# Patient Record
Sex: FEMALE | Race: WHITE | Employment: OTHER | URBAN - NONMETROPOLITAN AREA
[De-identification: names, ages, dates, MRNs, and addresses within clinical notes are randomized per-mention and may not be internally consistent; named-entity substitution may affect disease eponyms.]

---

## 2017-05-19 ENCOUNTER — HOSPITAL ENCOUNTER (OUTPATIENT)
Dept: GENERAL RADIOLOGY | Age: 62
Discharge: HOME | End: 2017-05-19
Payer: MEDICARE

## 2017-05-19 DIAGNOSIS — M25.561 ARTHRALGIA OF BOTH KNEES: ICD-10-CM

## 2017-05-19 DIAGNOSIS — M25.562 ARTHRALGIA OF BOTH KNEES: ICD-10-CM

## 2017-05-19 PROCEDURE — 73562 X-RAY EXAM OF KNEE 3: CPT

## 2020-11-04 ENCOUNTER — HOSPITAL ENCOUNTER (OUTPATIENT)
Dept: NON INVASIVE DIAGNOSTICS | Age: 65
Discharge: HOME | End: 2020-11-04
Payer: MEDICARE

## 2020-11-04 PROCEDURE — 93229 REMOTE 30 DAY ECG TECH SUPP: CPT

## 2022-06-14 ENCOUNTER — HOSPITAL ENCOUNTER (INPATIENT)
Age: 67
LOS: 16 days | Discharge: NURSING, REHAB OR LONG-TERM CARE | DRG: 853 | End: 2022-07-01
Attending: EMERGENCY MEDICINE | Admitting: INTERNAL MEDICINE
Payer: MEDICARE

## 2022-06-14 ENCOUNTER — APPOINTMENT (OUTPATIENT)
Dept: GENERAL RADIOLOGY | Age: 67
DRG: 853 | End: 2022-06-14
Payer: MEDICARE

## 2022-06-14 DIAGNOSIS — G89.4 CHRONIC PAIN SYNDROME: ICD-10-CM

## 2022-06-14 DIAGNOSIS — A41.9 SEPTICEMIA (HCC): ICD-10-CM

## 2022-06-14 DIAGNOSIS — J44.9 OSA AND COPD OVERLAP SYNDROME (HCC): ICD-10-CM

## 2022-06-14 DIAGNOSIS — R65.20 SEPSIS WITH ACUTE RENAL FAILURE AND TUBULAR NECROSIS WITHOUT SEPTIC SHOCK, DUE TO UNSPECIFIED ORGANISM (HCC): ICD-10-CM

## 2022-06-14 DIAGNOSIS — R10.30 LOWER ABDOMINAL PAIN: ICD-10-CM

## 2022-06-14 DIAGNOSIS — A41.9 SEPSIS WITH ACUTE RENAL FAILURE AND TUBULAR NECROSIS WITHOUT SEPTIC SHOCK, DUE TO UNSPECIFIED ORGANISM (HCC): ICD-10-CM

## 2022-06-14 DIAGNOSIS — N17.0 SEPSIS WITH ACUTE RENAL FAILURE AND TUBULAR NECROSIS WITHOUT SEPTIC SHOCK, DUE TO UNSPECIFIED ORGANISM (HCC): ICD-10-CM

## 2022-06-14 DIAGNOSIS — N17.9 ACUTE KIDNEY INJURY (HCC): Primary | ICD-10-CM

## 2022-06-14 DIAGNOSIS — N20.0 KIDNEY STONE: ICD-10-CM

## 2022-06-14 DIAGNOSIS — G47.33 OSA AND COPD OVERLAP SYNDROME (HCC): ICD-10-CM

## 2022-06-14 DIAGNOSIS — M15.9 PRIMARY OSTEOARTHRITIS INVOLVING MULTIPLE JOINTS: ICD-10-CM

## 2022-06-14 DIAGNOSIS — N30.01 ACUTE CYSTITIS WITH HEMATURIA: ICD-10-CM

## 2022-06-14 DIAGNOSIS — N20.1 URETERAL CALCULUS: ICD-10-CM

## 2022-06-14 DIAGNOSIS — N39.0 URINARY TRACT INFECTION WITHOUT HEMATURIA, SITE UNSPECIFIED: ICD-10-CM

## 2022-06-14 DIAGNOSIS — N20.1 CALCULUS OF DISTAL LEFT URETER: ICD-10-CM

## 2022-06-14 DIAGNOSIS — N13.9 OBSTRUCTIVE UROPATHY: ICD-10-CM

## 2022-06-14 LAB
ALBUMIN SERPL-MCNC: 3.1 G/DL (ref 3.5–5.2)
ALP BLD-CCNC: 68 U/L (ref 35–104)
ALT SERPL-CCNC: 42 U/L (ref 5–33)
ANION GAP SERPL CALCULATED.3IONS-SCNC: 17 MMOL/L (ref 7–19)
AST SERPL-CCNC: 50 U/L (ref 5–32)
BANDED NEUTROPHILS RELATIVE PERCENT: 9 % (ref 0–5)
BASOPHILS ABSOLUTE: 0 K/UL (ref 0–0.2)
BASOPHILS RELATIVE PERCENT: 0 % (ref 0–1)
BILIRUB SERPL-MCNC: 1.1 MG/DL (ref 0.2–1.2)
BUN BLDV-MCNC: 47 MG/DL (ref 8–23)
CALCIUM SERPL-MCNC: 8.5 MG/DL (ref 8.8–10.2)
CHLORIDE BLD-SCNC: 94 MMOL/L (ref 98–111)
CO2: 25 MMOL/L (ref 22–29)
CREAT SERPL-MCNC: 3 MG/DL (ref 0.5–0.9)
EOSINOPHILS ABSOLUTE: 0.17 K/UL (ref 0–0.6)
EOSINOPHILS RELATIVE PERCENT: 1 % (ref 0–5)
GFR AFRICAN AMERICAN: 19
GFR NON-AFRICAN AMERICAN: 16
GLUCOSE BLD-MCNC: 228 MG/DL (ref 74–109)
HCT VFR BLD CALC: 49.3 % (ref 37–47)
HEMOGLOBIN: 15.9 G/DL (ref 12–16)
IMMATURE GRANULOCYTES #: 1.6 K/UL
LACTIC ACID: 4.1 MMOL/L (ref 0.5–1.9)
LIPASE: 9 U/L (ref 13–60)
LYMPHOCYTES ABSOLUTE: 0.9 K/UL (ref 1.1–4.5)
LYMPHOCYTES RELATIVE PERCENT: 5 % (ref 20–40)
MCH RBC QN AUTO: 30.2 PG (ref 27–31)
MCHC RBC AUTO-ENTMCNC: 32.3 G/DL (ref 33–37)
MCV RBC AUTO: 93.7 FL (ref 81–99)
METAMYELOCYTES RELATIVE PERCENT: 2 %
MONOCYTES ABSOLUTE: 1 K/UL (ref 0–0.9)
MONOCYTES RELATIVE PERCENT: 6 % (ref 0–10)
NEUTROPHILS ABSOLUTE: 15 K/UL (ref 1.5–7.5)
NEUTROPHILS RELATIVE PERCENT: 77 % (ref 50–65)
PDW BLD-RTO: 14.2 % (ref 11.5–14.5)
PLATELET # BLD: 84 K/UL (ref 130–400)
PLATELET SLIDE REVIEW: ABNORMAL
PMV BLD AUTO: 13.1 FL (ref 9.4–12.3)
POTASSIUM REFLEX MAGNESIUM: 4.2 MMOL/L (ref 3.5–5)
PRO-BNP: 6541 PG/ML (ref 0–900)
RBC # BLD: 5.26 M/UL (ref 4.2–5.4)
RBC # BLD: NORMAL 10*6/UL
REASON FOR REJECTION: NORMAL
REJECTED TEST: NORMAL
SARS-COV-2, NAAT: NOT DETECTED
SODIUM BLD-SCNC: 136 MMOL/L (ref 136–145)
TOTAL PROTEIN: 6 G/DL (ref 6.6–8.7)
TROPONIN: 0.01 NG/ML (ref 0–0.03)
WBC # BLD: 17 K/UL (ref 4.8–10.8)

## 2022-06-14 PROCEDURE — 71045 X-RAY EXAM CHEST 1 VIEW: CPT

## 2022-06-14 PROCEDURE — 96361 HYDRATE IV INFUSION ADD-ON: CPT

## 2022-06-14 PROCEDURE — 2580000003 HC RX 258: Performed by: PHYSICIAN ASSISTANT

## 2022-06-14 PROCEDURE — 36415 COLL VENOUS BLD VENIPUNCTURE: CPT

## 2022-06-14 PROCEDURE — 87150 DNA/RNA AMPLIFIED PROBE: CPT

## 2022-06-14 PROCEDURE — 85025 COMPLETE CBC W/AUTO DIFF WBC: CPT

## 2022-06-14 PROCEDURE — 87186 SC STD MICRODIL/AGAR DIL: CPT

## 2022-06-14 PROCEDURE — 84484 ASSAY OF TROPONIN QUANT: CPT

## 2022-06-14 PROCEDURE — 83880 ASSAY OF NATRIURETIC PEPTIDE: CPT

## 2022-06-14 PROCEDURE — 83605 ASSAY OF LACTIC ACID: CPT

## 2022-06-14 PROCEDURE — 71045 X-RAY EXAM CHEST 1 VIEW: CPT | Performed by: RADIOLOGY

## 2022-06-14 PROCEDURE — 87040 BLOOD CULTURE FOR BACTERIA: CPT

## 2022-06-14 RX ORDER — 0.9 % SODIUM CHLORIDE 0.9 %
1000 INTRAVENOUS SOLUTION INTRAVENOUS ONCE
Status: COMPLETED | OUTPATIENT
Start: 2022-06-14 | End: 2022-06-14

## 2022-06-14 RX ADMIN — SODIUM CHLORIDE 1000 ML: 9 INJECTION, SOLUTION INTRAVENOUS at 22:42

## 2022-06-14 ASSESSMENT — ENCOUNTER SYMPTOMS
COLOR CHANGE: 0
SHORTNESS OF BREATH: 0
PHOTOPHOBIA: 0
BACK PAIN: 0
ABDOMINAL DISTENTION: 0
APNEA: 0
NAUSEA: 0
RHINORRHEA: 0
COUGH: 0
ABDOMINAL PAIN: 1
SORE THROAT: 0
EYE PAIN: 0
EYE DISCHARGE: 0

## 2022-06-15 ENCOUNTER — APPOINTMENT (OUTPATIENT)
Dept: GENERAL RADIOLOGY | Age: 67
DRG: 853 | End: 2022-06-15
Payer: MEDICARE

## 2022-06-15 ENCOUNTER — ANESTHESIA EVENT (OUTPATIENT)
Dept: OPERATING ROOM | Age: 67
DRG: 853 | End: 2022-06-15
Payer: MEDICARE

## 2022-06-15 ENCOUNTER — APPOINTMENT (OUTPATIENT)
Dept: CT IMAGING | Age: 67
DRG: 853 | End: 2022-06-15
Payer: MEDICARE

## 2022-06-15 ENCOUNTER — ANESTHESIA (OUTPATIENT)
Dept: OPERATING ROOM | Age: 67
DRG: 853 | End: 2022-06-15
Payer: MEDICARE

## 2022-06-15 PROBLEM — N17.9 AKI (ACUTE KIDNEY INJURY) (HCC): Status: ACTIVE | Noted: 2022-06-15

## 2022-06-15 PROBLEM — R65.20 SEPSIS WITH ACUTE RENAL FAILURE (HCC): Status: ACTIVE | Noted: 2022-06-15

## 2022-06-15 PROBLEM — N20.1 LEFT URETERAL CALCULUS: Status: ACTIVE | Noted: 2022-06-15

## 2022-06-15 PROBLEM — A41.9 SEPSIS WITH ACUTE RENAL FAILURE (HCC): Status: ACTIVE | Noted: 2022-06-15

## 2022-06-15 PROBLEM — G47.33 OSA AND COPD OVERLAP SYNDROME (HCC): Status: ACTIVE | Noted: 2022-06-15

## 2022-06-15 PROBLEM — J44.9 OSA AND COPD OVERLAP SYNDROME (HCC): Status: ACTIVE | Noted: 2022-06-15

## 2022-06-15 PROBLEM — N13.9 OBSTRUCTIVE UROPATHY: Status: ACTIVE | Noted: 2022-06-15

## 2022-06-15 PROBLEM — N20.0 RENAL CALCULUS, BILATERAL: Status: ACTIVE | Noted: 2022-06-15

## 2022-06-15 PROBLEM — N17.9 SEPSIS WITH ACUTE RENAL FAILURE (HCC): Status: ACTIVE | Noted: 2022-06-15

## 2022-06-15 PROBLEM — E86.0 DEHYDRATION: Status: ACTIVE | Noted: 2022-06-15

## 2022-06-15 PROBLEM — E11.65 HYPERGLYCEMIA DUE TO DIABETES MELLITUS (HCC): Status: ACTIVE | Noted: 2022-06-15

## 2022-06-15 PROBLEM — N11.1 OBSTRUCTIVE PYELONEPHRITIS: Status: ACTIVE | Noted: 2022-06-15

## 2022-06-15 PROBLEM — M19.90 DEGENERATIVE JOINT DISEASE: Status: ACTIVE | Noted: 2022-06-15

## 2022-06-15 PROBLEM — I10 HYPERTENSION: Status: ACTIVE | Noted: 2022-06-15

## 2022-06-15 LAB
ACINETOBACTER CALCOAC BAUMANNII COMPLEX BY PCR: NOT DETECTED
ALBUMIN SERPL-MCNC: 2.8 G/DL (ref 3.5–5.2)
ALP BLD-CCNC: 73 U/L (ref 35–104)
ALT SERPL-CCNC: 39 U/L (ref 5–33)
AMORPHOUS: ABNORMAL /HPF
ANION GAP SERPL CALCULATED.3IONS-SCNC: 18 MMOL/L (ref 7–19)
AST SERPL-CCNC: 48 U/L (ref 5–32)
BACTERIA: ABNORMAL /HPF
BACTEROIDES FRAGILIS BY PCR: NOT DETECTED
BANDED NEUTROPHILS RELATIVE PERCENT: 15 % (ref 0–5)
BASOPHILS ABSOLUTE: 0 K/UL (ref 0–0.2)
BASOPHILS RELATIVE PERCENT: 0 % (ref 0–1)
BILIRUB SERPL-MCNC: 1 MG/DL (ref 0.2–1.2)
BILIRUBIN URINE: ABNORMAL
BLOOD, URINE: ABNORMAL
BUN BLDV-MCNC: 52 MG/DL (ref 8–23)
CALCIUM SERPL-MCNC: 8 MG/DL (ref 8.8–10.2)
CANDIDA ALBICANS BY PCR: NOT DETECTED
CANDIDA AURIS BY PCR: NOT DETECTED
CANDIDA GLABRATA BY PCR: NOT DETECTED
CANDIDA KRUSEI BY PCR: NOT DETECTED
CANDIDA PARAPSILOSIS BY PCR: NOT DETECTED
CANDIDA TROPICALIS BY PCR: NOT DETECTED
CARBAPENEM RESISTANCE IMP GENE BY PCR: NOT DETECTED
CARBAPENEM RESISTANCE KPC BY PCR: NOT DETECTED
CARBAPENEM RESISTANCE NDM GENE BY PCR: NOT DETECTED
CARBAPENEM RESISTANCE OXA-48 GENE BY PCR: NOT DETECTED
CARBAPENEM RESISTANCE VIM GENE BY PCR: NOT DETECTED
CEPHALOSPORIN RESISTANCE CTX-M GENE BY PCR: NOT DETECTED
CHLORIDE BLD-SCNC: 97 MMOL/L (ref 98–111)
CLARITY: ABNORMAL
CO2: 21 MMOL/L (ref 22–29)
COLISTIN RESISTANCE MCR-1 GENE BY PCR: NOT DETECTED
COLOR: ABNORMAL
CREAT SERPL-MCNC: 2.8 MG/DL (ref 0.5–0.9)
CRYPTOCOCCUS NEOFORMANS/GATTII BY PCR: NOT DETECTED
ENTEROBACTER CLOACAE COMPLEX BY PCR: NOT DETECTED
ENTEROBACTERALES BY PCR: DETECTED
ENTEROCOCCUS FAECALIS BY PCR: NOT DETECTED
ENTEROCOCCUS FAECIUM BY PCR: NOT DETECTED
EOSINOPHILS ABSOLUTE: 0 K/UL (ref 0–0.6)
EOSINOPHILS RELATIVE PERCENT: 0 % (ref 0–5)
ESCHERICHIA COLI BY PCR: DETECTED
GFR AFRICAN AMERICAN: 20
GFR NON-AFRICAN AMERICAN: 17
GLUCOSE BLD-MCNC: 171 MG/DL (ref 70–99)
GLUCOSE BLD-MCNC: 178 MG/DL (ref 70–99)
GLUCOSE BLD-MCNC: 193 MG/DL (ref 70–99)
GLUCOSE BLD-MCNC: 197 MG/DL (ref 70–99)
GLUCOSE BLD-MCNC: 200 MG/DL (ref 70–99)
GLUCOSE BLD-MCNC: 216 MG/DL (ref 74–109)
GLUCOSE URINE: 100 MG/DL
HAEMOPHILUS INFLUENZAE BY PCR: NOT DETECTED
HBA1C MFR BLD: 9.1 % (ref 4–6)
HCT VFR BLD CALC: 42.9 % (ref 37–47)
HEMOGLOBIN: 13.6 G/DL (ref 12–16)
IMMATURE GRANULOCYTES #: 2.4 K/UL
INR BLD: 1.39 (ref 0.88–1.18)
KETONES, URINE: 15 MG/DL
KLEBSIELLA AEROGENES BY PCR: NOT DETECTED
KLEBSIELLA OXYTOCA BY PCR: NOT DETECTED
KLEBSIELLA PNEUMONIAE GROUP BY PCR: NOT DETECTED
LACTIC ACID: 2.6 MMOL/L (ref 0.5–1.9)
LACTIC ACID: 4 MMOL/L (ref 0.5–1.9)
LACTIC ACID: 4.3 MMOL/L (ref 0.5–1.9)
LEUKOCYTE ESTERASE, URINE: ABNORMAL
LISTERIA MONOCYTOGENES BY PCR: NOT DETECTED
LYMPHOCYTES ABSOLUTE: 0.9 K/UL (ref 1.1–4.5)
LYMPHOCYTES RELATIVE PERCENT: 7 % (ref 20–40)
MCH RBC QN AUTO: 30.1 PG (ref 27–31)
MCHC RBC AUTO-ENTMCNC: 31.7 G/DL (ref 33–37)
MCV RBC AUTO: 94.9 FL (ref 81–99)
MONOCYTES ABSOLUTE: 0.5 K/UL (ref 0–0.9)
MONOCYTES RELATIVE PERCENT: 4 % (ref 0–10)
NEISSERIA MENINGITIDIS BY PCR: NOT DETECTED
NEUTROPHILS ABSOLUTE: 11.1 K/UL (ref 1.5–7.5)
NEUTROPHILS RELATIVE PERCENT: 74 % (ref 50–65)
NITRITE, URINE: NEGATIVE
PDW BLD-RTO: 14.3 % (ref 11.5–14.5)
PERFORMED ON: ABNORMAL
PH UA: 5 (ref 5–8)
PLATELET # BLD: 64 K/UL (ref 130–400)
PMV BLD AUTO: 12.8 FL (ref 9.4–12.3)
POTASSIUM REFLEX MAGNESIUM: 4.5 MMOL/L (ref 3.5–5)
PROTEIN UA: 100 MG/DL
PROTEUS SPECIES BY PCR: NOT DETECTED
PROTHROMBIN TIME: 17.2 SEC (ref 12–14.6)
PSEUDOMONAS AERUGINOSA BY PCR: NOT DETECTED
RBC # BLD: 4.52 M/UL (ref 4.2–5.4)
RBC # BLD: NORMAL 10*6/UL
SALMONELLA SPECIES BY PCR: NOT DETECTED
SERRATIA MARCESCENS BY PCR: NOT DETECTED
SODIUM BLD-SCNC: 136 MMOL/L (ref 136–145)
SPECIFIC GRAVITY UA: 1.03 (ref 1–1.03)
STAPHYLOCOCCUS AUREUS BY PCR: NOT DETECTED
STAPHYLOCOCCUS EPIDERMIDIS BY PCR: NOT DETECTED
STAPHYLOCOCCUS LUGDUNENSIS BY PCR: NOT DETECTED
STAPHYLOCOCCUS SPECIES BY PCR: NOT DETECTED
STENOTROPHOMONAS MALTOPHILIA BY PCR: NOT DETECTED
STREPTOCOCCUS AGALACTIAE BY PCR: NOT DETECTED
STREPTOCOCCUS PNEUMONIAE BY PCR: NOT DETECTED
STREPTOCOCCUS PYOGENES  BY PCR: NOT DETECTED
STREPTOCOCCUS SPECIES BY PCR: NOT DETECTED
TOTAL PROTEIN: 5.4 G/DL (ref 6.6–8.7)
TROPONIN: 0.02 NG/ML (ref 0–0.03)
TROPONIN: 0.02 NG/ML (ref 0–0.03)
TROPONIN: <0.01 NG/ML (ref 0–0.03)
UROBILINOGEN, URINE: 1 E.U./DL
VACUOLATED NEUTROPHILS: ABNORMAL
WBC # BLD: 12.5 K/UL (ref 4.8–10.8)
WBC UA: ABNORMAL /HPF (ref 0–5)
YEAST: PRESENT /HPF

## 2022-06-15 PROCEDURE — 6360000002 HC RX W HCPCS: Performed by: UROLOGY

## 2022-06-15 PROCEDURE — 80053 COMPREHEN METABOLIC PANEL: CPT

## 2022-06-15 PROCEDURE — 94640 AIRWAY INHALATION TREATMENT: CPT

## 2022-06-15 PROCEDURE — 87077 CULTURE AEROBIC IDENTIFY: CPT

## 2022-06-15 PROCEDURE — 6360000002 HC RX W HCPCS: Performed by: INTERNAL MEDICINE

## 2022-06-15 PROCEDURE — 6370000000 HC RX 637 (ALT 250 FOR IP): Performed by: UROLOGY

## 2022-06-15 PROCEDURE — 2500000003 HC RX 250 WO HCPCS: Performed by: NURSE ANESTHETIST, CERTIFIED REGISTERED

## 2022-06-15 PROCEDURE — 3600000004 HC SURGERY LEVEL 4 BASE: Performed by: UROLOGY

## 2022-06-15 PROCEDURE — C1751 CATH, INF, PER/CENT/MIDLINE: HCPCS | Performed by: UROLOGY

## 2022-06-15 PROCEDURE — 87186 SC STD MICRODIL/AGAR DIL: CPT

## 2022-06-15 PROCEDURE — 84484 ASSAY OF TROPONIN QUANT: CPT

## 2022-06-15 PROCEDURE — 7100000000 HC PACU RECOVERY - FIRST 15 MIN: Performed by: UROLOGY

## 2022-06-15 PROCEDURE — 83605 ASSAY OF LACTIC ACID: CPT

## 2022-06-15 PROCEDURE — C1769 GUIDE WIRE: HCPCS | Performed by: UROLOGY

## 2022-06-15 PROCEDURE — 52332 CYSTOSCOPY AND TREATMENT: CPT | Performed by: UROLOGY

## 2022-06-15 PROCEDURE — 3600000014 HC SURGERY LEVEL 4 ADDTL 15MIN: Performed by: UROLOGY

## 2022-06-15 PROCEDURE — 71045 X-RAY EXAM CHEST 1 VIEW: CPT | Performed by: RADIOLOGY

## 2022-06-15 PROCEDURE — 2580000003 HC RX 258: Performed by: NURSE ANESTHETIST, CERTIFIED REGISTERED

## 2022-06-15 PROCEDURE — 2000000000 HC ICU R&B

## 2022-06-15 PROCEDURE — 87635 SARS-COV-2 COVID-19 AMP PRB: CPT

## 2022-06-15 PROCEDURE — 96374 THER/PROPH/DIAG INJ IV PUSH: CPT

## 2022-06-15 PROCEDURE — 85025 COMPLETE CBC W/AUTO DIFF WBC: CPT

## 2022-06-15 PROCEDURE — 6360000002 HC RX W HCPCS: Performed by: NURSE ANESTHETIST, CERTIFIED REGISTERED

## 2022-06-15 PROCEDURE — 74176 CT ABD & PELVIS W/O CONTRAST: CPT

## 2022-06-15 PROCEDURE — 85610 PROTHROMBIN TIME: CPT

## 2022-06-15 PROCEDURE — 83690 ASSAY OF LIPASE: CPT

## 2022-06-15 PROCEDURE — 93005 ELECTROCARDIOGRAM TRACING: CPT | Performed by: INTERNAL MEDICINE

## 2022-06-15 PROCEDURE — 3700000000 HC ANESTHESIA ATTENDED CARE: Performed by: UROLOGY

## 2022-06-15 PROCEDURE — 71045 X-RAY EXAM CHEST 1 VIEW: CPT

## 2022-06-15 PROCEDURE — 83036 HEMOGLOBIN GLYCOSYLATED A1C: CPT

## 2022-06-15 PROCEDURE — 74176 CT ABD & PELVIS W/O CONTRAST: CPT | Performed by: RADIOLOGY

## 2022-06-15 PROCEDURE — 99285 EMERGENCY DEPT VISIT HI MDM: CPT

## 2022-06-15 PROCEDURE — 2580000003 HC RX 258: Performed by: UROLOGY

## 2022-06-15 PROCEDURE — 2500000003 HC RX 250 WO HCPCS: Performed by: INTERNAL MEDICINE

## 2022-06-15 PROCEDURE — 81001 URINALYSIS AUTO W/SCOPE: CPT

## 2022-06-15 PROCEDURE — 87086 URINE CULTURE/COLONY COUNT: CPT

## 2022-06-15 PROCEDURE — 2580000003 HC RX 258: Performed by: INTERNAL MEDICINE

## 2022-06-15 PROCEDURE — 2580000003 HC RX 258: Performed by: EMERGENCY MEDICINE

## 2022-06-15 PROCEDURE — 7100000001 HC PACU RECOVERY - ADDTL 15 MIN: Performed by: UROLOGY

## 2022-06-15 PROCEDURE — 99223 1ST HOSP IP/OBS HIGH 75: CPT | Performed by: UROLOGY

## 2022-06-15 PROCEDURE — 6370000000 HC RX 637 (ALT 250 FOR IP): Performed by: INTERNAL MEDICINE

## 2022-06-15 PROCEDURE — C2617 STENT, NON-COR, TEM W/O DEL: HCPCS | Performed by: UROLOGY

## 2022-06-15 PROCEDURE — 36415 COLL VENOUS BLD VENIPUNCTURE: CPT

## 2022-06-15 PROCEDURE — 3700000001 HC ADD 15 MINUTES (ANESTHESIA): Performed by: UROLOGY

## 2022-06-15 PROCEDURE — 2709999900 HC NON-CHARGEABLE SUPPLY: Performed by: UROLOGY

## 2022-06-15 PROCEDURE — 6360000002 HC RX W HCPCS: Performed by: EMERGENCY MEDICINE

## 2022-06-15 PROCEDURE — 2700000000 HC OXYGEN THERAPY PER DAY

## 2022-06-15 PROCEDURE — 87040 BLOOD CULTURE FOR BACTERIA: CPT

## 2022-06-15 PROCEDURE — 82947 ASSAY GLUCOSE BLOOD QUANT: CPT

## 2022-06-15 PROCEDURE — C1758 CATHETER, URETERAL: HCPCS | Performed by: UROLOGY

## 2022-06-15 PROCEDURE — 0T778DZ DILATION OF LEFT URETER WITH INTRALUMINAL DEVICE, VIA NATURAL OR ARTIFICIAL OPENING ENDOSCOPIC: ICD-10-PCS | Performed by: UROLOGY

## 2022-06-15 DEVICE — URETERAL STENT
Type: IMPLANTABLE DEVICE | Site: URETER | Status: FUNCTIONAL
Brand: POLARIS™ ULTRA

## 2022-06-15 RX ORDER — INSULIN GLARGINE 100 [IU]/ML
10 INJECTION, SOLUTION SUBCUTANEOUS NIGHTLY
Status: DISCONTINUED | OUTPATIENT
Start: 2022-06-15 | End: 2022-07-02 | Stop reason: HOSPADM

## 2022-06-15 RX ORDER — MEPERIDINE HYDROCHLORIDE 25 MG/ML
12.5 INJECTION INTRAMUSCULAR; INTRAVENOUS; SUBCUTANEOUS EVERY 5 MIN PRN
Status: DISCONTINUED | OUTPATIENT
Start: 2022-06-15 | End: 2022-06-15 | Stop reason: HOSPADM

## 2022-06-15 RX ORDER — ACETAMINOPHEN 650 MG/1
650 SUPPOSITORY RECTAL EVERY 6 HOURS PRN
Status: DISCONTINUED | OUTPATIENT
Start: 2022-06-15 | End: 2022-07-02 | Stop reason: HOSPADM

## 2022-06-15 RX ORDER — LISINOPRIL AND HYDROCHLOROTHIAZIDE 20; 12.5 MG/1; MG/1
1 TABLET ORAL 2 TIMES DAILY
COMMUNITY

## 2022-06-15 RX ORDER — GABAPENTIN 600 MG/1
600 TABLET ORAL 4 TIMES DAILY
Status: ON HOLD | COMMUNITY
End: 2022-07-01 | Stop reason: SDUPTHER

## 2022-06-15 RX ORDER — SODIUM CHLORIDE 9 MG/ML
INJECTION, SOLUTION INTRAVENOUS CONTINUOUS
Status: DISCONTINUED | OUTPATIENT
Start: 2022-06-15 | End: 2022-06-15

## 2022-06-15 RX ORDER — SODIUM CHLORIDE 9 MG/ML
INJECTION, SOLUTION INTRAVENOUS PRN
Status: DISCONTINUED | OUTPATIENT
Start: 2022-06-15 | End: 2022-06-15 | Stop reason: HOSPADM

## 2022-06-15 RX ORDER — PRAVASTATIN SODIUM 20 MG
40 TABLET ORAL DAILY
Status: DISCONTINUED | OUTPATIENT
Start: 2022-06-15 | End: 2022-07-02 | Stop reason: HOSPADM

## 2022-06-15 RX ORDER — CEFEPIME HYDROCHLORIDE 1 G/50ML
1000 INJECTION, SOLUTION INTRAVENOUS EVERY 12 HOURS
Status: DISCONTINUED | OUTPATIENT
Start: 2022-06-15 | End: 2022-06-17

## 2022-06-15 RX ORDER — OMEPRAZOLE 20 MG/1
20 CAPSULE, DELAYED RELEASE ORAL DAILY
COMMUNITY
End: 2022-08-17

## 2022-06-15 RX ORDER — NOREPINEPHRINE BIT/0.9 % NACL 16MG/250ML
1-100 INFUSION BOTTLE (ML) INTRAVENOUS CONTINUOUS
Status: DISCONTINUED | OUTPATIENT
Start: 2022-06-15 | End: 2022-06-16

## 2022-06-15 RX ORDER — SUCCINYLCHOLINE CHLORIDE 20 MG/ML
INJECTION INTRAMUSCULAR; INTRAVENOUS PRN
Status: DISCONTINUED | OUTPATIENT
Start: 2022-06-15 | End: 2022-06-15 | Stop reason: SDUPTHER

## 2022-06-15 RX ORDER — SODIUM CHLORIDE 9 MG/ML
INJECTION, SOLUTION INTRAVENOUS PRN
Status: DISCONTINUED | OUTPATIENT
Start: 2022-06-15 | End: 2022-07-02 | Stop reason: HOSPADM

## 2022-06-15 RX ORDER — HYDROMORPHONE HYDROCHLORIDE 1 MG/ML
0.25 INJECTION, SOLUTION INTRAMUSCULAR; INTRAVENOUS; SUBCUTANEOUS EVERY 5 MIN PRN
Status: DISCONTINUED | OUTPATIENT
Start: 2022-06-15 | End: 2022-06-15 | Stop reason: HOSPADM

## 2022-06-15 RX ORDER — IPRATROPIUM BROMIDE AND ALBUTEROL SULFATE 2.5; .5 MG/3ML; MG/3ML
1 SOLUTION RESPIRATORY (INHALATION)
Status: DISCONTINUED | OUTPATIENT
Start: 2022-06-15 | End: 2022-07-02 | Stop reason: HOSPADM

## 2022-06-15 RX ORDER — HYDROMORPHONE HYDROCHLORIDE 1 MG/ML
0.5 INJECTION, SOLUTION INTRAMUSCULAR; INTRAVENOUS; SUBCUTANEOUS
Status: DISCONTINUED | OUTPATIENT
Start: 2022-06-15 | End: 2022-06-16

## 2022-06-15 RX ORDER — LORAZEPAM 0.5 MG/1
0.5 TABLET ORAL EVERY 6 HOURS PRN
Status: ON HOLD | COMMUNITY
End: 2022-07-01 | Stop reason: SDUPTHER

## 2022-06-15 RX ORDER — HYDROMORPHONE HYDROCHLORIDE 1 MG/ML
0.5 INJECTION, SOLUTION INTRAMUSCULAR; INTRAVENOUS; SUBCUTANEOUS EVERY 5 MIN PRN
Status: DISCONTINUED | OUTPATIENT
Start: 2022-06-15 | End: 2022-06-15 | Stop reason: HOSPADM

## 2022-06-15 RX ORDER — ONDANSETRON 4 MG/1
4 TABLET, ORALLY DISINTEGRATING ORAL EVERY 8 HOURS PRN
Status: DISCONTINUED | OUTPATIENT
Start: 2022-06-15 | End: 2022-07-02 | Stop reason: HOSPADM

## 2022-06-15 RX ORDER — DIPHENHYDRAMINE HYDROCHLORIDE 50 MG/ML
12.5 INJECTION INTRAMUSCULAR; INTRAVENOUS
Status: DISCONTINUED | OUTPATIENT
Start: 2022-06-15 | End: 2022-06-15 | Stop reason: HOSPADM

## 2022-06-15 RX ORDER — METOCLOPRAMIDE HYDROCHLORIDE 5 MG/ML
10 INJECTION INTRAMUSCULAR; INTRAVENOUS
Status: DISCONTINUED | OUTPATIENT
Start: 2022-06-15 | End: 2022-06-15 | Stop reason: HOSPADM

## 2022-06-15 RX ORDER — ENOXAPARIN SODIUM 100 MG/ML
30 INJECTION SUBCUTANEOUS DAILY
Status: DISCONTINUED | OUTPATIENT
Start: 2022-06-15 | End: 2022-06-15

## 2022-06-15 RX ORDER — ONDANSETRON 2 MG/ML
4 INJECTION INTRAMUSCULAR; INTRAVENOUS EVERY 6 HOURS PRN
Status: DISCONTINUED | OUTPATIENT
Start: 2022-06-15 | End: 2022-07-02 | Stop reason: HOSPADM

## 2022-06-15 RX ORDER — ONDANSETRON 2 MG/ML
INJECTION INTRAMUSCULAR; INTRAVENOUS PRN
Status: DISCONTINUED | OUTPATIENT
Start: 2022-06-15 | End: 2022-06-15 | Stop reason: SDUPTHER

## 2022-06-15 RX ORDER — POTASSIUM CHLORIDE 7.45 MG/ML
10 INJECTION INTRAVENOUS PRN
Status: DISCONTINUED | OUTPATIENT
Start: 2022-06-15 | End: 2022-07-02 | Stop reason: HOSPADM

## 2022-06-15 RX ORDER — HYDROMORPHONE HYDROCHLORIDE 1 MG/ML
0.25 INJECTION, SOLUTION INTRAMUSCULAR; INTRAVENOUS; SUBCUTANEOUS
Status: DISCONTINUED | OUTPATIENT
Start: 2022-06-15 | End: 2022-06-16

## 2022-06-15 RX ORDER — SODIUM CHLORIDE 0.9 % (FLUSH) 0.9 %
5-40 SYRINGE (ML) INJECTION EVERY 12 HOURS SCHEDULED
Status: DISCONTINUED | OUTPATIENT
Start: 2022-06-15 | End: 2022-06-15 | Stop reason: HOSPADM

## 2022-06-15 RX ORDER — SODIUM CHLORIDE 0.9 % (FLUSH) 0.9 %
5-40 SYRINGE (ML) INJECTION EVERY 12 HOURS SCHEDULED
Status: DISCONTINUED | OUTPATIENT
Start: 2022-06-15 | End: 2022-07-02 | Stop reason: HOSPADM

## 2022-06-15 RX ORDER — LEVOFLOXACIN 5 MG/ML
250 INJECTION, SOLUTION INTRAVENOUS EVERY 24 HOURS
Status: DISCONTINUED | OUTPATIENT
Start: 2022-06-16 | End: 2022-06-18

## 2022-06-15 RX ORDER — FENTANYL CITRATE 50 UG/ML
INJECTION, SOLUTION INTRAMUSCULAR; INTRAVENOUS PRN
Status: DISCONTINUED | OUTPATIENT
Start: 2022-06-15 | End: 2022-06-15 | Stop reason: SDUPTHER

## 2022-06-15 RX ORDER — INSULIN LISPRO 100 [IU]/ML
0-9 INJECTION, SOLUTION INTRAVENOUS; SUBCUTANEOUS NIGHTLY
Status: DISCONTINUED | OUTPATIENT
Start: 2022-06-15 | End: 2022-07-02 | Stop reason: HOSPADM

## 2022-06-15 RX ORDER — HYDROMORPHONE HYDROCHLORIDE 1 MG/ML
1 INJECTION, SOLUTION INTRAMUSCULAR; INTRAVENOUS; SUBCUTANEOUS
Status: DISCONTINUED | OUTPATIENT
Start: 2022-06-15 | End: 2022-06-15

## 2022-06-15 RX ORDER — SODIUM CHLORIDE 0.9 % (FLUSH) 0.9 %
5-40 SYRINGE (ML) INJECTION PRN
Status: DISCONTINUED | OUTPATIENT
Start: 2022-06-15 | End: 2022-06-15 | Stop reason: HOSPADM

## 2022-06-15 RX ORDER — DEXTROSE MONOHYDRATE 50 MG/ML
100 INJECTION, SOLUTION INTRAVENOUS PRN
Status: DISCONTINUED | OUTPATIENT
Start: 2022-06-15 | End: 2022-07-02 | Stop reason: HOSPADM

## 2022-06-15 RX ORDER — METOPROLOL TARTRATE 50 MG/1
100 TABLET, FILM COATED ORAL 2 TIMES DAILY
Status: DISCONTINUED | OUTPATIENT
Start: 2022-06-15 | End: 2022-06-18

## 2022-06-15 RX ORDER — INSULIN LISPRO 100 [IU]/ML
0-18 INJECTION, SOLUTION INTRAVENOUS; SUBCUTANEOUS
Status: DISCONTINUED | OUTPATIENT
Start: 2022-06-15 | End: 2022-07-02 | Stop reason: HOSPADM

## 2022-06-15 RX ORDER — SODIUM CHLORIDE, SODIUM LACTATE, POTASSIUM CHLORIDE, CALCIUM CHLORIDE 600; 310; 30; 20 MG/100ML; MG/100ML; MG/100ML; MG/100ML
INJECTION, SOLUTION INTRAVENOUS CONTINUOUS
Status: DISCONTINUED | OUTPATIENT
Start: 2022-06-15 | End: 2022-06-29

## 2022-06-15 RX ORDER — LEVOFLOXACIN 5 MG/ML
500 INJECTION, SOLUTION INTRAVENOUS EVERY 24 HOURS
Status: DISCONTINUED | OUTPATIENT
Start: 2022-06-16 | End: 2022-06-15

## 2022-06-15 RX ORDER — SODIUM CHLORIDE, SODIUM LACTATE, POTASSIUM CHLORIDE, CALCIUM CHLORIDE 600; 310; 30; 20 MG/100ML; MG/100ML; MG/100ML; MG/100ML
INJECTION, SOLUTION INTRAVENOUS CONTINUOUS PRN
Status: DISCONTINUED | OUTPATIENT
Start: 2022-06-15 | End: 2022-06-15 | Stop reason: SDUPTHER

## 2022-06-15 RX ORDER — POLYETHYLENE GLYCOL 3350 17 G/17G
17 POWDER, FOR SOLUTION ORAL DAILY PRN
Status: DISCONTINUED | OUTPATIENT
Start: 2022-06-15 | End: 2022-07-02 | Stop reason: HOSPADM

## 2022-06-15 RX ORDER — SODIUM CHLORIDE 0.9 % (FLUSH) 0.9 %
5-40 SYRINGE (ML) INJECTION PRN
Status: DISCONTINUED | OUTPATIENT
Start: 2022-06-15 | End: 2022-07-02 | Stop reason: HOSPADM

## 2022-06-15 RX ORDER — CLOTRIMAZOLE AND BETAMETHASONE DIPROPIONATE 10; .64 MG/G; MG/G
CREAM TOPICAL 2 TIMES DAILY
COMMUNITY

## 2022-06-15 RX ORDER — LISINOPRIL AND HYDROCHLOROTHIAZIDE 20; 12.5 MG/1; MG/1
1 TABLET ORAL 2 TIMES DAILY
Status: DISCONTINUED | OUTPATIENT
Start: 2022-06-15 | End: 2022-06-19

## 2022-06-15 RX ORDER — ACETAMINOPHEN 325 MG/1
650 TABLET ORAL EVERY 6 HOURS PRN
Status: DISCONTINUED | OUTPATIENT
Start: 2022-06-15 | End: 2022-07-02 | Stop reason: HOSPADM

## 2022-06-15 RX ORDER — PANTOPRAZOLE SODIUM 40 MG/1
40 TABLET, DELAYED RELEASE ORAL
Status: DISCONTINUED | OUTPATIENT
Start: 2022-06-15 | End: 2022-06-15

## 2022-06-15 RX ORDER — LEVOFLOXACIN 5 MG/ML
500 INJECTION, SOLUTION INTRAVENOUS
Status: COMPLETED | OUTPATIENT
Start: 2022-06-15 | End: 2022-06-15

## 2022-06-15 RX ORDER — PROPOFOL 10 MG/ML
INJECTION, EMULSION INTRAVENOUS PRN
Status: DISCONTINUED | OUTPATIENT
Start: 2022-06-15 | End: 2022-06-15 | Stop reason: SDUPTHER

## 2022-06-15 RX ORDER — GABAPENTIN 600 MG/1
600 TABLET ORAL 4 TIMES DAILY
Status: DISCONTINUED | OUTPATIENT
Start: 2022-06-15 | End: 2022-07-02 | Stop reason: HOSPADM

## 2022-06-15 RX ORDER — LIDOCAINE HYDROCHLORIDE 10 MG/ML
INJECTION, SOLUTION INFILTRATION; PERINEURAL PRN
Status: DISCONTINUED | OUTPATIENT
Start: 2022-06-15 | End: 2022-06-15 | Stop reason: SDUPTHER

## 2022-06-15 RX ORDER — HYDROCODONE BITARTRATE AND ACETAMINOPHEN 10; 325 MG/1; MG/1
2 TABLET ORAL 4 TIMES DAILY PRN
Status: ON HOLD | COMMUNITY
End: 2022-07-01 | Stop reason: SDUPTHER

## 2022-06-15 RX ORDER — PRAVASTATIN SODIUM 40 MG
40 TABLET ORAL DAILY
COMMUNITY

## 2022-06-15 RX ORDER — METOPROLOL TARTRATE 100 MG/1
100 TABLET ORAL 2 TIMES DAILY
COMMUNITY
End: 2022-09-11

## 2022-06-15 RX ORDER — MAGNESIUM SULFATE IN WATER 40 MG/ML
2000 INJECTION, SOLUTION INTRAVENOUS PRN
Status: DISCONTINUED | OUTPATIENT
Start: 2022-06-15 | End: 2022-07-02 | Stop reason: HOSPADM

## 2022-06-15 RX ADMIN — LEVOFLOXACIN 500 MG: 5 INJECTION, SOLUTION INTRAVENOUS at 03:29

## 2022-06-15 RX ADMIN — INSULIN LISPRO 2 UNITS: 100 INJECTION, SOLUTION INTRAVENOUS; SUBCUTANEOUS at 20:16

## 2022-06-15 RX ADMIN — PHENYLEPHRINE HYDROCHLORIDE 100 MCG: 10 INJECTION INTRAVENOUS at 03:39

## 2022-06-15 RX ADMIN — PHENYLEPHRINE HYDROCHLORIDE 100 MCG: 10 INJECTION INTRAVENOUS at 03:37

## 2022-06-15 RX ADMIN — SUCCINYLCHOLINE CHLORIDE 160 MG: 20 INJECTION, SOLUTION INTRAMUSCULAR; INTRAVENOUS at 03:21

## 2022-06-15 RX ADMIN — METOPROLOL TARTRATE 100 MG: 50 TABLET, FILM COATED ORAL at 09:50

## 2022-06-15 RX ADMIN — FENTANYL CITRATE 50 MCG: 50 INJECTION, SOLUTION INTRAMUSCULAR; INTRAVENOUS at 03:19

## 2022-06-15 RX ADMIN — SODIUM CHLORIDE: 9 INJECTION, SOLUTION INTRAVENOUS at 08:39

## 2022-06-15 RX ADMIN — SODIUM CHLORIDE: 9 INJECTION, SOLUTION INTRAVENOUS at 02:08

## 2022-06-15 RX ADMIN — IPRATROPIUM BROMIDE AND ALBUTEROL SULFATE 1 AMPULE: 2.5; .5 SOLUTION RESPIRATORY (INHALATION) at 06:10

## 2022-06-15 RX ADMIN — SODIUM CHLORIDE, POTASSIUM CHLORIDE, SODIUM LACTATE AND CALCIUM CHLORIDE: 600; 310; 30; 20 INJECTION, SOLUTION INTRAVENOUS at 13:01

## 2022-06-15 RX ADMIN — IPRATROPIUM BROMIDE AND ALBUTEROL SULFATE 1 AMPULE: 2.5; .5 SOLUTION RESPIRATORY (INHALATION) at 14:27

## 2022-06-15 RX ADMIN — PROPOFOL 200 MG: 10 INJECTION, EMULSION INTRAVENOUS at 03:20

## 2022-06-15 RX ADMIN — Medication 28 MCG/MIN: at 19:04

## 2022-06-15 RX ADMIN — SODIUM CHLORIDE, POTASSIUM CHLORIDE, SODIUM LACTATE AND CALCIUM CHLORIDE: 600; 310; 30; 20 INJECTION, SOLUTION INTRAVENOUS at 20:24

## 2022-06-15 RX ADMIN — INSULIN LISPRO 6 UNITS: 100 INJECTION, SOLUTION INTRAVENOUS; SUBCUTANEOUS at 07:42

## 2022-06-15 RX ADMIN — SODIUM CHLORIDE, PRESERVATIVE FREE 10 ML: 5 INJECTION INTRAVENOUS at 20:16

## 2022-06-15 RX ADMIN — CEFEPIME HYDROCHLORIDE 2000 MG: 2 INJECTION, POWDER, FOR SOLUTION INTRAVENOUS at 02:10

## 2022-06-15 RX ADMIN — PRAVASTATIN SODIUM 40 MG: 20 TABLET ORAL at 07:40

## 2022-06-15 RX ADMIN — INSULIN LISPRO 3 UNITS: 100 INJECTION, SOLUTION INTRAVENOUS; SUBCUTANEOUS at 16:13

## 2022-06-15 RX ADMIN — GABAPENTIN 600 MG: 600 TABLET, FILM COATED ORAL at 07:40

## 2022-06-15 RX ADMIN — SODIUM CHLORIDE, PRESERVATIVE FREE 10 ML: 5 INJECTION INTRAVENOUS at 07:42

## 2022-06-15 RX ADMIN — SODIUM CHLORIDE: 9 INJECTION, SOLUTION INTRAVENOUS at 05:00

## 2022-06-15 RX ADMIN — Medication 5 MCG/MIN: at 08:38

## 2022-06-15 RX ADMIN — CEFEPIME HYDROCHLORIDE 1000 MG: 1 INJECTION, SOLUTION INTRAVENOUS at 13:10

## 2022-06-15 RX ADMIN — PHENYLEPHRINE HYDROCHLORIDE 100 MCG/MIN: 10 INJECTION INTRAVENOUS at 03:45

## 2022-06-15 RX ADMIN — SODIUM CHLORIDE, PRESERVATIVE FREE 20 MG: 5 INJECTION INTRAVENOUS at 07:39

## 2022-06-15 RX ADMIN — INSULIN GLARGINE 10 UNITS: 100 INJECTION, SOLUTION SUBCUTANEOUS at 05:52

## 2022-06-15 RX ADMIN — SODIUM CHLORIDE, POTASSIUM CHLORIDE, SODIUM LACTATE AND CALCIUM CHLORIDE: 600; 310; 30; 20 INJECTION, SOLUTION INTRAVENOUS at 03:13

## 2022-06-15 RX ADMIN — PHENYLEPHRINE HYDROCHLORIDE 100 MCG: 10 INJECTION INTRAVENOUS at 03:44

## 2022-06-15 RX ADMIN — INSULIN LISPRO 3 UNITS: 100 INJECTION, SOLUTION INTRAVENOUS; SUBCUTANEOUS at 11:10

## 2022-06-15 RX ADMIN — PHENYLEPHRINE HYDROCHLORIDE 100 MCG: 10 INJECTION INTRAVENOUS at 03:42

## 2022-06-15 RX ADMIN — HYDROMORPHONE HYDROCHLORIDE 1 MG: 1 INJECTION, SOLUTION INTRAMUSCULAR; INTRAVENOUS; SUBCUTANEOUS at 09:49

## 2022-06-15 RX ADMIN — ONDANSETRON 4 MG: 2 INJECTION INTRAMUSCULAR; INTRAVENOUS at 03:27

## 2022-06-15 RX ADMIN — LIDOCAINE HYDROCHLORIDE 50 MG: 10 INJECTION, SOLUTION INFILTRATION; PERINEURAL at 03:19

## 2022-06-15 RX ADMIN — HYDROMORPHONE HYDROCHLORIDE 1 MG: 1 INJECTION, SOLUTION INTRAMUSCULAR; INTRAVENOUS; SUBCUTANEOUS at 05:52

## 2022-06-15 RX ADMIN — PHENYLEPHRINE HYDROCHLORIDE 100 MCG: 10 INJECTION INTRAVENOUS at 03:25

## 2022-06-15 RX ADMIN — HYDROMORPHONE HYDROCHLORIDE 0.5 MG: 1 INJECTION, SOLUTION INTRAMUSCULAR; INTRAVENOUS; SUBCUTANEOUS at 22:03

## 2022-06-15 RX ADMIN — ENOXAPARIN SODIUM 30 MG: 100 INJECTION SUBCUTANEOUS at 07:40

## 2022-06-15 RX ADMIN — IPRATROPIUM BROMIDE AND ALBUTEROL SULFATE 1 AMPULE: 2.5; .5 SOLUTION RESPIRATORY (INHALATION) at 18:56

## 2022-06-15 ASSESSMENT — PAIN SCALES - WONG BAKER
WONGBAKER_NUMERICALRESPONSE: 0
WONGBAKER_NUMERICALRESPONSE: 0

## 2022-06-15 ASSESSMENT — PAIN SCALES - GENERAL
PAINLEVEL_OUTOF10: 0
PAINLEVEL_OUTOF10: 0
PAINLEVEL_OUTOF10: 7
PAINLEVEL_OUTOF10: 7
PAINLEVEL_OUTOF10: 6
PAINLEVEL_OUTOF10: 0
PAINLEVEL_OUTOF10: 2
PAINLEVEL_OUTOF10: 0

## 2022-06-15 ASSESSMENT — ENCOUNTER SYMPTOMS
VOMITING: 1
RESPIRATORY NEGATIVE: 1
CONSTIPATION: 1
BACK PAIN: 1
EYES NEGATIVE: 1
NAUSEA: 1
ALLERGIC/IMMUNOLOGIC NEGATIVE: 1

## 2022-06-15 ASSESSMENT — LIFESTYLE VARIABLES: SMOKING_STATUS: 0

## 2022-06-15 ASSESSMENT — PAIN DESCRIPTION - ORIENTATION: ORIENTATION: MID;LOWER

## 2022-06-15 ASSESSMENT — PAIN - FUNCTIONAL ASSESSMENT
PAIN_FUNCTIONAL_ASSESSMENT: ACTIVITIES ARE NOT PREVENTED
PAIN_FUNCTIONAL_ASSESSMENT: ACTIVITIES ARE NOT PREVENTED

## 2022-06-15 ASSESSMENT — PAIN DESCRIPTION - ONSET: ONSET: GRADUAL

## 2022-06-15 ASSESSMENT — PAIN DESCRIPTION - FREQUENCY: FREQUENCY: INTERMITTENT

## 2022-06-15 ASSESSMENT — PAIN DESCRIPTION - LOCATION
LOCATION: BACK
LOCATION: OTHER (COMMENT)

## 2022-06-15 ASSESSMENT — PAIN DESCRIPTION - DESCRIPTORS: DESCRIPTORS: ACHING

## 2022-06-15 ASSESSMENT — PAIN DESCRIPTION - PAIN TYPE: TYPE: CHRONIC PAIN

## 2022-06-15 NOTE — ANESTHESIA PRE PROCEDURE
Department of Anesthesiology  Preprocedure Note       Name:  Jorge Alatorre   Age:  78 y.o.  :  1955                                          MRN:  [id number]         Date:  6/15/2022      Surgeon: Butch Lakhani):  Malika Meredith MD    Procedure: Procedure(s):  CYSTOSCOPY RETROGRADE PYELOGRAM STENT INSERTION    Medications prior to admission:   Prior to Admission medications    Medication Sig Start Date End Date Taking? Authorizing Provider   omeprazole (PRILOSEC) 20 MG delayed release capsule Take 20 mg by mouth Daily   Yes Historical Provider, MD   HYDROcodone-acetaminophen (NORCO)  MG per tablet Take 1 tablet by mouth 4 times daily as needed for Pain. Yes Historical Provider, MD   pravastatin (PRAVACHOL) 40 MG tablet Take 40 mg by mouth daily   Yes Historical Provider, MD   gabapentin (NEURONTIN) 600 MG tablet Take 600 mg by mouth 4 times daily. Yes Historical Provider, MD   lisinopril-hydroCHLOROthiazide (PRINZIDE;ZESTORETIC) 20-12.5 MG per tablet Take 1 tablet by mouth in the morning and at bedtime   Yes Historical Provider, MD   metoprolol (LOPRESSOR) 100 MG tablet Take 100 mg by mouth 2 times daily   Yes Historical Provider, MD   LORazepam (ATIVAN) 0.5 MG tablet Take 0.5 mg by mouth every 6 hours as needed for Anxiety. Yes Historical Provider, MD       Current medications:    Current Facility-Administered Medications   Medication Dose Route Frequency Provider Last Rate Last Admin    0.9 % sodium chloride infusion   IntraVENous Continuous Ruth Aguilar  mL/hr at 06/15/22 0208 New Bag at 06/15/22 0208    levoFLOXacin (LEVAQUIN) 500 MG/100ML infusion 500 mg  500 mg IntraVENous On Call to Selvin Nevarez MD         Current Outpatient Medications   Medication Sig Dispense Refill    omeprazole (PRILOSEC) 20 MG delayed release capsule Take 20 mg by mouth Daily      HYDROcodone-acetaminophen (NORCO)  MG per tablet Take 1 tablet by mouth 4 times daily as needed for Pain.  pravastatin (PRAVACHOL) 40 MG tablet Take 40 mg by mouth daily      gabapentin (NEURONTIN) 600 MG tablet Take 600 mg by mouth 4 times daily.  lisinopril-hydroCHLOROthiazide (PRINZIDE;ZESTORETIC) 20-12.5 MG per tablet Take 1 tablet by mouth in the morning and at bedtime      metoprolol (LOPRESSOR) 100 MG tablet Take 100 mg by mouth 2 times daily      LORazepam (ATIVAN) 0.5 MG tablet Take 0.5 mg by mouth every 6 hours as needed for Anxiety. Allergies: Allergies   Allergen Reactions    Codeine        Problem List:    Patient Active Problem List   Diagnosis Code    Obstructive pyelonephritis N11.1    Sepsis with acute renal failure (HCC) A41.9, R65.20, N17.9    Left ureteral calculus N20.1    Renal calculus, bilateral N20.0    BINH (acute kidney injury) ([facility] [address] N17.9       Past Medical History:  No past medical history on file. Past Surgical History:  No past surgical history on file. Social History:    Social History     Tobacco Use    Smoking status: Not on file    Smokeless tobacco: Not on file   Substance Use Topics    Alcohol use: Not on file                                Counseling given: Not Answered      Vital Signs (Current):   Vitals:    06/15/22 0100 06/15/22 0130 06/15/22 0200 06/15/22 0230   BP: (!) 91/49 (!) 87/44 (!) 88/48 (!) 87/51   Pulse: 97 97 100 (!) 102   Resp:       Temp:   98.1 °F (36.7 °C)    TempSrc:   Oral    SpO2: (!) 87% 91% 91% 95%   Weight:       Height:                                                  BP Readings from Last 3 Encounters:   06/15/22 (!) 87/51       NPO Status:                                                                                 BMI:   Wt Readings from Last 3 Encounters:   06/14/22 (!) 315 lb (142.9 kg)     Body mass index is 52.42 kg/m².     CBC:   Lab Results   Component Value Date    WBC 17.0 06/14/2022    RBC 5.26 06/14/2022    HGB 15.9 06/14/2022    HCT 49.3 06/14/2022    MCV 93.7 06/14/2022    RDW 14.2 06/14/2022    PLT 84 06/14/2022       CMP:   Lab Results   Component Value Date     06/14/2022    K 4.2 06/14/2022    CL 94 06/14/2022    CO2 25 06/14/2022    BUN 47 06/14/2022    CREATININE 3.0 06/14/2022    GFRAA 19 06/14/2022    LABGLOM 16 06/14/2022    GLUCOSE 228 06/14/2022    PROT 6.0 06/14/2022    CALCIUM 8.5 06/14/2022    BILITOT 1.1 06/14/2022    ALKPHOS 68 06/14/2022    AST 50 06/14/2022    ALT 42 06/14/2022       POC Tests: No results for input(s): POCGLU, POCNA, POCK, POCCL, POCBUN, POCHEMO, POCHCT in the last 72 hours. Coags: No results found for: PROTIME, INR, APTT    HCG (If Applicable): No results found for: PREGTESTUR, PREGSERUM, HCG, HCGQUANT     ABGs: No results found for: PHART, PO2ART, OGI9WQS, EBW0LHB, BEART, V6UJUCBH     Type & Screen (If Applicable):  No results found for: LABABO, LABRH    Drug/Infectious Status (If Applicable):  No results found for: HIV, HEPCAB    COVID-19 Screening (If Applicable):   Lab Results   Component Value Date    COVID19 Not Detected 06/14/2022           Anesthesia Evaluation  Patient summary reviewed and Nursing notes reviewed history of anesthetic complications (slow to wake up): Airway: Mallampati: III       Mouth opening: < 3 FB   Dental:          Pulmonary:       (-) not a current smoker                          ROS comment: Probable FREDDY   Cardiovascular:    (+) hypertension:, hyperlipidemia        Rhythm: regular  Rate: abnormal           Beta Blocker:  Dose within 24 Hrs         Neuro/Psych:   (+) depression/anxiety             GI/Hepatic/Renal:   (+) GERD: well controlled, renal disease: kidney stones,           Endo/Other:    (+) Diabetes, . Abdominal:             Vascular: negative vascular ROS. Other Findings:           Anesthesia Plan      general     ASA 3 - emergent       Induction: intravenous. MIPS: Postoperative opioids intended and Prophylactic antiemetics administered.   Anesthetic plan and risks discussed with patient.                         Coleman Villalpando MD   6/15/2022

## 2022-06-15 NOTE — ANESTHESIA POSTPROCEDURE EVALUATION
Department of Anesthesiology  Postprocedure Note    Patient: Jordan Torres  MRN: [id number]  YOB: 1955  Date of evaluation: 6/15/2022  Time:  4:16 AM     Procedure Summary     Date: 06/15/22 Room / Location: [facility] OR [facility]    Anesthesia Start: 6713 Anesthesia Stop: 6322    Procedure: CYSTOSCOPY, LEFT URETEROSCOPY, RETROGRADE PYELOGRAM, STENT INSERTION (Left ) Diagnosis:       Ureteral calculus      (Ureteral calculus [N20.1])    Surgeons: Ebony Pablo MD Responsible Provider: MATTHEW Beltrán CRNA    Anesthesia Type: general ASA Status: 3 - Emergent          Anesthesia Type: No value filed. Niels Phase I:      Niels Phase II:      Last vitals: Reviewed and per EMR flowsheets.        Anesthesia Post Evaluation    Patient location during evaluation: PACU  Patient participation: complete - patient participated  Level of consciousness: sleepy but conscious and awake and alert  Pain score: 0  Airway patency: patent  Nausea & Vomiting: no nausea and no vomiting  Complications: no  Cardiovascular status: hemodynamically stable, hypotensive and blood pressure returned to baseline  Respiratory status: acceptable, spontaneous ventilation, nonlabored ventilation and face mask  Hydration status: hypovolemic

## 2022-06-15 NOTE — CONSULTS
Consult Note            Date:6/15/2022        Patient Max Nunn     YOB: 1955     Age:66 y.o. Consults    Chief Complaint     Chief Complaint   Patient presents with    Extremity Weakness     x1 week, unable to get up and move around    Abdominal Pain     started yesterday      Nausea and vomiting back pain    History Obtained From   patient, electronic medical record    History of Present Illness   Patient 70-year-old or morbidly obese female who states that she was in her normal state of health until approximately 2 or 3 days ago. Planed of some constipation and then some difficulty with urination she states she had a bowel movement 2 days ago but then became severely fatigue with extreme weakness left lower abdominal pain radiating to her left flank with associated nausea and vomiting. Brought to the emergency room. She did not have documented fever however she had some hypotension with blood pressure in the 80R to 484 systolic some mild tachycardia with heart rate up to 100. She was found to have acute renal failure with creatinine up to 3.0. Lactic acid was elevated at 4.1 white blood cell count was elevated to 17 K. Urinalysis showed signs of infection blood positive leukocyte Estrace 2-4 WBCs 1+ bacteria and yeast.  Blood urine cultures were obtained patient was started on broad-spectrum antibiotic and IV fluid resuscitation. CT scan of the abdomen pelvis was performed which shows a proximal left ureteral calculus she does not have significant hydronephrosis she also has bilateral nonobstructing renal calculi. Based on these findings it was felt that she had most likely sepsis obstructive pyelonephritis secondary to right proximal ureteral calculus with associated BINH. As a consultation requested for urgent urologic intervention with stent placement. Past Medical History   No past medical history on file. Past Surgical History   No past surgical history on file. Medications     Prior to Admission medications    Medication Sig Start Date End Date Taking? Authorizing Provider   omeprazole (PRILOSEC) 20 MG delayed release capsule Take 20 mg by mouth Daily   Yes Historical Provider, MD   HYDROcodone-acetaminophen (NORCO)  MG per tablet Take 1 tablet by mouth 4 times daily as needed for Pain. Yes Historical Provider, MD   pravastatin (PRAVACHOL) 40 MG tablet Take 40 mg by mouth daily   Yes Historical Provider, MD   gabapentin (NEURONTIN) 600 MG tablet Take 600 mg by mouth 4 times daily. Yes Historical Provider, MD   lisinopril-hydroCHLOROthiazide (PRINZIDE;ZESTORETIC) 20-12.5 MG per tablet Take 1 tablet by mouth in the morning and at bedtime   Yes Historical Provider, MD   metoprolol (LOPRESSOR) 100 MG tablet Take 100 mg by mouth 2 times daily   Yes Historical Provider, MD   LORazepam (ATIVAN) 0.5 MG tablet Take 0.5 mg by mouth every 6 hours as needed for Anxiety. Yes Historical Provider, MD        0.9 % sodium chloride infusion, Continuous  levoFLOXacin (LEVAQUIN) 500 MG/100ML infusion 500 mg, On Call to OR        Allergies   Codeine    Social History     Social History    None         Family History   No family history on file. Review of Systems   Review of Systems   Constitutional: Positive for activity change, appetite change and fatigue. Negative for chills and unexpected weight change. HENT: Negative. Eyes: Negative. Respiratory: Negative. Cardiovascular: Negative. Gastrointestinal: Positive for constipation, nausea and vomiting. Endocrine: Negative. Genitourinary: Positive for decreased urine volume and flank pain. Negative for dysuria, frequency, hematuria and urgency. Musculoskeletal: Positive for back pain. Skin: Negative. Allergic/Immunologic: Negative. Neurological: Positive for weakness. Hematological: Negative. Psychiatric/Behavioral: Negative.          Physical Exam   BP (!) 87/51   Pulse (!) 102   Temp 98.1 °F (36.7 °C) (Oral)   Resp 14   Ht 5' 5\" (1.651 m)   Wt (!) 315 lb (142.9 kg)   SpO2 95%   BMI 52.42 kg/m²      Physical Exam  Vitals reviewed. Constitutional:       General: She is not in acute distress. Appearance: She is obese. She is ill-appearing and toxic-appearing. HENT:      Head: Normocephalic and atraumatic. Nose: Nose normal.      Mouth/Throat:      Mouth: Mucous membranes are dry. Eyes:      General: Scleral icterus present. Conjunctiva/sclera: Conjunctivae normal.      Pupils: Pupils are equal, round, and reactive to light. Cardiovascular:      Rate and Rhythm: Regular rhythm. Tachycardia present. Pulses: Normal pulses. Pulmonary:      Effort: Pulmonary effort is normal. No respiratory distress. Abdominal:      General: There is no distension. Palpations: There is no mass. Tenderness: There is abdominal tenderness. There is no left CVA tenderness. Comments: Obese he has some intertiginitis of the lower abdominal pannus fold   Musculoskeletal:         General: No swelling or deformity. Cervical back: Normal range of motion. Lymphadenopathy:      Cervical: No cervical adenopathy. Skin:     General: Skin is warm. Neurological:      General: No focal deficit present. Mental Status: She is alert and oriented to person, place, and time. Psychiatric:         Mood and Affect: Mood normal.         Labs    CBC:  Recent Labs     06/14/22  2152   WBC 17.0*   RBC 5.26   HGB 15.9   HCT 49.3*   MCV 93.7   RDW 14.2   PLT 84*     CHEMISTRIES:  Recent Labs     06/14/22  2306      K 4.2   CL 94*   CO2 25   BUN 47*   CREATININE 3.0*   GLUCOSE 228*     PT/INR:No results for input(s): PROTIME, INR in the last 72 hours. APTT:No results for input(s): APTT in the last 72 hours.   LIVER PROFILE:  Recent Labs     06/14/22  2306   AST 50*   ALT 42*   BILITOT 1.1   ALKPHOS 68       Imaging/Diagnostics   CT ABDOMEN PELVIS WO CONTRAST Additional Contrast? None    Result Date: 6/15/2022  1. There is 6 mm obstructive calculus in the left upper ureter with mild proximal hydroureteronephrosis and perinephric inflammatory changes. 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 Bilateral non-obstructing renal calculi noted. 2. Mild hepatomegaly with mild hepatic steatosis. 3. Colonic diverticulosis without evidence of acute diverticulitis. 4. Small fat containing umbilical and supra umbilical hernia noted. Recommendation: Follow up as clinically indicated. All CT scans at this facility utilize dose modulation, iterative reconstruction, and/or weight based dosing when appropriate to reduce radiation dose to as low as reasonably achievable. Amended by Micki Finley MD at 15-[place]aquille-2022 02:44:19 AM Electronically Signed by Micki Finley MD at 15-[place]aquille-2022 02:29:46 AM             XR CHEST PORTABLE    Result Date: 6/14/2022  1. Unremarkable radiograph of chest. Recommendation: Follow up as clinically indicated. Electronically Signed by Kelsy Norton MD at 15-[place]-2022 12:21:20 AM               Assessment      Hospital Problems           Last Modified POA    Obstructive pyelonephritis 6/15/2022 Yes    Sepsis with acute renal failure ([address] 6/15/2022 Yes    Left ureteral calculus 6/15/2022 Yes    Renal calculus, bilateral 6/15/2022 Yes    BINH (acute kidney injury) ([address] 6/15/2022 Yes          Plan   1. Obstructive left pyelonephritis with sepsis with associated BINH secondary to proximal left ureteral calculus. We will plan to take the operating room for urgent cystoscopy left ureteral stent placement. She will be admitted to the hospital service to  ICU post procedure for continued  resuscitation, broad-spectrum antibiotic she is already been started on cefepime. And supportive care. Follow-up on blood and urine cultures which have already been obtained    2.   Left proximal ureteral calculus. As above with associated obstruction and sepsis plan for cystoscopy left ureteral stent placement. Definitive treatment with stone will be deferred until patient has been treated for her infection and sepsis. 3.  BINH secondary to #1 & #2 ATN with associated sepsis. Continue IV fluids hydration resuscitation monitor for spontaneous recovery. 4.  Nonobstructing bilateral renal calculi.     Electronically signed by Erling Phoenix, MD on 6/15/22 at 2:39 AM CDT

## 2022-06-15 NOTE — PLAN OF CARE
Problem: Discharge Planning  Goal: Discharge to home or other facility with appropriate resources  6/15/2022 1025 by Mey Lewis RN  Outcome: Progressing  6/15/2022 0529 by Diego Mc RN  Outcome: Progressing     Problem: Pain  Goal: Verbalizes/displays adequate comfort level or baseline comfort level  6/15/2022 1025 by Mey Lewis RN  Outcome: Progressing  6/15/2022 0529 by Diego Mc RN  Outcome: Progressing     Problem: Safety - Adult  Goal: Free from fall injury  6/15/2022 1025 by Mey Lewis RN  Outcome: Progressing  6/15/2022 0529 by Diego Mc RN  Outcome: Progressing     Problem: ABCDS Injury Assessment  Goal: Absence of physical injury  6/15/2022 1025 by Mey Lewis RN  Outcome: Progressing  6/15/2022 0529 by Diego Mc RN  Outcome: Progressing     Problem: Skin/Tissue Integrity  Goal: Absence of new skin breakdown  Description: 1. Monitor for areas of redness and/or skin breakdown  2. Assess vascular access sites hourly  3. Every 4-6 hours minimum:  Change oxygen saturation probe site  4. Every 4-6 hours:  If on nasal continuous positive airway pressure, respiratory therapy assess nares and determine need for appliance change or resting period.   6/15/2022 1025 by Mey Lewis RN  Outcome: Progressing  6/15/2022 0529 by Diego Mc RN  Outcome: Progressing

## 2022-06-15 NOTE — ED NOTES
Dr. Delia Calhoun at bedside discussing anesthesia with pt.       Mallorie Rizo, ABDI  06/15/22 0077

## 2022-06-15 NOTE — ED PROVIDER NOTES
[facility] ICU  eMERGENCYdEPARTMENT eNCOUnter      Pt Name: Carla Owens  MRN: [id number]  [place] 1955  Date of evaluation: 6/14/2022  Provider:BRAULIO Garcia    CHIEF COMPLAINT       Chief Complaint   Patient presents with    Extremity Weakness     x1 week, unable to get up and move around    Abdominal Pain     started yesterday         HISTORY OF PRESENT ILLNESS  (Location/Symptom, Timing/Onset, Context/Setting, Quality, Duration, Modifying Factors, Severity.)   Carla Owens is a 78 y.o. female who presents to the emergency department with complaints of fatigue extremity weakness unable to get up and move around has chronic low back pain from operation bilateral knees that are bone-on-bone morbid obesity. She denies heart failure history feels like her legs are no more swollen than baseline. Lives alone but has family check on her multiple times a day. She baseline uses a rolling walker. No fever or chills. Feels like the output has decreased in regards to stool making less flatus but left lower quadrant pain starting yesterday. Patient denies any nausea vomiting. She denies any new injury. HPI    Nursing Notes were reviewed and I agree. REVIEW OF SYSTEMS    (2-9 systems for level 4, 10 or more for level 5)     Review of Systems   Constitutional: Positive for fatigue. Negative for activity change, appetite change, chills and fever. HENT: Negative for congestion, postnasal drip, rhinorrhea and sore throat. Eyes: Negative for photophobia, pain, discharge and visual disturbance. Respiratory: Negative for apnea, cough and shortness of breath. Cardiovascular: Negative for chest pain and leg swelling. Gastrointestinal: Positive for abdominal pain. Negative for abdominal distention and nausea. Genitourinary: Negative for vaginal bleeding. Musculoskeletal: Negative for arthralgias, back pain, joint swelling, neck pain and neck stiffness. Skin: Negative for color change and rash. Neurological: Positive for weakness. Negative for dizziness, syncope, facial asymmetry and headaches. Hematological: Negative for adenopathy. Does not bruise/bleed easily. Psychiatric/Behavioral: Negative for agitation, behavioral problems and confusion. Except as noted above the remainder of the review of systems was reviewed and negative. PAST MEDICAL HISTORY     Past Medical History:   Diagnosis Date    Arthritis     HTN (hypertension)          SURGICAL HISTORY       Past Surgical History:   Procedure Laterality Date    ANKLE FRACTURE SURGERY Right     BLADDER SURGERY Left 6/15/2022    CYSTOSCOPY, LEFT URETEROSCOPY, RETROGRADE PYELOGRAM, STENT INSERTION performed by Gurinder Webber MD at [facility] (CERVIX STATUS UNKNOWN)      INCONTINENCE SURGERY           CURRENT MEDICATIONS       Current Discharge Medication List      CONTINUE these medications which have NOT CHANGED    Details   omeprazole (PRILOSEC) 20 MG delayed release capsule Take 20 mg by mouth Daily      HYDROcodone-acetaminophen (NORCO)  MG per tablet Take 2 tablets by mouth 4 times daily as needed for Pain.       pravastatin (PRAVACHOL) 40 MG tablet Take 40 mg by mouth daily      gabapentin (NEURONTIN) 600 MG tablet Take 600 mg by mouth 4 times daily. lisinopril-hydroCHLOROthiazide (PRINZIDE;ZESTORETIC) 20-12.5 MG per tablet Take 1 tablet by mouth in the morning and at bedtime      metoprolol (LOPRESSOR) 100 MG tablet Take 100 mg by mouth 2 times daily      LORazepam (ATIVAN) 0.5 MG tablet Take 0.5 mg by mouth every 6 hours as needed for Anxiety. DICLOFENAC SODIUM ER PO Take 75 tablets by mouth in the morning and at bedtime      clotrimazole-betamethasone (LOTRISONE) 1-0.05 % cream Apply topically 2 times daily Apply topically 2 times daily.              ALLERGIES     Codeine    FAMILY HISTORY       Family History   Problem Relation Age of Onset    Cancer Mother     Cancer Father           SOCIAL HISTORY       Social History     Socioeconomic History    Marital status: Single     Spouse name: None    Number of children: None    Years of education: None    Highest education level: None   Occupational History    None   Tobacco Use    Smoking status: Former Smoker    Smokeless tobacco: Never Used    Tobacco comment: quit 10 years ago   Vaping Use    Vaping Use: Never used   Substance and Sexual Activity    Alcohol use: Never    Drug use: Never    Sexual activity: None   Other Topics Concern    None   Social History Narrative    None     Social Determinants of Health     Financial Resource Strain:     Difficulty of Paying Living Expenses: Not on file   Food Insecurity:     Worried About Running Out of Food in the Last Year: Not on file    Kavon of Food in the Last Year: Not on file   Transportation Needs:     Lack of Transportation (Medical): Not on file    Lack of Transportation (Non-Medical): Not on file   Physical Activity:     Days of Exercise per Week: Not on file    Minutes of Exercise per Session: Not on file   Stress:     Feeling of Stress : Not on file   Social Connections:     Frequency of Communication with Friends and Family: Not on file    Frequency of Social Gatherings with Friends and Family: Not on file    Attends [employer]ology Services: Not on file    Active Member of [address] [place] [address] or Organizations: Not on file    Attends Club or Organization Meetings: Not on file    Marital Status: Not on file   Intimate Partner Violence:     Fear of Current or Ex-Partner: Not on file    Emotionally Abused: Not on file    Physically Abused: Not on file    Sexually Abused: Not on file   Housing Stability:     Unable to Pay for Housing in the Last Year: Not on file    Number of [place] in the Last Year: Not on file    Unstable Housing in the Last Year: Not on file       SCREENINGS    Mona Coma Scale  Eye Opening:  To speech  Best Verbal Response: Confused  Best Motor Response: Obeys commands  Mona Coma Scale Score: 13      PHYSICAL EXAM    (up to 7 forlevel 4, 8 or more for level 5)     ED Triage Vitals [06/14/22 1746]   BP Temp Temp src Heart Rate Resp SpO2 Height Weight   106/76 98.3 °F (36.8 °C) -- 84 18 95 % 5' 5\" (1.651 m) (!) 315 lb (142.9 kg)       Physical Exam  Vitals and nursing note reviewed. Constitutional:       General: She is not in acute distress. Appearance: She is well-developed. She is obese. She is not diaphoretic. HENT:      Head: Normocephalic and atraumatic. Right Ear: External ear normal.      Left Ear: External ear normal.      Mouth/Throat:      Pharynx: No oropharyngeal exudate. Eyes:      General:         Right eye: No discharge. Left eye: No discharge. Pupils: Pupils are equal, round, and reactive to light. Neck:      Thyroid: No thyromegaly. Cardiovascular:      Rate and Rhythm: Normal rate and regular rhythm. Heart sounds: Normal heart sounds. No murmur heard. No friction rub. Pulmonary:      Effort: Pulmonary effort is normal. No respiratory distress. Breath sounds: Normal breath sounds. No stridor. No wheezing. Abdominal:      General: Bowel sounds are normal. There is no distension. Palpations: Abdomen is soft. Tenderness: There is abdominal tenderness in the left lower quadrant. Comments: periumblical hernia noted on exam per patient is not acute   Musculoskeletal:         General: Normal range of motion. Cervical back: Normal range of motion and neck supple. Skin:     General: Skin is warm and dry. Capillary Refill: Capillary refill takes less than 2 seconds. Findings: No rash. Neurological:      Mental Status: She is alert and oriented to person, place, and time. Cranial Nerves: No cranial nerve deficit. Sensory: No sensory deficit.       Coordination: Coordination normal.   Psychiatric:         Behavior: Behavior normal. Thought Content: Thought content normal.           DIAGNOSTIC RESULTS     RADIOLOGY:   Non-plain film images such as CT, Ultrasound and MRI are read by the radiologist. Plain radiographic images are visualized and preliminarilyinterpreted by Leny Sellers MD with the below findings:      Interpretation per the Radiologist below, if available at the time of this note:    XR CHEST PORTABLE   Final Result   1. Questionable trace left pleural effusion with basilar atelectasis. 2. Right sided central line is in place with its tip in SVC. Recommendation: Follow up as clinically indicated. Electronically Signed by Harry Escobar MD at 15-Shaquille-2022 06:55:39 AM               CT ABDOMEN PELVIS WO CONTRAST Additional Contrast? None   Final Result   1. There is 6 mm obstructive calculus in the left upper ureter with mild proximal hydroureteronephrosis and perinephric inflammatory changes. 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 Bilateral non-obstructing renal calculi noted. 2. Mild hepatomegaly with mild hepatic steatosis. 3. Colonic diverticulosis without evidence of acute diverticulitis. 4. Small fat containing umbilical and supra umbilical hernia noted. Recommendation: Follow up as clinically indicated. All CT scans at this facility utilize dose modulation, iterative reconstruction, and/or weight based dosing when appropriate to reduce radiation dose to as low as reasonably achievable. Amended by Mary Reed MD at 15-Shaquille-2022 02:44:19 AM   Electronically Signed by Mary Reed MD at 15-Shaquille-2022 02:29:46 AM               XR CHEST PORTABLE   Final Result   1. Unremarkable radiograph of chest.   Recommendation: Follow up as clinically indicated.     Electronically Signed by Jojo Alvarez MD at 15-Shaquille-2022 12:21:20 AM                   LABS:  Labs Reviewed   CULTURE, BLOOD 2 - Abnormal; Notable for the following components:       Result Value    Culture, Blood 2   (*)     Value:  Bottle volume = 6 ml-Aerobic   Bottle volume = <5 ml-Anaerobic  Quality of results might be  affected with low volume.   Gram stain Aerobic and Anaerobic bottles:  Gram negative rods  Culture in progress  Please notify Physician      All other components within normal limits    Narrative:     ORDER#: [id number]                          ORDERED BY: MADELYN SEAY  SOURCE: Blood R AC                         COLLECTED:  06/14/22 23:00  ANTIBIOTICS AT [facility].:                      RECEIVED :  06/14/22 75:80  CALL  [facility] tel. ,  Microbiology results called to and read back by Rui Wallace RN, ICU,  06/15/2022 11:01, by [facility]   CULTURE, BLOOD 1 - Abnormal; Notable for the following components:    Blood Culture, Routine   (*)     Value:  Bottle volume = 10 ml  Gram stain Aerobic bottle:  Gram negative rods  Culture in progress  Please notify Physician   Bottle volume = 6 ml  Gram stain Anaerobic bottle:  Gram negative rods  Culture in progress  Please notify Physician      All other components within normal limits    Narrative:     ORDER#: [id number]                          ORDERED BY: MADELYN SEAY  SOURCE: Blood R Arm                        COLLECTED:  06/14/22 22:55  ANTIBIOTICS AT [place]UC[facility].:                      RECEIVED :  06/14/22 45:85  CALL  [facility] tel. ,  Microbiology results called to and read back by Rui Wallace RN, ICU,  06/15/2022 10:57, by [address] [place] [address] TO CULTURE - Abnormal; Notable for the following components:    Color, UA DARK YELLOW (*)     Clarity, UA TURBID (*)     Glucose, Ur 100 (*)     Bilirubin Urine MODERATE (*)     Ketones, Urine 15 (*)     Blood, Urine MODERATE (*)     Protein,  (*)     Leukocyte Esterase, Urine SMALL (*)     All other components within normal limits   CBC WITH AUTO DIFFERENTIAL - Abnormal; Notable for the following components:    WBC 17.0 (*)     Hematocrit 49.3 (*) MCHC 32.3 (*)     Platelets 84 (*)     MPV 13.1 (*)     Neutrophils % 77.0 (*)     Lymphocytes % 5.0 (*)     Neutrophils Absolute 15.0 (*)     Lymphocytes Absolute 0.9 (*)     Monocytes Absolute 1.00 (*)     Bands Relative 9 (*)     Metamyelocytes Relative 2 (*)     All other components within normal limits   LACTIC ACID - Abnormal; Notable for the following components:    Lactic Acid 4.1 (*)     All other components within normal limits    Narrative:     CALL  [facility] tel. ,  Chemistry results called to and read back by Gallito Hollingsworth RN in ED, 06/14/2022  22:55, by [address] - Abnormal; Notable for the following components:    Pro-BNP 6,541 (*)     All other components within normal limits   COMPREHENSIVE METABOLIC PANEL W/ REFLEX TO MG FOR LOW K - Abnormal; Notable for the following components:    Chloride 94 (*)     Glucose 228 (*)     BUN 47 (*)     CREATININE 3.0 (*)     GFR Non- 16 (*)     GFR  19 (*)     Calcium 8.5 (*)     Total Protein 6.0 (*)     Albumin 3.1 (*)     ALT 42 (*)     AST 50 (*)     All other components within normal limits   LIPASE - Abnormal; Notable for the following components:    Lipase 9 (*)     All other components within normal limits   MICROSCOPIC URINALYSIS - Abnormal; Notable for the following components:    Bacteria, UA 1+ (*)     Amorphous, UA 2+ (*)     Yeast, UA Present (*)     All other components within normal limits   LACTIC ACID - Abnormal; Notable for the following components:    Lactic Acid 4.3 (*)     All other components within normal limits    Narrative:     CALL  [facility] tel. ,  Chemistry results called to and read back by General Johnson in ED, 06/15/2022  02:32, by RUTHIE   HEMOGLOBIN A1C - Abnormal; Notable for the following components:    Hemoglobin A1C 9.1 (*)     All other components within normal limits   COMPREHENSIVE METABOLIC PANEL W/ REFLEX TO MG FOR LOW K - Abnormal; Notable for the following components: Chloride 97 (*)     CO2 21 (*)     Glucose 216 (*)     BUN 52 (*)     CREATININE 2.8 (*)     GFR Non- 17 (*)     GFR African American 20 (*)     Calcium 8.0 (*)     Total Protein 5.4 (*)     Albumin 2.8 (*)     ALT 39 (*)     AST 48 (*)     All other components within normal limits   LACTIC ACID - Abnormal; Notable for the following components:    Lactic Acid 4.0 (*)     All other components within normal limits    Narrative:     CALL  [facility] tel. ,  Chemistry results called to and read back by Jack Villatoro RN in ICU, 06/15/2022  06:39, by RUTHIE   CBC WITH AUTO DIFFERENTIAL - Abnormal; Notable for the following components:    WBC 12.5 (*)     MCHC 31.7 (*)     Platelets 64 (*)     MPV 12.8 (*)     Neutrophils % 74.0 (*)     Lymphocytes % 7.0 (*)     Neutrophils Absolute 11.1 (*)     Lymphocytes Absolute 0.9 (*)     Bands Relative 15 (*)     Vacuolated Neutrophils Occasional (*)     All other components within normal limits   PROTIME-INR - Abnormal; Notable for the following components:    Protime 17.2 (*)     INR 1.39 (*)     All other components within normal limits   BLOOD ID PANEL, MOLECULAR - Abnormal; Notable for the following components:    Enterobacteriaceae by PCR DETECTED (*)     Escherichia coli by PCR DETECTED (*)     All other components within normal limits    Narrative:     [facility] tel. ,  Microbiology results called to and read back by Murphy Giordano RN, ICU,  06/15/2022 10:57, by [address] tel. ,  Microbiology results called to and read back by Carleen Mann RN, ICU,  06/15/2022 12:57, by [facility]   LACTIC ACID - Abnormal; Notable for the following components:    Lactic Acid 2.6 (*)     All other components within normal limits    Narrative:     [facility] tel. ,  Chemistry results called to and read back by [address] [place] [address], 43/09/7442 74:99,  by LEONCIO   CBC WITH AUTO DIFFERENTIAL - Abnormal; Notable for the following components:    WBC 12.8 (*)     MCHC 31.4 (*)     RDW 14.7 (*)     Platelets 42 (*)     MPV 13.3 (*)     Neutrophils % 86.0 (*)     Lymphocytes % 5.0 (*)     Neutrophils Absolute 11.1 (*)     Lymphocytes Absolute 0.6 (*)     Monocytes Absolute 1.00 (*)     Vacuolated Neutrophils Occasional (*)     All other components within normal limits   COMPREHENSIVE METABOLIC PANEL - Abnormal; Notable for the following components:    CO2 20 (*)     Glucose 150 (*)     BUN 56 (*)     CREATININE 1.8 (*)     GFR Non- 28 (*)     GFR  34 (*)     Calcium 8.1 (*)     Total Protein 5.9 (*)     Albumin 2.3 (*)     Total Bilirubin 2.6 (*)     Alkaline Phosphatase 157 (*)     ALT 53 (*)     AST 75 (*)     All other components within normal limits   MAGNESIUM - Abnormal; Notable for the following components:    Magnesium 1.2 (*)     All other components within normal limits   POCT GLUCOSE - Abnormal; Notable for the following components:    POC Glucose 197 (*)     All other components within normal limits   POCT GLUCOSE - Abnormal; Notable for the following components:    POC Glucose 200 (*)     All other components within normal limits   POCT GLUCOSE - Abnormal; Notable for the following components:    POC Glucose 193 (*)     All other components within normal limits   POCT GLUCOSE - Abnormal; Notable for the following components:    POC Glucose 178 (*)     All other components within normal limits   POCT GLUCOSE - Abnormal; Notable for the following components:    POC Glucose 171 (*)     All other components within normal limits   POCT GLUCOSE - Abnormal; Notable for the following components:    POC Glucose 173 (*)     All other components within normal limits   COVID-19, RAPID   CULTURE, URINE   CULTURE, URINE   CULTURE, URINE   TROPONIN   SPECIMEN REJECTION   TROPONIN   TROPONIN   TROPONIN   POCT GLUCOSE   POCT GLUCOSE   POCT GLUCOSE   POCT GLUCOSE   POCT GLUCOSE   POCT GLUCOSE   POCT GLUCOSE   POCT GLUCOSE       All other labs were within normal range or notreturned as of this dictation. RE-ASSESSMENT        EMERGENCY DEPARTMENT COURSE and DIFFERENTIAL DIAGNOSIS/MDM:   Vitals:    Vitals:    06/16/22 0600 06/16/22 0653 06/16/22 0700 06/16/22 0902   BP: 136/64  117/69    Pulse: (!) 132 (!) 121 (!) 123    Resp: 19 16 21 19   Temp:       TempSrc:       SpO2: 95% 94% 94%    Weight:       Height:           MDM  Plan for passing care over to Dr. Ruba Degroot on evening shift. Have already made patient aware that she is going to be treated and admitted for what appears to be sepsis. She has fluids going broad-spectrum antibiotics have already been ordered awaiting on CT abdomen read. PROCEDURES:    Procedures      FINAL IMPRESSION      1. Acute kidney injury ([address]    2. Lower abdominal pain    3. Acute cystitis with hematuria    4. Septicemia ([address]    5. Ureteral calculus    6. Kidney stone    7. Urinary tract infection without hematuria, site unspecified          DISPOSITION/PLAN   DISPOSITION Decision To Admit 06/15/2022 01:13:32 AM      PATIENT REFERRED TO:  No follow-up provider specified.     DISCHARGE MEDICATIONS:  Current Discharge Medication List          (Please note that portions of this note were completed with a voice recognition program.  Efforts were made to edit the dictations but occasionallywords are mis-transcribed.)    Jaya Fofana, [address] [place], [place]  06/16/22 3544

## 2022-06-15 NOTE — ED PROVIDER NOTES
Attending Supervisory Note/Shared Visit   I have personally performed a face to face diagnostic evaluation on this patient. I have reviewed the mid-levels findings and agree. Patient is a 26-year-old female presents complaining of generalized fatigue for the past week or more. Denies any fevers. Has some generalized abdominal pain more in the left side. No nausea or vomiting. Felt like she was constipated but said that she is had diarrhea for the past couple of days. On exam patient is nontoxic in no distress. She is morbidly obese. Lungs are clear. Abdomen soft. CT of the abdomen pelvis shows 6 mm obstructive calculus in the left upper ureter with concerns for emphysematous pyelonephritis. Labs indicate elevated lactate and BINH. Urine looks infected. Has been given a liter of fluids. Denies any dyspnea but BNP is a little elevated so were trying to hold off on more aggressive fluids for now. Blood pressure has been a little low but stable. Patient's case discussed with on-call urologist, Dr. Mariana Teague. He came to the ER and is taking patient emergently to the OR. Spoke with hospitalist, Dr. Oscar Dumas, who admit patient to ICU following surgery. Patient resting comfortably and updated about plan. CRITICAL CARE TIME   Total Critical Care time was 35 minutes, excluding separately reportable procedures. There was a high probability of clinically significant/life threatening deterioration in the patient's condition which required my urgent intervention. FINAL IMPRESSION      1. Acute kidney injury ([address]    2. Lower abdominal pain    3. Acute cystitis with hematuria    4. Septicemia ([address]    5. Ureteral calculus    6. Kidney stone    7.  Urinary tract infection without hematuria, site unspecified          Walker Nuñez MD  Attending Emergency Physician       Walker Nuñez MD  06/15/22 0372

## 2022-06-15 NOTE — FLOWSHEET NOTE
06/15/22 1315   Encounter Summary   Encounter Overview/Reason  Initial Encounter; Advance Care Planning   Service Provided For: Patient  (in ICU)   Referral/Consult From: Nurse   Support System Children   Begin Time 1300   End Time  1315   Total Time Calculated 15 min   Encounter    Type Initial Screen/Assessment   Rituals, Rites and Sacraments   Type Blessings   Advance Care Planning   Type ACP conversation     Attempted to do a LW Per nurse request. Pt is in need of rest post emergent surgery. Talked with bedside. Will return to follow up.   Electronically signed by Jayde Ambriz on 6/15/2022 at 1:19 PM

## 2022-06-15 NOTE — ANESTHESIA PROCEDURE NOTES
Central Venous Line:    A central venous line was placed using ultrasound guidance and surface landmarks, in the OR for the following indication(s): central venous access. Sterility preparation included the following: hand hygiene performed prior to procedure, maximum sterile barriers used and sterile technique used to drape from head to toe. The patient was placed in Trendelenburg position. The    The site was prepped with Chloraprep. A 7.5 Fr (size), triple lumen was placed. During the procedure, the following specific steps were taken: target vein identified, needle advanced into vein and blood aspirated and guidewire advanced into vein. Intravenous verification was obtained by ultrasound, venous blood return and x-ray. Post insertion care included: all ports aspirated, all ports flushed easily, guidewire removed intact, Biopatch applied, line sutured in place and dressing applied. During the procedure the patient experienced: patient tolerated procedure well with no complications.       Outcomes: uncomplicated  Real-time US image taken/store: yes  Anesthesia type: general..No  Staffing  Performed: Anesthesiologist   Anesthesiologist: Chidi Luna MD  Preanesthetic Checklist  Completed: patient identified, timeout performed and monitors applied/VS acknowledged

## 2022-06-15 NOTE — ED NOTES
Verbal order over telephone from Dr. Savita Ocasio to get surgical consent ready for pt for cystoscopy with left ureteral stent placement and to have 500 mg Levaquin IV on call to OR.       Eyal Kaufman RN  06/15/22 9987

## 2022-06-15 NOTE — H&P
HISTORY AND PHYSICAL             Date: 6/15/2022        Patient Name: Rafat Lopez     YOB: 1955      Age:  78 y.o. Chief Complaint     Chief Complaint   Patient presents with    Extremity Weakness     x1 week, unable to get up and move around    Abdominal Pain     started yesterday    increased abdominal pain and flank pain that started all of a sudden   About 72 hours ago. Got worse with then fever chills and rigors. She then decided to come to ER for evaluation . History Obtained From   Patient     History of Present Illness   78year old morbidly obese female with history of hypertension , osteoarthritis and diffuse low back pain and tobacco abuse, she quit smoking several years ago. She comes in for evaluation due to flank pain and fever and chills. On evaluation noted to have kidney stones with findings of obstructive uropathy and superimposed infection with findings of sepsis. Patient had significant leukocytosis note and had developed acute renal failure and elevated lactate levels. Urine appeared greatly infected. Upon discussion with urology, Dr Elisa Gilmore, took patient emergently to OR to remove obstructing stones, place stent and wash out genitourinary system. She was then transferred to icu for care. She is seen in icu and she is slowly waking up and she can answer all of our questions and is mentally intact. Alert and oriented and coherent. She is being admitted to icu for   Sepsis and septic shock   Elevated lactate levels. Acute renal failure   Tachycardia   Tachypnea,   Dehydration   Morbid obesity   FREDDY / sleep apnea, no cpap   Hyperglycemia and most likely has dx of type ii dm   Flank pain   Chronic osteoarthritis and pain syndrome.      Past Medical History     Past Medical History:   Diagnosis Date    Arthritis     HTN (hypertension)         Past Surgical History     Past Surgical History:   Procedure Laterality Date    ANKLE FRACTURE SURGERY Right     CHOLECYSTECTOMY      HYSTERECTOMY (CERVIX STATUS UNKNOWN)      INCONTINENCE SURGERY          Medications Prior to Admission     Prior to Admission medications    Medication Sig Start Date End Date Taking? Authorizing Provider   omeprazole (PRILOSEC) 20 MG delayed release capsule Take 20 mg by mouth Daily   Yes Historical Provider, MD   HYDROcodone-acetaminophen (NORCO)  MG per tablet Take 2 tablets by mouth 4 times daily as needed for Pain. Yes Historical Provider, MD   pravastatin (PRAVACHOL) 40 MG tablet Take 40 mg by mouth daily   Yes Historical Provider, MD   gabapentin (NEURONTIN) 600 MG tablet Take 600 mg by mouth 4 times daily. Yes Historical Provider, MD   lisinopril-hydroCHLOROthiazide (PRINZIDE;ZESTORETIC) 20-12.5 MG per tablet Take 1 tablet by mouth in the morning and at bedtime   Yes Historical Provider, MD   metoprolol (LOPRESSOR) 100 MG tablet Take 100 mg by mouth 2 times daily   Yes Historical Provider, MD   LORazepam (ATIVAN) 0.5 MG tablet Take 0.5 mg by mouth every 6 hours as needed for Anxiety. Yes Historical Provider, MD   DICLOFENAC SODIUM ER PO Take 75 tablets by mouth in the morning and at bedtime   Yes Historical Provider, MD   clotrimazole-betamethasone (LOTRISONE) 1-0.05 % cream Apply topically 2 times daily Apply topically 2 times daily. Yes Historical Provider, MD        Allergies   Codeine    Social History     Social History     Tobacco History     Smoking Status  Former Smoker    Smokeless Tobacco Use  Never Used    Tobacco Comment  quit 10 years ago          Alcohol History     Alcohol Use Status  Never          Drug Use     Drug Use Status  Never          Sexual Activity     Sexually Active  Not Asked                Family History     Family History   Problem Relation Age of Onset    Cancer Mother     Cancer Father        Review of Systems   Review of Systems   Constitutional: Positive for activity change, chills, fatigue and fever. HENT: Negative.     Eyes: Negative. Respiratory: Negative. Cardiovascular: Positive for palpitations. Gastrointestinal: Positive for nausea and vomiting. Endocrine: Negative. Genitourinary: Positive for decreased urine volume, difficulty urinating, dysuria, flank pain and urgency. History of kidney stones   Musculoskeletal: Positive for arthralgias, back pain, gait problem and myalgias. Allergic/Immunologic: Negative. Neurological: Positive for dizziness, weakness and light-headedness. Hematological: Negative. Psychiatric/Behavioral: Negative. Physical Exam   BP (!) 91/54   Pulse (!) 122   Temp 97.2 °F (36.2 °C) (Temporal)   Resp 23   Ht 5' 5\" (1.651 m)   Wt (!) 308 lb 4.8 oz (139.8 kg)   SpO2 93%   BMI 51.30 kg/m²     Physical Exam  Vitals and nursing note reviewed. Constitutional:       General: She is in acute distress. Appearance: She is ill-appearing, toxic-appearing and diaphoretic. HENT:      Head: Normocephalic and atraumatic. Nose: Nose normal.      Mouth/Throat:      Mouth: Mucous membranes are moist.   Eyes:      Conjunctiva/sclera: Conjunctivae normal.      Pupils: Pupils are equal, round, and reactive to light. Cardiovascular:      Rate and Rhythm: Regular rhythm. Tachycardia present. Pulmonary:      Effort: Pulmonary effort is normal.      Breath sounds: Normal breath sounds. Abdominal:      General: Abdomen is flat. Bowel sounds are normal.      Comments: Flank pain    Musculoskeletal:      Cervical back: Normal range of motion. Right lower leg: Edema present. Left lower leg: Edema present. Skin:     General: Skin is warm. Neurological:      General: No focal deficit present.    Psychiatric:         Mood and Affect: Mood normal.         Labs      Recent Results (from the past 24 hour(s))   Lactic Acid    Collection Time: 06/14/22  9:25 PM   Result Value Ref Range    Lactic Acid 4.1 (HH) 0.5 - 1.9 mmol/L   CBC with Auto Differential    Collection Time: 06/14/22  9:52 PM   Result Value Ref Range    WBC 17.0 (H) 4.8 - 10.8 K/uL    RBC 5.26 4.20 - 5.40 M/uL    Hemoglobin 15.9 12.0 - 16.0 g/dL    Hematocrit 49.3 (H) 37.0 - 47.0 %    MCV 93.7 81.0 - 99.0 fL    MCH 30.2 27.0 - 31.0 pg    MCHC 32.3 (L) 33.0 - 37.0 g/dL    RDW 14.2 11.5 - 14.5 %    Platelets 84 (L) 449 - 400 K/uL    MPV 13.1 (H) 9.4 - 12.3 fL    PLATELET SLIDE REVIEW Decreased     Neutrophils % 77.0 (H) 50.0 - 65.0 %    Lymphocytes % 5.0 (L) 20.0 - 40.0 %    Monocytes % 6.0 0.0 - 10.0 %    Eosinophils % 1.0 0.0 - 5.0 %    Basophils % 0.0 0.0 - 1.0 %    Neutrophils Absolute 15.0 (H) 1.5 - 7.5 K/uL    Immature Granulocytes # 1.6 K/uL    Lymphocytes Absolute 0.9 (L) 1.1 - 4.5 K/uL    Monocytes Absolute 1.00 (H) 0.00 - 0.90 K/uL    Eosinophils Absolute 0.17 0.00 - 0.60 K/uL    Basophils Absolute 0.00 0.00 - 0.20 K/uL    Bands Relative 9 (H) 0 - 5 %    Metamyelocytes Relative 2 (A) %    RBC Morphology Normal    Troponin    Collection Time: 06/14/22  9:52 PM   Result Value Ref Range    Troponin 0.01 0.00 - 0.03 ng/mL   Brain Natriuretic Peptide    Collection Time: 06/14/22  9:52 PM   Result Value Ref Range    Pro-BNP 6,541 (H) 0 - 900 pg/mL   SPECIMEN REJECTION    Collection Time: 06/14/22 10:54 PM   Result Value Ref Range    Rejected Test CMP LIPAS     Reason for Rejection see below    Comprehensive Metabolic Panel w/ Reflex to MG    Collection Time: 06/14/22 11:06 PM   Result Value Ref Range    Sodium 136 136 - 145 mmol/L    Potassium reflex Magnesium 4.2 3.5 - 5.0 mmol/L    Chloride 94 (L) 98 - 111 mmol/L    CO2 25 22 - 29 mmol/L    Anion Gap 17 7 - 19 mmol/L    Glucose 228 (H) 74 - 109 mg/dL    BUN 47 (H) 8 - 23 mg/dL    CREATININE 3.0 (H) 0.5 - 0.9 mg/dL    GFR Non- 16 (A) >60    GFR  19 (L) >59    Calcium 8.5 (L) 8.8 - 10.2 mg/dL    Total Protein 6.0 (L) 6.6 - 8.7 g/dL    Albumin 3.1 (L) 3.5 - 5.2 g/dL    Total Bilirubin 1.1 0.2 - 1.2 mg/dL    Alkaline Phosphatase 68 35 - 104 U/L    ALT 42 (H) 5 - 33 U/L    AST 50 (H) 5 - 32 U/L   Lipase    Collection Time: 06/14/22 11:06 PM   Result Value Ref Range    Lipase 9 (L) 13 - 60 U/L   COVID-19, Rapid    Collection Time: 06/14/22 11:12 PM    Specimen: Nasopharyngeal Swab   Result Value Ref Range    SARS-CoV-2, NAAT Not Detected Not Detected   Urinalysis with Reflex to Culture    Collection Time: 06/15/22 12:26 AM    Specimen: Urine   Result Value Ref Range    Color, UA DARK YELLOW (A) Straw/Yellow    Clarity, UA TURBID (A) Clear    Glucose, Ur 100 (A) Negative mg/dL    Bilirubin Urine MODERATE (A) Negative    Ketones, Urine 15 (A) Negative mg/dL    Specific Gravity, UA 1.026 1.005 - 1.030    Blood, Urine MODERATE (A) Negative    pH, UA 5.0 5.0 - 8.0    Protein,  (A) Negative mg/dL    Urobilinogen, Urine 1.0 <2.0 E.U./dL    Nitrite, Urine Negative Negative    Leukocyte Esterase, Urine SMALL (A) Negative   Microscopic Urinalysis    Collection Time: 06/15/22 12:26 AM   Result Value Ref Range    WBC, UA 2-4 0 - 5 /HPF    Bacteria, UA 1+ (A) None Seen /HPF    Amorphous, UA 2+ (A) /HPF    Yeast, UA Present (A) None Seen /HPF   Lactic Acid    Collection Time: 06/15/22  1:50 AM   Result Value Ref Range    Lactic Acid 4.3 (HH) 0.5 - 1.9 mmol/L   POCT Glucose    Collection Time: 06/15/22  5:37 AM   Result Value Ref Range    POC Glucose 197 (H) 70 - 99 mg/dl    Performed on [employer]    Hemoglobin A1c    Collection Time: 06/15/22  5:39 AM   Result Value Ref Range    Hemoglobin A1C 9.1 (H) 4.0 - 6.0 %   Comprehensive Metabolic Panel w/ Reflex to MG    Collection Time: 06/15/22  5:39 AM   Result Value Ref Range    Sodium 136 136 - 145 mmol/L    Potassium reflex Magnesium 4.5 3.5 - 5.0 mmol/L    Chloride 97 (L) 98 - 111 mmol/L    CO2 21 (L) 22 - 29 mmol/L    Anion Gap 18 7 - 19 mmol/L    Glucose 216 (H) 74 - 109 mg/dL    BUN 52 (H) 8 - 23 mg/dL    CREATININE 2.8 (H) 0.5 - 0.9 mg/dL    GFR Non-African American 17 (A) >60    GFR  20 (L) >59    Calcium 8.0 (L) 8.8 - 10.2 mg/dL    Total Protein 5.4 (L) 6.6 - 8.7 g/dL    Albumin 2.8 (L) 3.5 - 5.2 g/dL    Total Bilirubin 1.0 0.2 - 1.2 mg/dL    Alkaline Phosphatase 73 35 - 104 U/L    ALT 39 (H) 5 - 33 U/L    AST 48 (H) 5 - 32 U/L   Lactic Acid    Collection Time: 06/15/22  5:39 AM   Result Value Ref Range    Lactic Acid 4.0 (HH) 0.5 - 1.9 mmol/L   CBC with Auto Differential    Collection Time: 06/15/22  5:39 AM   Result Value Ref Range    WBC 12.5 (H) 4.8 - 10.8 K/uL    RBC 4.52 4.20 - 5.40 M/uL    Hemoglobin 13.6 12.0 - 16.0 g/dL    Hematocrit 42.9 37.0 - 47.0 %    MCV 94.9 81.0 - 99.0 fL    MCH 30.1 27.0 - 31.0 pg    MCHC 31.7 (L) 33.0 - 37.0 g/dL    RDW 14.3 11.5 - 14.5 %    Platelets 64 (L) 305 - 400 K/uL    MPV 12.8 (H) 9.4 - 12.3 fL    Immature Granulocytes # 2.4 K/uL   Protime-INR    Collection Time: 06/15/22  5:39 AM   Result Value Ref Range    Protime 17.2 (H) 12.0 - 14.6 sec    INR 1.39 (H) 0.88 - 1.18        Imaging/Diagnostics Last 24 Hours   CT ABDOMEN PELVIS WO CONTRAST Additional Contrast? None    Result Date: 6/15/2022  NO PRIOR REPORT AVAILABLE <Addendum Signed by Rose Butt MD at [facility]-[place]-2022 02:44:19 AM Findings were communicated to Hortensia Mejias RN on 6/15/22 at 2:42a. m.,who confirms having received the report. Myrtle Leonard takes responsibility for providing the report to the referring clinician Dr. Ana Osorio No further action required by the reading radiologist. If the referring clinician has any further questions please call customer service [phone], option 1. Critical Documentation Completed Addendum End> Exam: CT OF THE ABDOMEN/PELVIS WITHOUT CONTRAST Clinical data: Left lower quadrant pain with constipation, less flatus, acute onset. No prior diverticular disease. Technique: Axial CT images were acquired through the abdomen and pelvis without contrast using soft tissue and bone algorithms. Reformatted/MPR images were performed. Radiation dose: CTDIvol =29.80 mGy, DLP =1470 mGy x cm. Limitations: Lack of intravenous contrast limits evaluation of solid viscera. Lack of oral contrast limits evaluation of the bowel loops. Prior Studies: No prior studies submitted. Findings: Lung bases: Mild subsegmental atelectasis is noted in the included bilateral lungs. Liver:Mild hepatomegaly with mild hepatic steatosis. No evidence of mass. No evidence of dilated ducts. Gallbladder Fossa: Status post cholecystectomy. Spleen: Grossly unremarkable. Pancreas/adrenal glands: Grossly unremarkable size, contour and density. Kidneys: In anatomic position. Grossly unremarkablerenal size, contour and density. No evidence of a renal mass or cyst.There is 6 mm obstructive calculus in the left upper ureter with mild proximal hydroureteronephrosis and perinephric inflammatory changes. 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 Bilateral non-obstructing renal calculi noted, largest is measuring about 17 mm in the lower pole of right kidney and 15 mm in the lower pole of  left kidney. Retroperitoneum: No enlarged retroperitoneal lymphadenopathy. The aorta and IVC appear unremarkable. Peritoneal cavity: No evidence of free air or ascites. Gastrointestinal tract: No obstruction. Colonic diverticulosis without evidence of acute diverticulitis. Appendix: Unremarkable Pelvis: The urinary bladder is suboptimally distended. Status post hysterectomy and bilateral oopherectomy. Osseous structures: No acute or destructive bony process identified. Mild degenerative changes noted in the spine and pelvis. Small fat containing umbilical and supra umbilical hernia noted. 1. There is 6 mm obstructive calculus in the left upper ureter with mild proximal hydroureteronephrosis and perinephric inflammatory changes. 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  Bilateral non-obstructing renal calculi noted. 2. Mild hepatomegaly with mild hepatic steatosis. 3. Colonic diverticulosis without evidence of acute diverticulitis. 4. Small fat containing umbilical and supra umbilical hernia noted. Recommendation: Follow up as clinically indicated. All CT scans at this facility utilize dose modulation, iterative reconstruction, and/or weight based dosing when appropriate to reduce radiation dose to as low as reasonably achievable. Amended by Aylin Leroy MD at 15-[place]-2022 02:44:19 AM Electronically Signed by Aylin Leroy MD at 15[facility]-2022 02:29:46 AM             XR CHEST PORTABLE    Result Date: 6/14/2022  NO PRIOR REPORT AVAILABLE Exam: X-RAY OF [facility] Clinical data:Fatigue. Technique:Single view of the chest. Prior studies: No prior studies submitted. Findings: The lungs are grossly clear; noevidence of acute infiltrate or pleural effusion. Cardiac silhouette is within normal limits. No acute osseous abnormality is detected. 1. Unremarkable radiograph of chest. Recommendation: Follow up as clinically indicated. Electronically Signed by Deborah Wilkerson MD at 15[facility]-2022 12:21:20 AM               Assessment      Hospital Problems           Last Modified POA    * (Principal) Obstructive uropathy 6/15/2022 Yes    Obstructive pyelonephritis 6/15/2022 Yes    Sepsis with acute renal failure ([address] 6/15/2022 Yes    Left ureteral calculus 6/15/2022 Yes    Renal calculus, bilateral 6/15/2022 Yes    BINH (acute kidney injury) ([address] 6/15/2022 Yes    Hyperglycemia due to diabetes mellitus ([address] 6/15/2022 Yes    Hypertension 6/15/2022 Yes    Dehydration 6/15/2022 Yes    FREDDY and COPD overlap syndrome ([address] 6/15/2022 Yes    Degenerative joint disease 6/15/2022 Yes          Plan   1. Patient is admitted due to left flank pain  2. Noted to have 6 mm obstructing stone in left upper ureter and with hydroureteronephrosis noted with acute renal failure   3. Emergently taken to or to remove   4.  Pain

## 2022-06-15 NOTE — OP NOTE
Brief operative Note      Patient: Noel Sharma  YOB: 1955  MRN: [id number]    Date of Procedure: 6/15/2022    Pre-Op Diagnosis: Left ureteral calculus [N20.1]    Post-Op Diagnosis: Same       Procedure(s):  CYSTOSCOPY, LEFT ureteral stent placement    Surgeon(s):  Low Romero MD    Assistant:   * No surgical staff found *    Anesthesia: General    Estimated Blood Loss (mL): 0    Complications: None    Specimens:   ID Type Source Tests Collected by Time Destination   1 :  Urine Bladder CULTURE, URINE Low Romero MD 6/15/2022 0342    2 :  Urine Kidney CULTURE, URINE Low Romero MD 6/15/2022 9880        Implants:  Implant Name Type Inv. Item Serial No.  Lot No. LRB No. Used Action   STENT URET 6FR L26CM PERCFLX HYDR+ DBL PGTL THRD 2 - [id number]  STENT URET 6FR L26CM PERCFLX HYDR+ DBL PGTL THRD 2  [facility]- [id number] Left 1 Implanted         Drains:   Urinary Catheter 2 Way (Active)       Findings: Proximal left ureteral calculus. Bladder mucosa showed changes consistent with cystitis. Grossly cloudy purulent bloody from the left kidney. Mild hydronephrosis. 6 Beatrice Olp by 26 cm left double-J ureteral stent placed.  18fr whiting     Detailed Description of Procedure:   See dictated report: [id number]    Disposition to PACU admit to ICU to hospitalist service critical condition for continued resuscitation, fluid hydration, supportive care, broad-spectrum antibiotics follow-up on cultures    Electronically signed by Benji Trejo MD on 6/15/2022 at 3:52 AM

## 2022-06-15 NOTE — PROGRESS NOTES
Hospitalist Progress Note  [facility]     Patient: Rafat Lopez  : 1955  MRN: [id number]  Code Status: Full Code    Hospital Day: 0   Date of Service: 6/15/2022    Subjective:   Patient seen and examined. No current complaints. Past Medical History:   Diagnosis Date    Arthritis     HTN (hypertension)        Past Surgical History:   Procedure Laterality Date    ANKLE FRACTURE SURGERY Right     CHOLECYSTECTOMY      HYSTERECTOMY (CERVIX STATUS UNKNOWN)      INCONTINENCE SURGERY         Family History   Problem Relation Age of Onset    Cancer Mother     Cancer Father        Social History     Socioeconomic History    Marital status: Single     Spouse name: Not on file    Number of children: Not on file    Years of education: Not on file    Highest education level: Not on file   Occupational History    Not on file   Tobacco Use    Smoking status: Former Smoker    Smokeless tobacco: Never Used    Tobacco comment: quit 10 years ago   Vaping Use    Vaping Use: Never used   Substance and Sexual Activity    Alcohol use: Never    Drug use: Never    Sexual activity: Not on file   Other Topics Concern    Not on file   Social History Narrative    Not on file     Social Determinants of Health     Financial Resource Strain:     Difficulty of Paying Living Expenses: Not on file   Food Insecurity:     Worried About Running Out of Food in the Last Year: Not on file    Kavon of Food in the Last Year: Not on file   Transportation Needs:     Lack of Transportation (Medical): Not on file    Lack of Transportation (Non-Medical):  Not on file   Physical Activity:     Days of Exercise per Week: Not on file    Minutes of Exercise per Session: Not on file   Stress:     Feeling of Stress : Not on file   Social Connections:     Frequency of Communication with Friends and Family: Not on file    Frequency of Social Gatherings with Friends and Family: Not on file    Attends [employer] Services: Not on file    Active Member of Clubs or Organizations: Not on file    Attends Club or Organization Meetings: Not on file    Marital Status: Not on file   Intimate Partner Violence:     Fear of Current or Ex-Partner: Not on file    Emotionally Abused: Not on file    Physically Abused: Not on file    Sexually Abused: Not on file   Housing Stability:     Unable to Pay for Housing in the Last Year: Not on file    Number of [place] in the Last Year: Not on file    Unstable Housing in the Last Year: Not on file       Current Facility-Administered Medications   Medication Dose Route Frequency Provider Last Rate Last Admin    0.9 % sodium chloride infusion   IntraVENous Continuous Corliss Runner,  mL/hr at 06/15/22 1000 Rate Verify at 06/15/22 1000    gabapentin (NEURONTIN) tablet 600 mg  600 mg Oral 4x Daily Corliss Runner, MD   600 mg at 06/15/22 0740    metoprolol tartrate (LOPRESSOR) tablet 100 mg  100 mg Oral BID Corliss Runner, MD   100 mg at 06/15/22 0950    pravastatin (PRAVACHOL) tablet 40 mg  40 mg Oral Daily Corliss Runner, MD   40 mg at 06/15/22 0740    [Held by provider] lisinopril-hydroCHLOROthiazide (PRINZIDE;ZESTORETIC) 20-12.5 MG per tablet 1 tablet  1 tablet Oral BID Corliss Runner, MD        cefepime (MAXIPIME) 1000 mg IVPB in 50 mL dextrose 5% (premix)  1,000 mg IntraVENous Q12H Corliss Runner, MD        glucose chewable tablet 16 g  4 tablet Oral PRN Ngoc Chase MD        dextrose bolus 10% 125 mL  125 mL IntraVENous PRN Ngoc Chase MD        Or    dextrose bolus 10% 250 mL  250 mL IntraVENous PRN Ngoc Chase MD        glucagon (rDNA) injection 1 mg  1 mg IntraMUSCular PRN Ngoc Chase MD        dextrose 5 % solution  100 mL/hr IntraVENous PRN Ngoc Chase MD        insulin lispro (HUMALOG) injection vial 0-18 Units  0-18 Units SubCUTAneous TID  Ngoc Chase MD   3 Units at 06/15/22 1110    insulin lispro (HUMALOG) injection vial 0-9 Units  0-9 Units SubCUTAneous Nightly Ngoc Chase MD        insulin glargine (LANTUS) injection vial 10 Units  10 Units SubCUTAneous Nightly Ngoc Chase MD   10 Units at 06/15/22 0552    sodium chloride flush 0.9 % injection 5-40 mL  5-40 mL IntraVENous 2 times per day Ngoc Chase MD   10 mL at 06/15/22 0742    sodium chloride flush 0.9 % injection 5-40 mL  5-40 mL IntraVENous PRN Ngoc Chase MD        0.9 % sodium chloride infusion   IntraVENous PRN Ngoc Chase MD        enoxaparin Sodium (LOVENOX) injection 30 mg  30 mg SubCUTAneous Daily Ngoc Chase MD   30 mg at 06/15/22 0740    ondansetron (ZOFRAN-ODT) disintegrating tablet 4 mg  4 mg Oral Q8H PRN Ngoc Chase MD        Or    ondansetron (ZOFRAN) injection 4 mg  4 mg IntraVENous Q6H PRN Ngoc Chase MD        polyethylene glycol (GLYCOLAX) packet 17 g  17 g Oral Daily PRN Ngoc Chase MD        acetaminophen (TYLENOL) tablet 650 mg  650 mg Oral Q6H PRN Ngoc Chase MD        Or    acetaminophen (TYLENOL) suppository 650 mg  650 mg Rectal Q6H PRN Ngoc Chase MD        potassium chloride 10 mEq/100 mL IVPB (Peripheral Line)  10 mEq IntraVENous PRN Ngoc Chase MD        magnesium sulfate 2000 mg in 50 mL IVPB premix  2,000 mg IntraVENous PRN Ngoc Chase MD        famotidine (PEPCID) 20 mg in sodium chloride (PF) 10 mL injection  20 mg IntraVENous BID Ngoc Chase MD   20 mg at 06/15/22 0739    ipratropium-albuterol (DUONEB) nebulizer solution 1 ampule  1 ampule Inhalation Q4H WA Ngoc Chase MD   1 ampule at 06/15/22 0610    [START ON 6/16/2022] levoFLOXacin (LEVAQUIN) 500 MG/100ML infusion 500 mg  500 mg IntraVENous Q24H Ngoc Chase MD        HYDROmorphone HCl PF (DILAUDID) injection 1 mg  1 mg IntraVENous Q3H PRN Jason Alcocer MD   1 mg at 06/15/22 0949    norepinephrine (LEVOPHED) 16 mg in sodium chloride 0.9 % 250 mL infusion  1-100 mcg/min IntraVENous Continuous Corey Beltran MD 20.6 mL/hr at 06/15/22 1104 22 mcg/min at 06/15/22 1104         sodium chloride 150 mL/hr at 06/15/22 1000    dextrose      sodium chloride      norepinephrine 22 mcg/min (06/15/22 1104)        Objective:   BP (!) 84/56   Pulse (!) 133   Temp 96.8 °F (36 °C) (Temporal)   Resp 20   Ht 5' 5\" (1.651 m)   Wt (!) 308 lb 4.8 oz (139.8 kg)   SpO2 94%   BMI 51.30 kg/m²     General: no acute distress  HEENT: normocephalic, atraumatic  Neck: supple, symmetrical, trachea midline   Lungs: clear to auscultation bilaterally   Cardiovascular: s1 and s2 normal  Abdomen: soft, positive bowel sounds  Extremities: no edema or cyanosis   Neuro: aaox3, no focal deficits   Skin: normal color and texture     Recent Labs     06/14/22  2152 06/15/22  0539   WBC 17.0* 12.5*   RBC 5.26 4.52   HGB 15.9 13.6   HCT 49.3* 42.9   MCV 93.7 94.9   MCH 30.2 30.1   MCHC 32.3* 31.7*   PLT 84* 64*     Recent Labs     06/14/22  2306 06/15/22  0539    136   K 4.2 4.5   ANIONGAP 17 18   CL 94* 97*   CO2 25 21*   BUN 47* 52*   CREATININE 3.0* 2.8*   GLUCOSE 228* 216*   CALCIUM 8.5* 8.0*     No results for input(s): MG, PHOS in the last 72 hours. Recent Labs     06/14/22  2306 06/15/22  0539   AST 50* 48*   ALT 42* 39*   BILITOT 1.1 1.0   ALKPHOS 68 73     No results for input(s): PH, PO2, PCO2, HCO3, BE, O2SAT in the last 72 hours.   Recent Labs     06/14/22  2152 06/15/22  0842   TROPONINI 0.01 0.02     Recent Labs     06/15/22  0539   INR 1.39*     Recent Labs     06/15/22  0539   LACTA 4.0*         Intake/Output Summary (Last 24 hours) at 6/15/2022 1128  Last data filed at 6/15/2022 1000  Gross per 24 hour   Intake 3038.19 ml   Output 175 ml   Net 2863.19 ml       CT ABDOMEN PELVIS WO CONTRAST Additional Contrast? None    Result Date: 6/15/2022  NO PRIOR REPORT AVAILABLE <Addendum Signed by Nuno Zambrano MD at 15-[place]aqu[place]-2022 02:44:19 AM Findings were communicated to Gracie Gilmore RN on 6/15/22 at 2:42a. m.,who confirms having received the report. Kirill Jasonshayan takes responsibility for providing the report to the referring clinician Dr. Greenfield Points No further action required by the reading radiologist. If the referring clinician has any further questions please call customer service [phone], option 1. Critical Documentation Completed Addendum End> Exam: CT OF THE ABDOMEN/PELVIS WITHOUT CONTRAST Clinical data: Left lower quadrant pain with constipation, less flatus, acute onset. No prior diverticular disease. Technique: Axial CT images were acquired through the abdomen and pelvis without contrast using soft tissue and bone algorithms. Reformatted/MPR images were performed. Radiation dose: CTDIvol =29.80 mGy, DLP =1470 mGy x cm. Limitations: Lack of intravenous contrast limits evaluation of solid viscera. Lack of oral contrast limits evaluation of the bowel loops. Prior Studies: No prior studies submitted. Findings: Lung bases: Mild subsegmental atelectasis is noted in the included bilateral lungs. Liver:Mild hepatomegaly with mild hepatic steatosis. No evidence of mass. No evidence of dilated ducts. Gallbladder Fossa: Status post cholecystectomy. Spleen: Grossly unremarkable. Pancreas/adrenal glands: Grossly unremarkable size, contour and density. Kidneys: In anatomic position. Grossly unremarkablerenal size, contour and density. No evidence of a renal mass or cyst.There is 6 mm obstructive calculus in the left upper ureter with mild proximal hydroureteronephrosis and perinephric inflammatory changes. 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  Bilateral non-obstructing renal calculi noted, largest is measuring about 17 mm in the lower pole of right kidney and 15 mm in the lower pole of  left kidney. Retroperitoneum: No enlarged retroperitoneal lymphadenopathy. The aorta and IVC appear unremarkable. Peritoneal cavity: No evidence of free air or ascites. Gastrointestinal tract: No obstruction. Colonic diverticulosis without evidence of acute diverticulitis. Appendix: Unremarkable Pelvis: The urinary bladder is suboptimally distended. Status post hysterectomy and bilateral oopherectomy. Osseous structures: No acute or destructive bony process identified. Mild degenerative changes noted in the spine and pelvis. Small fat containing umbilical and supra umbilical hernia noted. 1. There is 6 mm obstructive calculus in the left upper ureter with mild proximal hydroureteronephrosis and perinephric inflammatory changes. 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 Bilateral non-obstructing renal calculi noted. 2. Mild hepatomegaly with mild hepatic steatosis. 3. Colonic diverticulosis without evidence of acute diverticulitis. 4. Small fat containing umbilical and supra umbilical hernia noted. Recommendation: Follow up as clinically indicated. All CT scans at this facility utilize dose modulation, iterative reconstruction, and/or weight based dosing when appropriate to reduce radiation dose to as low as reasonably achievable. Amended by Halie Garcia MD at 15-[place]aquille-2022 02:44:19 AM Electronically Signed by Halie Garcia MD at 15-[place]aquille-2022 02:29:46 AM             XR CHEST PORTABLE    Result Date: 6/15/2022  NO PRIOR REPORT AVAILABLE Exam: X-RAY OF [facility] Clinical data:Central line placement. Technique:Single view of the chest. Prior studies: Radiograph of the chest dated 6/14/2022. Findings: The lungs are grossly clear; noevidence of acute infiltrate. Questionable trace left pleural effusion with basilar atelectasis. Cardiac silhouette is within normal limits. No acute osseous abnormality is detected.  Right sided central line is in  place with its tip in SVC. 1. Questionable trace left pleural effusion with basilar atelectasis. 2. Right sided central line is in place with its tip in SVC. Recommendation: Follow up as clinically indicated. Electronically Signed by Coral Fernandez MD at 15-[place]aquille-2022 06:55:39 AM             XR CHEST PORTABLE    Result Date: 6/14/2022  NO PRIOR REPORT AVAILABLE Exam: X-RAY OF [facility] Clinical data:Fatigue. Technique:Single view of the chest. Prior studies: No prior studies submitted. Findings: The lungs are grossly clear; noevidence of acute infiltrate or pleural effusion. Cardiac silhouette is within normal limits. No acute osseous abnormality is detected. 1. Unremarkable radiograph of chest. Recommendation: Follow up as clinically indicated.  Electronically Signed by Yousif Luis MD at 15-[place]aquille-2022 12:21:20 AM              Assessment and Plan:   Septic shock  Secondary to below  Initial blood cultures growing GNR  Broad-spectrum antibiotics  IVF  Requiring norepinephrine gtt  Trend lactate to clearance    Obstructive left pyelonephritis  Urology following  Secondary to proximal left ureteral calculus  S/p left ureteral stent placement 6/15/2022 per Dr. Bravo Tran  Agents as above  Follow cultures    BINH  Unknown baseline creatinine  IVF  Avoid offending agents  Follow renal function/urine output/electrolytes    DM2  HbA1c 9.1  Poor control  Counseled  Meds on board    Morbid obesity  Counseled    DVT prophylaxis  SCDs given thrombocytopenia    Total critical care time: 53 minutes    Dianna Horton MD   6/15/2022 11:28 AM

## 2022-06-15 NOTE — OP NOTE
[facility] [address] 77, [facility]                                OPERATIVE REPORT    PATIENT NAME: Marie Peabody                       :        1955  MED REC NO:   [id number]                              ROOM:       [facility]  ACCOUNT NO:   [de-identified]                           ADMIT DATE: 2022  PROVIDER:     Rosemary Narayanan MD    DATE OF PROCEDURE:  06/15/2022    TITLE OF OPERATION:  Cystoscopy, left ureteral stent placement. PREOPERATIVE DIAGNOSES:  1. Left obstructive pyelonephritis. 2.  Sepsis secondary to left obstructive pyelonephritis. 3.  Left proximal ureteral calculus. 4.  BINH. POSTOPERATIVE DIAGNOSES:  1. Left obstructive pyelonephritis. 2.  Sepsis secondary to left obstructive pyelonephritis. 3.  Left proximal ureteral calculus. 4.  BINH. ANESTHETIC:  General anesthetic. ATTENDING SURGEON:  Rosemary Narayanan MD    HISTORY:  The patient is a 67-year-old obese woman who came to the ER  with weakness, nausea, vomiting, and flank pain. She was found to have  an elevated lactic acid. She was slightly hypotensive with blood  pressures in the upper 80s to 100. She was not febrile. She had an  elevated white count of _____ and a creatinine 3.0. Lactic acid was  elevated 4.3. I felt this was secondary to sepsis. CT scan was  obtained which showed bilateral nonobstructing renal calculi, but she  had a stone in the proximal left ureter. She did not have a lot of  hydronephrosis, but she really was not making much urine. Therefore, it  was felt she has obstructive pyelonephritis with sepsis and needs to be  taken to the operating room for cystoscopy, left ureteral stent  placement. She does understand that we are not definitely treating the  stone and just providing drainage with stent placement. The risks and  complications were discussed. She agrees to proceed.   She has already  been cultured and already

## 2022-06-15 NOTE — PROGRESS NOTES
Pharmacy Renal Adjustment    Lo Meeks is a 78 y.o. female. Pharmacy has renally adjusted medications per protocol. Recent Labs     06/14/22  2306 06/15/22  0539   BUN 47* 52*       Recent Labs     06/14/22  2306 06/15/22  0539   CREATININE 3.0* 2.8*       Estimated Creatinine Clearance: 28 mL/min (A) (based on SCr of 2.8 mg/dL (H)). Height:   Ht Readings from Last 1 Encounters:   06/15/22 5' 5\" (1.651 m)     Weight:  Wt Readings from Last 1 Encounters:   06/15/22 (!) 308 lb 4.8 oz (139.8 kg)       Plan: Adjust the following medications based on renal function:  Change Levaquin to 250mg IV q 24 hours.     SIMONA WEISS, PHARM D, 6/15/2022, 1:42 PM

## 2022-06-16 LAB
ALBUMIN SERPL-MCNC: 2.3 G/DL (ref 3.5–5.2)
ALP BLD-CCNC: 157 U/L (ref 35–104)
ALT SERPL-CCNC: 53 U/L (ref 5–33)
ANION GAP SERPL CALCULATED.3IONS-SCNC: 17 MMOL/L (ref 7–19)
AST SERPL-CCNC: 75 U/L (ref 5–32)
BANDED NEUTROPHILS RELATIVE PERCENT: 1 % (ref 0–5)
BASOPHILS ABSOLUTE: 0 K/UL (ref 0–0.2)
BASOPHILS RELATIVE PERCENT: 0 % (ref 0–1)
BILIRUB SERPL-MCNC: 2.6 MG/DL (ref 0.2–1.2)
BUN BLDV-MCNC: 56 MG/DL (ref 8–23)
CALCIUM SERPL-MCNC: 8.1 MG/DL (ref 8.8–10.2)
CHLORIDE BLD-SCNC: 99 MMOL/L (ref 98–111)
CO2: 20 MMOL/L (ref 22–29)
CREAT SERPL-MCNC: 1.8 MG/DL (ref 0.5–0.9)
CULTURE, BLOOD 2: ABNORMAL
CULTURE, BLOOD 2: ABNORMAL
EKG P AXIS: 55 DEGREES
EKG P-R INTERVAL: 120 MS
EKG Q-T INTERVAL: 334 MS
EKG QRS DURATION: 98 MS
EKG QTC CALCULATION (BAZETT): 447 MS
EKG T AXIS: 7 DEGREES
EOSINOPHILS ABSOLUTE: 0 K/UL (ref 0–0.6)
EOSINOPHILS RELATIVE PERCENT: 0 % (ref 0–5)
GFR AFRICAN AMERICAN: 34
GFR NON-AFRICAN AMERICAN: 28
GLUCOSE BLD-MCNC: 150 MG/DL (ref 74–109)
GLUCOSE BLD-MCNC: 155 MG/DL (ref 70–99)
GLUCOSE BLD-MCNC: 173 MG/DL (ref 70–99)
GLUCOSE BLD-MCNC: 184 MG/DL (ref 70–99)
GLUCOSE BLD-MCNC: 192 MG/DL (ref 70–99)
HCT VFR BLD CALC: 44.3 % (ref 37–47)
HEMOGLOBIN: 13.9 G/DL (ref 12–16)
IMMATURE GRANULOCYTES #: 0.3 K/UL
LYMPHOCYTES ABSOLUTE: 0.6 K/UL (ref 1.1–4.5)
LYMPHOCYTES RELATIVE PERCENT: 5 % (ref 20–40)
MAGNESIUM: 1.2 MG/DL (ref 1.6–2.4)
MAGNESIUM: 2.4 MG/DL (ref 1.6–2.4)
MCH RBC QN AUTO: 29.8 PG (ref 27–31)
MCHC RBC AUTO-ENTMCNC: 31.4 G/DL (ref 33–37)
MCV RBC AUTO: 95.1 FL (ref 81–99)
MONOCYTES ABSOLUTE: 1 K/UL (ref 0–0.9)
MONOCYTES RELATIVE PERCENT: 8 % (ref 0–10)
NEUTROPHILS ABSOLUTE: 11.1 K/UL (ref 1.5–7.5)
NEUTROPHILS RELATIVE PERCENT: 86 % (ref 50–65)
ORGANISM: ABNORMAL
PDW BLD-RTO: 14.7 % (ref 11.5–14.5)
PERFORMED ON: ABNORMAL
PLATELET # BLD: 42 K/UL (ref 130–400)
PLATELET SLIDE REVIEW: ABNORMAL
PMV BLD AUTO: 13.3 FL (ref 9.4–12.3)
POTASSIUM SERPL-SCNC: 4 MMOL/L (ref 3.5–5)
RBC # BLD: 4.66 M/UL (ref 4.2–5.4)
SODIUM BLD-SCNC: 136 MMOL/L (ref 136–145)
TOTAL PROTEIN: 5.9 G/DL (ref 6.6–8.7)
VACUOLATED NEUTROPHILS: ABNORMAL
WBC # BLD: 12.8 K/UL (ref 4.8–10.8)

## 2022-06-16 PROCEDURE — 36592 COLLECT BLOOD FROM PICC: CPT

## 2022-06-16 PROCEDURE — 6370000000 HC RX 637 (ALT 250 FOR IP): Performed by: UROLOGY

## 2022-06-16 PROCEDURE — 6370000000 HC RX 637 (ALT 250 FOR IP): Performed by: INTERNAL MEDICINE

## 2022-06-16 PROCEDURE — 6360000002 HC RX W HCPCS: Performed by: UROLOGY

## 2022-06-16 PROCEDURE — 82947 ASSAY GLUCOSE BLOOD QUANT: CPT

## 2022-06-16 PROCEDURE — 2580000003 HC RX 258: Performed by: INTERNAL MEDICINE

## 2022-06-16 PROCEDURE — 6360000002 HC RX W HCPCS: Performed by: INTERNAL MEDICINE

## 2022-06-16 PROCEDURE — 99232 SBSQ HOSP IP/OBS MODERATE 35: CPT | Performed by: UROLOGY

## 2022-06-16 PROCEDURE — 83735 ASSAY OF MAGNESIUM: CPT

## 2022-06-16 PROCEDURE — 2000000000 HC ICU R&B

## 2022-06-16 PROCEDURE — 85025 COMPLETE CBC W/AUTO DIFF WBC: CPT

## 2022-06-16 PROCEDURE — 80053 COMPREHEN METABOLIC PANEL: CPT

## 2022-06-16 PROCEDURE — 2500000003 HC RX 250 WO HCPCS: Performed by: INTERNAL MEDICINE

## 2022-06-16 PROCEDURE — 2700000000 HC OXYGEN THERAPY PER DAY

## 2022-06-16 PROCEDURE — 94640 AIRWAY INHALATION TREATMENT: CPT

## 2022-06-16 RX ORDER — BUSPIRONE HYDROCHLORIDE 10 MG/1
10 TABLET ORAL 3 TIMES DAILY
Status: DISCONTINUED | OUTPATIENT
Start: 2022-06-16 | End: 2022-07-02 | Stop reason: HOSPADM

## 2022-06-16 RX ORDER — TRAMADOL HYDROCHLORIDE 50 MG/1
50 TABLET ORAL EVERY 6 HOURS PRN
Status: DISCONTINUED | OUTPATIENT
Start: 2022-06-16 | End: 2022-07-02 | Stop reason: HOSPADM

## 2022-06-16 RX ORDER — METOPROLOL TARTRATE 5 MG/5ML
5 INJECTION INTRAVENOUS ONCE
Status: COMPLETED | OUTPATIENT
Start: 2022-06-16 | End: 2022-06-16

## 2022-06-16 RX ORDER — METOPROLOL TARTRATE 5 MG/5ML
INJECTION INTRAVENOUS
Status: DISCONTINUED
Start: 2022-06-16 | End: 2022-06-16

## 2022-06-16 RX ORDER — FENTANYL CITRATE 50 UG/ML
25 INJECTION, SOLUTION INTRAMUSCULAR; INTRAVENOUS
Status: COMPLETED | OUTPATIENT
Start: 2022-06-16 | End: 2022-06-16

## 2022-06-16 RX ORDER — FENTANYL CITRATE 50 UG/ML
25 INJECTION, SOLUTION INTRAMUSCULAR; INTRAVENOUS ONCE
Status: DISCONTINUED | OUTPATIENT
Start: 2022-06-16 | End: 2022-06-16

## 2022-06-16 RX ORDER — PHENAZOPYRIDINE HYDROCHLORIDE 200 MG/1
200 TABLET, FILM COATED ORAL
Status: DISCONTINUED | OUTPATIENT
Start: 2022-06-16 | End: 2022-06-16

## 2022-06-16 RX ORDER — OLANZAPINE 10 MG/1
10 INJECTION, POWDER, LYOPHILIZED, FOR SOLUTION INTRAMUSCULAR
Status: COMPLETED | OUTPATIENT
Start: 2022-06-16 | End: 2022-06-16

## 2022-06-16 RX ADMIN — ONDANSETRON HYDROCHLORIDE 4 MG: 2 SOLUTION INTRAMUSCULAR; INTRAVENOUS at 10:46

## 2022-06-16 RX ADMIN — INSULIN LISPRO 2 UNITS: 100 INJECTION, SOLUTION INTRAVENOUS; SUBCUTANEOUS at 22:43

## 2022-06-16 RX ADMIN — METOPROLOL TARTRATE 5 MG: 5 INJECTION INTRAVENOUS at 05:59

## 2022-06-16 RX ADMIN — FENTANYL CITRATE 25 MCG: 50 INJECTION, SOLUTION INTRAMUSCULAR; INTRAVENOUS at 10:31

## 2022-06-16 RX ADMIN — CEFEPIME HYDROCHLORIDE 1000 MG: 1 INJECTION, SOLUTION INTRAVENOUS at 14:55

## 2022-06-16 RX ADMIN — OLANZAPINE 10 MG: 10 INJECTION, POWDER, LYOPHILIZED, FOR SOLUTION INTRAMUSCULAR at 12:14

## 2022-06-16 RX ADMIN — INSULIN LISPRO 3 UNITS: 100 INJECTION, SOLUTION INTRAVENOUS; SUBCUTANEOUS at 11:47

## 2022-06-16 RX ADMIN — AMIODARONE HYDROCHLORIDE 1 MG/MIN: 50 INJECTION, SOLUTION INTRAVENOUS at 16:32

## 2022-06-16 RX ADMIN — LEVOFLOXACIN 250 MG: 5 INJECTION, SOLUTION INTRAVENOUS at 03:07

## 2022-06-16 RX ADMIN — MAGNESIUM SULFATE HEPTAHYDRATE 2000 MG: 40 INJECTION, SOLUTION INTRAVENOUS at 03:20

## 2022-06-16 RX ADMIN — SODIUM CHLORIDE, POTASSIUM CHLORIDE, SODIUM LACTATE AND CALCIUM CHLORIDE: 600; 310; 30; 20 INJECTION, SOLUTION INTRAVENOUS at 04:39

## 2022-06-16 RX ADMIN — INSULIN LISPRO 3 UNITS: 100 INJECTION, SOLUTION INTRAVENOUS; SUBCUTANEOUS at 07:59

## 2022-06-16 RX ADMIN — PRAVASTATIN SODIUM 40 MG: 20 TABLET ORAL at 07:59

## 2022-06-16 RX ADMIN — ACETAMINOPHEN 650 MG: 325 TABLET ORAL at 07:15

## 2022-06-16 RX ADMIN — GABAPENTIN 600 MG: 600 TABLET, FILM COATED ORAL at 07:59

## 2022-06-16 RX ADMIN — SODIUM CHLORIDE, POTASSIUM CHLORIDE, SODIUM LACTATE AND CALCIUM CHLORIDE: 600; 310; 30; 20 INJECTION, SOLUTION INTRAVENOUS at 21:13

## 2022-06-16 RX ADMIN — INSULIN LISPRO 3 UNITS: 100 INJECTION, SOLUTION INTRAVENOUS; SUBCUTANEOUS at 17:10

## 2022-06-16 RX ADMIN — SODIUM CHLORIDE, POTASSIUM CHLORIDE, SODIUM LACTATE AND CALCIUM CHLORIDE: 600; 310; 30; 20 INJECTION, SOLUTION INTRAVENOUS at 12:52

## 2022-06-16 RX ADMIN — HYDROCORTISONE SODIUM SUCCINATE 50 MG: 100 INJECTION, POWDER, FOR SOLUTION INTRAMUSCULAR; INTRAVENOUS at 05:59

## 2022-06-16 RX ADMIN — INSULIN GLARGINE 10 UNITS: 100 INJECTION, SOLUTION SUBCUTANEOUS at 22:42

## 2022-06-16 RX ADMIN — HYDROMORPHONE HYDROCHLORIDE 0.25 MG: 1 INJECTION, SOLUTION INTRAMUSCULAR; INTRAVENOUS; SUBCUTANEOUS at 03:35

## 2022-06-16 RX ADMIN — TRAMADOL HYDROCHLORIDE 50 MG: 50 TABLET, COATED ORAL at 09:02

## 2022-06-16 RX ADMIN — CEFEPIME HYDROCHLORIDE 1000 MG: 1 INJECTION, SOLUTION INTRAVENOUS at 02:05

## 2022-06-16 RX ADMIN — PHENYLEPHRINE HYDROCHLORIDE 30 MCG/MIN: 10 INJECTION INTRAVENOUS at 15:26

## 2022-06-16 RX ADMIN — IPRATROPIUM BROMIDE AND ALBUTEROL SULFATE 1 AMPULE: 2.5; .5 SOLUTION RESPIRATORY (INHALATION) at 14:25

## 2022-06-16 RX ADMIN — AMIODARONE HYDROCHLORIDE 0.5 MG/MIN: 50 INJECTION, SOLUTION INTRAVENOUS at 22:48

## 2022-06-16 RX ADMIN — GABAPENTIN 600 MG: 600 TABLET, FILM COATED ORAL at 11:38

## 2022-06-16 RX ADMIN — MAGNESIUM SULFATE HEPTAHYDRATE 2000 MG: 40 INJECTION, SOLUTION INTRAVENOUS at 04:41

## 2022-06-16 RX ADMIN — DEXTROSE MONOHYDRATE 150 MG: 50 INJECTION, SOLUTION INTRAVENOUS at 16:20

## 2022-06-16 ASSESSMENT — PAIN DESCRIPTION - FREQUENCY: FREQUENCY: INTERMITTENT

## 2022-06-16 ASSESSMENT — PAIN SCALES - GENERAL
PAINLEVEL_OUTOF10: 10
PAINLEVEL_OUTOF10: 0
PAINLEVEL_OUTOF10: 4
PAINLEVEL_OUTOF10: 5
PAINLEVEL_OUTOF10: 4
PAINLEVEL_OUTOF10: 4
PAINLEVEL_OUTOF10: 10
PAINLEVEL_OUTOF10: 4
PAINLEVEL_OUTOF10: 0
PAINLEVEL_OUTOF10: 5

## 2022-06-16 ASSESSMENT — PAIN SCALES - WONG BAKER
WONGBAKER_NUMERICALRESPONSE: 2

## 2022-06-16 ASSESSMENT — PAIN DESCRIPTION - ONSET: ONSET: AWAKENED FROM SLEEP

## 2022-06-16 ASSESSMENT — ENCOUNTER SYMPTOMS
BACK PAIN: 1
RESPIRATORY NEGATIVE: 1
EYES NEGATIVE: 1
VOMITING: 0
CONSTIPATION: 1
ABDOMINAL PAIN: 1

## 2022-06-16 ASSESSMENT — PAIN DESCRIPTION - ORIENTATION
ORIENTATION: MID;LOWER
ORIENTATION: MID
ORIENTATION: MID

## 2022-06-16 ASSESSMENT — PAIN DESCRIPTION - LOCATION
LOCATION: HEAD
LOCATION: BACK;PERINEUM
LOCATION: HEAD

## 2022-06-16 ASSESSMENT — PAIN - FUNCTIONAL ASSESSMENT
PAIN_FUNCTIONAL_ASSESSMENT: PREVENTS OR INTERFERES SOME ACTIVE ACTIVITIES AND ADLS
PAIN_FUNCTIONAL_ASSESSMENT: ACTIVITIES ARE NOT PREVENTED
PAIN_FUNCTIONAL_ASSESSMENT: ACTIVITIES ARE NOT PREVENTED

## 2022-06-16 ASSESSMENT — PAIN DESCRIPTION - DESCRIPTORS
DESCRIPTORS: ACHING;BURNING
DESCRIPTORS: DISCOMFORT

## 2022-06-16 ASSESSMENT — PAIN DESCRIPTION - PAIN TYPE: TYPE: CHRONIC PAIN;ACUTE PAIN

## 2022-06-16 NOTE — PLAN OF CARE
Problem: Discharge Planning  Goal: Discharge to home or other facility with appropriate resources  6/16/2022 0722 by Mey Lewis RN  Outcome: Progressing  6/16/2022 0007 by Diego Mc RN  Outcome: Progressing     Problem: Pain  Goal: Verbalizes/displays adequate comfort level or baseline comfort level  6/16/2022 0722 by Mey Lewis RN  Outcome: Progressing  6/16/2022 0007 by Diego Mc RN  Outcome: Progressing     Problem: Safety - Adult  Goal: Free from fall injury  6/16/2022 0722 by Mey Lewis RN  Outcome: Progressing  6/16/2022 0007 by Diego Mc RN  Outcome: Progressing     Problem: ABCDS Injury Assessment  Goal: Absence of physical injury  6/16/2022 0722 by Mey Lewis RN  Outcome: Progressing  6/16/2022 0007 by Diego Mc RN  Outcome: Progressing     Problem: Skin/Tissue Integrity  Goal: Absence of new skin breakdown  Description: 1. Monitor for areas of redness and/or skin breakdown  2. Assess vascular access sites hourly  3. Every 4-6 hours minimum:  Change oxygen saturation probe site  4. Every 4-6 hours:  If on nasal continuous positive airway pressure, respiratory therapy assess nares and determine need for appliance change or resting period.   6/16/2022 0722 by Mey Lewis RN  Outcome: Progressing  6/16/2022 0007 by Diego Mc RN  Outcome: Progressing     Problem: Chronic Conditions and Co-morbidities  Goal: Patient's chronic conditions and co-morbidity symptoms are monitored and maintained or improved  6/16/2022 0722 by Mey Lewis RN  Outcome: Progressing  6/16/2022 0007 by Diego Mc RN  Outcome: Progressing

## 2022-06-16 NOTE — PROGRESS NOTES
Patient heart rate consistently staying in 150s-160s. Asymptomatic. Dr. Jenise Diaz notified. EKG obtained. EKG showed AFIB RVR at rate of 164 bpm. Orders received for Amio bolus and gtt.

## 2022-06-16 NOTE — PROGRESS NOTES
Hospitalist Progress Note  [facility]     Patient: Elizabeth Stanton  : 1955  MRN: [id number]  Code Status: Full Code    Hospital Day: 1   Date of Service: 2022    Subjective:   Patient seen and examined. Complains of chronic pain. Past Medical History:   Diagnosis Date    Arthritis     HTN (hypertension)        Past Surgical History:   Procedure Laterality Date    ANKLE FRACTURE SURGERY Right     BLADDER SURGERY Left 6/15/2022    CYSTOSCOPY, LEFT URETEROSCOPY, RETROGRADE PYELOGRAM, STENT INSERTION performed by Cal aVrgas MD at \[facility]\"" 67 (CERVIX STATUS UNKNOWN)      INCONTINENCE SURGERY         Family History   Problem Relation Age of Onset    Cancer Mother     Cancer Father        Social History     Socioeconomic History    Marital status: Single     Spouse name: Not on file    Number of children: Not on file    Years of education: Not on file    Highest education level: Not on file   Occupational History    Not on file   Tobacco Use    Smoking status: Former Smoker    Smokeless tobacco: Never Used    Tobacco comment: quit 10 years ago   Vaping Use    Vaping Use: Never used   Substance and Sexual Activity    Alcohol use: Never    Drug use: Never    Sexual activity: Not on file   Other Topics Concern    Not on file   Social History Narrative    Not on file     Social Determinants of Health     Financial Resource Strain:     Difficulty of Paying Living Expenses: Not on file   Food Insecurity:     Worried About [address] in the Last Year: Not on file    [address] in the Last Year: Not on file   Transportation Needs:     Lack of Transportation (Medical): Not on file    Lack of Transportation (Non-Medical):  Not on file   Physical Activity:     Days of Exercise per Week: Not on file    Minutes of Exercise per Session: Not on file   Stress:     Feeling of Stress : Not on file   Social Connections:     Frequency of Communication with Friends and Family: Not on file    Frequency of Social Gatherings with Friends and Family: Not on file    Attends [employer] Services: Not on file    Active Member of Clubs or Organizations: Not on file    Attends Club or Organization Meetings: Not on file    Marital Status: Not on file   Intimate Partner Violence:     Fear of Current or Ex-Partner: Not on file    Emotionally Abused: Not on file    Physically Abused: Not on file    Sexually Abused: Not on file   Housing Stability:     Unable to Pay for Housing in the Last Year: Not on file    Number of [place] in the Last Year: Not on file    Unstable Housing in the Last Year: Not on file       Current Facility-Administered Medications   Medication Dose Route Frequency Provider Last Rate Last Admin    traMADol (ULTRAM) tablet 50 mg  50 mg Oral Q6H PRN Shilo Gaspar MD   50 mg at 06/16/22 0902    busPIRone (BUSPAR) tablet 10 mg  10 mg Oral TID Shilo Gaspar MD        metoprolol (LOPRESSOR) 5 MG/5ML injection             gabapentin (NEURONTIN) tablet 600 mg  600 mg Oral 4x Daily Tomas Kinsey MD   600 mg at 06/16/22 1138    [Held by provider] metoprolol tartrate (LOPRESSOR) tablet 100 mg  100 mg Oral BID Tomas Kinsey MD   100 mg at 06/15/22 0950    pravastatin (PRAVACHOL) tablet 40 mg  40 mg Oral Daily Tomas Kinsey MD   40 mg at 06/16/22 0759    [Held by provider] lisinopril-hydroCHLOROthiazide (PRINZIDE;ZESTORETIC) 20-12.5 MG per tablet 1 tablet  1 tablet Oral BID Tomas Kinsey MD        cefepime (MAXIPIME) 1000 mg IVPB in 50 mL dextrose 5% (premix)  1,000 mg IntraVENous Q12H Tomas Kinsey MD   Stopped at 06/16/22 0235    glucose chewable tablet 16 g  4 tablet Oral PRN Ghanshyam Mendes MD        dextrose bolus 10% 125 mL  125 mL IntraVENous PRN Ghanshyam Mendes MD        Or    dextrose bolus 10% 250 mL  250 mL IntraVENous PRN Ghanshyam Mendes MD       Urvashi Coral glucagon (rDNA) injection 1 mg  1 mg IntraMUSCular PRN Kamila Schofield MD        dextrose 5 % solution  100 mL/hr IntraVENous PREDGARD Schofield MD        insulin lispro (HUMALOG) injection vial 0-18 Units  0-18 Units SubCUTAneous TID WC Kamila Schofield MD   3 Units at 06/16/22 1147    insulin lispro (HUMALOG) injection vial 0-9 Units  0-9 Units SubCUTAneous Nightly Kamila Schofield MD   2 Units at 06/15/22 2016    insulin glargine (LANTUS) injection vial 10 Units  10 Units SubCUTAneous Nightly Kamila Schofield MD   10 Units at 06/15/22 0552    sodium chloride flush 0.9 % injection 5-40 mL  5-40 mL IntraVENous 2 times per day Kamila Schofield MD   10 mL at 06/15/22 2016    sodium chloride flush 0.9 % injection 5-40 mL  5-40 mL IntraVENous PREDGARD Schofield MD        0.9 % sodium chloride infusion   IntraVENous BILLIE Schofield MD        ondansetron (ZOFRAN-ODT) disintegrating tablet 4 mg  4 mg Oral Q8H PRN Kamila Schofield MD        Or    ondansetron (ZOFRAN) injection 4 mg  4 mg IntraVENous Q6H BILLIE Schofield MD   4 mg at 06/16/22 1046    polyethylene glycol (GLYCOLAX) packet 17 g  17 g Oral Daily PRN Kamila Schofield MD        acetaminophen (TYLENOL) tablet 650 mg  650 mg Oral Q6H PRN Kamila Schofield MD   650 mg at 06/16/22 0715    Or    acetaminophen (TYLENOL) suppository 650 mg  650 mg Rectal Q6H PRN Kamila Schofield MD        potassium chloride 10 mEq/100 mL IVPB (Peripheral Line)  10 mEq IntraVENous PRN Kamila Schofield MD        magnesium sulfate 2000 mg in 50 mL IVPB premix  2,000 mg IntraVENous PREDGARD Schofield MD   Stopped at 06/16/22 0640    ipratropium-albuterol (DUONEB) nebulizer solution 1 ampule  1 ampule Inhalation Q4H WA Kamila Schofield MD   1 ampule at 06/15/22 1856    norepinephrine (LEVOPHED) 16 mg in sodium chloride 0.9 % 250 mL infusion  1-100 mcg/min IntraVENous Continuous Jose R Enamorado MD 5.6 mL/hr at 06/16/22 1000 6 mcg/min at 06/16/22 1000    lactated ringers infusion   IntraVENous Continuous Jose R Enamorado  mL/hr at 06/16/22 1000 Rate Verify at 06/16/22 1000    levoFLOXacin (LEVAQUIN) 250 MG/50ML infusion 250 mg  250 mg IntraVENous Q24H Angela Winston MD   Stopped at 06/16/22 0407         dextrose      sodium chloride      norepinephrine 6 mcg/min (06/16/22 1000)    lactated ringers 125 mL/hr at 06/16/22 1000        Objective:   BP (!) 78/60   Pulse (!) 126   Temp 98.6 °F (37 °C) (Temporal)   Resp 16   Ht 5' 5\" (1.651 m)   Wt (!) 329 lb 14.4 oz (149.6 kg)   SpO2 93%   BMI 54.90 kg/m²     General: no acute distress  HEENT: normocephalic, atraumatic  Neck: supple, symmetrical, trachea midline   Lungs: clear to auscultation bilaterally   Cardiovascular: s1 and s2 normal  Abdomen: soft, positive bowel sounds  Extremities: no edema or cyanosis   Neuro: aaox3, no focal deficits   Skin: normal color and texture     Recent Labs     06/14/22  2152 06/15/22  0539 06/16/22  0200   WBC 17.0* 12.5* 12.8*   RBC 5.26 4.52 4.66   HGB 15.9 13.6 13.9   HCT 49.3* 42.9 44.3   MCV 93.7 94.9 95.1   MCH 30.2 30.1 29.8   MCHC 32.3* 31.7* 31.4*   PLT 84* 64* 42*     Recent Labs     06/14/22  2306 06/15/22  0539 06/16/22  0200    136 136   K 4.2 4.5 4.0   ANIONGAP 17 18 17   CL 94* 97* 99   CO2 25 21* 20*   BUN 47* 52* 56*   CREATININE 3.0* 2.8* 1.8*   GLUCOSE 228* 216* 150*   CALCIUM 8.5* 8.0* 8.1*     Recent Labs     06/16/22  0200   MG 1.2*     Recent Labs     06/14/22  2306 06/15/22  0539 06/16/22  0200   AST 50* 48* 75*   ALT 42* 39* 53*   BILITOT 1.1 1.0 2.6*   ALKPHOS 68 73 157*     No results for input(s): PH, PO2, PCO2, HCO3, BE, O2SAT in the last 72 hours.   Recent Labs     06/15/22  0842 06/15/22  1448 06/15/22  2110   TROPONINI 0.02 0.02 <0.01     Recent Labs     06/15/22  0539   INR 1.39*     Recent Labs     06/15/22  1307 LACTA 2.6*         Intake/Output Summary (Last 24 hours) at 6/16/2022 1152  Last data filed at 6/16/2022 1000  Gross per 24 hour   Intake 4132.09 ml   Output 2180 ml   Net 1952.09 ml       CT ABDOMEN PELVIS WO CONTRAST Additional Contrast? None    Result Date: 6/15/2022  NO PRIOR REPORT AVAILABLE <Addendum Signed by Alexander Schwab MD at 15-[place]aquille-2022 02:44:19 AM Findings were communicated to Isaias Tovar RN on 6/15/22 at 2:42a. m.,who confirms having received the report. Luna López takes responsibility for providing the report to the referring clinician Dr. Jolynn Bello No further action required by the reading radiologist. If the referring clinician has any further questions please call [email or web address] service [phone], option 1. Critical Documentation Completed Addendum End> Exam: CT OF THE ABDOMEN/PELVIS WITHOUT CONTRAST Clinical data: Left lower quadrant pain with constipation, less flatus, acute onset. No prior diverticular disease. Technique: Axial CT images were acquired through the abdomen and pelvis without contrast using soft tissue and bone algorithms. Reformatted/MPR images were performed. Radiation dose: CTDIvol =29.80 mGy, DLP =1470 mGy x cm. Limitations: Lack of intravenous contrast limits evaluation of solid viscera. Lack of oral contrast limits evaluation of the bowel loops. Prior Studies: No prior studies submitted. Findings: Lung bases: Mild subsegmental atelectasis is noted in the included bilateral lungs. Liver:Mild hepatomegaly with mild hepatic steatosis. No evidence of mass. No evidence of dilated ducts. Gallbladder Fossa: Status post cholecystectomy. Spleen: Grossly unremarkable. Pancreas/adrenal glands: Grossly unremarkable size, contour and density. Kidneys: In anatomic position. Grossly unremarkablerenal size, contour and density.  No evidence of a renal mass or cyst.There is 6 mm obstructive calculus in the left upper ureter with mild proximal hydroureteronephrosis and perinephric inflammatory changes. 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 Bilateral non-obstructing renal calculi noted, largest is measuring about 17 mm in the lower pole of right kidney and 15 mm in the lower pole of  left kidney. Retroperitoneum: No enlarged retroperitoneal lymphadenopathy. The aorta and IVC appear unremarkable. Peritoneal cavity: No evidence of free air or ascites. Gastrointestinal tract: No obstruction. Colonic diverticulosis without evidence of acute diverticulitis. Appendix: Unremarkable Pelvis: The urinary bladder is suboptimally distended. Status post hysterectomy and bilateral oopherectomy. Osseous structures: No acute or destructive bony process identified. Mild degenerative changes noted in the spine and pelvis. Small fat containing umbilical and supra umbilical hernia noted. 1. There is 6 mm obstructive calculus in the left upper ureter with mild proximal hydroureteronephrosis and perinephric inflammatory changes. 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 Bilateral non-obstructing renal calculi noted. 2. Mild hepatomegaly with mild hepatic steatosis. 3. Colonic diverticulosis without evidence of acute diverticulitis. 4. Small fat containing umbilical and supra umbilical hernia noted. Recommendation: Follow up as clinically indicated. All CT scans at this facility utilize dose modulation, iterative reconstruction, and/or weight based dosing when appropriate to reduce radiation dose to as low as reasonably achievable. Amended by Kim Howard MD at 15-[place]aquille-2022 02:44:19 AM Electronically Signed by Kim Howard MD at 15-[place]aquille-2022 02:29:46 AM             XR CHEST PORTABLE    Result Date: 6/15/2022  NO PRIOR REPORT AVAILABLE Exam: X-RAY OF [facility] Clinical data:Central line placement.  Technique:Single view of the chest. Prior studies: Radiograph of the chest dated 6/14/2022. Findings: The lungs are grossly clear; noevidence of acute infiltrate. Questionable trace left pleural effusion with basilar atelectasis. Cardiac silhouette is within normal limits. No acute osseous abnormality is detected. Right sided central line is in  place with its tip in SVC. 1. Questionable trace left pleural effusion with basilar atelectasis. 2. Right sided central line is in place with its tip in SVC. Recommendation: Follow up as clinically indicated. Electronically Signed by Alice Butler MD at 15-[place]aquille-2022 06:55:39 AM             XR CHEST PORTABLE    Result Date: 6/14/2022  NO PRIOR REPORT AVAILABLE Exam: X-RAY OF [facility] Clinical data:Fatigue. Technique:Single view of the chest. Prior studies: No prior studies submitted. Findings: The lungs are grossly clear; noevidence of acute infiltrate or pleural effusion. Cardiac silhouette is within normal limits. No acute osseous abnormality is detected. 1. Unremarkable radiograph of chest. Recommendation: Follow up as clinically indicated.  Electronically Signed by Chrystie Bence MD at 15-[place]aquille-2022 12:21:20 AM              Assessment and Plan:   Septic shock  Secondary to below  Blood cultures growing GNR  Awaiting speciation/urine culture  Broad-spectrum antibiotics per urology  IVF  Norepinephrine gtt, decreasing requirement  Lactate improved overall     Obstructive left pyelonephritis  Urology following  Secondary to proximal left ureteral calculus  S/p left ureteral stent placement 6/15/2022 per Dr. Queen August  Agents as above  Follow cultures     BINH  Unknown baseline creatinine  Creatinine continues to improve  IVF  Avoid offending agents  Follow renal function/urine output/electrolytes     DM2  HbA1c 9.1  Poor control  Counseled  Meds on board     Morbid obesity  Counseled    Chronic pain syndrome  Patient states she has been taking opioids for many years  I extensively explained how these agents can run counter to her current treatment plan  Further discussed addicting effects of these agents  Daughter also at bedside  All questions sought and answered     DVT prophylaxis  SCDs given thrombocytopenia    Extensive discussion with patient and her daughter in regards to current clinical state. All questions sought and answered.     Total critical care time: 61 minutes    Eusebio Cervantes MD   6/16/2022 11:52 AM

## 2022-06-16 NOTE — PLAN OF CARE
Problem: Discharge Planning  Goal: Discharge to home or other facility with appropriate resources  6/16/2022 0007 by Aurora Maier RN  Outcome: Progressing     Problem: Pain  Goal: Verbalizes/displays adequate comfort level or baseline comfort level  6/16/2022 0007 by Aurora Maier RN  Outcome: Progressing     Problem: Safety - Adult  Goal: Free from fall injury  6/16/2022 0007 by Aurora Maier RN  Outcome: Progressing     Problem: ABCDS Injury Assessment  Goal: Absence of physical injury  6/16/2022 0007 by Aurora Maier RN  Outcome: Progressing     Problem: Skin/Tissue Integrity  Goal: Absence of new skin breakdown  Description: 1. Monitor for areas of redness and/or skin breakdown  2. Assess vascular access sites hourly  3. Every 4-6 hours minimum:  Change oxygen saturation probe site  4. Every 4-6 hours:  If on nasal continuous positive airway pressure, respiratory therapy assess nares and determine need for appliance change or resting period.   6/16/2022 0007 by Aurora Maier RN  Outcome: Progressing     Problem: Chronic Conditions and Co-morbidities  Goal: Patient's chronic conditions and co-morbidity symptoms are monitored and maintained or improved  Outcome: Progressing

## 2022-06-16 NOTE — PROGRESS NOTES
Urology Progress Note      SUBJECTIVE: Patient awake complaining of discomfort associated with Krishna catheter and just generalized back pain. Daughter at bedside    OBJECTIVE: Norepinephrine down to 8. Slowly weaning urine output improved creatinine down to 1.8    REVIEW OF SYSTEMS:   Review of Systems   Constitutional: Positive for fatigue. HENT: Negative. Eyes: Negative. Respiratory: Negative. Cardiovascular: Negative. Gastrointestinal: Positive for abdominal pain and constipation. Negative for vomiting. Genitourinary: Positive for dysuria and flank pain. Musculoskeletal: Positive for arthralgias and back pain. Neurological: Positive for weakness. All other systems reviewed and are negative. Physical  VITALS:  /69   Pulse (!) 123   Temp (!) 96.3 °F (35.7 °C) (Temporal)   Resp 21   Ht 5' 5\" (1.651 m)   Wt (!) 329 lb 14.4 oz (149.6 kg)   SpO2 94%   BMI 54.90 kg/m²   TEMPERATURE:  Current - Temp: (!) 96.3 °F (35.7 °C); Max - Temp  Av.7 °F (35.9 °C)  Min: 96.1 °F (35.6 °C)  Max: 97.6 °F (36.4 °C)   24 HR I&O     Intake/Output Summary (Last 24 hours) at 2022 0842  Last data filed at 2022 0600  Gross per 24 hour   Intake 3972.45 ml   Output 1530 ml   Net 2442. 45 ml     BACK: flank tenderness: left  ABDOMEN: Obese, distended and tenderness noted diffusely  HEART:  regular rhythm and abnormal rate-tachycardic 110s 120  CHEST: Normal respiratory effort no distress  GENITAL/URINARY: Krishna catheter in place otherwise normal genitalia urine slight tinged    Data       CBC:   Recent Labs     22  2152 06/15/22  0539 22  0200   WBC 17.0* 12.5* 12.8*   HGB 15.9 13.6 13.9   HCT 49.3* 42.9 44.3   PLT 84* 64* 42*     BMP:    Recent Labs     22  2306 06/15/22  0539 22  0200    136 136   K 4.2 4.5 4.0   CL 94* 97* 99   CO2 25 21* 20*   BUN 47* 52* 56*   CREATININE 3.0* 2.8* 1.8*   GLUCOSE 228* 216* 150*       No results for input(s): LABURIN in

## 2022-06-16 NOTE — PROGRESS NOTES
Physical Therapy/ Occupational Therapy    Eval attempted. Pt up in chair per nsg and robert lift. Nurse states too lethargic at this time to participate in therapy. Will cont to follow.     Electronically signed by Deana Powell PT on 6/16/2022 at 2:17 PM

## 2022-06-17 PROBLEM — Z51.5 PALLIATIVE CARE PATIENT: Status: ACTIVE | Noted: 2022-06-17

## 2022-06-17 LAB
ALBUMIN SERPL-MCNC: 2.3 G/DL (ref 3.5–5.2)
ALP BLD-CCNC: 133 U/L (ref 35–104)
ALT SERPL-CCNC: 51 U/L (ref 5–33)
ANION GAP SERPL CALCULATED.3IONS-SCNC: 10 MMOL/L (ref 7–19)
AST SERPL-CCNC: 62 U/L (ref 5–32)
BASOPHILS ABSOLUTE: 0 K/UL (ref 0–0.2)
BASOPHILS RELATIVE PERCENT: 0 % (ref 0–1)
BILIRUB SERPL-MCNC: 2.1 MG/DL (ref 0.2–1.2)
BLOOD CULTURE, ROUTINE: ABNORMAL
BLOOD CULTURE, ROUTINE: ABNORMAL
BUN BLDV-MCNC: 54 MG/DL (ref 8–23)
CALCIUM SERPL-MCNC: 7.8 MG/DL (ref 8.8–10.2)
CHLORIDE BLD-SCNC: 100 MMOL/L (ref 98–111)
CO2: 25 MMOL/L (ref 22–29)
CREAT SERPL-MCNC: 1.3 MG/DL (ref 0.5–0.9)
EOSINOPHILS ABSOLUTE: 0.19 K/UL (ref 0–0.6)
EOSINOPHILS RELATIVE PERCENT: 2 % (ref 0–5)
GFR AFRICAN AMERICAN: 49
GFR NON-AFRICAN AMERICAN: 41
GLUCOSE BLD-MCNC: 154 MG/DL (ref 70–99)
GLUCOSE BLD-MCNC: 182 MG/DL (ref 70–99)
GLUCOSE BLD-MCNC: 182 MG/DL (ref 70–99)
GLUCOSE BLD-MCNC: 190 MG/DL (ref 70–99)
GLUCOSE BLD-MCNC: 215 MG/DL (ref 74–109)
HCT VFR BLD CALC: 40.7 % (ref 37–47)
HEMOGLOBIN: 12.7 G/DL (ref 12–16)
IMMATURE GRANULOCYTES #: 0.9 K/UL
LYMPHOCYTES ABSOLUTE: 1 K/UL (ref 1.1–4.5)
LYMPHOCYTES RELATIVE PERCENT: 11 % (ref 20–40)
MAGNESIUM: 2.1 MG/DL (ref 1.6–2.4)
MCH RBC QN AUTO: 29.7 PG (ref 27–31)
MCHC RBC AUTO-ENTMCNC: 31.2 G/DL (ref 33–37)
MCV RBC AUTO: 95.3 FL (ref 81–99)
MONOCYTES ABSOLUTE: 1.1 K/UL (ref 0–0.9)
MONOCYTES RELATIVE PERCENT: 12 % (ref 0–10)
NEUTROPHILS ABSOLUTE: 7 K/UL (ref 1.5–7.5)
NEUTROPHILS RELATIVE PERCENT: 75 % (ref 50–65)
ORGANISM: ABNORMAL
PDW BLD-RTO: 15.2 % (ref 11.5–14.5)
PERFORMED ON: ABNORMAL
PLATELET # BLD: 36 K/UL (ref 130–400)
PLATELET SLIDE REVIEW: ABNORMAL
PMV BLD AUTO: 12.5 FL (ref 9.4–12.3)
POTASSIUM SERPL-SCNC: 4 MMOL/L (ref 3.5–5)
RBC # BLD: 4.27 M/UL (ref 4.2–5.4)
SODIUM BLD-SCNC: 135 MMOL/L (ref 136–145)
TOTAL PROTEIN: 4.8 G/DL (ref 6.6–8.7)
TOXIC GRANULATION: ABNORMAL
URINE CULTURE, ROUTINE: ABNORMAL
VACUOLATED NEUTROPHILS: ABNORMAL
WBC # BLD: 9.3 K/UL (ref 4.8–10.8)

## 2022-06-17 PROCEDURE — 6360000002 HC RX W HCPCS: Performed by: UROLOGY

## 2022-06-17 PROCEDURE — 2580000003 HC RX 258: Performed by: INTERNAL MEDICINE

## 2022-06-17 PROCEDURE — 6360000002 HC RX W HCPCS: Performed by: INTERNAL MEDICINE

## 2022-06-17 PROCEDURE — 99223 1ST HOSP IP/OBS HIGH 75: CPT

## 2022-06-17 PROCEDURE — 99231 SBSQ HOSP IP/OBS SF/LOW 25: CPT | Performed by: NURSE PRACTITIONER

## 2022-06-17 PROCEDURE — 2000000000 HC ICU R&B

## 2022-06-17 PROCEDURE — 36592 COLLECT BLOOD FROM PICC: CPT

## 2022-06-17 PROCEDURE — 94640 AIRWAY INHALATION TREATMENT: CPT

## 2022-06-17 PROCEDURE — 80053 COMPREHEN METABOLIC PANEL: CPT

## 2022-06-17 PROCEDURE — 83735 ASSAY OF MAGNESIUM: CPT

## 2022-06-17 PROCEDURE — 82947 ASSAY GLUCOSE BLOOD QUANT: CPT

## 2022-06-17 PROCEDURE — 6370000000 HC RX 637 (ALT 250 FOR IP): Performed by: UROLOGY

## 2022-06-17 PROCEDURE — 2700000000 HC OXYGEN THERAPY PER DAY

## 2022-06-17 PROCEDURE — 85025 COMPLETE CBC W/AUTO DIFF WBC: CPT

## 2022-06-17 PROCEDURE — 6370000000 HC RX 637 (ALT 250 FOR IP): Performed by: INTERNAL MEDICINE

## 2022-06-17 RX ADMIN — METOPROLOL TARTRATE 25 MG: 25 TABLET, FILM COATED ORAL at 14:49

## 2022-06-17 RX ADMIN — SODIUM CHLORIDE, PRESERVATIVE FREE 10 ML: 5 INJECTION INTRAVENOUS at 07:58

## 2022-06-17 RX ADMIN — BUSPIRONE HYDROCHLORIDE 10 MG: 10 TABLET ORAL at 07:57

## 2022-06-17 RX ADMIN — INSULIN GLARGINE 10 UNITS: 100 INJECTION, SOLUTION SUBCUTANEOUS at 20:48

## 2022-06-17 RX ADMIN — GABAPENTIN 600 MG: 600 TABLET, FILM COATED ORAL at 07:57

## 2022-06-17 RX ADMIN — AMIODARONE HYDROCHLORIDE 0.5 MG/MIN: 50 INJECTION, SOLUTION INTRAVENOUS at 12:12

## 2022-06-17 RX ADMIN — LEVOFLOXACIN 250 MG: 5 INJECTION, SOLUTION INTRAVENOUS at 03:02

## 2022-06-17 RX ADMIN — TRAMADOL HYDROCHLORIDE 50 MG: 50 TABLET, COATED ORAL at 13:13

## 2022-06-17 RX ADMIN — SODIUM CHLORIDE, POTASSIUM CHLORIDE, SODIUM LACTATE AND CALCIUM CHLORIDE: 600; 310; 30; 20 INJECTION, SOLUTION INTRAVENOUS at 07:03

## 2022-06-17 RX ADMIN — BUSPIRONE HYDROCHLORIDE 10 MG: 10 TABLET ORAL at 13:13

## 2022-06-17 RX ADMIN — GABAPENTIN 600 MG: 600 TABLET, FILM COATED ORAL at 13:13

## 2022-06-17 RX ADMIN — IPRATROPIUM BROMIDE AND ALBUTEROL SULFATE 1 AMPULE: 2.5; .5 SOLUTION RESPIRATORY (INHALATION) at 18:26

## 2022-06-17 RX ADMIN — INSULIN LISPRO 3 UNITS: 100 INJECTION, SOLUTION INTRAVENOUS; SUBCUTANEOUS at 07:56

## 2022-06-17 RX ADMIN — ACETAMINOPHEN 650 MG: 325 TABLET ORAL at 08:00

## 2022-06-17 RX ADMIN — GABAPENTIN 600 MG: 600 TABLET, FILM COATED ORAL at 20:49

## 2022-06-17 RX ADMIN — CEFEPIME HYDROCHLORIDE 1000 MG: 1 INJECTION, SOLUTION INTRAVENOUS at 02:02

## 2022-06-17 RX ADMIN — INSULIN LISPRO 3 UNITS: 100 INJECTION, SOLUTION INTRAVENOUS; SUBCUTANEOUS at 16:48

## 2022-06-17 RX ADMIN — BUSPIRONE HYDROCHLORIDE 10 MG: 10 TABLET ORAL at 20:49

## 2022-06-17 RX ADMIN — GABAPENTIN 600 MG: 600 TABLET, FILM COATED ORAL at 16:49

## 2022-06-17 RX ADMIN — PRAVASTATIN SODIUM 40 MG: 20 TABLET ORAL at 07:57

## 2022-06-17 RX ADMIN — CEFEPIME HYDROCHLORIDE 1000 MG: 1 INJECTION, SOLUTION INTRAVENOUS at 13:13

## 2022-06-17 RX ADMIN — SODIUM CHLORIDE, POTASSIUM CHLORIDE, SODIUM LACTATE AND CALCIUM CHLORIDE: 600; 310; 30; 20 INJECTION, SOLUTION INTRAVENOUS at 14:46

## 2022-06-17 RX ADMIN — METOPROLOL TARTRATE 25 MG: 25 TABLET, FILM COATED ORAL at 20:49

## 2022-06-17 RX ADMIN — INSULIN LISPRO 2 UNITS: 100 INJECTION, SOLUTION INTRAVENOUS; SUBCUTANEOUS at 20:49

## 2022-06-17 ASSESSMENT — PAIN SCALES - GENERAL
PAINLEVEL_OUTOF10: 2
PAINLEVEL_OUTOF10: 4
PAINLEVEL_OUTOF10: 5
PAINLEVEL_OUTOF10: 0
PAINLEVEL_OUTOF10: 5
PAINLEVEL_OUTOF10: 2
PAINLEVEL_OUTOF10: 0

## 2022-06-17 ASSESSMENT — PAIN DESCRIPTION - LOCATION
LOCATION: HEAD
LOCATION: GENERALIZED
LOCATION: GENERALIZED

## 2022-06-17 ASSESSMENT — PAIN DESCRIPTION - DESCRIPTORS
DESCRIPTORS: ACHING;DISCOMFORT
DESCRIPTORS: ACHING

## 2022-06-17 NOTE — PROGRESS NOTES
Urology Progress Note    SUBJECTIVE: Patient awake and sitting up, nurse and daughter at bedside. Patient states generalized abdominal pain. OBJECTIVE: No longer requiring pressors. Adequate urine output. Improving renal function creatinine now 1.3, GFR 41. Review of Systems     Physical  VITALS:  /68   Pulse (!) 126   Temp 97.4 °F (36.3 °C) (Temporal)   Resp 18   Ht 5' 5\" (1.651 m)   Wt (!) 337 lb (152.9 kg)   SpO2 90%   BMI 56.08 kg/m²   TEMPERATURE:  Current - Temp: 97.4 °F (36.3 °C); Max - Temp  Av.5 °F (36.4 °C)  Min: 97 °F (36.1 °C)  Max: 98.9 °F (37.2 °C)   24 HR I&O   Intake/Output Summary (Last 24 hours) at 2022 1045  Last data filed at 2022 1000  Gross per 24 hour   Intake 3726.61 ml   Output 2335 ml   Net 1391.61 ml       Physical Exam   BACK: no tenderness in spine or flanks  ABDOMEN:  Soft, obese, distended and tenderness noted diffusely  HEART:  abnormal rate-tachycardic  CHEST: Normal respiratory effort, no distress  GENITAL/URINARY: Krishna catheter in place draining josy-colored urine. Data  CBC:   Recent Labs     06/15/22  0539 22  0200 22  0451   WBC 12.5* 12.8* 9.3   HGB 13.6 13.9 12.7   HCT 42.9 44.3 40.7   PLT 64* 42* 36*     BMP:    Recent Labs     06/15/22  0539 22  0200 22  0451    136 135*   K 4.5 4.0 4.0   CL 97* 99 100   CO2 21* 20* 25   BUN 52* 56* 54*   CREATININE 2.8* 1.8* 1.3*   GLUCOSE 216* 150* 215*       Recent Labs     06/15/22  0243   LABURIN >100,000 CFU/ml*  Heavy growth     Recent Labs     22  2255   BC  Bottle volume = 10 ml   Bottle volume = 6 ml  *  Isolated from Aerobic and Anaerobic bottle     Recent Labs     22  2300   BLOODCULT2  Bottle volume = 6 ml-Aerobic   Bottle volume = <5 ml-Anaerobic  Quality of results might be  affected with low volume.   *  No further workup  Isolated from Aerobic and Anaerobic bottle  Refer to (blood culture collected [email or web address]) for sensitivity results U/A:    Lab Results   Component Value Date    COLORU DARK YELLOW 06/15/2022    PHUR 5.0 06/15/2022    WBCUA 2-4 06/15/2022    YEAST Present 06/15/2022    BACTERIA 1+ 06/15/2022    CLARITYU TURBID 06/15/2022    SPECGRAV 1.026 06/15/2022    LEUKOCYTESUR SMALL 06/15/2022    UROBILINOGEN 1.0 06/15/2022    BILIRUBINUR MODERATE 06/15/2022    BLOODU MODERATE 06/15/2022    GLUCOSEU 100 06/15/2022    AMORPHOUS 2+ 06/15/2022       Radiology:   CT ABDOMEN PELVIS WO CONTRAST Additional Contrast? None    Result Date: 6/15/2022  1. There is 6 mm obstructive calculus in the left upper ureter with mild proximal hydroureteronephrosis and perinephric inflammatory changes. 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 Bilateral non-obstructing renal calculi noted. 2. Mild hepatomegaly with mild hepatic steatosis. 3. Colonic diverticulosis without evidence of acute diverticulitis. 4. Small fat containing umbilical and supra umbilical hernia noted. Recommendation: Follow up as clinically indicated. All CT scans at this facility utilize dose modulation, iterative reconstruction, and/or weight based dosing when appropriate to reduce radiation dose to as low as reasonably achievable. Amended by Leopoldo Karvonen MD at 15-[place]aquille-2022 02:44:19 AM Electronically Signed by Leopoldo Karvonen MD at 15-[place]-2022 02:29:46 AM             XR CHEST PORTABLE    Result Date: 6/15/2022  1. Questionable trace left pleural effusion with basilar atelectasis. 2. Right sided central line is in place with its tip in SVC. Recommendation: Follow up as clinically indicated. Electronically Signed by Elie Blackman MD at 15-[place]aquille-2022 06:55:39 AM             XR CHEST PORTABLE    Result Date: 6/14/2022  1. Unremarkable radiograph of chest. Recommendation: Follow up as clinically indicated.  Electronically Signed by Trevor Angulo MD at 15-[place]aquille-2022 12:21:20 AM ASSESSMENT AND PLAN    Patient Active Problem List   Diagnosis    Obstructive pyelonephritis    Sepsis with acute renal failure ([address]    Left ureteral calculus    Renal calculus, bilateral    BINH (acute kidney injury) ([address]    Obstructive uropathy    [facility]glycemia [facility] diabetes mellitus [facility]    Hypertension    Dehydration    FREDDY and COPD overlap syndrome [address] [address]    Degenerative joint disease    Palliative care patient     1. Sepsis secondary to left obstructive pyelonephritis. Overall clinically improved. No longer requiring pressors. Leukocytosis significantly improved. Urine and blood cultures positive for E. Coli resistant to sulfa antibiotics. Per hospitalist continue antibiotics cefepime and Levaquin. No further  recommendations. 2.  Left obstructing proximal ureteral calculus status post left ureteral stent placement. Overall improvement in renal function. Definitive treatment of stone at a later date after the patient is recovered from sepsis and has had the appropriate course of antibiotics. 3.  Bilateral nonobstructing renal calculi. These can be treated electively in the future when clinically appropriate.       MATTHEW Blakely CNP  6/17/2022 10:45 AM

## 2022-06-17 NOTE — ACP (ADVANCE CARE PLANNING)
Advance Care Planning   Healthcare Decision Maker:    Primary Decision Maker: Pranav Gates - [phone]    Primary Decision Maker: Gilford Mannan - Child    Electronically signed by Jocelynn Barajas on 6/16/2022 at 8:53 PM

## 2022-06-17 NOTE — PROGRESS NOTES
Not on file   Social Connections:     Frequency of Communication with Friends and Family: Not on file    Frequency of Social Gatherings with Friends and Family: Not on file    Attends Bahai Services: Not on file    Active Member of Clubs or Organizations: Not on file    Attends Club or Organization Meetings: Not on file    Marital Status: Not on file   Intimate Partner Violence:     Fear of Current or Ex-Partner: Not on file    Emotionally Abused: Not on file    Physically Abused: Not on file    Sexually Abused: Not on file   Housing Stability:     Unable to Pay for Housing in the Last Year: Not on file    Number of [place] in the Last Year: Not on file    Unstable Housing in the Last Year: Not on file       Current Facility-Administered Medications   Medication Dose Route Frequency Provider Last Rate Last Admin    traMADol (ULTRAM) tablet 50 mg  50 mg Oral Q6H PRN Jossy Schafer MD   50 mg at 06/17/22 1313    busPIRone (BUSPAR) tablet 10 mg  10 mg Oral TID Jossy Schafer MD   10 mg at 06/17/22 1313    phenylephrine (ARI-SYNEPHRINE) 50 mg in dextrose 5 % 250 mL infusion   mcg/min IntraVENous Continuous Jossy Schafer MD   Paused at 06/17/22 0602    amiodarone (CORDARONE) 450 mg in dextrose 5 % 250 mL infusion  0.5 mg/min IntraVENous Continuous Jossy Schafer MD 16.7 mL/hr at 06/17/22 1212 0.5 mg/min at 06/17/22 1212    gabapentin (NEURONTIN) tablet 600 mg  600 mg Oral 4x Daily Patti Wilder MD   600 mg at 06/17/22 1313    [Held by provider] metoprolol tartrate (LOPRESSOR) tablet 100 mg  100 mg Oral BID Patti Wilder MD   100 mg at 06/15/22 0950    pravastatin (PRAVACHOL) tablet 40 mg  40 mg Oral Daily Patti Wilder MD   40 mg at 06/17/22 0757    [Held by provider] lisinopril-hydroCHLOROthiazide (PRINZIDE;ZESTORETIC) 20-12.5 MG per tablet 1 tablet  1 tablet Oral BID Patti Wilder MD        cefepime (MAXIPIME) 1000 mg IVPB in 50 mL dextrose 5% (premix)  1,000 mg IntraVENous Q12H Carry MD Veronica 100 mL/hr at 06/17/22 1313 1,000 mg at 06/17/22 1313    glucose chewable tablet 16 g  4 tablet Oral PRN Niki Leong MD        dextrose bolus 10% 125 mL  125 mL IntraVENous PRN Niki Leong MD        Or    dextrose bolus 10% 250 mL  250 mL IntraVENous PRN Niki Leong MD        glucagon (rDNA) injection 1 mg  1 mg IntraMUSCular PRN Niki Leong MD        dextrose 5 % solution  100 mL/hr IntraVENous PRN Niki Leong MD        insulin lispro (HUMALOG) injection vial 0-18 Units  0-18 Units SubCUTAneous TID WC Niki Leong MD   3 Units at 06/17/22 0756    insulin lispro (HUMALOG) injection vial 0-9 Units  0-9 Units SubCUTAneous Nightly Niki Leong MD   2 Units at 06/16/22 2243    insulin glargine (LANTUS) injection vial 10 Units  10 Units SubCUTAneous Nightly Niki Leong MD   10 Units at 06/16/22 2242    sodium chloride flush 0.9 % injection 5-40 mL  5-40 mL IntraVENous 2 times per day Niki Leong MD   10 mL at 06/17/22 0758    sodium chloride flush 0.9 % injection 5-40 mL  5-40 mL IntraVENous PRN Niki Leong MD        0.9 % sodium chloride infusion   IntraVENous PRN Niki Leong MD        ondansetron (ZOFRAN-ODT) disintegrating tablet 4 mg  4 mg Oral Q8H PRN Niki Leong MD        Or    ondansetron (ZOFRAN) injection 4 mg  4 mg IntraVENous Q6H PRN Niki Leong MD   4 mg at 06/16/22 1046    polyethylene glycol (GLYCOLAX) packet 17 g  17 g Oral Daily PRN Niki Leong MD        acetaminophen (TYLENOL) tablet 650 mg  650 mg Oral Q6H PRN Niki Leong MD   650 mg at 06/17/22 0800    Or    acetaminophen (TYLENOL) suppository 650 mg  650 mg Rectal Q6H PRN Niki Leong MD        potassium chloride 10 mEq/100 mL IVPB (Peripheral Line)  10 mEq IntraVENous PRN MD Dave Carrillo ALKPHOS 73 157* 133*     No results for input(s): PH, PO2, PCO2, HCO3, BE, O2SAT in the last 72 hours. Recent Labs     06/15/22  0842 06/15/22  1448 06/15/22  2110   TROPONINI 0.02 0.02 <0.01     Recent Labs     06/15/22  0539   INR 1.39*     Recent Labs     06/15/22  1307   LACTA 2.6*         Intake/Output Summary (Last 24 hours) at 6/17/2022 1357  Last data filed at 6/17/2022 1200  Gross per 24 hour   Intake 3876.41 ml   Output 2200 ml   Net 1676.41 ml       No results found. Assessment and Plan:   Septic shock  Secondary to below  Agents as below  IVF  Since weaned off phenylephrine gtt  Lactate improved overall     Obstructive left E. coli pyelonephritis  Urology following  Secondary to proximal left ureteral calculus  S/p left ureteral stent placement 6/15/2022 per Dr. Tiffanie Ndiaye per sensitivities    E. coli bacteremia  Secondary to above  Levaquin per sensitivities  Follow repeat blood cultures  Echo as warranted     BINH  Unknown baseline creatinine  Creatinine continues to improve  IVF  Avoid offending agents  Follow renal function/urine output/electrolytes     DM2  HbA1c 9.1  Poor control  Counseled  Meds on board     Morbid obesity  Counseled     Chronic pain syndrome  Patient has been bedbound for the last 5 years  Patient/daughter states she has been taking opioids for many years  I extensively explained how these agents can run counter to her current treatment plan  Patient continues to request home pain meds despite extensive counseling  Palliative consulted per patient/family request    New onset atrial fibrillation with RVR  Suspect secondary to above processes  Rate control agents  AC contraindicated given thrombocytopenia  Serial troponin 0.01 > 0.02 > 0.02 > (<0.01)  Follow electrolytes     DVT prophylaxis  SCDs given thrombocytopenia     Extensive discussions with patient and her daughter Iva [facility]) in regards to current clinical state/goals of care.   All questions sought and answered.     Total critical care time: 93 minutes    Angella Pinon MD   6/17/2022 1:57 PM

## 2022-06-17 NOTE — CARE COORDINATION
Was called by nurse to icu to talk with family. Writer along with Daryn Nicole to icu  fatou with family. Family was asking about possible options as far as nursing home vz home vz hospice. Pt lives at home alone. Mainly stays in bed or recliner but is able to walk down [address] to bathroom with walker. Pt requires assist but was able to get to dr evangelista. pts daughter Jaelyn Mendez , and son Davonte Byrd both involved in conversation, pt is more lethargic today and not able to participate. Family was just looking at possible options at this point. Different options have been explained. Pt does have a paid caregiver that comes and helps her cook and clean. Will need to see how pt does medically over the weekend. PT has been ordered but pt was not able to particiate due to mental status. Gave family my # and informed them that we will monitor medical status and assist with planning as pt progresses.     Electronically signed by Florinda Sullivan RN on 6/17/2022 at 4:24 PM

## 2022-06-17 NOTE — FLOWSHEET NOTE
06/16/22 2047   Encounter Summary   Encounter Overview/Reason  Spiritual/Emotional Needs   Service Provided For: Patient and family together  (with daughter at bedside in ICU)   Begin Time 1930   End Time  1945   Total Time Calculated 15 min   Encounter    Type Follow up   Spiritual/Emotional needs   Type Spiritual Support   Advance Care Planning   Type ACP conversation; Completed AD/ACP document(s)   Assessment/Intervention/Outcome   Assessment Concerns with suffering; Hopeful   Intervention Active listening;Discussed belief system/[employer]ist practices/waaqs; Explored/Affirmed feelings, thoughts, concerns;Prayer (assurance of)/[place]   Outcome Expressed feelings, needs, and concerns;Expressed Gratitude     Pt asleep. Discussed ACP with pt's daughter Kat Kruger at bedside. Explained LW and Raven will take time to discuss with her brother regarding LW. Will follow up. HCDMs documented in ACP Note at this time.   Electronically signed by Davina Das on 6/16/2022 at 8:52 PM

## 2022-06-17 NOTE — CONSULTS
Palliative Care Consult Note    6/17/2022 7:35 AM  Subjective:  Admit Date: 6/14/2022  PCP: Jesus Todd MD    Date of Service: 6/17/2022    Reason for Consultation:  Goals of Care, Code Status, Family Support     History Obtained From: EMR/Patient and their Family    History Of Present Illness: The patient is a 78 y.o. female with PMH arthritis and HTN who presented to [address] [place] [address] ED complaining of weakness, fatigue, and abdominal pain x2-3 days. She reported difficulty with urination and constipation at home as well. She presented with hypotension and mild tachycardia. Work up in ED revealed She was found to have acute renal failure with creatinine up to 3.0. Lactic acid was elevated at 4.1 white blood cell count was elevated to 17 K. Urinalysis showed signs of infection blood positive leukocyte Estrace 2-4 WBCs 1+ bacteria and yeast.  Blood urine cultures were obtained patient was started on broad-spectrum antibiotic and IV fluid resuscitation. CT scan of the abdomen pelvis was performed which shows a proximal left ureteral calculus she does not have significant hydronephrosis she also has bilateral nonobstructing renal calculi. She was taken to OR emergently for sepsis obstructive pyelonephritis secondary to left proximal ureteral calculus with placement of stent to left ureter. She was admitted under hospitalist services following OR and required pressor support. She has since been weaned with positive growth of E Coli in both blood and urine cultures. Palliative care is consulted for goals of care, code status discussion, and family support.      Past Medical History:        Diagnosis Date    Arthritis     HTN (hypertension)        Past Surgical History:        Procedure Laterality Date    ANKLE FRACTURE SURGERY Right     BLADDER SURGERY Left 6/15/2022    CYSTOSCOPY, LEFT URETEROSCOPY, RETROGRADE PYELOGRAM, STENT INSERTION performed by Gregory Ruiz MD at [place] [id number] (CERVIX STATUS UNKNOWN)      INCONTINENCE SURGERY         Home Medications:  Prior to Admission medications    Medication Sig Start Date End Date Taking? Authorizing Provider   omeprazole (PRILOSEC) 20 MG delayed release capsule Take 20 mg by mouth Daily   Yes Historical Provider, MD   HYDROcodone-acetaminophen (NORCO)  MG per tablet Take 2 tablets by mouth 4 times daily as needed for Pain. Yes Historical Provider, MD   pravastatin (PRAVACHOL) 40 MG tablet Take 40 mg by mouth daily   Yes Historical Provider, MD   gabapentin (NEURONTIN) 600 MG tablet Take 600 mg by mouth 4 times daily. Yes Historical Provider, MD   lisinopril-hydroCHLOROthiazide (PRINZIDE;ZESTORETIC) 20-12.5 MG per tablet Take 1 tablet by mouth in the morning and at bedtime   Yes Historical Provider, MD   metoprolol (LOPRESSOR) 100 MG tablet Take 100 mg by mouth 2 times daily   Yes Historical Provider, MD   LORazepam (ATIVAN) 0.5 MG tablet Take 0.5 mg by mouth every 6 hours as needed for Anxiety. Yes Historical Provider, MD   DICLOFENAC SODIUM ER PO Take 75 tablets by mouth in the morning and at bedtime   Yes Historical Provider, MD   clotrimazole-betamethasone (LOTRISONE) 1-0.05 % cream Apply topically 2 times daily Apply topically 2 times daily. Yes Historical Provider, MD       Allergies:    Codeine    Social History:    The patient currently lives at home alone  Tobacco:   reports that she has quit smoking. She has never used smokeless tobacco.  Alcohol:   reports no history of alcohol use.   Illicit Drugs: Unknown    Family History:      Problem Relation Age of Onset    Cancer Mother     Cancer Father        Review of Systems:   Unable to obtain 2/2 AMS    Physical Examination:  BP (!) 149/82   Pulse (!) 130   Temp 97.1 °F (36.2 °C) (Temporal)   Resp 20   Ht 5' 5\" (1.651 m)   Wt (!) 337 lb (152.9 kg)   SpO2 95%   BMI 56.08 kg/m²      General appearance: 77 yo female, ill appearing, no acute distress  Head: Normocephalic, without obvious abnormality, atraumatic  Eyes: conjunctivae/corneas clear. PERRL, EOM's intact. Ears: normal external ears and nose  Neck: no JVD, supple, symmetrical, trachea midline   Lungs: clear and diminished in bases to auscultation bilaterally, shallow, NC in place  Heart: Irregularly irregular rhythm and rate controlled, S1, S2 normal, no murmur  Abdomen: Rounded, taut, no grimacing to palpation normal bowel sounds   Extremities: BLE 1+ edema,  No erythema, no to palpation  Skin: Warm, dry/flaky, pale,   Neurologic: Alert, answers orientation questions appropriately however with illogical speech and intermittent increased confusion, generalized weakness, occasional agitation    Diagnostic Data:  CBC:  Recent Labs     06/15/22  0539 06/16/22  0200 06/17/22 0451   WBC 12.5* 12.8* 9.3   HGB 13.6 13.9 12.7   HCT 42.9 44.3 40.7   PLT 64* 42* 36*     BMP:  Recent Labs     06/15/22  0539 06/16/22  0200 06/17/22 0451    136 135*   K 4.5 4.0 4.0   CL 97* 99 100   CO2 21* 20* 25   BUN 52* 56* 54*   CREATININE 2.8* 1.8* 1.3*   CALCIUM 8.0* 8.1* 7.8*     Recent Labs     06/15/22  0539 06/16/22  0200 06/17/22 0451   AST 48* 75* 62*   ALT 39* 53* 51*   BILITOT 1.0 2.6* 2.1*   ALKPHOS 73 157* 133*     CT ABDOMEN PELVIS WO CONTRAST Additional Contrast? None  Result Date: 6/15/2022  1. There is 6 mm obstructive calculus in the left upper ureter with mild proximal hydroureteronephrosis and perinephric inflammatory changes. 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 Bilateral non-obstructing renal calculi noted. 2. Mild hepatomegaly with mild hepatic steatosis. 3. Colonic diverticulosis without evidence of acute diverticulitis. 4. Small fat containing umbilical and supra umbilical hernia noted. Recommendation: Follow up as clinically indicated.  All CT scans at this facility utilize dose modulation, iterative reconstruction, and/or weight based dosing when appropriate to reduce radiation dose to as low as reasonably achievable. Amended by Stephanie Bernal MD at [facility]-2022 02:44:19 AM Electronically Signed by Stephanie Bernal MD at [facility]-2022 02:29:46 AM             XR CHEST PORTABLE  Result Date: 6/15/2022  1. Questionable trace left pleural effusion with basilar atelectasis. 2. Right sided central line is in place with its tip in SVC. Recommendation: Follow up as clinically indicated. Electronically Signed by Pam Solis MD at [facility]-2022 06:55:39 AM             XR CHEST PORTABLE  Result Date: 6/14/2022  1. Unremarkable radiograph of chest. Recommendation: Follow up as clinically indicated. Electronically Signed by Vivi De Leon MD at [facility]-2022 12:21:20 AM             Palliative Performance Scale:  [x] 30% Bed Bound  Extensive disease  Total care  Reduced Intake  LOC full/confusion      Palliative Review of Advance Directives:     Surrogate Decision Maker:  Flako Wong (daughter) and Hilton Motta (son)    Durable Power of : No    Advanced Directives/Living Celis:  No    Out of hospital medical orders in place to reflect resuscitation status (MOLST/POLST): No    Information Sharing:  Patient's awareness of illness:  [] Terminal [] Life-Threatening [] Serious [] Non life-threatening [] Not serious   [x] Not discussed    Family awareness of illness:   [] Terminal [x] Life-Threatening [] Serious [] Nonlife-threatening [] Not serious   [] Not discussed    Assessment/Plan:  Principal Problem:    Obstructive uropathy  Active Problems:    Obstructive pyelonephritis    Sepsis with acute renal failure ([address]    Left ureteral calculus    Renal calculus, bilateral    BINH (acute kidney injury) ([address]    Hyperglycemia due to diabetes mellitus ([address]    Hypertension    Dehydration    FREDDY and COPD overlap syndrome ([address]    Degenerative joint disease    Palliative care patient  Resolved Problems:    * No resolved hospital problems. *       Visit Summary:  Chart reviewed, patient discussed with nursing staff. Reviewed health issues, work up and treatment plan as well as factors that lead to hospitalization. Ms. Jaymie Kellogg is seen at bedside this afternoon with her son and daughter present. Family conference completed with hospitalist and long discussion had regarding patient's current status and goals of care. Septic shock has improved with treatment however patient's underlying chronic illnesses and quality of life prior to hospitalization are poor. Family reports that patient is homebound and mostly immobile. She is able to ambulate to bathroom with walker and can perform simple care needs but family checks on her frequently throughout the day. She has ongoing issues with chronic pain and cannot do the things she used to enjoy such as going out to eat with family. Family is interested in additional information regarding hospice/comfort care. I provided basic information regarding hospice services, goals of care, billing, and care team we discussed outpatient hospice in the setting of home versus SNF. Their goal is to continue treatment for sepsis and improve infection but are considering evaluation for hospice care at discharge likely at [facility]. Family will be unable to care for her at home if she remains total care. SW asked to evaluate coverage for SNF with hospice as a potential option for care once patient is more stable. I explained that if patient continues to decline over the weekend hospice evaluation can be ordered and if there are symptom management issues/acute distress she potentially could admit to [facility] at that time. They report her PCP is Dr. Ward Reilly and records from his office will likely need to be requested for supporting documentation if hospice is per pursued as it does not appear that they are available in care everywhere. They deny that she has ACP/POA documents.   Education provided on full code vs DNR code status and family was going to discuss this. I reported that code status could be changed by any nursing staff if family wishes to change this and hospitalist or myself are not present at bedside. Opportunity for questions and emotional support provided. Will continue to follow. Recommendations:     1. Palliative Care- GOC continue all medical treatments and monitor for improvement. Considering transition to hospice care following improvement in acute condition or if further decline. Code Status- FULL CODE  2. Obstructive left pyelonephritis- 2/2 proximal left ureteral calculus. S/p left ureteral stent placement 6/15/2022 per Dr. Joseph Lopez. Urology following. Levaquin per sensitivities  3. Septic shock with E Coli bacteremia- 2/2 above, follow repeat blood cultures. Antibiotics per hospitalist.  Weaned from pressor support  4. BINH- improving. unknown baseline creatinine, IVF, monitor labs  5. Chronic pain syndrome- chronic back pain with Norco and gabapentin use at home. Potential overuse. Pain regimen with ultram as able at this time  6. Afib with RVR new onset- suspect 2/2 above processes. Troponin negative. Amio gtt in place    Thank you for consulting palliative care and allowing us to participate in the care of the patient.     CounselingTopics: Goals of care, Code Status, Disease process education, pt/family support    Time Spent Counseling > 50%:  YES                                   Total Time Spent with patient/family counseling, workup/treatment review, counseling and placement of orders/preparation of this note: 73 minutes plus an additional 34 minutes of ACP discussion with son/daughter    Electronically signed by MATTHEW Castillo CNP on 6/17/2022 at 7:35 AM    (Please note that portions of this note were completed with a voice recognition program.  Efforts were made to edit the dictations but occasionally words are mis-transcribed.)

## 2022-06-18 LAB
ALBUMIN SERPL-MCNC: 2 G/DL (ref 3.5–5.2)
ALP BLD-CCNC: 152 U/L (ref 35–104)
ALT SERPL-CCNC: 61 U/L (ref 5–33)
ANION GAP SERPL CALCULATED.3IONS-SCNC: 13 MMOL/L (ref 7–19)
AST SERPL-CCNC: 102 U/L (ref 5–32)
BANDED NEUTROPHILS RELATIVE PERCENT: 2 % (ref 0–5)
BASOPHILS ABSOLUTE: 0 K/UL (ref 0–0.2)
BASOPHILS RELATIVE PERCENT: 0 % (ref 0–1)
BILIRUB SERPL-MCNC: 2.6 MG/DL (ref 0.2–1.2)
BUN BLDV-MCNC: 46 MG/DL (ref 8–23)
CALCIUM SERPL-MCNC: 8.6 MG/DL (ref 8.8–10.2)
CHLORIDE BLD-SCNC: 101 MMOL/L (ref 98–111)
CO2: 25 MMOL/L (ref 22–29)
CREAT SERPL-MCNC: 1.2 MG/DL (ref 0.5–0.9)
EOSINOPHILS ABSOLUTE: 0 K/UL (ref 0–0.6)
EOSINOPHILS RELATIVE PERCENT: 0 % (ref 0–5)
GFR AFRICAN AMERICAN: 54
GFR NON-AFRICAN AMERICAN: 45
GLUCOSE BLD-MCNC: 165 MG/DL (ref 70–99)
GLUCOSE BLD-MCNC: 175 MG/DL (ref 70–99)
GLUCOSE BLD-MCNC: 179 MG/DL (ref 74–109)
GLUCOSE BLD-MCNC: 180 MG/DL (ref 70–99)
GLUCOSE BLD-MCNC: 206 MG/DL (ref 70–99)
HCT VFR BLD CALC: 41.8 % (ref 37–47)
HEMOGLOBIN: 13.2 G/DL (ref 12–16)
IMMATURE GRANULOCYTES #: 0.7 K/UL
LYMPHOCYTES ABSOLUTE: 1.8 K/UL (ref 1.1–4.5)
LYMPHOCYTES RELATIVE PERCENT: 17 % (ref 20–40)
MAGNESIUM: 2 MG/DL (ref 1.6–2.4)
MCH RBC QN AUTO: 29.9 PG (ref 27–31)
MCHC RBC AUTO-ENTMCNC: 31.6 G/DL (ref 33–37)
MCV RBC AUTO: 94.8 FL (ref 81–99)
MONOCYTES ABSOLUTE: 0.7 K/UL (ref 0–0.9)
MONOCYTES RELATIVE PERCENT: 7 % (ref 0–10)
NEUTROPHILS ABSOLUTE: 7.9 K/UL (ref 1.5–7.5)
NEUTROPHILS RELATIVE PERCENT: 74 % (ref 50–65)
PDW BLD-RTO: 15.3 % (ref 11.5–14.5)
PERFORMED ON: ABNORMAL
PLATELET # BLD: 36 K/UL (ref 130–400)
PLATELET SLIDE REVIEW: ABNORMAL
PMV BLD AUTO: 13.5 FL (ref 9.4–12.3)
POTASSIUM SERPL-SCNC: 3.9 MMOL/L (ref 3.5–5)
RBC # BLD: 4.41 M/UL (ref 4.2–5.4)
SODIUM BLD-SCNC: 139 MMOL/L (ref 136–145)
TOTAL PROTEIN: 5.8 G/DL (ref 6.6–8.7)
TOXIC GRANULATION: ABNORMAL
VACUOLATED NEUTROPHILS: ABNORMAL
WBC # BLD: 10.4 K/UL (ref 4.8–10.8)

## 2022-06-18 PROCEDURE — 99232 SBSQ HOSP IP/OBS MODERATE 35: CPT | Performed by: UROLOGY

## 2022-06-18 PROCEDURE — 2000000000 HC ICU R&B

## 2022-06-18 PROCEDURE — 2580000003 HC RX 258: Performed by: INTERNAL MEDICINE

## 2022-06-18 PROCEDURE — 6370000000 HC RX 637 (ALT 250 FOR IP): Performed by: INTERNAL MEDICINE

## 2022-06-18 PROCEDURE — 6370000000 HC RX 637 (ALT 250 FOR IP): Performed by: UROLOGY

## 2022-06-18 PROCEDURE — 87040 BLOOD CULTURE FOR BACTERIA: CPT

## 2022-06-18 PROCEDURE — 82947 ASSAY GLUCOSE BLOOD QUANT: CPT

## 2022-06-18 PROCEDURE — 83735 ASSAY OF MAGNESIUM: CPT

## 2022-06-18 PROCEDURE — 94640 AIRWAY INHALATION TREATMENT: CPT

## 2022-06-18 PROCEDURE — 36415 COLL VENOUS BLD VENIPUNCTURE: CPT

## 2022-06-18 PROCEDURE — 36592 COLLECT BLOOD FROM PICC: CPT

## 2022-06-18 PROCEDURE — 2700000000 HC OXYGEN THERAPY PER DAY

## 2022-06-18 PROCEDURE — 85025 COMPLETE CBC W/AUTO DIFF WBC: CPT

## 2022-06-18 PROCEDURE — 80053 COMPREHEN METABOLIC PANEL: CPT

## 2022-06-18 PROCEDURE — 6360000002 HC RX W HCPCS: Performed by: INTERNAL MEDICINE

## 2022-06-18 RX ORDER — METOPROLOL TARTRATE 50 MG/1
100 TABLET, FILM COATED ORAL 2 TIMES DAILY
Status: DISCONTINUED | OUTPATIENT
Start: 2022-06-18 | End: 2022-07-02 | Stop reason: HOSPADM

## 2022-06-18 RX ORDER — LEVOFLOXACIN 5 MG/ML
750 INJECTION, SOLUTION INTRAVENOUS EVERY 24 HOURS
Status: DISCONTINUED | OUTPATIENT
Start: 2022-06-19 | End: 2022-06-30

## 2022-06-18 RX ADMIN — PRAVASTATIN SODIUM 40 MG: 20 TABLET ORAL at 07:29

## 2022-06-18 RX ADMIN — LEVOFLOXACIN 250 MG: 5 INJECTION, SOLUTION INTRAVENOUS at 04:51

## 2022-06-18 RX ADMIN — AMIODARONE HYDROCHLORIDE 0.5 MG/MIN: 50 INJECTION, SOLUTION INTRAVENOUS at 17:45

## 2022-06-18 RX ADMIN — METOPROLOL TARTRATE 100 MG: 50 TABLET, FILM COATED ORAL at 21:55

## 2022-06-18 RX ADMIN — BUSPIRONE HYDROCHLORIDE 10 MG: 10 TABLET ORAL at 07:29

## 2022-06-18 RX ADMIN — IPRATROPIUM BROMIDE AND ALBUTEROL SULFATE 1 AMPULE: 2.5; .5 SOLUTION RESPIRATORY (INHALATION) at 19:06

## 2022-06-18 RX ADMIN — IPRATROPIUM BROMIDE AND ALBUTEROL SULFATE 1 AMPULE: 2.5; .5 SOLUTION RESPIRATORY (INHALATION) at 06:37

## 2022-06-18 RX ADMIN — INSULIN LISPRO 3 UNITS: 100 INJECTION, SOLUTION INTRAVENOUS; SUBCUTANEOUS at 08:42

## 2022-06-18 RX ADMIN — IPRATROPIUM BROMIDE AND ALBUTEROL SULFATE 1 AMPULE: 2.5; .5 SOLUTION RESPIRATORY (INHALATION) at 15:36

## 2022-06-18 RX ADMIN — INSULIN GLARGINE 10 UNITS: 100 INJECTION, SOLUTION SUBCUTANEOUS at 21:40

## 2022-06-18 RX ADMIN — INSULIN LISPRO 6 UNITS: 100 INJECTION, SOLUTION INTRAVENOUS; SUBCUTANEOUS at 12:28

## 2022-06-18 RX ADMIN — GABAPENTIN 600 MG: 600 TABLET, FILM COATED ORAL at 07:29

## 2022-06-18 RX ADMIN — METOPROLOL TARTRATE 25 MG: 25 TABLET, FILM COATED ORAL at 07:29

## 2022-06-18 RX ADMIN — AMIODARONE HYDROCHLORIDE 0.5 MG/MIN: 50 INJECTION, SOLUTION INTRAVENOUS at 03:37

## 2022-06-18 RX ADMIN — IPRATROPIUM BROMIDE AND ALBUTEROL SULFATE 1 AMPULE: 2.5; .5 SOLUTION RESPIRATORY (INHALATION) at 10:10

## 2022-06-18 RX ADMIN — INSULIN LISPRO 3 UNITS: 100 INJECTION, SOLUTION INTRAVENOUS; SUBCUTANEOUS at 17:34

## 2022-06-18 RX ADMIN — SODIUM CHLORIDE, PRESERVATIVE FREE 10 ML: 5 INJECTION INTRAVENOUS at 09:24

## 2022-06-18 ASSESSMENT — PAIN SCALES - WONG BAKER
WONGBAKER_NUMERICALRESPONSE: 0

## 2022-06-18 ASSESSMENT — PAIN SCALES - GENERAL
PAINLEVEL_OUTOF10: 0

## 2022-06-18 NOTE — PROGRESS NOTES
Physical Therapy   Name: oNel Sharma  MRN:  [id number]  Date of service:  6/18/2022  RN states pt is on 15L NRB and sats are 85-88%, pt is restless. Will f/u at a later time.   Electronically signed by Jair Pineda PT on 6/18/2022 at 2:18 PM

## 2022-06-18 NOTE — PROGRESS NOTES
Physical Therapy   Name: Dilma Richards  MRN:  [id number]  Date of service:  6/18/2022  Pt. had just returned to bed with robert lift after breakfast. Will f/u at a later time.   Electronically signed by Marceil Landau, PT on 6/18/2022 at 10:35 AM

## 2022-06-18 NOTE — PROGRESS NOTES
Urology Progress Note      SUBJECTIVE: Patient requiring supplemental oxygen is sleepy but is arousable but not very responsive nurses report she has now been made DNR. OBJECTIVE: Patient on 50% nonrebreather    REVIEW OF SYSTEMS:   Review of Systems   Unable to perform ROS: Acuity of condition         Physical  VITALS:  /72   Pulse (!) 103   Temp 97.6 °F (36.4 °C) (Temporal)   Resp 27   Ht 5' 5\" (1.651 m)   Wt (!) 337 lb (152.9 kg)   SpO2 (!) 89% Comment: keep oxygen saturation greater than 88% per Dr. Marrero Crew  BMI 56.08 kg/m²   TEMPERATURE:  Current - Temp: 97.6 °F (36.4 °C); Max - Temp  Av.2 °F (36.2 °C)  Min: 96.4 °F (35.8 °C)  Max: 97.7 °F (36.5 °C)   24 HR I&O   Intake/Output Summary (Last 24 hours) at 2022 1242  Last data filed at 2022 1200  Gross per 24 hour   Intake 2314.05 ml   Output 2155 ml   Net 159.05 ml     BACK: flank tenderness: left  ABDOMEN:  soft, non-distended, non-tender and obese  HEART:  normal rate, regular rhythm and tachycardia to the 100s which is improved  CHEST: Increased work and labored breathing not tachypneic or in distress.   GENITAL/URINARY: Krishna cath draining clear urine    Data       CBC:   Recent Labs     22  0200 22  0451 22  0415   WBC 12.8* 9.3 10.4   HGB 13.9 12.7 13.2   HCT 44.3 40.7 41.8   PLT 42* 36* 36*     BMP:    Recent Labs     22  0200 22  0451 22  0415    135* 139   K 4.0 4.0 3.9   CL 99 100 101   CO2 20* 25 25   BUN 56* 54* 46*   CREATININE 1.8* 1.3* 1.2*   GLUCOSE 150* 215* 179*         Component 6/15/22 0243   Urine Culture, Routine >100,000 CFU/ml Abnormal     Organism Escherichia coli Abnormal     Urine Culture, Routine Heavy growth    Resulting Agency [facility]          Susceptibility      Escherichia coli (1)    Antibiotic Interpretation Microscan  Method Status    ampicillin Sensitive <=2 mcg/mL BACTERIAL SUSCEPTIBILITY PANEL BY DARRIUS     ampicillin-sulbactam Sensitive <=2 mcg/mL BACTERIAL SUSCEPTIBILITY PANEL BY DARRIUS     aztreonam Sensitive <=1 mcg/mL BACTERIAL SUSCEPTIBILITY PANEL BY DARRIUS     ceFAZolin Sensitive <=4 mcg/mL BACTERIAL SUSCEPTIBILITY PANEL BY DARRIUS     cefepime Sensitive <=1 mcg/mL BACTERIAL SUSCEPTIBILITY PANEL BY DARRIUS     cefTRIAXone Sensitive <=1 mcg/mL BACTERIAL SUSCEPTIBILITY PANEL BY DARRIUS     ertapenem Sensitive <=0.5 mcg/mL BACTERIAL SUSCEPTIBILITY PANEL BY DARRIUS     gentamicin Sensitive <=1 mcg/mL BACTERIAL SUSCEPTIBILITY PANEL BY DARRIUS     levofloxacin Sensitive <=0.12 mcg/mL BACTERIAL SUSCEPTIBILITY PANEL BY DARRIUS     meropenem Sensitive <=0.25 mcg/mL BACTERIAL SUSCEPTIBILITY PANEL BY DARRIUS     nitrofurantoin Sensitive <=16 mcg/mL BACTERIAL SUSCEPTIBILITY PANEL BY DARRIUS     piperacillin-tazobactam Sensitive <=4 mcg/mL BACTERIAL SUSCEPTIBILITY PANEL BY DARRIUS     trimethoprim-sulfamethoxazole Resistant >=320 mcg/mL BACTERIAL SUSCEPTIBILITY PANEL BY DARRIUS          Narrative  Performed by: [facility]  ORDER#: [id number]                          ORDERED BY: Miranda Holland   SOURCE: Urine Cystoscopy #2 Kidney Urine   COLLECTED:  06/15/22 02:43   [facility] :                      RECEIVED :  06/15/22 04:33               Component 6/14/22 9345   Blood Culture, Routine  Abnormal    Bottle volume = 10 ml    Bottle volume = 6 ml     Organism Escherichia coli Abnormal     Blood Culture, Routine Isolated from Aerobic and Anaerobic bottle    Resulting Agency [address] [place] [address]          Susceptibility      Escherichia coli (1)    Antibiotic Interpretation Microscan  Method Status    ampicillin Sensitive <=2 mcg/mL BACTERIAL SUSCEPTIBILITY PANEL BY DARRIUS     ampicillin-sulbactam Sensitive <=2 mcg/mL BACTERIAL SUSCEPTIBILITY PANEL BY DARRIUS     aztreonam Sensitive <=1 mcg/mL BACTERIAL SUSCEPTIBILITY PANEL BY DARRIUS     ceFAZolin Sensitive <=4 mcg/mL BACTERIAL SUSCEPTIBILITY PANEL BY DARRIUS     cefepime Sensitive <=1 mcg/mL BACTERIAL SUSCEPTIBILITY PANEL BY DARRIUS cefTRIAXone Sensitive <=1 mcg/mL BACTERIAL SUSCEPTIBILITY PANEL BY DARRIUS     ertapenem Sensitive <=0.5 mcg/mL BACTERIAL SUSCEPTIBILITY PANEL BY DARRIUS     gentamicin Sensitive <=1 mcg/mL BACTERIAL SUSCEPTIBILITY PANEL BY DARRIUS     levofloxacin Sensitive <=0.12 mcg/mL BACTERIAL SUSCEPTIBILITY PANEL BY DARRIUS     meropenem Sensitive <=0.25 mcg/mL BACTERIAL SUSCEPTIBILITY PANEL BY DARRIUS     piperacillin-tazobactam Sensitive <=4 mcg/mL BACTERIAL SUSCEPTIBILITY PANEL BY DARRIUS     trimethoprim-sulfamethoxazole Sensitive <=20 mcg/mL BACTERIAL SUSCEPTIBILITY PANEL BY DARRIUS          Narrative  Performed by: [facility]  ORDER#: [id number]                          ORDERED BY: MADELYN Mendoza   SOURCE: Blood R Arm                        COLLECTED:  06/14/22 22:55   [facility] :                      RECEIVED :  06/14/22 72:31   CALL  [facility]  [facility] tel. ,   Microbiology results called to and read back by Johanna Vance RN, ICU,   06/15/2022 10:57, by [facility]      Specimen Collected: 06/14/22 22:55 Last Resulted: 06/17/22 06:32              Urine from the left kidney and blood culture positive for E. coli pansensitive        ASSESSMENT AND PLAN    Patient Active Problem List   Diagnosis    Obstructive pyelonephritis    Sepsis with acute renal failure ([address]    Left ureteral calculus    Renal calculus, bilateral    BINH (acute kidney injury) ([address]    Obstructive uropathy    Hyperglycemia due to diabetes mellitus ([address]    Hypertension    Dehydration    FREDDY and COPD overlap syndrome ([address]    Degenerative joint disease    Palliative care patient    Septicemia ([address]    Chronic pain syndrome       1. Sepsis secondary to left obstructive pyelonephritis. Cultures noted patient is covered on cefepime which has been discontinued and Levaquin which she continues on.   She was initially placed on 250 mg IV every 24 hours due to BINH this is improved we will ask pharmacy recommendation for adjustment of dosing based on improved

## 2022-06-18 NOTE — PROGRESS NOTES
Hospitalist Progress Note  [facility]     Patient: Reggie Richardson  : 1955  MRN: [id number]  Code Status: Full Code    Hospital Day: 3   Date of Service: 2022    Subjective:   Patient seen and examined. Sitting in chair. No acute distress. Past Medical History:   Diagnosis Date    Arthritis     HTN (hypertension)        Past Surgical History:   Procedure Laterality Date    ANKLE FRACTURE SURGERY Right     BLADDER SURGERY Left 6/15/2022    CYSTOSCOPY, LEFT URETEROSCOPY, RETROGRADE PYELOGRAM, STENT INSERTION performed by Christina Lopez MD at [facility] (CERVIX STATUS UNKNOWN)      INCONTINENCE SURGERY         Family History   Problem Relation Age of Onset    Cancer Mother     Cancer Father        Social History     Socioeconomic History    Marital status: Single     Spouse name: Not on file    Number of children: Not on file    Years of education: Not on file    Highest education level: Not on file   Occupational History    Not on file   Tobacco Use    Smoking status: Former Smoker    Smokeless tobacco: Never Used    Tobacco comment: quit 10 years ago   Vaping Use    Vaping Use: Never used   Substance and Sexual Activity    Alcohol use: Never    Drug use: Never    Sexual activity: Not on file   Other Topics Concern    Not on file   Social History Narrative    Not on file     Social Determinants of Health     Financial Resource Strain:     Difficulty of Paying Living Expenses: Not on file   Food Insecurity:     Worried About [address] in the Last Year: Not on file    [address] in the Last Year: Not on file   Transportation Needs:     Lack of Transportation (Medical): Not on file    Lack of Transportation (Non-Medical):  Not on file   Physical Activity:     Days of Exercise per Week: Not on file    Minutes of Exercise per Session: Not on file   Stress:     Feeling of Stress : Not on file   Social Connections:     Frequency of Communication with Friends and Family: Not on file    Frequency of Social Gatherings with Friends and Family: Not on file    Attends Mu-ism Services: Not on file    Active Member of Clubs or Organizations: Not on file    Attends Club or Organization Meetings: Not on file    Marital Status: Not on file   Intimate Partner Violence:     Fear of Current or Ex-Partner: Not on file    Emotionally Abused: Not on file    Physically Abused: Not on file    Sexually Abused: Not on file   Housing Stability:     Unable to Pay for Housing in the Last Year: Not on file    Number of [place] in the Last Year: Not on file    Unstable Housing in the Last Year: Not on file       Current Facility-Administered Medications   Medication Dose Route Frequency Provider Last Rate Last Admin    metoprolol tartrate (LOPRESSOR) tablet 25 mg  25 mg Oral BID Abdulkadir Tolentino MD   25 mg at 06/18/22 0729    traMADol (ULTRAM) tablet 50 mg  50 mg Oral Q6H PRN Abdulkadir Tolentino MD   50 mg at 06/17/22 1313    busPIRone (BUSPAR) tablet 10 mg  10 mg Oral TID Abdulkadir Tolentino MD   10 mg at 06/18/22 0729    amiodarone (CORDARONE) 450 mg in dextrose 5 % 250 mL infusion  0.5 mg/min IntraVENous Continuous Abdulkadir Tolentino MD 16.7 mL/hr at 06/18/22 0800 0.5 mg/min at 06/18/22 0800    gabapentin (NEURONTIN) tablet 600 mg  600 mg Oral 4x Daily Jimbo Lucas MD   600 mg at 06/18/22 0729    [Held by provider] metoprolol tartrate (LOPRESSOR) tablet 100 mg  100 mg Oral BID Jimbo Lucas MD   100 mg at 06/15/22 0950    pravastatin (PRAVACHOL) tablet 40 mg  40 mg Oral Daily Jimbo Lucas MD   40 mg at 06/18/22 0729    [Held by provider] lisinopril-hydroCHLOROthiazide (PRINZIDE;ZESTORETIC) 20-12.5 MG per tablet 1 tablet  1 tablet Oral BID Jimbo Lucas MD        glucose chewable tablet 16 g  4 tablet Oral PRN Kamila Schofield MD        dextrose bolus 10% 125 mL  125 mL IntraVENous PRN Jim GODINEZ MD   1 ampule at 06/18/22 5202    lactated ringers infusion   IntraVENous Continuous Dianna Horton MD 75 mL/hr at 06/17/22 1446 New Bag at 06/17/22 1446    levoFLOXacin (LEVAQUIN) 250 MG/50ML infusion 250 mg  250 mg IntraVENous Q24H Madelin Charles MD 50 mL/hr at 06/18/22 0451 250 mg at 06/18/22 0451         amiodarone 0.5 mg/min (06/18/22 0800)    dextrose      sodium chloride      lactated ringers 75 mL/hr at 06/17/22 1446        Objective:   BP (!) 158/70   Pulse (!) 113   Temp 97.2 °F (36.2 °C) (Temporal)   Resp 23   Ht 5' 5\" (1.651 m)   Wt (!) 337 lb (152.9 kg)   SpO2 90%   BMI 56.08 kg/m²     General: no acute distress  HEENT: normocephalic, atraumatic  Neck: supple, symmetrical, trachea midline   Lungs: clear to auscultation bilaterally   Cardiovascular: s1 and s2 normal, irregularly irregular rhythm  Abdomen: soft, positive bowel sounds  Extremities: no edema or cyanosis   Neuro: alert, no acute focal motor deficits    Recent Labs     06/16/22  0200 06/17/22  0451 06/18/22  0415   WBC 12.8* 9.3 10.4   RBC 4.66 4.27 4.41   HGB 13.9 12.7 13.2   HCT 44.3 40.7 41.8   MCV 95.1 95.3 94.8   MCH 29.8 29.7 29.9   MCHC 31.4* 31.2* 31.6*   PLT 42* 36* 36*     Recent Labs     06/16/22  0200 06/17/22  0451 06/18/22  0415    135* 139   K 4.0 4.0 3.9   ANIONGAP 17 10 13   CL 99 100 101   CO2 20* 25 25   BUN 56* 54* 46*   CREATININE 1.8* 1.3* 1.2*   GLUCOSE 150* 215* 179*   CALCIUM 8.1* 7.8* 8.6*     Recent Labs     06/16/22  1559 06/17/22  0451 06/18/22  0415   MG 2.4 2.1 2.0     Recent Labs     06/16/22  0200 06/17/22  0451 06/18/22  0415   AST 75* 62* 102*   ALT 53* 51* 61*   BILITOT 2.6* 2.1* 2.6*   ALKPHOS 157* 133* 152*     No results for input(s): PH, PO2, PCO2, HCO3, BE, O2SAT in the last 72 hours. Recent Labs     06/15/22  1448 06/15/22  2110   TROPONINI 0.02 <0.01     No results for input(s): INR in the last 72 hours.   Recent Labs     06/15/22  1307   LACTA 2.6*         Intake/Output Summary (Last 24 hours) at 6/18/2022 0934  Last data filed at 6/18/2022 0800  Gross per 24 hour   Intake 2615.12 ml   Output 2495 ml   Net 120.12 ml       No results found. Assessment and Plan:   Septic shock  Shock resolved  Secondary to below  Agents as below  IVF  Since weaned off phenylephrine gtt  Lactate improved overall     Obstructive left E. coli pyelonephritis  Urology following  Secondary to proximal left ureteral calculus  S/p left ureteral stent placement 6/15/2022 per Dr. Toya Aguirre per sensitivities     E. coli bacteremia  Secondary to above  Levaquin per sensitivities  Follow repeat blood cultures  Echo as warranted     BINH  Unknown baseline creatinine  Creatinine continues to improve  IVF  Avoid offending agents  Follow renal function/urine output/electrolytes     DM2  HbA1c 9.1  Poor control  Counseled  Meds on board     Morbid obesity  Counseled     Chronic pain syndrome  Patient has been bedbound for the last 5 years  Patient/daughter state she has been taking opioids for many years  I extensively explained how these agents can run counter to her current treatment plan  Patient continues to request home pain meds despite extensive counseling  Palliative/social work following per patient/family request     New onset atrial fibrillation with RVR  Suspect secondary to above processes  Rate control agents  AC contraindicated given thrombocytopenia  Serial troponin 0.01 > 0.02 > 0.02 > (<0.01)  Follow electrolytes    Acute hypoxic respiratory failure  Suspect multifactorial  Currently requiring HFNC 10 L  Wean as tolerated     DVT prophylaxis  SCDs given thrombocytopenia     Extensive discussions with patient, daughter Belinda Dunbar), and son Dimas Sheehan) in regards to current clinical state/goals of care.  All questions sought and answered.     Total critical care time: 48 minutes    Arlet Anne MD   6/18/2022 9:34 AM

## 2022-06-18 NOTE — PROGRESS NOTES
Patient's son and daughter in agreement patient code status be updated to DNR. At this time both wish to continue with current course of treatment. Glendy PATTON witness. Dr. Martin Pouch notified. Code status updated.     Electronically signed by Rafael Melo RN on 6/18/2022 at 11:19 AM

## 2022-06-19 LAB
ALBUMIN SERPL-MCNC: 2 G/DL (ref 3.5–5.2)
ALP BLD-CCNC: 169 U/L (ref 35–104)
ALT SERPL-CCNC: 71 U/L (ref 5–33)
ANION GAP SERPL CALCULATED.3IONS-SCNC: 11 MMOL/L (ref 7–19)
AST SERPL-CCNC: 140 U/L (ref 5–32)
ATYPICAL LYMPHOCYTE RELATIVE PERCENT: 1 % (ref 0–8)
BASOPHILS ABSOLUTE: 0 K/UL (ref 0–0.2)
BASOPHILS RELATIVE PERCENT: 0 % (ref 0–1)
BILIRUB SERPL-MCNC: 3.4 MG/DL (ref 0.2–1.2)
BUN BLDV-MCNC: 47 MG/DL (ref 8–23)
CALCIUM SERPL-MCNC: 8.7 MG/DL (ref 8.8–10.2)
CHLORIDE BLD-SCNC: 103 MMOL/L (ref 98–111)
CO2: 26 MMOL/L (ref 22–29)
CREAT SERPL-MCNC: 1.1 MG/DL (ref 0.5–0.9)
EOSINOPHILS ABSOLUTE: 0 K/UL (ref 0–0.6)
EOSINOPHILS RELATIVE PERCENT: 0 % (ref 0–5)
GFR AFRICAN AMERICAN: >59
GFR NON-AFRICAN AMERICAN: 49
GLUCOSE BLD-MCNC: 163 MG/DL (ref 70–99)
GLUCOSE BLD-MCNC: 169 MG/DL (ref 70–99)
GLUCOSE BLD-MCNC: 175 MG/DL (ref 70–99)
GLUCOSE BLD-MCNC: 181 MG/DL (ref 70–99)
GLUCOSE BLD-MCNC: 196 MG/DL (ref 74–109)
HCT VFR BLD CALC: 43.4 % (ref 37–47)
HEMOGLOBIN: 13.6 G/DL (ref 12–16)
IMMATURE GRANULOCYTES #: 1.5 K/UL
LYMPHOCYTES ABSOLUTE: 2 K/UL (ref 1.1–4.5)
LYMPHOCYTES RELATIVE PERCENT: 13 % (ref 20–40)
MAGNESIUM: 2.1 MG/DL (ref 1.6–2.4)
MCH RBC QN AUTO: 29.4 PG (ref 27–31)
MCHC RBC AUTO-ENTMCNC: 31.3 G/DL (ref 33–37)
MCV RBC AUTO: 93.9 FL (ref 81–99)
MONOCYTES ABSOLUTE: 1 K/UL (ref 0–0.9)
MONOCYTES RELATIVE PERCENT: 7 % (ref 0–10)
NEUTROPHILS ABSOLUTE: 11.3 K/UL (ref 1.5–7.5)
NEUTROPHILS RELATIVE PERCENT: 79 % (ref 50–65)
PDW BLD-RTO: 15.8 % (ref 11.5–14.5)
PERFORMED ON: ABNORMAL
PLATELET # BLD: 46 K/UL (ref 130–400)
PLATELET SLIDE REVIEW: ABNORMAL
PMV BLD AUTO: 13 FL (ref 9.4–12.3)
POTASSIUM SERPL-SCNC: 4 MMOL/L (ref 3.5–5)
RBC # BLD: 4.62 M/UL (ref 4.2–5.4)
SODIUM BLD-SCNC: 140 MMOL/L (ref 136–145)
TOTAL PROTEIN: 5.9 G/DL (ref 6.6–8.7)
TOXIC GRANULATION: ABNORMAL
VACUOLATED NEUTROPHILS: ABNORMAL
WBC # BLD: 14.3 K/UL (ref 4.8–10.8)

## 2022-06-19 PROCEDURE — 83735 ASSAY OF MAGNESIUM: CPT

## 2022-06-19 PROCEDURE — 6370000000 HC RX 637 (ALT 250 FOR IP): Performed by: INTERNAL MEDICINE

## 2022-06-19 PROCEDURE — 6370000000 HC RX 637 (ALT 250 FOR IP): Performed by: UROLOGY

## 2022-06-19 PROCEDURE — 2140000000 HC CCU INTERMEDIATE R&B

## 2022-06-19 PROCEDURE — 99231 SBSQ HOSP IP/OBS SF/LOW 25: CPT | Performed by: UROLOGY

## 2022-06-19 PROCEDURE — 2700000000 HC OXYGEN THERAPY PER DAY

## 2022-06-19 PROCEDURE — 2500000003 HC RX 250 WO HCPCS: Performed by: INTERNAL MEDICINE

## 2022-06-19 PROCEDURE — 36592 COLLECT BLOOD FROM PICC: CPT

## 2022-06-19 PROCEDURE — 6360000002 HC RX W HCPCS: Performed by: INTERNAL MEDICINE

## 2022-06-19 PROCEDURE — 2580000003 HC RX 258: Performed by: INTERNAL MEDICINE

## 2022-06-19 PROCEDURE — 80053 COMPREHEN METABOLIC PANEL: CPT

## 2022-06-19 PROCEDURE — 94640 AIRWAY INHALATION TREATMENT: CPT

## 2022-06-19 PROCEDURE — 82947 ASSAY GLUCOSE BLOOD QUANT: CPT

## 2022-06-19 PROCEDURE — 85025 COMPLETE CBC W/AUTO DIFF WBC: CPT

## 2022-06-19 PROCEDURE — 6360000002 HC RX W HCPCS: Performed by: UROLOGY

## 2022-06-19 RX ORDER — LISINOPRIL 20 MG/1
20 TABLET ORAL 2 TIMES DAILY
Status: DISCONTINUED | OUTPATIENT
Start: 2022-06-19 | End: 2022-07-02 | Stop reason: HOSPADM

## 2022-06-19 RX ORDER — HYDROCHLOROTHIAZIDE 25 MG/1
12.5 TABLET ORAL 2 TIMES DAILY
Status: DISCONTINUED | OUTPATIENT
Start: 2022-06-19 | End: 2022-07-02 | Stop reason: HOSPADM

## 2022-06-19 RX ORDER — LABETALOL HYDROCHLORIDE 5 MG/ML
10 INJECTION, SOLUTION INTRAVENOUS EVERY 4 HOURS PRN
Status: DISCONTINUED | OUTPATIENT
Start: 2022-06-19 | End: 2022-07-02 | Stop reason: HOSPADM

## 2022-06-19 RX ADMIN — LISINOPRIL 20 MG: 20 TABLET ORAL at 13:48

## 2022-06-19 RX ADMIN — INSULIN LISPRO 3 UNITS: 100 INJECTION, SOLUTION INTRAVENOUS; SUBCUTANEOUS at 08:54

## 2022-06-19 RX ADMIN — BUSPIRONE HYDROCHLORIDE 10 MG: 10 TABLET ORAL at 20:13

## 2022-06-19 RX ADMIN — INSULIN LISPRO 3 UNITS: 100 INJECTION, SOLUTION INTRAVENOUS; SUBCUTANEOUS at 16:59

## 2022-06-19 RX ADMIN — INSULIN LISPRO 3 UNITS: 100 INJECTION, SOLUTION INTRAVENOUS; SUBCUTANEOUS at 12:31

## 2022-06-19 RX ADMIN — AMIODARONE HYDROCHLORIDE 0.5 MG/MIN: 50 INJECTION, SOLUTION INTRAVENOUS at 08:50

## 2022-06-19 RX ADMIN — SODIUM CHLORIDE, POTASSIUM CHLORIDE, SODIUM LACTATE AND CALCIUM CHLORIDE: 600; 310; 30; 20 INJECTION, SOLUTION INTRAVENOUS at 12:02

## 2022-06-19 RX ADMIN — GABAPENTIN 600 MG: 600 TABLET, FILM COATED ORAL at 12:32

## 2022-06-19 RX ADMIN — PRAVASTATIN SODIUM 40 MG: 20 TABLET ORAL at 07:53

## 2022-06-19 RX ADMIN — IPRATROPIUM BROMIDE AND ALBUTEROL SULFATE 1 AMPULE: 2.5; .5 SOLUTION RESPIRATORY (INHALATION) at 06:55

## 2022-06-19 RX ADMIN — SODIUM CHLORIDE, PRESERVATIVE FREE 10 ML: 5 INJECTION INTRAVENOUS at 20:13

## 2022-06-19 RX ADMIN — SODIUM CHLORIDE, PRESERVATIVE FREE 10 ML: 5 INJECTION INTRAVENOUS at 08:03

## 2022-06-19 RX ADMIN — IPRATROPIUM BROMIDE AND ALBUTEROL SULFATE 1 AMPULE: 2.5; .5 SOLUTION RESPIRATORY (INHALATION) at 10:41

## 2022-06-19 RX ADMIN — LEVOFLOXACIN 750 MG: 5 INJECTION, SOLUTION INTRAVENOUS at 04:34

## 2022-06-19 RX ADMIN — BUSPIRONE HYDROCHLORIDE 10 MG: 10 TABLET ORAL at 09:08

## 2022-06-19 RX ADMIN — HYDROCHLOROTHIAZIDE 12.5 MG: 25 TABLET ORAL at 12:32

## 2022-06-19 RX ADMIN — IPRATROPIUM BROMIDE AND ALBUTEROL SULFATE 1 AMPULE: 2.5; .5 SOLUTION RESPIRATORY (INHALATION) at 14:51

## 2022-06-19 RX ADMIN — Medication 10 MG: at 03:34

## 2022-06-19 RX ADMIN — INSULIN GLARGINE 10 UNITS: 100 INJECTION, SOLUTION SUBCUTANEOUS at 21:22

## 2022-06-19 RX ADMIN — GABAPENTIN 600 MG: 600 TABLET, FILM COATED ORAL at 16:58

## 2022-06-19 RX ADMIN — HYDROCHLOROTHIAZIDE 12.5 MG: 25 TABLET ORAL at 20:13

## 2022-06-19 RX ADMIN — BUSPIRONE HYDROCHLORIDE 10 MG: 10 TABLET ORAL at 12:32

## 2022-06-19 RX ADMIN — METOPROLOL TARTRATE 100 MG: 50 TABLET, FILM COATED ORAL at 20:13

## 2022-06-19 RX ADMIN — INSULIN LISPRO 2 UNITS: 100 INJECTION, SOLUTION INTRAVENOUS; SUBCUTANEOUS at 21:23

## 2022-06-19 RX ADMIN — IPRATROPIUM BROMIDE AND ALBUTEROL SULFATE 1 AMPULE: 2.5; .5 SOLUTION RESPIRATORY (INHALATION) at 19:29

## 2022-06-19 RX ADMIN — LISINOPRIL 20 MG: 20 TABLET ORAL at 20:13

## 2022-06-19 RX ADMIN — GABAPENTIN 600 MG: 600 TABLET, FILM COATED ORAL at 20:13

## 2022-06-19 RX ADMIN — METOPROLOL TARTRATE 100 MG: 50 TABLET, FILM COATED ORAL at 07:53

## 2022-06-19 RX ADMIN — GABAPENTIN 600 MG: 600 TABLET, FILM COATED ORAL at 09:08

## 2022-06-19 ASSESSMENT — PAIN SCALES - GENERAL
PAINLEVEL_OUTOF10: 0

## 2022-06-19 ASSESSMENT — PAIN SCALES - WONG BAKER
WONGBAKER_NUMERICALRESPONSE: 0

## 2022-06-19 NOTE — PROGRESS NOTES
Urology Progress Note      SUBJECTIVE: Patient more alert today though is not really able to answer questions denies hurting her pain. OBJECTIVE: Hypoxic respiratory failure still requiring submental oxygen may be a little improved. Thrombocytopenia continues, A. fib but overall slow clinical improvement. Levaquin day #4 today     REVIEW OF SYSTEMS:   Review of Systems   Unable to perform ROS: Acuity of condition         Physical  VITALS:  BP (!) 158/85   Pulse 84   Temp 97.7 °F (36.5 °C) (Temporal)   Resp 21   Ht 5' 5\" (1.651 m)   Wt (!) 349 lb 8 oz (158.5 kg)   SpO2 91%   BMI 58.16 kg/m²   TEMPERATURE:  Current - Temp: 97.7 °F (36.5 °C); Max - Temp  Av.1 °F (36.2 °C)  Min: 96.3 °F (35.7 °C)  Max: 97.7 °F (36.5 °C)   24 HR I&O   Intake/Output Summary (Last 24 hours) at 2022 1248  Last data filed at 2022 1000  Gross per 24 hour   Intake 1322.99 ml   Output 1360 ml   Net -37.01 ml     BACK: no tenderness in spine or flanks  ABDOMEN:  soft, non-distended and non-tender  HEART:  abnormal rate-100s and irregularly irregular rhythm noted A. fib  CHEST: Increased work of breathing but not distressed or tachypneic  GENITAL/URINARY: Normal Krishna catheter blood-tinged urine    Data       CBC:   Recent Labs     22  0451 22  0415 22  0315   WBC 9.3 10.4 14.3*   HGB 12.7 13.2 13.6   HCT 40.7 41.8 43.4   PLT 36* 36* 46*     BMP:    Recent Labs     22  0451 22  0415 22  0315   * 139 140   K 4.0 3.9 4.0    101 103   CO2 25 25 26   BUN 54* 46* 47*   CREATININE 1.3* 1.2* 1.1*   GLUCOSE 215* 179* 196*       No results for input(s): LABURIN in the last 72 hours. Recent Labs     22  1046   BC No Growth to date. Any change in status will be called. Recent Labs     22  1046   BLOODCULT2 No Growth to date. Any change in status will be called.      Blood cultures from 2022 now no growth    ASSESSMENT AND PLAN    Patient Active Problem List Diagnosis    Obstructive pyelonephritis    [facility]is with [facility] failure [facility]    Left ureteral calculus    Renal calculus, bilateral    BINH (acute kidney injury) ([address]    Obstructive uropathy    Hyperglycemia due to diabetes mellitus (HCC)    Hypertension    Dehydration    FREDDY and COPD overlap syndrome (HCC)    Degenerative joint disease    Palliative care patient    Septicemia ([facility]    Chronic pain syndrome       1. Sepsis with septic shock secondary to left obstructive pyelonephritis from obstructing left ureter stone status post left ureteral stent placement. She continues to clinically improve slowly. Levaquin dose adjusted up to 750 mg continue current antibiotics and continued current supportive care no further  recommendations. 2.  Left obstructing proximal ureteral calculus status post left ureteral stent placement we will plan for definitive treatment of the stone at a later date when the patient is much improved clinically and she is completed at least a minimum of 14 days of antibiotic treatment.     Bob Clark MD

## 2022-06-19 NOTE — PROGRESS NOTES
Hospitalist Progress Note  [facility]     Patient: Lalito Corral  : 1955  MRN: [id number]  Code Status: DNR    Hospital Day: 4   Date of Service: 2022    Subjective:   Patient seen and examined. No acute distress. Past Medical History:   Diagnosis Date    Arthritis     HTN (hypertension)        Past Surgical History:   Procedure Laterality Date    ANKLE FRACTURE SURGERY Right     BLADDER SURGERY Left 6/15/2022    CYSTOSCOPY, LEFT URETEROSCOPY, RETROGRADE PYELOGRAM, STENT INSERTION performed by Orly Rodriguez MD at [facility] (CERVIX STATUS UNKNOWN)      INCONTINENCE SURGERY         Family History   Problem Relation Age of Onset    Cancer Mother     Cancer Father        Social History     Socioeconomic History    Marital status: Single     Spouse name: Not on file    Number of children: Not on file    Years of education: Not on file    Highest education level: Not on file   Occupational History    Not on file   Tobacco Use    Smoking status: Former Smoker    Smokeless tobacco: Never Used    Tobacco comment: quit 10 years ago   Vaping Use    Vaping Use: Never used   Substance and Sexual Activity    Alcohol use: Never    Drug use: Never    Sexual activity: Not on file   Other Topics Concern    Not on file   Social History Narrative    Not on file     Social Determinants of Health     Financial Resource Strain:     Difficulty of Paying Living Expenses: Not on file   Food Insecurity:     Worried About [address] in the Last Year: Not on file    [address] in the Last Year: Not on file   Transportation Needs:     Lack of Transportation (Medical): Not on file    Lack of Transportation (Non-Medical):  Not on file   Physical Activity:     Days of Exercise per Week: Not on file    Minutes of Exercise per Session: Not on file   Stress:     Feeling of Stress : Not on file   Social Connections:     Frequency of Communication with Friends and Family: Not on file    Frequency of Social Gatherings with Friends and Family: Not on file    Attends Catholic Services: Not on file    Active Member of Clubs or Organizations: Not on file    Attends Club or Organization Meetings: Not on file    Marital Status: Not on file   Intimate Partner Violence:     Fear of Current or Ex-Partner: Not on file    Emotionally Abused: Not on file    Physically Abused: Not on file    Sexually Abused: Not on file   Housing Stability:     Unable to Pay for Housing in the Last Year: Not on file    Number of [place] in the Last Year: Not on file    Unstable Housing in the Last Year: Not on file       Current Facility-Administered Medications   Medication Dose Route Frequency Provider Last Rate Last Admin    labetalol (NORMODYNE;TRANDATE) injection 10 mg  10 mg IntraVENous Q4H PRN Mickie De Oliveira MD   10 mg at 06/19/22 0334    metoprolol tartrate (LOPRESSOR) tablet 100 mg  100 mg Oral BID Aryan Jacobson MD   100 mg at 06/19/22 0753    levoFLOXacin (LEVAQUIN) 750 MG/150ML infusion 750 mg  750 mg IntraVENous Q24H Raquel Oh MD   Stopped at 06/19/22 7044    traMADol (ULTRAM) tablet 50 mg  50 mg Oral Q6H PRN Aryan Jacobson MD   50 mg at 06/17/22 1313    busPIRone (BUSPAR) tablet 10 mg  10 mg Oral TID Aryan Jacobson MD   10 mg at 06/19/22 0908    amiodarone (CORDARONE) 450 mg in dextrose 5 % 250 mL infusion  0.5 mg/min IntraVENous Continuous Aryan Jacobson MD 16.7 mL/hr at 06/19/22 1000 0.5 mg/min at 06/19/22 1000    gabapentin (NEURONTIN) tablet 600 mg  600 mg Oral 4x Daily Raquel Oh MD   600 mg at 06/19/22 0908    pravastatin (PRAVACHOL) tablet 40 mg  40 mg Oral Daily Raquel Oh MD   40 mg at 06/19/22 0753    [Held by provider] lisinopril-hydroCHLOROthiazide (PRINZIDE;ZESTORETIC) 20-12.5 MG per tablet 1 tablet  1 tablet Oral BID Raquel Oh MD        glucose chewable tablet 16 g  4 tablet Oral PRN Antoine Meza MD        dextrose bolus 10% 125 mL  125 mL IntraVENous PRN Antoine Meza MD        Or    dextrose bolus 10% 250 mL  250 mL IntraVENous PRN Antoine Meza MD        glucagon (rDNA) injection 1 mg  1 mg IntraMUSCular PRN Antoine Meza MD        dextrose 5 % solution  100 mL/hr IntraVENous PRN Antoine Meza MD        insulin lispro (HUMALOG) injection vial 0-18 Units  0-18 Units SubCUTAneous TID WC Antoine Meza MD   3 Units at 06/19/22 0854    insulin lispro (HUMALOG) injection vial 0-9 Units  0-9 Units SubCUTAneous Nightly Antoine Meza MD   2 Units at 06/17/22 2049    insulin glargine (LANTUS) injection vial 10 Units  10 Units SubCUTAneous Nightly Antoine Meza MD   10 Units at 06/18/22 2140    sodium chloride flush 0.9 % injection 5-40 mL  5-40 mL IntraVENous 2 times per day Antoine Meza MD   10 mL at 06/19/22 0803    sodium chloride flush 0.9 % injection 5-40 mL  5-40 mL IntraVENous PRN Antoine Meza MD        0.9 % sodium chloride infusion   IntraVENous PRN Antoine Meza MD        ondansetron (ZOFRAN-ODT) disintegrating tablet 4 mg  4 mg Oral Q8H PRN Antoine Meza MD        Or    ondansetron (ZOFRAN) injection 4 mg  4 mg IntraVENous Q6H PRN Antoine Meza MD   4 mg at 06/16/22 1046    polyethylene glycol (GLYCOLAX) packet 17 g  17 g Oral Daily PRN Antoine Meza MD        acetaminophen (TYLENOL) tablet 650 mg  650 mg Oral Q6H PRN Antoine Meza MD   650 mg at 06/17/22 0800    Or    acetaminophen (TYLENOL) suppository 650 mg  650 mg Rectal Q6H PRN Antoine Meza MD        potassium chloride 10 mEq/100 mL IVPB (Peripheral Line)  10 mEq IntraVENous PRN Antoine Meza MD        magnesium sulfate 2000 mg in 50 mL IVPB premix  2,000 mg IntraVENous PRN Antoine Meza MD   Stopped at 06/16/22 5466    ipratropium-albuterol (DUONEB) nebulizer solution 1 ampule  1 ampule Inhalation Q4H WA Bong Benavides MD   1 ampule at 06/19/22 1041    lactated ringers infusion   IntraVENous Continuous Ady Workman MD 30 mL/hr at 06/19/22 1000 Rate Verify at 06/19/22 1000         amiodarone 0.5 mg/min (06/19/22 1000)    dextrose      sodium chloride      lactated ringers 30 mL/hr at 06/19/22 1000        Objective:   BP (!) 148/83   Pulse 79   Temp 97.6 °F (36.4 °C) (Temporal)   Resp 22   Ht 5' 5\" (1.651 m)   Wt (!) 349 lb 8 oz (158.5 kg)   SpO2 93%   BMI 58.16 kg/m²     General: no acute distress  HEENT: normocephalic, atraumatic  Neck: supple, symmetrical, trachea midline   Lungs: clear to auscultation bilaterally   Cardiovascular: s1 and s2 normal  Abdomen: soft, positive bowel sounds  Extremities: no edema or cyanosis   Neuro: alert, no acute focal motor deficits    Recent Labs     06/17/22 0451 06/18/22 0415 06/19/22  0315   WBC 9.3 10.4 14.3*   RBC 4.27 4.41 4.62   HGB 12.7 13.2 13.6   HCT 40.7 41.8 43.4   MCV 95.3 94.8 93.9   MCH 29.7 29.9 29.4   MCHC 31.2* 31.6* 31.3*   PLT 36* 36* 46*     Recent Labs     06/17/22 0451 06/18/22 0415 06/19/22  0315   * 139 140   K 4.0 3.9 4.0   ANIONGAP 10 13 11    101 103   CO2 25 25 26   BUN 54* 46* 47*   CREATININE 1.3* 1.2* 1.1*   GLUCOSE 215* 179* 196*   CALCIUM 7.8* 8.6* 8.7*     Recent Labs     06/17/22 0451 06/18/22  0415 06/19/22  0315   MG 2.1 2.0 2.1     Recent Labs     06/17/22 0451 06/18/22 0415 06/19/22  0315   AST 62* 102* 140*   ALT 51* 61* 71*   BILITOT 2.1* 2.6* 3.4*   ALKPHOS 133* 152* 169*     No results for input(s): PH, PO2, PCO2, HCO3, BE, O2SAT in the last 72 hours. No results for input(s): TROPONINI in the last 72 hours. No results for input(s): INR in the last 72 hours. No results for input(s): LACTA in the last 72 hours.       Intake/Output Summary (Last 24 hours) at 6/19/2022 1149  Last data filed at 6/19/2022 1000  Gross per 24 hour   Intake 1416.39 ml   Output 1480 ml   Net -63.61 ml       No results found. Assessment and Plan:   Septic shock  Shock resolved  Secondary to below  Agents as below  IVF  Since weaned off phenylephrine gtt  Lactate improved overall     Obstructive left E. coli pyelonephritis  Urology following  Secondary to proximal left ureteral calculus  S/p left ureteral stent placement 6/15/2022 per Dr. Madeline Mcbride per sensitivities     E. coli bacteremia  Secondary to above  Levaquin per sensitivities  Repeat blood cultures NGTD     BINH  Unknown baseline creatinine  Creatinine continues to improve currently at 1.1  IVF  Avoid offending agents  Follow renal function/urine output/electrolytes     DM2  HbA1c 9.1  Poor control  Counseled  Meds on board     Morbid obesity  Counseled     Chronic pain syndrome  Patient has been bedbound for the last 5 years  Patient/daughter state she has been taking opioids for many years  I extensively explained how these agents can run counter to her current treatment plan  Patient continues to request home pain meds despite extensive counseling  Palliative/social work following per patient/family request  Possible transition to hospice but nothing official as of yet     New onset atrial fibrillation with RVR  Currently in sinus rhythm  Suspect secondary to above processes  Rate control agents  AC contraindicated given thrombocytopenia  Serial troponin 0.01 > 0.02 > 0.02 > (<0.01)  Follow electrolytes     Acute hypoxic respiratory failure  Suspect multifactorial  Currently requiring HFNC 9 L, decreasing requirement     DVT prophylaxis  SCDs given thrombocytopenia     Extensive discussions with patient, daughter Shade Pam), and son (Evens) in regards to current clinical state/goals of care.  All questions sought and answered.     Total critical care time: 45 minutes    Angella Pinon MD   6/19/2022 11:49 AM

## 2022-06-19 NOTE — FLOWSHEET NOTE
06/18/22 1859   Encounter Summary   Encounter Overview/Reason  Spiritual/Emotional Needs   Service Provided For: Patient and family together  (with the son at bedside)   Begin Time 1830   End Time  1900   Total Time Calculated 30 min   Spiritual/Emotional needs   Type Spiritual Support   Assessment/Intervention/Outcome   Assessment Concerns with suffering;Coping   Intervention Active listening;Discussed belief system/Uatsdin practices/waqas; Explored/Affirmed feelings, thoughts, concerns   Outcome Expressed feelings, needs, and concerns;Expressed Gratitude     Patient is not able to converse, her son is at bedside. The son and daughter discussed pt's care gaol, pt is a DNR now. The son stated the patient has waqas on God so does the family. Emotional and spiritual support provided. Family appreciated the visit.   Electronically signed by Jocelynn Barajas on 6/18/2022 at 7:06 PM

## 2022-06-20 LAB
ALBUMIN SERPL-MCNC: 2 G/DL (ref 3.5–5.2)
ALP BLD-CCNC: 189 U/L (ref 35–104)
ALT SERPL-CCNC: 69 U/L (ref 5–33)
ANION GAP SERPL CALCULATED.3IONS-SCNC: 10 MMOL/L (ref 7–19)
AST SERPL-CCNC: 135 U/L (ref 5–32)
BANDED NEUTROPHILS RELATIVE PERCENT: 1 % (ref 0–5)
BASOPHILS ABSOLUTE: 0 K/UL (ref 0–0.2)
BASOPHILS RELATIVE PERCENT: 0 % (ref 0–1)
BILIRUB SERPL-MCNC: 3.5 MG/DL (ref 0.2–1.2)
BUN BLDV-MCNC: 50 MG/DL (ref 8–23)
CALCIUM SERPL-MCNC: 8.8 MG/DL (ref 8.8–10.2)
CHLORIDE BLD-SCNC: 102 MMOL/L (ref 98–111)
CO2: 28 MMOL/L (ref 22–29)
CREAT SERPL-MCNC: 1 MG/DL (ref 0.5–0.9)
EOSINOPHILS ABSOLUTE: 0 K/UL (ref 0–0.6)
EOSINOPHILS RELATIVE PERCENT: 0 % (ref 0–5)
GFR AFRICAN AMERICAN: >59
GFR NON-AFRICAN AMERICAN: 55
GLUCOSE BLD-MCNC: 136 MG/DL (ref 70–99)
GLUCOSE BLD-MCNC: 161 MG/DL (ref 70–99)
GLUCOSE BLD-MCNC: 179 MG/DL (ref 70–99)
GLUCOSE BLD-MCNC: 188 MG/DL (ref 70–99)
GLUCOSE BLD-MCNC: 190 MG/DL (ref 74–109)
HCT VFR BLD CALC: 43 % (ref 37–47)
HEMOGLOBIN: 13.3 G/DL (ref 12–16)
IMMATURE GRANULOCYTES #: 2.2 K/UL
LYMPHOCYTES ABSOLUTE: 3.1 K/UL (ref 1.1–4.5)
LYMPHOCYTES RELATIVE PERCENT: 17 % (ref 20–40)
MAGNESIUM: 2 MG/DL (ref 1.6–2.4)
MCH RBC QN AUTO: 29.6 PG (ref 27–31)
MCHC RBC AUTO-ENTMCNC: 30.9 G/DL (ref 33–37)
MCV RBC AUTO: 95.6 FL (ref 81–99)
MONOCYTES ABSOLUTE: 1.8 K/UL (ref 0–0.9)
MONOCYTES RELATIVE PERCENT: 10 % (ref 0–10)
NEUTROPHILS ABSOLUTE: 13.1 K/UL (ref 1.5–7.5)
NEUTROPHILS RELATIVE PERCENT: 72 % (ref 50–65)
PDW BLD-RTO: 15.8 % (ref 11.5–14.5)
PERFORMED ON: ABNORMAL
PLATELET # BLD: 54 K/UL (ref 130–400)
PLATELET SLIDE REVIEW: ABNORMAL
PMV BLD AUTO: 12.8 FL (ref 9.4–12.3)
POTASSIUM SERPL-SCNC: 4 MMOL/L (ref 3.5–5)
RBC # BLD: 4.5 M/UL (ref 4.2–5.4)
SODIUM BLD-SCNC: 140 MMOL/L (ref 136–145)
STOMATOCYTES: ABNORMAL
TOTAL PROTEIN: 5.9 G/DL (ref 6.6–8.7)
TOXIC GRANULATION: ABNORMAL
VACUOLATED NEUTROPHILS: ABNORMAL
WBC # BLD: 18 K/UL (ref 4.8–10.8)

## 2022-06-20 PROCEDURE — 2700000000 HC OXYGEN THERAPY PER DAY

## 2022-06-20 PROCEDURE — 94640 AIRWAY INHALATION TREATMENT: CPT

## 2022-06-20 PROCEDURE — 99231 SBSQ HOSP IP/OBS SF/LOW 25: CPT | Performed by: NURSE PRACTITIONER

## 2022-06-20 PROCEDURE — 82947 ASSAY GLUCOSE BLOOD QUANT: CPT

## 2022-06-20 PROCEDURE — 6370000000 HC RX 637 (ALT 250 FOR IP): Performed by: INTERNAL MEDICINE

## 2022-06-20 PROCEDURE — 2140000000 HC CCU INTERMEDIATE R&B

## 2022-06-20 PROCEDURE — 6360000002 HC RX W HCPCS: Performed by: UROLOGY

## 2022-06-20 PROCEDURE — 80053 COMPREHEN METABOLIC PANEL: CPT

## 2022-06-20 PROCEDURE — 99231 SBSQ HOSP IP/OBS SF/LOW 25: CPT

## 2022-06-20 PROCEDURE — 05HY33Z INSERTION OF INFUSION DEVICE INTO UPPER VEIN, PERCUTANEOUS APPROACH: ICD-10-PCS | Performed by: INTERNAL MEDICINE

## 2022-06-20 PROCEDURE — 85025 COMPLETE CBC W/AUTO DIFF WBC: CPT

## 2022-06-20 PROCEDURE — 97530 THERAPEUTIC ACTIVITIES: CPT

## 2022-06-20 PROCEDURE — 97165 OT EVAL LOW COMPLEX 30 MIN: CPT

## 2022-06-20 PROCEDURE — 6370000000 HC RX 637 (ALT 250 FOR IP): Performed by: UROLOGY

## 2022-06-20 PROCEDURE — 97535 SELF CARE MNGMENT TRAINING: CPT

## 2022-06-20 PROCEDURE — 83735 ASSAY OF MAGNESIUM: CPT

## 2022-06-20 RX ADMIN — BUSPIRONE HYDROCHLORIDE 10 MG: 10 TABLET ORAL at 21:58

## 2022-06-20 RX ADMIN — BUSPIRONE HYDROCHLORIDE 10 MG: 10 TABLET ORAL at 14:02

## 2022-06-20 RX ADMIN — GABAPENTIN 600 MG: 600 TABLET, FILM COATED ORAL at 14:02

## 2022-06-20 RX ADMIN — LEVOFLOXACIN 750 MG: 5 INJECTION, SOLUTION INTRAVENOUS at 05:04

## 2022-06-20 RX ADMIN — METOPROLOL TARTRATE 100 MG: 50 TABLET, FILM COATED ORAL at 21:58

## 2022-06-20 RX ADMIN — TRAMADOL HYDROCHLORIDE 50 MG: 50 TABLET, COATED ORAL at 14:05

## 2022-06-20 RX ADMIN — INSULIN LISPRO 3 UNITS: 100 INJECTION, SOLUTION INTRAVENOUS; SUBCUTANEOUS at 16:50

## 2022-06-20 RX ADMIN — PRAVASTATIN SODIUM 40 MG: 20 TABLET ORAL at 08:35

## 2022-06-20 RX ADMIN — ACETAMINOPHEN 650 MG: 325 TABLET ORAL at 17:13

## 2022-06-20 RX ADMIN — METOPROLOL TARTRATE 100 MG: 50 TABLET, FILM COATED ORAL at 08:35

## 2022-06-20 RX ADMIN — HYDROCHLOROTHIAZIDE 12.5 MG: 25 TABLET ORAL at 08:35

## 2022-06-20 RX ADMIN — LISINOPRIL 20 MG: 20 TABLET ORAL at 08:35

## 2022-06-20 RX ADMIN — IPRATROPIUM BROMIDE AND ALBUTEROL SULFATE 1 AMPULE: 2.5; .5 SOLUTION RESPIRATORY (INHALATION) at 06:54

## 2022-06-20 RX ADMIN — INSULIN GLARGINE 10 UNITS: 100 INJECTION, SOLUTION SUBCUTANEOUS at 22:04

## 2022-06-20 RX ADMIN — IPRATROPIUM BROMIDE AND ALBUTEROL SULFATE 1 AMPULE: 2.5; .5 SOLUTION RESPIRATORY (INHALATION) at 14:22

## 2022-06-20 RX ADMIN — IPRATROPIUM BROMIDE AND ALBUTEROL SULFATE 1 AMPULE: 2.5; .5 SOLUTION RESPIRATORY (INHALATION) at 10:17

## 2022-06-20 RX ADMIN — BUSPIRONE HYDROCHLORIDE 10 MG: 10 TABLET ORAL at 08:35

## 2022-06-20 RX ADMIN — GABAPENTIN 600 MG: 600 TABLET, FILM COATED ORAL at 21:58

## 2022-06-20 RX ADMIN — IPRATROPIUM BROMIDE AND ALBUTEROL SULFATE 1 AMPULE: 2.5; .5 SOLUTION RESPIRATORY (INHALATION) at 19:10

## 2022-06-20 RX ADMIN — GABAPENTIN 600 MG: 600 TABLET, FILM COATED ORAL at 16:50

## 2022-06-20 RX ADMIN — LISINOPRIL 20 MG: 20 TABLET ORAL at 21:58

## 2022-06-20 RX ADMIN — GABAPENTIN 600 MG: 600 TABLET, FILM COATED ORAL at 08:35

## 2022-06-20 RX ADMIN — INSULIN LISPRO 3 UNITS: 100 INJECTION, SOLUTION INTRAVENOUS; SUBCUTANEOUS at 08:40

## 2022-06-20 RX ADMIN — HYDROCHLOROTHIAZIDE 12.5 MG: 25 TABLET ORAL at 21:58

## 2022-06-20 ASSESSMENT — PAIN SCALES - GENERAL
PAINLEVEL_OUTOF10: 10
PAINLEVEL_OUTOF10: 10
PAINLEVEL_OUTOF10: 8
PAINLEVEL_OUTOF10: 6
PAINLEVEL_OUTOF10: 0

## 2022-06-20 ASSESSMENT — PAIN DESCRIPTION - LOCATION
LOCATION: HEAD
LOCATION: BACK;OTHER (COMMENT)

## 2022-06-20 ASSESSMENT — PAIN - FUNCTIONAL ASSESSMENT: PAIN_FUNCTIONAL_ASSESSMENT: ACTIVITIES ARE NOT PREVENTED

## 2022-06-20 ASSESSMENT — PAIN DESCRIPTION - ORIENTATION: ORIENTATION: RIGHT;LEFT;LOWER

## 2022-06-20 ASSESSMENT — PAIN DESCRIPTION - DESCRIPTORS
DESCRIPTORS: ACHING;THROBBING
DESCRIPTORS: ACHING

## 2022-06-20 NOTE — PROGRESS NOTES
Palliative Care Progress Note  6/20/2022 8:58 AM    Patient:  Cindy Lizama  YOB: 1955  Primary Care Physician: Patricia White MD  Advance Directive: DNR  Admit Date: 6/14/2022       Hospital Day: 5  Portions of this note have been copied forward, however, changed to reflect the most current clinical status of this patient. CHIEF COMPLAINT/REASON FOR CONSULTATION goals of care, code status discussion, and family support. SUBJECTIVE:  Ms. Christiano Choi is alert and sitting upright in hospital bed. She remains intermittently lethargic/confused. Denies pain when asked. No s/s distress noted    Review of Systems:   14 point review of systems is negative except as specifically addressed above. Objective:   VITALS:  BP (!) 156/96   Pulse 93   Temp (!) 96.6 °F (35.9 °C) (Temporal)   Resp (!) 32   Ht 5' 5\" (1.651 m)   Wt (!) 348 lb 9 oz (158.1 kg)   SpO2 90%   BMI 58.00 kg/m²   24HR INTAKE/OUTPUT:    Intake/Output Summary (Last 24 hours) at 6/20/2022 0858  Last data filed at 6/20/2022 0507  Gross per 24 hour   Intake 620.6 ml   Output 1985 ml   Net -1364.4 ml     General appearance: 77 yo female, chronically ill appearing, NAC  Head: Normocephalic, without obvious abnormality, atraumatic  Eyes: conjunctivae/corneas clear. PERRL, EOM's intact. Ears: normal external ears and nose  Neck: no JVD, supple, symmetrical, trachea midline   Lungs: clear and diminished in bases to auscultation bilaterally, shallow, NC in place  Heart: Irregularly irregular rhythm and rate controlled, S1, S2 normal, no murmur  Abdomen: Rounded, taut, no grimacing to palpation, normal bowel sounds   Extremities: BLE 1+ edema,  No erythema, no to palpation  Skin: Warm, dry/flaky, pale,   Neurologic: Alert, oriented x3, intermittent confusion/lethargy/ illogical speech continues, generalized weakness, follows commands.      Medications:      dextrose      sodium chloride      lactated ringers 30 mL/hr at 06/19/22 1600  lisinopril  20 mg Oral BID    And    hydroCHLOROthiazide  12.5 mg Oral BID    metoprolol  100 mg Oral BID    levofloxacin  750 mg IntraVENous Q24H    busPIRone  10 mg Oral TID    gabapentin  600 mg Oral 4x Daily    pravastatin  40 mg Oral Daily    insulin lispro  0-18 Units SubCUTAneous TID     insulin lispro  0-9 Units SubCUTAneous Nightly    insulin glargine  10 Units SubCUTAneous Nightly    sodium chloride flush  5-40 mL IntraVENous 2 times per day    ipratropium-albuterol  1 ampule Inhalation Q4H WA     labetalol, traMADol, glucose, dextrose bolus **OR** dextrose bolus, glucagon (rDNA), dextrose, sodium chloride flush, sodium chloride, ondansetron **OR** ondansetron, polyethylene glycol, acetaminophen **OR** acetaminophen, potassium chloride, magnesium sulfate  ADULT DIET; Clear Liquid     Lab and other Data:     Recent Labs     06/18/22 0415 06/19/22 0315 06/20/22  0135   WBC 10.4 14.3* 18.0*   HGB 13.2 13.6 13.3   PLT 36* 46* 54*     Recent Labs     06/18/22  0415 06/19/22  0315 06/20/22  0135    140 140   K 3.9 4.0 4.0    103 102   CO2 25 26 28   BUN 46* 47* 50*   CREATININE 1.2* 1.1* 1.0*   GLUCOSE 179* 196* 190*     Recent Labs     06/18/22 0415 06/19/22  0315 06/20/22  0135   * 140* 135*   ALT 61* 71* 69*   BILITOT 2.6* 3.4* 3.5*   ALKPHOS 152* 169* 189*       RAD:   CT ABDOMEN PELVIS WO CONTRAST Additional Contrast? None  Result Date: 6/15/2022  1. There is 6 mm obstructive calculus in the left upper ureter with mild proximal hydroureteronephrosis and perinephric inflammatory changes. 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 Bilateral non-obstructing renal calculi noted. 2. Mild hepatomegaly with mild hepatic steatosis. 3. Colonic diverticulosis without evidence of acute diverticulitis. 4. Small fat containing umbilical and supra umbilical hernia noted. Recommendation: Follow up as clinically indicated. All CT scans at this facility utilize dose modulation, iterative reconstruction, and/or weight based dosing when appropriate to reduce radiation dose to as low as reasonably achievable. Amended by Sherie Herr MD at 15-[place]-2022 02:44:19 AM Electronically Signed by Sherie Herr MD at 15-[place]-2022 02:29:46 AM             XR CHEST PORTABLE  Result Date: 6/15/2022  1. Questionable trace left pleural effusion with basilar atelectasis. 2. Right sided central line is in place with its tip in SVC. Recommendation: Follow up as clinically indicated. Electronically Signed by Ewa Pitts MD at 15-[place]-2022 06:55:39 AM             XR CHEST PORTABLE  Result Date: 6/14/2022  1. Unremarkable radiograph of chest. Recommendation: Follow up as clinically indicated. Electronically Signed by Graham Dolan MD at 15-[place]-2022 12:21:20 AM             Assessment/Plan   Principal Problem:    Obstructive uropathy  Active Problems:    Obstructive pyelonephritis    Sepsis with acute renal failure (HCC)    Left ureteral calculus    Renal calculus, bilateral    BINH (acute kidney injury) ([address]    Hyperglycemia due to diabetes mellitus ([address]    Hypertension    Dehydration    FREDDY and COPD overlap syndrome (HCC)    Degenerative joint disease    Palliative care patient    Septicemia ([address]    Chronic pain syndrome  Resolved Problems:    * No resolved hospital problems. *      Visit Summary:  Chart reviewed, patient discussed with  nursing staff. Reviewed health issues, work up and treatment plan as well as factors that lead to hospitalization. Ms. Silas Mcdonald is seen at bedside this afternoon and is more alert today. She has been downgraded from ICU over the weekend. She denies pain when asked and if following commands and answering simple questions. Continues with IV abx and 6L NC. I attempted to call her daughter, Chintan Jones, to discuss pt status and plan of care with no answer.  VM left with my contact information. After family meeting last Friday, pts children were considering hospice care at d/c following completion of sepsis treatment d/t pts decline and baseline poor quality of life d/t chronic illnesses/pain. Pt has been also been transitioned to a DNR over the weekend per family request. At this time it appears OT is recommending continued skilled therapy at discharge. Family had previously voiced concerns regarding ability to care for pt in her home and had been interested in SNF placement with or without hospice. I will follow up with pt/family again tomorrow if not contact from them this afternoon in order to continue addressing goals of care and d/c planning. In the meantime, I have asked RN to request pts medical records from PCP for review in case supporting evidence is needed for terminal diagnosis in the event hospice is pursued. Recommendations:   1. Palliative Care- GOC to be determined once able to have further discussions with pts children. Last week they had been considering outpatient hospice at SNF when pt was medically stable to d/c. CM following. Code status- DNR    2, Obstructive left pyelonephritits 2/2 proximal left urteral calculus- S/p ureteral stent placement 6/15/22 per Dr. Hasmukh Layne. Urology following. Levaquin day #5    3. Septic shock with E coli bacteremia- 2/2 above, follow repeat blood cultures, NGTD. Abx per hospitalist. Weaned from pressors    4. BINH- improved. 5. Chronic pain syndrome-  chronic back pain with Norco and gabapentin baseline. Potential overuse at home noted. Pain regimen with ultram prn at this time    6. Afib with RVR new onset- suspect 2/2 above. NSR now and off amio gtt    7. Transaminitis-  Noted, unclear baseline LFTs. Continue to monitor labs    8. Acute hypoxic respiratory failure- mgmt per hospitalist, suspect multifactorial. Weaned to 6L    Thank you for consulting Palliative Care and allowing us to participate in the care of this patient. Time Spent Counseling > 50%:  YES                                   Total Time Spent with patient/family counseling, workup/treatment review, counseling and placement of orders/preparation of this note: 27 minutes    Electronically signed by MATTHEW Pemberton CNP on 6/20/2022 at 8:58 AM    (Please note that portions of this note were completed with a voice recognition program.  [place] made to edit the dictations but occasionally words are mis-transcribed.)

## 2022-06-20 NOTE — PROGRESS NOTES
Jeny Mills transferred to  from [facility] via bed. Reason for transfer: lower level of care   Explained reason for transfer to Patient and Family. Belongings: son took all belongings including glasses  Soft chart transferred with patient: Yes. Telemetry box number [id number] transferred with patient: yes. Report given to: Misty Anna, at bedside.       Electronically signed by Bryan Sidhu RN on 6/19/2022 at 7:04 PM

## 2022-06-20 NOTE — CONSULTS
[facility] Vascular Access Team:  Consult Note    Melba Hilton  [id number]   Admitted - 6/14/2022  9:10 PM  Admission Diagnosis -   Septicemia ([facility] [A41.9]  Lower abdominal pain [R10.30]  Acute kidney injury ([address] [N17.9]  Acute cystitis with hematuria [N30.01]  Obstructive uropathy [N13.9]    Attending Physician - Corinna Harper MD  Ordering Physician - Corinna Harper MD    Active LDAs -   CVC Triple Lumen 06/15/22 (Active)   Number of days: 5       Peripheral IV 06/20/22 Right; Anterior Forearm (Active)   Number of days: 0       REASON FOR CONSULT:   [facility] vascular access team consulted for placement of Ultrasound Guided Peripheral IV. INDICATIONS:  Patient currently has a right Internal Jugular CVC in place, Day 5, and no other IV access. Patient is currently not receiving any IV medications indicate a continued need for line. Dr. Pasha Diop is agreeable to have IJ pulled if other IV access can be obtained. FINDINGS:  20G Ultrasound Guide Peripheral IV placed in patient's right forearm with one attempt and no complications. Patient tolerated well. Notified primary RN to remove IJ CVC as soon as possible. Noted that IJ CVC insertion site is \"sore\" per patient, and that dressing is not intact. Past Medical History:   Diagnosis Date    Arthritis     HTN (hypertension)        Recent Labs     Units 06/20/22  0135 06/19/22  0315 06/18/22  1046   BC   --   --  No Growth to date. Any change in status will be called. BLOODCULT2   --   --  No Growth to date. Any change in status will be called. WBC K/uL 18.0*   < >  --    PLT K/uL 54*   < >  --    CREATININE mg/dL 1.0*   < >  --     < > = values in this interval not displayed. IMPRESSION/PLAN:  1. Ultrasound-Guided Peripheral IV is ready to be used  2. Remove IJ CVC    Thank you for your time and consult.      Electronically Signed By: Tracy Carranza RN on 6/20/2022 at 4:24 PM

## 2022-06-20 NOTE — PROGRESS NOTES
Hospitalist Progress Note  [facility]     Patient: Lalito Corral  : 1955  MRN: [id number]  Code Status: DNR    Hospital Day: 5   Date of Service: 2022    Subjective:   Patient seen and examined. No acute distress. Past Medical History:   Diagnosis Date    Arthritis     HTN (hypertension)        Past Surgical History:   Procedure Laterality Date    ANKLE FRACTURE SURGERY Right     BLADDER SURGERY Left 6/15/2022    CYSTOSCOPY, LEFT URETEROSCOPY, RETROGRADE PYELOGRAM, STENT INSERTION performed by Orly Rodriguez MD at [facility] (CERVIX STATUS UNKNOWN)      INCONTINENCE SURGERY         Family History   Problem Relation Age of Onset    Cancer Mother     Cancer Father        Social History     Socioeconomic History    Marital status: Single     Spouse name: Not on file    Number of children: Not on file    Years of education: Not on file    Highest education level: Not on file   Occupational History    Not on file   Tobacco Use    Smoking status: Former Smoker    Smokeless tobacco: Never Used    Tobacco comment: quit 10 years ago   Vaping Use    Vaping Use: Never used   Substance and Sexual Activity    Alcohol use: Never    Drug use: Never    Sexual activity: Not on file   Other Topics Concern    Not on file   Social History Narrative    Not on file     Social Determinants of Health     Financial Resource Strain:     Difficulty of Paying Living Expenses: Not on file   Food Insecurity:     Worried About [address] in the Last Year: Not on file    [address] in the Last Year: Not on file   Transportation Needs:     Lack of Transportation (Medical): Not on file    Lack of Transportation (Non-Medical):  Not on file   Physical Activity:     Days of Exercise per Week: Not on file    Minutes of Exercise per Session: Not on file   Stress:     Feeling of Stress : Not on file   Social Connections:     Frequency of Communication with Friends and Family: Not on file    Frequency of Social Gatherings with Friends and Family: Not on file    Attends Buddhist Services: Not on file    Active Member of Clubs or Organizations: Not on file    Attends Club or Organization Meetings: Not on file    Marital Status: Not on file   Intimate Partner Violence:     Fear of Current or Ex-Partner: Not on file    Emotionally Abused: Not on file    Physically Abused: Not on file    Sexually Abused: Not on file   Housing Stability:     Unable to Pay for Housing in the Last Year: Not on file    Number of [place] in the Last Year: Not on file    Unstable Housing in the Last Year: Not on file       Current Facility-Administered Medications   Medication Dose Route Frequency Provider Last Rate Last Admin    labetalol (NORMODYNE;TRANDATE) injection 10 mg  10 mg IntraVENous Q4H PRN George Mena MD   10 mg at 06/19/22 0334    lisinopril (PRINIVIL;ZESTRIL) tablet 20 mg  20 mg Oral BID Joe Crow MD   20 mg at 06/20/22 9092    And    hydroCHLOROthiazide (HYDRODIURIL) tablet 12.5 mg  12.5 mg Oral BID Joe Crow MD   12.5 mg at 06/20/22 0835    metoprolol tartrate (LOPRESSOR) tablet 100 mg  100 mg Oral BID Joe Crow MD   100 mg at 06/20/22 0835    levoFLOXacin (LEVAQUIN) 750 MG/150ML infusion 750 mg  750 mg IntraVENous Q24H Maribell Bueno  mL/hr at 06/20/22 0504 750 mg at 06/20/22 0504    traMADol (ULTRAM) tablet 50 mg  50 mg Oral Q6H PRN Joe Crow MD   50 mg at 06/17/22 1313    busPIRone (BUSPAR) tablet 10 mg  10 mg Oral TID Joe Crow MD   10 mg at 06/20/22 0835    gabapentin (NEURONTIN) tablet 600 mg  600 mg Oral 4x Daily Maribell Bueno MD   600 mg at 06/20/22 0835    pravastatin (PRAVACHOL) tablet 40 mg  40 mg Oral Daily Maribell Bueno MD   40 mg at 06/20/22 0835    glucose chewable tablet 16 g  4 tablet Oral PRN George Mena MD        dextrose bolus 10% 125 mL  125 mL IntraVENous PREDGARD Mackay MD        Or    dextrose bolus 10% 250 mL  250 mL IntraVENous PREDGARD Mackay MD        glucagon (rDNA) injection 1 mg  1 mg IntraMUSCular PRN Lata Mackay MD        dextrose 5 % solution  100 mL/hr IntraVENous PREDGARD Mackay MD        insulin lispro (HUMALOG) injection vial 0-18 Units  0-18 Units SubCUTAneous TID WC Lata Mackay MD   3 Units at 06/20/22 0840    insulin lispro (HUMALOG) injection vial 0-9 Units  0-9 Units SubCUTAneous Nightly Lata Mackay MD   2 Units at 06/19/22 2123    insulin glargine (LANTUS) injection vial 10 Units  10 Units SubCUTAneous Nightly Lata Mackay MD   10 Units at 06/19/22 2122    sodium chloride flush 0.9 % injection 5-40 mL  5-40 mL IntraVENous 2 times per day Lata Mackay MD   10 mL at 06/19/22 2013    sodium chloride flush 0.9 % injection 5-40 mL  5-40 mL IntraVENous PREDGARD Mackay MD        0.9 % sodium chloride infusion   IntraVENous PREDGARD Mackay MD        ondansetron (ZOFRAN-ODT) disintegrating tablet 4 mg  4 mg Oral Q8H PREDGARD Mackay MD        Or    ondansetron (ZOFRAN) injection 4 mg  4 mg IntraVENous Q6H PREDGARD Mackay MD   4 mg at 06/16/22 1046    polyethylene glycol (GLYCOLAX) packet 17 g  17 g Oral Daily PREDGARD Mackay MD        acetaminophen (TYLENOL) tablet 650 mg  650 mg Oral Q6H PRN Ltaa Mackay MD   650 mg at 06/17/22 0800    Or    acetaminophen (TYLENOL) suppository 650 mg  650 mg Rectal Q6H PREGDARD Mackay MD        potassium chloride 10 mEq/100 mL IVPB (Peripheral Line)  10 mEq IntraVENous PREDGARD Mackay MD        magnesium sulfate 2000 mg in 50 mL IVPB premix  2,000 mg IntraVENous PREDGARD Mackay MD   Stopped at 06/16/22 0640    ipratropium-albuterol (DUONEB) nebulizer solution 1 ampule  1 ampule Inhalation Q4H WA Triny Cabral MD   1 ampule at 06/20/22 1017    lactated ringers infusion   IntraVENous Continuous Enma Quintanilla MD 30 mL/hr at 06/19/22 1600 Rate Verify at 06/19/22 1600         dextrose      sodium chloride      lactated ringers 30 mL/hr at 06/19/22 1600        Objective:   BP (!) 156/96   Pulse 93   Temp (!) 96.6 °F (35.9 °C) (Temporal)   Resp (!) 32   Ht 5' 5\" (1.651 m)   Wt (!) 348 lb 9 oz (158.1 kg)   SpO2 90%   BMI 58.00 kg/m²     General: no acute distress  HEENT: normocephalic, atraumatic  Neck: supple, symmetrical, trachea midline   Lungs: clear to auscultation bilaterally   Cardiovascular: s1 and s2 normal  Abdomen: soft, positive bowel sounds  Extremities: no edema or cyanosis   Neuro: alert, no acute focal motor deficits    Recent Labs     06/18/22 0415 06/19/22 0315 06/20/22  0135   WBC 10.4 14.3* 18.0*   RBC 4.41 4.62 4.50   HGB 13.2 13.6 13.3   HCT 41.8 43.4 43.0   MCV 94.8 93.9 95.6   MCH 29.9 29.4 29.6   MCHC 31.6* 31.3* 30.9*   PLT 36* 46* 54*     Recent Labs     06/18/22 0415 06/19/22 0315 06/20/22  0135    140 140   K 3.9 4.0 4.0   ANIONGAP 13 11 10    103 102   CO2 25 26 28   BUN 46* 47* 50*   CREATININE 1.2* 1.1* 1.0*   GLUCOSE 179* 196* 190*   CALCIUM 8.6* 8.7* 8.8     Recent Labs     06/18/22 0415 06/19/22 0315 06/20/22  0135   MG 2.0 2.1 2.0     Recent Labs     06/18/22 0415 06/19/22 0315 06/20/22  0135   * 140* 135*   ALT 61* 71* 69*   BILITOT 2.6* 3.4* 3.5*   ALKPHOS 152* 169* 189*     No results for input(s): PH, PO2, PCO2, HCO3, BE, O2SAT in the last 72 hours. No results for input(s): TROPONINI in the last 72 hours. No results for input(s): INR in the last 72 hours. No results for input(s): LACTA in the last 72 hours. Intake/Output Summary (Last 24 hours) at 6/20/2022 1150  Last data filed at 6/20/2022 0920  Gross per 24 hour   Intake 427.38 ml   Output 1885 ml   Net -1457.62 ml       No results found.      Assessment and Plan: Septic shock  Shock resolved  Secondary to below  Agents as below  IVF  Since weaned off phenylephrine gtt  Lactate improved overall     Obstructive left E. coli pyelonephritis  Urology following  Secondary to proximal left ureteral calculus  S/p left ureteral stent placement 6/15/2022 per Dr. Kaden Persaud per sensitivities     E. coli bacteremia  Secondary to above  Levaquin per sensitivities  Repeat blood cultures NGTD     BINH  Unknown baseline creatinine  Creatinine continues to improve currently at 1.0  IVF  Avoid offending agents  Follow renal function/urine output/electrolytes     DM2  HbA1c 9.1  Poor control  Counseled  Meds on board     Morbid obesity  Counseled     Chronic pain syndrome  Patient has been bedbound for the last 5 years  Patient/daughter state she has been taking opioids for many years  I extensively explained how these agents can run counter to her current treatment plan  Patient continues to request home pain meds despite extensive counseling  Palliative/social work following per patient/family request  Possible transition to hospice but nothing official as of yet     New onset atrial fibrillation with RVR  Remains in sinus rhythm  Suspect secondary to above processes  Rate control agents  AC contraindicated given thrombocytopenia  Serial troponin 0.01 > 0.02 > 0.02 > (<0.01)  Follow electrolytes     Acute hypoxic respiratory failure  Suspect multifactorial  Currently requiring HFNC 5-6 L, decreasing requirement     DVT prophylaxis  SCDs given thrombocytopenia     Extensive discussions with patient, daughter Esthela Carlos), and son (Evens) in regards to current clinical state/goals of care.  All questions sought and answered.     Enma Quintanilla MD   6/20/2022 11:50 AM

## 2022-06-20 NOTE — PROGRESS NOTES
Occupational Therapy Initial Assessment  Date: 2022   Patient Name: Jh Suarez  MRN: [id number]     : 1955    Date of Service: 2022    Discharge Recommendations:  Patient would benefit from continued therapy after discharge       Assessment   Assessment: Evaluation completed and tx initiated. The patient will need skilled therapy to upgrade activity level. Will begin with semi supported sitting ADL tasks and progress to standing level ADL as able  Treatment Diagnosis: Obstructive uropathy, septic shock, hypoxic respiratory failure  REQUIRES OT FOLLOW-UP: Yes  Activity Tolerance  Activity Tolerance: Patient Tolerated treatment well           Patient Diagnosis(es): The primary encounter diagnosis was Acute kidney injury ([facility] [address] Diagnoses of Lower abdominal pain, Acute cystitis with hematuria, Septicemia ([facility] [address] Ureteral calculus, Kidney stone, and Urinary tract infection without hematuria, site unspecified were also pertinent to this visit. has a past medical history of Arthritis and HTN (hypertension). has a past surgical history that includes Cholecystectomy; Ankle fracture surgery (Right); Hysterectomy; Incontinence surgery; and Bladder surgery (Left, 6/15/2022).     Treatment Diagnosis: Obstructive uropathy, septic shock, hypoxic respiratory failure      Restrictions  Restrictions/Precautions  Restrictions/Precautions: Fall Risk  Position Activity Restriction  Other position/activity restrictions: whiting, fecal tube    Subjective   General  Chart Reviewed: Yes  Patient assessed for rehabilitation services?: Yes  Family / Caregiver Present: No  Pain Assessment  Pain Level: 8  Patient's Stated Pain Goal: 0 - No pain  Pain Location: Head  Pain Descriptors: Aching  Response to Pain Intervention: Pain improved but above pain goal  Side Effects: No reported side effects  Pre Treatment Pain Screening  Pain at present: 5  Scale Used: Numeric Score  Intervention List: Patient able to continue with treatment  Comments / Details: generalized pain  Vital Signs  Heart Rate: 78  Resp: 17  Oxygen Therapy  SpO2: 93 %  O2 Device: High flow nasal cannula  O2 Flow Rate (L/min): 6 L/min    Social/Functional History  Social/Functional History  Lives With: Alone  Additional Comments: Per chart, mainly stays in bed/recliner but able to walk down the [address]reno to bathroom, paid caregiver to cook and clean       Objective              Toilet Transfers  Toilet Transfer: Dependent/Total (per clinical observation)  ADL  Feeding: Minimal assistance  Grooming: Moderate assistance (using right hand)  UE Bathing: Maximum assistance  LE Bathing: Maximum assistance (of two supine in bed)  UE Dressing: Maximum assistance  LE Dressing: Maximum assistance (of two supine)  Toileting: Dependent/Total        Bed mobility  Rolling to Left: Maximum assistance;2 Person assistance  Rolling to Right: Maximum assistance;2 Person assistance  Transfers  Stand Step Transfers: Dependent/Total (per clinical observation)  Transfer Comments: Recommend Maxi Move     Cognition  Cognition Comment: Awake, alert, following directions, some delayed responses/delayed processing, requires some repetition                 LUE PROM (degrees)  LUE PROM: WFL  LUE AROM (degrees)  LUE General AROM: minimal shoulder flexion, distally WFL with extra prompting to achieve reps. edematous and heavy  RUE AROM (degrees)  RUE AROM : WFL (supine)                    Plan   Plan  Times per Week: 4-8    Goals  Short Term Goals  Short Term Goal 1: Feed self with supervision after set up  Short Term Goal 2: Demo ability to participate in beginning leve standing tasks from recliner  Short Term Goal 3: Independent with UE exercises, trunk exercises, therapeutic positioning  Long Term Goals  Long Term Goal 1: Upgrade as toleratdd. Tx initiated:  Trunk control exercises performed in long sit with support only to the lumbar area.   Able to hold her head upright while performing tasks with BUE. LUE signficantly functionally weaker than RUE, exacerbated by edema. AAROM proximal arm LUE, AROM completed RUE.   (30 mins)          Gali Anderson, OT/L  Electronically signed by Gali Anderson OT/FATOU on 6/20/2022 at 3:31 PM.

## 2022-06-20 NOTE — CARE COORDINATION
Pts daughter Elsi Newton  # [phone]. She lives in [facility] but is working with her brother Rj Samuels to help assist with discharge planning needs.    Electronically signed by Chandler Shane RN on 6/20/2022 at 9:04 AM

## 2022-06-21 LAB
GLUCOSE BLD-MCNC: 149 MG/DL (ref 70–99)
GLUCOSE BLD-MCNC: 157 MG/DL (ref 70–99)
GLUCOSE BLD-MCNC: 159 MG/DL (ref 70–99)
GLUCOSE BLD-MCNC: 165 MG/DL (ref 70–99)
PERFORMED ON: ABNORMAL

## 2022-06-21 PROCEDURE — 6370000000 HC RX 637 (ALT 250 FOR IP): Performed by: INTERNAL MEDICINE

## 2022-06-21 PROCEDURE — 97530 THERAPEUTIC ACTIVITIES: CPT

## 2022-06-21 PROCEDURE — 97535 SELF CARE MNGMENT TRAINING: CPT

## 2022-06-21 PROCEDURE — 2700000000 HC OXYGEN THERAPY PER DAY

## 2022-06-21 PROCEDURE — 82947 ASSAY GLUCOSE BLOOD QUANT: CPT

## 2022-06-21 PROCEDURE — 6370000000 HC RX 637 (ALT 250 FOR IP): Performed by: UROLOGY

## 2022-06-21 PROCEDURE — 99232 SBSQ HOSP IP/OBS MODERATE 35: CPT | Performed by: NURSE PRACTITIONER

## 2022-06-21 PROCEDURE — 97162 PT EVAL MOD COMPLEX 30 MIN: CPT

## 2022-06-21 PROCEDURE — 6360000002 HC RX W HCPCS: Performed by: UROLOGY

## 2022-06-21 PROCEDURE — 2140000000 HC CCU INTERMEDIATE R&B

## 2022-06-21 PROCEDURE — 99233 SBSQ HOSP IP/OBS HIGH 50: CPT

## 2022-06-21 PROCEDURE — 94640 AIRWAY INHALATION TREATMENT: CPT

## 2022-06-21 RX ADMIN — LISINOPRIL 20 MG: 20 TABLET ORAL at 06:43

## 2022-06-21 RX ADMIN — INSULIN GLARGINE 10 UNITS: 100 INJECTION, SOLUTION SUBCUTANEOUS at 21:47

## 2022-06-21 RX ADMIN — GABAPENTIN 600 MG: 600 TABLET, FILM COATED ORAL at 17:37

## 2022-06-21 RX ADMIN — GABAPENTIN 600 MG: 600 TABLET, FILM COATED ORAL at 21:37

## 2022-06-21 RX ADMIN — GABAPENTIN 600 MG: 600 TABLET, FILM COATED ORAL at 08:17

## 2022-06-21 RX ADMIN — INSULIN LISPRO 3 UNITS: 100 INJECTION, SOLUTION INTRAVENOUS; SUBCUTANEOUS at 12:29

## 2022-06-21 RX ADMIN — HYDROCHLOROTHIAZIDE 12.5 MG: 25 TABLET ORAL at 08:16

## 2022-06-21 RX ADMIN — PRAVASTATIN SODIUM 40 MG: 20 TABLET ORAL at 08:17

## 2022-06-21 RX ADMIN — INSULIN LISPRO 3 UNITS: 100 INJECTION, SOLUTION INTRAVENOUS; SUBCUTANEOUS at 08:21

## 2022-06-21 RX ADMIN — IPRATROPIUM BROMIDE AND ALBUTEROL SULFATE 1 AMPULE: 2.5; .5 SOLUTION RESPIRATORY (INHALATION) at 06:10

## 2022-06-21 RX ADMIN — BUSPIRONE HYDROCHLORIDE 10 MG: 10 TABLET ORAL at 21:37

## 2022-06-21 RX ADMIN — HYDROCHLOROTHIAZIDE 12.5 MG: 25 TABLET ORAL at 21:37

## 2022-06-21 RX ADMIN — METOPROLOL TARTRATE 100 MG: 50 TABLET, FILM COATED ORAL at 08:17

## 2022-06-21 RX ADMIN — LEVOFLOXACIN 750 MG: 5 INJECTION, SOLUTION INTRAVENOUS at 06:47

## 2022-06-21 RX ADMIN — GABAPENTIN 600 MG: 600 TABLET, FILM COATED ORAL at 12:29

## 2022-06-21 RX ADMIN — METOPROLOL TARTRATE 100 MG: 50 TABLET, FILM COATED ORAL at 21:37

## 2022-06-21 RX ADMIN — INSULIN LISPRO 3 UNITS: 100 INJECTION, SOLUTION INTRAVENOUS; SUBCUTANEOUS at 17:37

## 2022-06-21 RX ADMIN — IPRATROPIUM BROMIDE AND ALBUTEROL SULFATE 1 AMPULE: 2.5; .5 SOLUTION RESPIRATORY (INHALATION) at 14:13

## 2022-06-21 RX ADMIN — TRAMADOL HYDROCHLORIDE 50 MG: 50 TABLET, COATED ORAL at 23:28

## 2022-06-21 RX ADMIN — LISINOPRIL 20 MG: 20 TABLET ORAL at 21:37

## 2022-06-21 RX ADMIN — IPRATROPIUM BROMIDE AND ALBUTEROL SULFATE 1 AMPULE: 2.5; .5 SOLUTION RESPIRATORY (INHALATION) at 10:45

## 2022-06-21 RX ADMIN — BUSPIRONE HYDROCHLORIDE 10 MG: 10 TABLET ORAL at 14:54

## 2022-06-21 RX ADMIN — IPRATROPIUM BROMIDE AND ALBUTEROL SULFATE 1 AMPULE: 2.5; .5 SOLUTION RESPIRATORY (INHALATION) at 18:36

## 2022-06-21 RX ADMIN — BUSPIRONE HYDROCHLORIDE 10 MG: 10 TABLET ORAL at 08:17

## 2022-06-21 RX ADMIN — ACETAMINOPHEN 650 MG: 325 TABLET ORAL at 08:28

## 2022-06-21 RX ADMIN — ONDANSETRON 4 MG: 4 TABLET, ORALLY DISINTEGRATING ORAL at 10:42

## 2022-06-21 ASSESSMENT — ENCOUNTER SYMPTOMS
NAUSEA: 0
VOMITING: 0
BACK PAIN: 1

## 2022-06-21 ASSESSMENT — PAIN SCALES - GENERAL
PAINLEVEL_OUTOF10: 2
PAINLEVEL_OUTOF10: 2
PAINLEVEL_OUTOF10: 9

## 2022-06-21 ASSESSMENT — PAIN DESCRIPTION - DESCRIPTORS: DESCRIPTORS: ACHING

## 2022-06-21 ASSESSMENT — PAIN - FUNCTIONAL ASSESSMENT: PAIN_FUNCTIONAL_ASSESSMENT: ACTIVITIES ARE NOT PREVENTED

## 2022-06-21 ASSESSMENT — PAIN DESCRIPTION - LOCATION: LOCATION: HEAD

## 2022-06-21 NOTE — PROGRESS NOTES
Facility/Department: [facility] PROGRESSIVE CARE  Daily Treatment Note  NAME: Priya Ocasio  : 1955  MRN: [id number]    Date of Service: 2022    Discharge Recommendations:  Patient would benefit from continued therapy after discharge       Assessment   Assessment: Seen while up in chair to work on sitting balance and posture, functional exercises but patient was minimally responsive despite being well awake. Treatment Diagnosis: Obstructive uropathy, septic shock, hypoxic respiratory failure  Activity Tolerance  Activity Tolerance: Patient limited by fatigue;Treatment limited secondary to decreased cognition         Patient Diagnosis(es): The primary encounter diagnosis was Acute kidney injury ([facility] [address] Diagnoses of Lower abdominal pain, Acute cystitis with hematuria, Septicemia ([facility] [address] Ureteral calculus, Kidney stone, and Urinary tract infection without hematuria, site unspecified were also pertinent to this visit. has a past medical history of Arthritis and HTN (hypertension). has a past surgical history that includes Cholecystectomy; Ankle fracture surgery (Right); Hysterectomy; Incontinence surgery; and Bladder surgery (Left, 6/15/2022). Restrictions  Restrictions/Precautions  Restrictions/Precautions: Fall Risk  Position Activity Restriction  Other position/activity restrictions: whiting, fecal tube    Subjective   General  Chart Reviewed: Yes  Patient assessed for rehabilitation services?: Yes  Family / Caregiver Present: No  Pre Treatment Pain Screening  Pain at present: 0  Scale Used: Numeric Score  Intervention List: Patient able to continue with treatment       Objective       22 [id number]   OT Exercises   Exercise Treatment Had patient lean away from backrest of recliner with feet on floor--demonstrates some active trunk contol but primarily maintained position passively.   Delayed or no responses for UE/LE exercise prompts despite eyes open and verbally responding at times Plan   Plan  Times per Week: 4-8    Goals (On-Going)   Short Term Goals  Short Term Goal 1: Feed self with supervision after set up  Short Term Goal 2: Demo ability to participate in beginning level standing tasks from recliner  Short Term Goal 3: Independent with UE exercises, trunk exercises, therapeutic positioning  Long Term Goals  Long Term Goal 1: Upgrade as tolerated.        Tx Time  Minutes  [facility], OT/L  Electronically signed by Caity Irwin OT/L on 6/21/2022 at 2:57 PM.

## 2022-06-21 NOTE — PLAN OF CARE
Nutrition Problem #1: Inadequate oral intake,Altered nutrition-related lab values  Intervention: Food and/or Nutrient Delivery: Continue Current Diet,Start Oral Nutrition Supplement  Nutritional

## 2022-06-21 NOTE — PROGRESS NOTES
Facility/Department: [facility] PROGRESSIVE CARE  Daily Treatment Note  NAME: Jazzy Cook  : 1955  MRN: [id number]    Date of Service: 2022    Discharge Recommendations:  Patient would benefit from continued therapy after discharge       Assessment   Assessment: OOB with lift with the hope of progressing to positions in short sit that would allow greater participation in simple ADL, sitting balance and posture, but the process of getting OOB fatigued her signficantly. Treatment Diagnosis: Obstructive uropathy, septic shock, hypoxic respiratory failure  Activity Tolerance  Activity Tolerance: Patient limited by fatigue         Patient Diagnosis(es): The primary encounter diagnosis was Acute kidney injury ([facility] [address] Diagnoses of Lower abdominal pain, Acute cystitis with hematuria, Septicemia ([facility] [address] Ureteral calculus, Kidney stone, and Urinary tract infection without hematuria, site unspecified were also pertinent to this visit. has a past medical history of Arthritis and HTN (hypertension). has a past surgical history that includes Cholecystectomy; Ankle fracture surgery (Right); Hysterectomy; Incontinence surgery; and Bladder surgery (Left, 6/15/2022).     Restrictions  Restrictions/Precautions  Restrictions/Precautions: Fall Risk  Position Activity Restriction  Other position/activity restrictions: whiting, fecal tube    Subjective   General  Chart Reviewed: Yes  Patient assessed for rehabilitation services?: Yes  Family / Caregiver Present: No  Pre Treatment Pain Screening  Pain at present: 0  Scale Used: Numeric Score  Intervention List: Patient able to continue with treatment  Comments / Details: Also no complaints of pain during the process of getting up     Objective    ADL  Grooming: Maximum assistance           Bed mobility  Rolling to Left: Maximum assistance;2 Person assistance  Rolling to Right: Maximum assistance;2 Person assistance  Bed Mobility Comments: max cues and extra time for patient to actively participate in rolling tasks--turning head, actively assisting with UE/LE initiation are at a beginning level status  Transfers  Transfer Comments: [address] used for OOB. Patient was responsible for beginning level active participation involve in sling placement, head control. Tolerated short sit position in sling and once seated in alan recliner without complaint. Ultimately left in recline. Cognition  Cognition Comment: Verbal and interacting but with mild confusion throughout, does not recall any detail from previous day's visit, occasionally randomly calling out hello? as she is looking about the room, denies hallucinations                                         Plan   Plan  Times per Week: 4-8    Goals (On-Going)   Short Term Goals  Short Term Goal 1: Feed self with supervision after set up  Short Term Goal 2: Demo ability to participate in beginning level standing tasks from recliner  Short Term Goal 3: Independent with UE exercises, trunk exercises, therapeutic positioning  Long Term Goals  Long Term Goal 1: Upgrade as tolerated.        Tx Time  Minutes  [id number], OT/L  Electronically signed by Krishna Crow OT/L on 6/21/2022 at 12:31 PM.

## 2022-06-21 NOTE — PROGRESS NOTES
Physical Therapy  Facility/Department: [facility] PROGRESSIVE CARE  Physical Therapy Initial Assessment    Name: Rafat Lopez  : 1955  MRN: [id number]  Date of Service: 2022    Discharge Recommendations:  Continue to assess pending progress,Patient would benefit from continued therapy after discharge          Patient Diagnosis(es): The primary encounter diagnosis was Acute kidney injury [facility]). Diagnoses of Lower abdominal pain, Acute cystitis with hematuria, Septicemia ([address] Ureteral calculus, Kidney stone, and Urinary tract infection without hematuria, site unspecified were also pertinent to this visit. Past Medical History:  has a past medical history of Arthritis and HTN (hypertension). Past Surgical History:  has a past surgical history that includes Cholecystectomy; Ankle fracture surgery (Right); Hysterectomy; Incontinence surgery; and Bladder surgery (Left, 6/15/2022). Assessment   Body Structures, Functions, Activity Limitations Requiring Skilled Therapeutic Intervention: Decreased functional mobility ; Decreased ADL status; Decreased ROM; Decreased strength;Decreased cognition;Decreased balance;Decreased posture  Assessment: Pt LETHARIC OVERALL BUT DOES OPEN EYES AND ANSWER A FEW SIMPLE QUESTIONS. OOB VIA MECHANICAL LIFT EARLIER IN DAY. MINIMAL ACTIVE MOVEMENTS LE/UE's AT THIS TIME. WILL PROGRESS AS TOLERATED. NSG TO ASSIST WITH OOB TRANSFERS.   Requires PT Follow-Up: Yes  Activity Tolerance  Activity Tolerance: Patient limited by fatigue;Patient limited by endurance;Treatment limited secondary to decreased cognition     Plan   Plan  Plan: 3-5 times per week  Current Treatment Recommendations: Strengthening,ROM,Balance training,Functional mobility training,Transfer training,Safety education & training,Patient/Caregiver education & training  Safety Devices  Type of Devices: Call light within reach,Chair alarm in place     Restrictions  Restrictions/Precautions  Restrictions/Precautions: Fall Risk  Position Activity Restriction  Other position/activity restrictions: whiting, fecal tube     Subjective   Pain: ONE MILD FACIAL GRIMACE WHEN R KNEE EXTENDED PASSIVELY  General  Patient assessed for rehabilitation services?: Yes  Diagnosis: SEPTIC SHOCK, RESPIRATORY FAILURE  Subjective  Subjective: Pt AGREEABLE, VERY SLEEPY         Social/Functional History  Social/Functional History  Lives With: Alone  Additional Comments: Per chart, mainly stays in bed/recliner but able to walk down the [address]ren[address] to bathroom, paid caregiver to cook and clean. Family lives next door. Vision/Hearing  Vision  Vision:  (difficult to assess due to current arousal level)    Cognition   Cognition  Cognition Comment: Minimally verbal, difficulty staying awake, appears to focus at times but still doesn't answer simple questions     Objective   Heart Rate: 75  Heart Rate Source: Monitor  BP: (!) 147/66  BP Location: Left lower arm  BP Method: Automatic  MAP (Calculated): 93  Resp: 20  SpO2: 93 %  O2 Device: Nasal cannula     Observation/Palpation  Posture: Poor  Gross Assessment  AROM: Grossly decreased, non-functional  Strength: Grossly decreased, non-functional                    Bed mobility  Bed Mobility Comments: NT: up in chair. OT reported MAX x 2 for rolling prior to transfer via lift. Also stated pt was actively moving LE's at times, but did not demonstrate this afternoon (on command or spontaneously). Attempted to lean pt fwd in chair to assess trunk control.  Slumped posture, leans to back of chair without assist.  Transfers  Bed to Chair:  (via MAXI MOVE)        Balance  Sitting - Static: Poor  Sitting - Dynamic: Poor           OutComes Score                                                  AM-PAC Score             Goals  Short Term Goals  Time Frame for Short term goals: 14 days  Short term goal 1: ROLLING MOD ASSIST  Short term goal 2: SUP TO/FROM SIT MOD ASSIST  Short term goal 3: BED TO CHAIR MOD-MAX ASSIST Education  Patient Education  Education Given To: Patient  Education Provided: Role of Therapy;Plan of Care  Barriers to Learning: Cognition      Therapy Time   Individual Concurrent Group Co-treatment   Time In           Time Out           Minutes                   Shree Marquez, PT

## 2022-06-21 NOTE — PROGRESS NOTES
Comprehensive Nutrition Assessment    Type and Reason for Visit:  Initial,RD Nutrition Re-Screen/LOS    Nutrition Recommendations/Plan:   1. Continue to follow for advanced diet, or comfort measures     Malnutrition Assessment:  Malnutrition Status: At risk for malnutrition (Comment) (06/21/22 2497)    Context:  Acute Illness     Findings of the 6 clinical characteristics of malnutrition:  Energy Intake:  75% or less of estimated energy requirements for 7 or more days  Weight Loss:  No significant weight loss     Body Fat Loss:  No significant body fat loss     Muscle Mass Loss:  No significant muscle mass loss    Fluid Accumulation:  Unable to assess     Strength:  Not Performed    Nutrition Assessment:    Following for LOS x 6 days. Pt appears adequately nourished, however pt is at risk for nutritional risk d/t decreased po intake, clear liquid x 6 days. Nutrition Related Findings:    Family considering Hospice Wound Type: None       Current Nutrition Intake & Therapies:    Average Meal Intake: 0%  Average Supplements Intake: None Ordered  ADULT DIET; Clear Liquid  ADULT ORAL NUTRITION SUPPLEMENT; Breakfast, Lunch, Dinner; Clear Liquid Oral Supplement    Anthropometric Measures:  Height: 5' 5\" (165.1 cm)  Ideal Body Weight (IBW): 125 lbs (57 kg)       Current Body Weight: 348 lb 9 oz (158.1 kg), 278.9 % IBW.     Current BMI (kg/m2): 58  BMI Categories: Obese Class 3 (BMI 40.0 or greater)    Estimated Daily Nutrient Needs:  Energy Requirements Based On: Kcal/kg     Energy (kcal/day): 3266-4207 kcals (8-11 kcals/kg)  Weight Used for Protein Requirements: Ideal  Protein (g/day): 114g  Method Used for Fluid Requirements: 1 ml/kcal  Fluid (ml/day): 3914-8137 ml    Nutrition Diagnosis:   · Inadequate oral intake,Altered nutrition-related lab values related to acute injury/trauma,endocrine dysfuntion as evidenced by intake 0-25%,NPO or clear liquid status due to medical condition,lab values      Nutrition Interventions:   Food and/or Nutrient Delivery: Continue Current Diet,Start Oral Nutrition Supplement  Nutrition Education/Counseling: No recommendation at this time  Coordination of Nutrition Care: Continue to monitor while inpatient       Goals:     Goals: PO intake 50% or greater       Nutrition Monitoring and Evaluation:   Behavioral-Environmental Outcomes: None Identified  Food/Nutrient Intake Outcomes: Diet Advancement/Tolerance,Food and Nutrient Intake  Physical Signs/Symptoms Outcomes: Biochemical Data,Weight,Skin,Nutrition Focused Physical Findings    Discharge Planning:     Too soon to determine     Dhaval Pedro MS, RD, LD  Contact: [phone]

## 2022-06-21 NOTE — PROGRESS NOTES
Urology Progress Note    SUBJECTIVE:  Patient resting in bed. Awake, more alert than yesterday. Answers some questions appropriately. OBJECTIVE:   Levaquin day # 7  Also received 2 days of Cefepime while awaiting culture results    Review of Systems   Constitutional: Negative for chills and fever. Gastrointestinal: Negative for nausea and vomiting. Genitourinary: Negative for decreased urine volume. Musculoskeletal: Positive for back pain (R>L). Psychiatric/Behavioral: Positive for confusion. Physical  VITALS:  BP (!) 181/83   Pulse 78   Temp 97.3 °F (36.3 °C) (Temporal)   Resp 20   Ht 5' 5\" (1.651 m)   Wt (!) 348 lb 9 oz (158.1 kg)   SpO2 96%   BMI 58.00 kg/m²   TEMPERATURE:  Current - Temp: 97.3 °F (36.3 °C); Max - Temp  Av.6 °F (36.4 °C)  Min: 96.3 °F (35.7 °C)  Max: 99.3 °F (37.4 °C)   24 HR I&O   Intake/Output Summary (Last 24 hours) at 2022 0807  Last data filed at 2022 0603  Gross per 24 hour   Intake 400 ml   Output 2200 ml   Net -1800 ml     BACK: complains of pain in right lower back  ABDOMEN:  non-distended and non-tender  HEART:  normal rate  CHEST:  Normal respiratory effort  GENITAL/URINARY:  FC in place to BSD with josy colored urine    Data  CBC:   Recent Labs     22  0315 22  0135   WBC 14.3* 18.0*   HGB 13.6 13.3   HCT 43.4 43.0   PLT 46* 54*     BMP:    Recent Labs     22  0315 22  0135    140   K 4.0 4.0    102   CO2 26 28   BUN 47* 50*   CREATININE 1.1* 1.0*   GLUCOSE 196* 190*       No results for input(s): LABURIN in the last 72 hours. Recent Labs     22  1046   BC No Growth to date. Any change in status will be called. Recent Labs     22  1046   BLOODCULT2 No Growth to date. Any change in status will be called.          U/A:    Lab Results   Component Value Date    COLORU DARK YELLOW 06/15/2022    PHUR 5.0 06/15/2022    WBCUA 2-4 06/15/2022    YEAST Present 06/15/2022    BACTERIA 1+ 06/15/2022 CLARITYU TURBID 06/15/2022    SPECGRAV 1.026 06/15/2022    LEUKOCYTESUR SMALL 06/15/2022    UROBILINOGEN 1.0 06/15/2022    BILIRUBINUR MODERATE 06/15/2022    BLOODU MODERATE 06/15/2022    GLUCOSEU 100 06/15/2022    AMORPHOUS 2+ 06/15/2022       ASSESSMENT AND PLAN    Patient Active Problem List   Diagnosis    Obstructive pyelonephritis    Sepsis with acute renal failure ([address]    Left ureteral calculus    Renal calculus, bilateral    BINH (acute kidney injury) ([address]    Obstructive uropathy    Hyperglycemia due to diabetes mellitus ([address]    Hypertension    Dehydration    FREDDY and COPD overlap syndrome ([address]    Degenerative joint disease    Palliative care patient    Septicemia (HCC)    Chronic pain syndrome       1. Sepsis with septic shock secondary to left obstructive pyelonephritis from obstructing left ureteral stone s/p left ureteral stent placement. Leukocytosis worsening, but patient remains afebrile. Continues on Levaquin per sensitivities.      2.  Left obstructing proximal ureteral stone s/p left ureteral stent placement. Definitive treatment to occur once patient is clinically improved and has been on at least 14 days of antibiotics.       MATTHEW BUSTILLOS - CNP  6/21/2022 8:07 AM

## 2022-06-21 NOTE — PROGRESS NOTES
Palliative Care Progress Note  6/21/2022 8:57 AM    Patient:  Caryn Licona  YOB: 1955  Primary Care Physician: Betina Manzo MD  Advance Directive: DNR  Admit Date: 6/14/2022       Hospital Day: 6  Portions of this note have been copied forward, however, changed to reflect the most current clinical status of this patient. CHIEF COMPLAINT/REASON FOR CONSULTATION goals of care, code status discussion, and family support. SUBJECTIVE:  Ms. Ifeoma Gordon is alert, sitting up in bed. C/o some back pain today. No acute distress. Review of Systems:   14 point review of systems is negative except as specifically addressed above. Objective:   VITALS:  BP (!) 181/83   Pulse 94   Temp 97.3 °F (36.3 °C) (Temporal)   Resp 20   Ht 5' 5\" (1.651 m)   Wt (!) 348 lb 9 oz (158.1 kg)   SpO2 96%   BMI 58.00 kg/m²   24HR INTAKE/OUTPUT:      Intake/Output Summary (Last 24 hours) at 6/21/2022 0857  Last data filed at 6/21/2022 4058  Gross per 24 hour   Intake 400 ml   Output 2500 ml   Net -2100 ml     General appearance: 77 yo female, chronically ill appearing, NAD  Head: Normocephalic, without obvious abnormality, atraumatic  Eyes: conjunctivae/corneas clear. PERRL, EOM's intact. Ears: normal external ears and nose  Neck: no JVD, supple, symmetrical, trachea midline   Lungs: clear and diminished in bases to auscultation bilaterally, shallow, NC in place  Heart: RRR, S1, S2 normal, no murmur  Abdomen: Rounded, taut, no grimacing to palpation, normal bowel sounds   Extremities: BLE 1+ edema,  No erythema, no to palpation  Skin: Warm, dry/flaky, pale,   Neurologic: Alert, oriented to person/place, intermittent confusion/lethargy/ illogical speech continues, generalized weakness, follows commands.      Medications:      dextrose      sodium chloride      lactated ringers 30 mL/hr at 06/19/22 1600      lisinopril  20 mg Oral BID    And    hydroCHLOROthiazide  12.5 mg Oral BID    metoprolol  100 mg Oral BID    levofloxacin  750 mg IntraVENous Q24H    busPIRone  10 mg Oral TID    gabapentin  600 mg Oral 4x Daily    pravastatin  40 mg Oral Daily    insulin lispro  0-18 Units SubCUTAneous TID     insulin lispro  0-9 Units SubCUTAneous Nightly    insulin glargine  10 Units SubCUTAneous Nightly    sodium chloride flush  5-40 mL IntraVENous 2 times per day    ipratropium-albuterol  1 ampule Inhalation Q4H WA     labetalol, traMADol, glucose, dextrose bolus **OR** dextrose bolus, glucagon (rDNA), dextrose, sodium chloride flush, sodium chloride, ondansetron **OR** ondansetron, polyethylene glycol, acetaminophen **OR** acetaminophen, potassium chloride, magnesium sulfate  ADULT DIET; Clear Liquid     Lab and other Data:     Recent Labs     06/19/22 0315 06/20/22  0135   WBC 14.3* 18.0*   HGB 13.6 13.3   PLT 46* 54*     Recent Labs     06/19/22 0315 06/20/22  0135    140   K 4.0 4.0    102   CO2 26 28   BUN 47* 50*   CREATININE 1.1* 1.0*   GLUCOSE 196* 190*     Recent Labs     06/19/22 0315 06/20/22  0135   * 135*   ALT 71* 69*   BILITOT 3.4* 3.5*   ALKPHOS 169* 189*       RAD:   CT ABDOMEN PELVIS WO CONTRAST Additional Contrast? None  Result Date: 6/15/2022  1. There is 6 mm obstructive calculus in the left upper ureter with mild proximal hydroureteronephrosis and perinephric inflammatory changes. 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 Bilateral non-obstructing renal calculi noted. 2. Mild hepatomegaly with mild hepatic steatosis. 3. Colonic diverticulosis without evidence of acute diverticulitis. 4. Small fat containing umbilical and supra umbilical hernia noted. Recommendation: Follow up as clinically indicated.  All CT scans at this facility utilize dose modulation, iterative reconstruction, and/or weight based dosing when appropriate to reduce radiation dose to as low as reasonably achievable. Amended by Kim Howard MD at 15-Shaquille-2022 02:44:19 AM Electronically Signed by Kim Howard MD at 15-Shaquille-2022 02:29:46 AM             XR CHEST PORTABLE  Result Date: 6/15/2022  1. Questionable trace left pleural effusion with basilar atelectasis. 2. Right sided central line is in place with its tip in SVC. Recommendation: Follow up as clinically indicated. Electronically Signed by Krishna Rocha MD at 15-Shaquille-2022 06:55:39 AM             XR CHEST PORTABLE  Result Date: 6/14/2022  1. Unremarkable radiograph of chest. Recommendation: Follow up as clinically indicated. Electronically Signed by Waleska King MD at 15-Shaquille-2022 12:21:20 AM             Assessment/Plan   Principal Problem:    Obstructive uropathy  Active Problems:    Obstructive pyelonephritis    Sepsis with acute renal failure (HCC)    Left ureteral calculus    Renal calculus, bilateral    BINH (acute kidney injury) ([facility]    Hyperglycemia due to diabetes mellitus ([facility]    Hypertension    Dehydration    FREDDY and COPD overlap syndrome (HCC)    Degenerative joint disease    Palliative care patient    Septicemia ([facility]    Chronic pain syndrome  Resolved Problems:    * No resolved hospital problems. *      Visit Summary:  Chart reviewed, patient discussed with  nursing staff. Reviewed health issues, work up and treatment plan as well as factors that lead to hospitalization. Report obtained from RN with no acute events overnight. Her clinical exam remains largely unchanged. Remains on 6 L NC. I discussed pt with hospitalist and CM. I was able to contact her daughter, Elda Delgado, today and we had a long discussion regarding pts insurance, hospice, and goals of care. At this point, Elda Delgado feels they would like to pursue SNF placement for rehab if pt qualifies. If pt continues to decline or unable to participate with therapy at SNF they would then pursue hospice there.  She reports she is working on choosing SNF, completing medicaid application, and obtaining POA. We discussed SCOP services and the ability for outpatient palliative care to potentially follow pt at SNF after d/c to assist with continued goals of care conversations. Elizabeth Field is agreeable to SCOP referral if pt is accepted to qualifying SNF. Elizabeth Field confirms that pt is to remain a DNR at this time. Opportunity for questions and emotional support provided. Will follow. Recommendations:   1. Palliative Care- GOC d/c to SNF for rehab if pt qualifies with SCOP services. CM following for d/c planning and placement. Code status- DNR    2, Obstructive left pyelonephritits 2/2 proximal left urteral calculus- S/p ureteral stent placement 6/15/22 per Dr. Marry Ruby. Urology following. Levaquin day #7    3. Septic shock with E coli bacteremia- 2/2 above, follow repeat blood cultures, NGTD. Abx per hospitalist. Weaned from pressors    4. BINH- improved. 5. Chronic pain syndrome-  chronic back pain with Norco and gabapentin baseline. Potential overuse at home noted. Pain regimen with ultram prn at this time    6. Afib with RVR new onset- suspect 2/2 above. NSR now and off amio gtt    7. Transaminitis-  Noted, unclear baseline LFTs. Continue to monitor labs    8. Acute hypoxic respiratory failure- mgmt per hospitalist, suspect multifactorial. Weaned to 6L    Thank you for consulting Palliative Care and allowing us to participate in the care of this patient.    Time Spent Counseling > 50%:  YES                                   Total Time Spent with patient/family counseling, workup/treatment review, counseling and placement of orders/preparation of this note: 37 minutes    Electronically signed by MATTHEW Rea CNP on 6/21/2022 at 8:57 AM    (Please note that portions of this note were completed with a voice recognition program.  Ysabel Stacks made to edit the dictations but occasionally words are mis-transcribed.)

## 2022-06-21 NOTE — CARE COORDINATION
Dannie Golden, dtr, calling back with choices:  1. [address]  2. [facility]  Dtr is asking about guidelines for discharge. CM reassured dtr that patient will not be d/c until she is medically stable, SNF has offered a bed, and insurance has approved admission. CM is calling to initiate the process of the discharge plan. By way of asking for SNF choices, and suggesting family pursue Medicaid option for assistance in reimbursement for patient. CM stressed that such discussions are all part of planning for the discharge.   Electronically signed by Sylwia Doan RN on 6/21/2022 at 1:28 PM

## 2022-06-21 NOTE — PROGRESS NOTES
Hospitalist Progress Note  [facility]     Patient: Vidhi Elizabeth  : 1955  MRN: [id number]  Code Status: DNR    Hospital Day: 6   Date of Service: 2022    Subjective:   Patient seen and examined. No acute distress. Past Medical History:   Diagnosis Date    Arthritis     HTN (hypertension)        Past Surgical History:   Procedure Laterality Date    ANKLE FRACTURE SURGERY Right     BLADDER SURGERY Left 6/15/2022    CYSTOSCOPY, LEFT URETEROSCOPY, RETROGRADE PYELOGRAM, STENT INSERTION performed by Saran Valdez MD at [facility] (CERVIX STATUS UNKNOWN)      INCONTINENCE SURGERY         Family History   Problem Relation Age of Onset    Cancer Mother     Cancer Father        Social History     Socioeconomic History    Marital status: Single     Spouse name: Not on file    Number of children: Not on file    Years of education: Not on file    Highest education level: Not on file   Occupational History    Not on file   Tobacco Use    Smoking status: Former Smoker    Smokeless tobacco: Never Used    Tobacco comment: quit 10 years ago   Vaping Use    Vaping Use: Never used   Substance and Sexual Activity    Alcohol use: Never    Drug use: Never    Sexual activity: Not on file   Other Topics Concern    Not on file   Social History Narrative    Not on file     Social Determinants of Health     Financial Resource Strain:     Difficulty of Paying Living Expenses: Not on file   Food Insecurity:     Worried About [address] in the Last Year: Not on file    [address] in the Last Year: Not on file   Transportation Needs:     Lack of Transportation (Medical): Not on file    Lack of Transportation (Non-Medical):  Not on file   Physical Activity:     Days of Exercise per Week: Not on file    Minutes of Exercise per Session: Not on file   Stress:     Feeling of Stress : Not on file   Social Connections:     Frequency of Communication with Friends and Family: Not on file    Frequency of Social Gatherings with Friends and Family: Not on file    Attends Voodoo Services: Not on file    Active Member of Clubs or Organizations: Not on file    Attends Club or Organization Meetings: Not on file    Marital Status: Not on file   Intimate Partner Violence:     Fear of Current or Ex-Partner: Not on file    Emotionally Abused: Not on file    Physically Abused: Not on file    Sexually Abused: Not on file   Housing Stability:     Unable to Pay for Housing in the Last Year: Not on file    Number of [place] in the Last Year: Not on file    Unstable Housing in the Last Year: Not on file       Current Facility-Administered Medications   Medication Dose Route Frequency Provider Last Rate Last Admin    labetalol (NORMODYNE;TRANDATE) injection 10 mg  10 mg IntraVENous Q4H PRN Niki Leong MD   10 mg at 06/19/22 0334    lisinopril (PRINIVIL;ZESTRIL) tablet 20 mg  20 mg Oral BID Arlet Anne MD   20 mg at 06/21/22 0471    And    hydroCHLOROthiazide (HYDRODIURIL) tablet 12.5 mg  12.5 mg Oral BID Arlet Anne MD   12.5 mg at 06/21/22 0816    metoprolol tartrate (LOPRESSOR) tablet 100 mg  100 mg Oral BID Arlet Anne MD   100 mg at 06/21/22 0817    levoFLOXacin (LEVAQUIN) 750 MG/150ML infusion 750 mg  750 mg IntraVENous Q24H Carry MD Veronica 100 mL/hr at 06/21/22 0647 750 mg at 06/21/22 0647    traMADol (ULTRAM) tablet 50 mg  50 mg Oral Q6H PRN Arlet Anne MD   50 mg at 06/20/22 1405    busPIRone (BUSPAR) tablet 10 mg  10 mg Oral TID Arlet Anne MD   10 mg at 06/21/22 0817    gabapentin (NEURONTIN) tablet 600 mg  600 mg Oral 4x Daily Carry MD Veronica   600 mg at 06/21/22 0817    pravastatin (PRAVACHOL) tablet 40 mg  40 mg Oral Daily Carry MD Veronica   40 mg at 06/21/22 0817    glucose chewable tablet 16 g  4 tablet Oral PRN Niki Leong MD        dextrose bolus 10% 125 mL  125 mL IntraVENous PRN Ran Cunningham MD        Or    dextrose bolus 10% 250 mL  250 mL IntraVENous PRN Ran Cunningham MD        glucagon (rDNA) injection 1 mg  1 mg IntraMUSCular PRN Ran Cunningham MD        dextrose 5 % solution  100 mL/hr IntraVENous PRN Ran Cunningham MD        insulin lispro (HUMALOG) injection vial 0-18 Units  0-18 Units SubCUTAneous TID WC Ran Cunningham MD   3 Units at 06/21/22 0821    insulin lispro (HUMALOG) injection vial 0-9 Units  0-9 Units SubCUTAneous Nightly Ran Cunningham MD   2 Units at 06/19/22 2123    insulin glargine (LANTUS) injection vial 10 Units  10 Units SubCUTAneous Nightly Ran Cunningham MD   10 Units at 06/20/22 2204    sodium chloride flush 0.9 % injection 5-40 mL  5-40 mL IntraVENous 2 times per day Ran Cunningham MD   10 mL at 06/19/22 2013    sodium chloride flush 0.9 % injection 5-40 mL  5-40 mL IntraVENous PRN Ran Cunningham MD        0.9 % sodium chloride infusion   IntraVENous PRN Ran Cunningham MD        ondansetron (ZOFRAN-ODT) disintegrating tablet 4 mg  4 mg Oral Q8H PRN Ran Cunningham MD   4 mg at 06/21/22 1042    Or    ondansetron (ZOFRAN) injection 4 mg  4 mg IntraVENous Q6H PRN Ran Cunningham MD   4 mg at 06/16/22 1046    polyethylene glycol (GLYCOLAX) packet 17 g  17 g Oral Daily PRN Ran Cunningham MD        acetaminophen (TYLENOL) tablet 650 mg  650 mg Oral Q6H PRN Ran Cunningham MD   650 mg at 06/21/22 8261    Or    acetaminophen (TYLENOL) suppository 650 mg  650 mg Rectal Q6H PRN Ran Cunningham MD        potassium chloride 10 mEq/100 mL IVPB (Peripheral Line)  10 mEq IntraVENous PRN Ran Cunningham MD        magnesium sulfate 2000 mg in 50 mL IVPB premix  2,000 mg IntraVENous PRN Ran Cunningham MD   Stopped at 06/16/22 0640    ipratropium-albuterol (DUONEB) nebulizer solution 1 ampule  1 ampule Inhalation Q4H WA Chen Solano MD   1 ampule at 06/21/22 1045    lactated ringers infusion   IntraVENous Continuous Xochitl Zaragoza MD 30 mL/hr at 06/19/22 1600 Rate Verify at 06/19/22 1600         dextrose      sodium chloride      lactated ringers 30 mL/hr at 06/19/22 1600        Objective:   BP (!) 147/66   Pulse 75   Temp 96.8 °F (36 °C) (Temporal)   Resp 20   Ht 5' 5\" (1.651 m)   Wt (!) 348 lb 9 oz (158.1 kg)   SpO2 93%   BMI 58.00 kg/m²     General: no acute distress  HEENT: normocephalic, atraumatic  Neck: supple, symmetrical, trachea midline   Lungs: clear to auscultation bilaterally   Cardiovascular: s1 and s2 normal  Abdomen: soft, positive bowel sounds  Extremities: no edema or cyanosis   Neuro: alert, no acute focal motor deficits    Recent Labs     06/19/22 0315 06/20/22  0135   WBC 14.3* 18.0*   RBC 4.62 4.50   HGB 13.6 13.3   HCT 43.4 43.0   MCV 93.9 95.6   MCH 29.4 29.6   MCHC 31.3* 30.9*   PLT 46* 54*     Recent Labs     06/19/22 0315 06/20/22  0135    140   K 4.0 4.0   ANIONGAP 11 10    102   CO2 26 28   BUN 47* 50*   CREATININE 1.1* 1.0*   GLUCOSE 196* 190*   CALCIUM 8.7* 8.8     Recent Labs     06/19/22 0315 06/20/22  0135   MG 2.1 2.0     Recent Labs     06/19/22 0315 06/20/22  0135   * 135*   ALT 71* 69*   BILITOT 3.4* 3.5*   ALKPHOS 169* 189*     No results for input(s): PH, PO2, PCO2, HCO3, BE, O2SAT in the last 72 hours. No results for input(s): TROPONINI in the last 72 hours. No results for input(s): INR in the last 72 hours. No results for input(s): LACTA in the last 72 hours. Intake/Output Summary (Last 24 hours) at 6/21/2022 1226  Last data filed at 6/21/2022 7855  Gross per 24 hour   Intake 400 ml   Output 2500 ml   Net -2100 ml       No results found.      Assessment and Plan:   Septic shock  Shock resolved  Secondary to below  Agents as below  IVF  Since weaned off phenylephrine gtt  Lactate improved overall     Obstructive left E. coli pyelonephritis  Urology following  Secondary to proximal left ureteral calculus  S/p left ureteral stent placement 6/15/2022 per Dr. Lacey Trejo per sensitivities     E. coli bacteremia  Secondary to above  Levaquin per sensitivities  Repeat blood cultures NGTD     BINH  Unknown baseline creatinine  Creatinine continues to improve  IVF  Avoid offending agents  Follow renal function/urine output/electrolytes     DM2  HbA1c 9.1  Poor control  Counseled  Meds on board     Morbid obesity  Counseled     Chronic pain syndrome  Patient has been bedbound for the last 5 years  Patient/daughter state she has been taking opioids for many years  I extensively explained how these agents can run counter to her current treatment plan  Patient continues to request home pain meds despite extensive counseling  Palliative/social work following per patient/family request  Possible transition to hospice but nothing official as of yet     New onset atrial fibrillation with RVR  Remains in sinus rhythm  Suspect secondary to above processes  Rate control agents  AC contraindicated given thrombocytopenia  Serial troponin 0.01 > 0.02 > 0.02 > (<0.01)  Follow electrolytes     Acute hypoxic respiratory failure  Suspect multifactorial  Currently requiring HFNC 5-6 L, decreasing requirement overall     DVT prophylaxis  SCDs given thrombocytopenia     Extensive discussions with patient, daughter Bertrand Dunlap), and son (Evens) in regards to current clinical state/goals of care.  All questions sought and answered.     Cuco Britton MD   6/21/2022 12:26 PM

## 2022-06-21 NOTE — CARE COORDINATION
HONG responding to call from Rosanna Trevino dtr, who lives in [facility] regarding d/c plan needs for patient. CM spoke at length with dtr, and answer all of her questions and concerns. Pt baseline functional level is sitting in he recliner, using a walker to ambulate in the home. Pt is able to toilet self, dress self, and shower about once a week. Pt does not change her clothes daily. Dtr states, these activities do wear patient out. Pt lives alone, son lives next door. Pt hx of chronic pain meds do to \"arthritis\" Pt does minimal during he day but sleep and visit w/he nephew. Pt does hire someone to cook/clean for her. Magdiel Lagos also helps with cooking. Per dtr, pt has been reluctant about going to a SNF. Dtr states, she needs to discuss with patient first.   HONG advised that SNF choices needed to be made today. HONG advised, pt needs to apply for Medicaid. CM has provided dtr with local phone number for  Medicaid. CM also stressed importance of obtain POA for patient. HONG explained to dtr, that a \"preauthorization\" has to be obtained for patient to go to a SNF. CM will await call back from Rosanna Trevino dtr, for choices.   Electronically signed by Edith Diaz RN on 6/21/2022 at 11:57 AM

## 2022-06-21 NOTE — CARE COORDINATION
HONG has made referrals to [facility] and [facility]. HONG reached out to Alessandra Muñoz who states, she has talked to an [place] regarding the process for obtaining POA. Alessandra Wanda has reached out to the [facility] office and is going to attempt to apply for Medicaid on line. Alessandra Muñoz reports, her brother will be coming over after work to visit with patient.    Electronically signed by Almita Meier RN on 6/21/2022 at 3:47 PM

## 2022-06-22 LAB
ALBUMIN SERPL-MCNC: 2.3 G/DL (ref 3.5–5.2)
ALP BLD-CCNC: 206 U/L (ref 35–104)
ALT SERPL-CCNC: 52 U/L (ref 5–33)
ANION GAP SERPL CALCULATED.3IONS-SCNC: 11 MMOL/L (ref 7–19)
AST SERPL-CCNC: 92 U/L (ref 5–32)
BASOPHILS ABSOLUTE: 0.1 K/UL (ref 0–0.2)
BASOPHILS RELATIVE PERCENT: 0.5 % (ref 0–1)
BILIRUB SERPL-MCNC: 2.8 MG/DL (ref 0.2–1.2)
BUN BLDV-MCNC: 45 MG/DL (ref 8–23)
CALCIUM SERPL-MCNC: 8.4 MG/DL (ref 8.8–10.2)
CHLORIDE BLD-SCNC: 102 MMOL/L (ref 98–111)
CO2: 31 MMOL/L (ref 22–29)
CREAT SERPL-MCNC: 0.8 MG/DL (ref 0.5–0.9)
EOSINOPHILS ABSOLUTE: 0 K/UL (ref 0–0.6)
EOSINOPHILS RELATIVE PERCENT: 0.1 % (ref 0–5)
GFR AFRICAN AMERICAN: >59
GFR NON-AFRICAN AMERICAN: >60
GLUCOSE BLD-MCNC: 138 MG/DL (ref 70–99)
GLUCOSE BLD-MCNC: 140 MG/DL (ref 70–99)
GLUCOSE BLD-MCNC: 141 MG/DL (ref 70–99)
GLUCOSE BLD-MCNC: 148 MG/DL (ref 70–99)
GLUCOSE BLD-MCNC: 148 MG/DL (ref 74–109)
HCT VFR BLD CALC: 41.5 % (ref 37–47)
HEMOGLOBIN: 13 G/DL (ref 12–16)
IMMATURE GRANULOCYTES #: 1.4 K/UL
LYMPHOCYTES ABSOLUTE: 1.8 K/UL (ref 1.1–4.5)
LYMPHOCYTES RELATIVE PERCENT: 10.2 % (ref 20–40)
MAGNESIUM: 1.9 MG/DL (ref 1.6–2.4)
MCH RBC QN AUTO: 29.7 PG (ref 27–31)
MCHC RBC AUTO-ENTMCNC: 31.3 G/DL (ref 33–37)
MCV RBC AUTO: 94.7 FL (ref 81–99)
MONOCYTES ABSOLUTE: 0.9 K/UL (ref 0–0.9)
MONOCYTES RELATIVE PERCENT: 5.2 % (ref 0–10)
NEUTROPHILS ABSOLUTE: 13.3 K/UL (ref 1.5–7.5)
NEUTROPHILS RELATIVE PERCENT: 76.1 % (ref 50–65)
PDW BLD-RTO: 15.8 % (ref 11.5–14.5)
PERFORMED ON: ABNORMAL
PLATELET # BLD: 67 K/UL (ref 130–400)
PMV BLD AUTO: 13 FL (ref 9.4–12.3)
POTASSIUM SERPL-SCNC: 3.4 MMOL/L (ref 3.5–5)
RBC # BLD: 4.38 M/UL (ref 4.2–5.4)
SODIUM BLD-SCNC: 144 MMOL/L (ref 136–145)
TOTAL PROTEIN: 5.2 G/DL (ref 6.6–8.7)
WBC # BLD: 17.4 K/UL (ref 4.8–10.8)

## 2022-06-22 PROCEDURE — 97530 THERAPEUTIC ACTIVITIES: CPT

## 2022-06-22 PROCEDURE — 2580000003 HC RX 258: Performed by: INTERNAL MEDICINE

## 2022-06-22 PROCEDURE — 2140000000 HC CCU INTERMEDIATE R&B

## 2022-06-22 PROCEDURE — 82947 ASSAY GLUCOSE BLOOD QUANT: CPT

## 2022-06-22 PROCEDURE — 6370000000 HC RX 637 (ALT 250 FOR IP): Performed by: INTERNAL MEDICINE

## 2022-06-22 PROCEDURE — 6360000002 HC RX W HCPCS: Performed by: INTERNAL MEDICINE

## 2022-06-22 PROCEDURE — 99232 SBSQ HOSP IP/OBS MODERATE 35: CPT

## 2022-06-22 PROCEDURE — 99232 SBSQ HOSP IP/OBS MODERATE 35: CPT | Performed by: NURSE PRACTITIONER

## 2022-06-22 PROCEDURE — 2700000000 HC OXYGEN THERAPY PER DAY

## 2022-06-22 PROCEDURE — 80053 COMPREHEN METABOLIC PANEL: CPT

## 2022-06-22 PROCEDURE — 85025 COMPLETE CBC W/AUTO DIFF WBC: CPT

## 2022-06-22 PROCEDURE — 94640 AIRWAY INHALATION TREATMENT: CPT

## 2022-06-22 PROCEDURE — 6360000002 HC RX W HCPCS: Performed by: UROLOGY

## 2022-06-22 PROCEDURE — 36415 COLL VENOUS BLD VENIPUNCTURE: CPT

## 2022-06-22 PROCEDURE — 6370000000 HC RX 637 (ALT 250 FOR IP): Performed by: UROLOGY

## 2022-06-22 PROCEDURE — 83735 ASSAY OF MAGNESIUM: CPT

## 2022-06-22 PROCEDURE — 97535 SELF CARE MNGMENT TRAINING: CPT

## 2022-06-22 RX ORDER — POTASSIUM CHLORIDE 20 MEQ/1
40 TABLET, EXTENDED RELEASE ORAL PRN
Status: DISCONTINUED | OUTPATIENT
Start: 2022-06-22 | End: 2022-07-02 | Stop reason: HOSPADM

## 2022-06-22 RX ORDER — MAGNESIUM SULFATE 1 G/100ML
1000 INJECTION INTRAVENOUS PRN
Status: DISCONTINUED | OUTPATIENT
Start: 2022-06-22 | End: 2022-07-02 | Stop reason: HOSPADM

## 2022-06-22 RX ORDER — POTASSIUM CHLORIDE 7.45 MG/ML
10 INJECTION INTRAVENOUS PRN
Status: DISCONTINUED | OUTPATIENT
Start: 2022-06-22 | End: 2022-07-02 | Stop reason: HOSPADM

## 2022-06-22 RX ADMIN — SODIUM CHLORIDE, PRESERVATIVE FREE 10 ML: 5 INJECTION INTRAVENOUS at 20:10

## 2022-06-22 RX ADMIN — BUSPIRONE HYDROCHLORIDE 10 MG: 10 TABLET ORAL at 13:24

## 2022-06-22 RX ADMIN — INSULIN LISPRO 3 UNITS: 100 INJECTION, SOLUTION INTRAVENOUS; SUBCUTANEOUS at 13:24

## 2022-06-22 RX ADMIN — POTASSIUM CHLORIDE 40 MEQ: 20 TABLET, EXTENDED RELEASE ORAL at 17:40

## 2022-06-22 RX ADMIN — IPRATROPIUM BROMIDE AND ALBUTEROL SULFATE 1 AMPULE: 2.5; .5 SOLUTION RESPIRATORY (INHALATION) at 18:32

## 2022-06-22 RX ADMIN — TRAMADOL HYDROCHLORIDE 50 MG: 50 TABLET, COATED ORAL at 11:23

## 2022-06-22 RX ADMIN — HYDROCHLOROTHIAZIDE 12.5 MG: 25 TABLET ORAL at 20:08

## 2022-06-22 RX ADMIN — LEVOFLOXACIN 750 MG: 5 INJECTION, SOLUTION INTRAVENOUS at 05:00

## 2022-06-22 RX ADMIN — IPRATROPIUM BROMIDE AND ALBUTEROL SULFATE 1 AMPULE: 2.5; .5 SOLUTION RESPIRATORY (INHALATION) at 14:25

## 2022-06-22 RX ADMIN — INSULIN LISPRO 3 UNITS: 100 INJECTION, SOLUTION INTRAVENOUS; SUBCUTANEOUS at 10:03

## 2022-06-22 RX ADMIN — BUSPIRONE HYDROCHLORIDE 10 MG: 10 TABLET ORAL at 20:08

## 2022-06-22 RX ADMIN — TRAMADOL HYDROCHLORIDE 50 MG: 50 TABLET, COATED ORAL at 22:05

## 2022-06-22 RX ADMIN — PRAVASTATIN SODIUM 40 MG: 20 TABLET ORAL at 10:01

## 2022-06-22 RX ADMIN — GABAPENTIN 600 MG: 600 TABLET, FILM COATED ORAL at 10:01

## 2022-06-22 RX ADMIN — METOPROLOL TARTRATE 100 MG: 50 TABLET, FILM COATED ORAL at 10:01

## 2022-06-22 RX ADMIN — LISINOPRIL 20 MG: 20 TABLET ORAL at 20:12

## 2022-06-22 RX ADMIN — ACETAMINOPHEN 650 MG: 325 TABLET ORAL at 04:57

## 2022-06-22 RX ADMIN — IPRATROPIUM BROMIDE AND ALBUTEROL SULFATE 1 AMPULE: 2.5; .5 SOLUTION RESPIRATORY (INHALATION) at 10:30

## 2022-06-22 RX ADMIN — METOPROLOL TARTRATE 100 MG: 50 TABLET, FILM COATED ORAL at 20:08

## 2022-06-22 RX ADMIN — INSULIN GLARGINE 10 UNITS: 100 INJECTION, SOLUTION SUBCUTANEOUS at 20:08

## 2022-06-22 RX ADMIN — LISINOPRIL 20 MG: 20 TABLET ORAL at 10:01

## 2022-06-22 RX ADMIN — GABAPENTIN 600 MG: 600 TABLET, FILM COATED ORAL at 20:08

## 2022-06-22 RX ADMIN — ONDANSETRON HYDROCHLORIDE 4 MG: 2 SOLUTION INTRAMUSCULAR; INTRAVENOUS at 14:48

## 2022-06-22 RX ADMIN — ONDANSETRON HYDROCHLORIDE 4 MG: 2 SOLUTION INTRAMUSCULAR; INTRAVENOUS at 06:39

## 2022-06-22 RX ADMIN — INSULIN LISPRO 3 UNITS: 100 INJECTION, SOLUTION INTRAVENOUS; SUBCUTANEOUS at 17:53

## 2022-06-22 RX ADMIN — GABAPENTIN 600 MG: 600 TABLET, FILM COATED ORAL at 13:24

## 2022-06-22 RX ADMIN — BUSPIRONE HYDROCHLORIDE 10 MG: 10 TABLET ORAL at 10:01

## 2022-06-22 RX ADMIN — HYDROCHLOROTHIAZIDE 12.5 MG: 25 TABLET ORAL at 10:02

## 2022-06-22 RX ADMIN — ACETAMINOPHEN 650 MG: 325 TABLET ORAL at 14:48

## 2022-06-22 RX ADMIN — SODIUM CHLORIDE, PRESERVATIVE FREE 10 ML: 5 INJECTION INTRAVENOUS at 10:02

## 2022-06-22 RX ADMIN — IPRATROPIUM BROMIDE AND ALBUTEROL SULFATE 1 AMPULE: 2.5; .5 SOLUTION RESPIRATORY (INHALATION) at 06:27

## 2022-06-22 RX ADMIN — GABAPENTIN 600 MG: 600 TABLET, FILM COATED ORAL at 17:39

## 2022-06-22 ASSESSMENT — PAIN SCALES - GENERAL
PAINLEVEL_OUTOF10: 3
PAINLEVEL_OUTOF10: 0
PAINLEVEL_OUTOF10: 4
PAINLEVEL_OUTOF10: 6
PAINLEVEL_OUTOF10: 0

## 2022-06-22 ASSESSMENT — PAIN - FUNCTIONAL ASSESSMENT: PAIN_FUNCTIONAL_ASSESSMENT: PREVENTS OR INTERFERES SOME ACTIVE ACTIVITIES AND ADLS

## 2022-06-22 ASSESSMENT — ENCOUNTER SYMPTOMS
VOMITING: 0
NAUSEA: 0
BACK PAIN: 1

## 2022-06-22 ASSESSMENT — PAIN SCALES - WONG BAKER
WONGBAKER_NUMERICALRESPONSE: 0

## 2022-06-22 ASSESSMENT — PAIN DESCRIPTION - DESCRIPTORS
DESCRIPTORS: ACHING
DESCRIPTORS: ACHING;DULL

## 2022-06-22 ASSESSMENT — PAIN DESCRIPTION - ORIENTATION: ORIENTATION: MID

## 2022-06-22 ASSESSMENT — PAIN DESCRIPTION - LOCATION
LOCATION: HEAD
LOCATION: BACK

## 2022-06-22 NOTE — PLAN OF CARE
Problem: Discharge Planning  Goal: Discharge to home or other facility with appropriate resources  Outcome: Progressing     Problem: Pain  Goal: Verbalizes/displays adequate comfort level or baseline comfort level  Outcome: Progressing     Problem: Safety - Adult  Goal: Free from fall injury  Outcome: Progressing     Problem: ABCDS Injury Assessment  Goal: Absence of physical injury  Outcome: Progressing     Problem: Skin/Tissue Integrity  Goal: Absence of new skin breakdown  Description: 1. Monitor for areas of redness and/or skin breakdown  2. Assess vascular access sites hourly  3. Every 4-6 hours minimum:  Change oxygen saturation probe site  4. Every 4-6 hours:  If on nasal continuous positive airway pressure, respiratory therapy assess nares and determine need for appliance change or resting period.   Outcome: Progressing     Problem: Chronic Conditions and Co-morbidities  Goal: Patient's chronic conditions and co-morbidity symptoms are monitored and maintained or improved  Outcome: Progressing     Problem: Neurosensory - Adult  Goal: Achieves stable or improved neurological status  Outcome: Progressing     Problem: Respiratory - Adult  Goal: Achieves optimal ventilation and oxygenation  Outcome: Progressing     Problem: Cardiovascular - Adult  Goal: Absence of cardiac dysrhythmias or at baseline  Outcome: Progressing     Problem: Skin/Tissue Integrity - Adult  Goal: Skin integrity remains intact  Outcome: Progressing     Problem: Musculoskeletal - Adult  Goal: Return mobility to safest level of function  Outcome: Progressing  Goal: Maintain proper alignment of affected body part  Outcome: Progressing     Problem: Genitourinary - Adult  Goal: Urinary catheter remains patent  Outcome: Progressing     Problem: Infection - Adult  Goal: Absence of infection during hospitalization  Outcome: Progressing     Problem: Metabolic/Fluid and Electrolytes - Adult  Goal: Electrolytes maintained within normal limits  Outcome: Progressing  Goal: Glucose maintained within prescribed range  Outcome: Progressing     Problem: Confusion  Goal: Confusion, delirium, dementia, or psychosis is improved or at baseline  Description: INTERVENTIONS:  1. Assess for possible contributors to thought disturbance, including medications, impaired vision or hearing, underlying metabolic abnormalities, dehydration, psychiatric diagnoses, and notify attending LIP  2. [place] high risk fall precautions, as indicated  3. Provide frequent short contacts to provide reality reorientation, refocusing and direction  4. Decrease environmental stimuli, including noise as appropriate  5. Monitor and intervene to maintain adequate nutrition, hydration, elimination, sleep and activity  6. If unable to ensure safety without constant attention obtain sitter and review sitter guidelines with assigned personnel  7.  Initiate Psychosocial CNS and Spiritual Care consult, as indicated  Outcome: Progressing     Problem: Nutrition Deficit:  Goal: Optimize nutritional status  Outcome: Progressing

## 2022-06-22 NOTE — PROGRESS NOTES
06/22/22 1000   Subjective   Subjective Patient in bed agrees to roll to improve bed mobility. Pain Assessment   Pain Assessment None - Denies Pain   Cognition   Cognition Comment Patient alert and talkative today. Answers appropriatly   Vitals   O2 Device Nasal cannula   Bed Mobility Training   Bed Mobility Training Yes   Rolling Minimum assistance; Moderate assistance;Assist X2  (Patient rolled several times to straighted linens and pad)   PT Equipment Recommendations   Other Maury Koenig OT  working with patient when this  therapist left   PT Plan of Care   Wednesday X   Physical Therapy    Electronically signed by Gisele Aguilar PTA on 6/22/2022 at 10:59 AM

## 2022-06-22 NOTE — PROGRESS NOTES
Palliative Care Progress Note  6/22/2022 8:39 AM    Patient:  Astrid Angela  YOB: 1955  Primary Care Physician: Macario Benton MD  Advance Directive: DNR  Admit Date: 6/14/2022       Hospital Day: 7  Portions of this note have been copied forward, however, changed to reflect the most current clinical status of this patient. CHIEF COMPLAINT/REASON FOR CONSULTATION goals of care, code status discussion, and family support. SUBJECTIVE:  Ms. Bradford Scheuermann is working with OT up in [address] [place] [address]. She is more alert and appropriate today. Denies pain at time of visit. No new complaints. Review of Systems:   14 point review of systems is negative except as specifically addressed above. Objective:   VITALS:  BP (!) 161/69   Pulse 87   Temp 97 °F (36.1 °C) (Temporal)   Resp 24   Ht 5' 5\" (1.651 m)   Wt (!) 348 lb 9 oz (158.1 kg)   SpO2 95%   BMI 58.00 kg/m²   24HR INTAKE/OUTPUT:      Intake/Output Summary (Last 24 hours) at 6/22/2022 0839  Last data filed at 6/22/2022 6123  Gross per 24 hour   Intake 1000 ml   Output 1850 ml   Net -850 ml     General appearance: 77 yo female, chronically ill appearing, no acute distress  Head: Normocephalic, without obvious abnormality, atraumatic  Eyes: conjunctivae/corneas clear. PERRL, EOM's intact. Ears: normal external ears and nose  Neck: no JVD, supple, symmetrical, trachea midline   Lungs: clear and diminished in bases to auscultation bilaterally, shallow, NC in place  Heart: RR, S1, S2 normal, no murmur  Abdomen: Obese, taut, no grimacing to palpation, normal bowel sounds, rectal tube in place with diarrhea noted  Extremities: BLE 1+ edema,  No erythema, no to palpation  Skin: Warm, dry/flaky, pale,   Neurologic: More alert today, oriented to person/place, intermittent confusion/lethargy/ illogical speech continues, generalized weakness, follows commands.      Medications:      dextrose      sodium chloride      lactated ringers 30 mL/hr at 06/19/22 1600      lisinopril  20 mg Oral BID    And    hydroCHLOROthiazide  12.5 mg Oral BID    metoprolol  100 mg Oral BID    levofloxacin  750 mg IntraVENous Q24H    busPIRone  10 mg Oral TID    gabapentin  600 mg Oral 4x Daily    pravastatin  40 mg Oral Daily    insulin lispro  0-18 Units SubCUTAneous TID WC    insulin lispro  0-9 Units SubCUTAneous Nightly    insulin glargine  10 Units SubCUTAneous Nightly    sodium chloride flush  5-40 mL IntraVENous 2 times per day    ipratropium-albuterol  1 ampule Inhalation Q4H WA     labetalol, traMADol, glucose, dextrose bolus **OR** dextrose bolus, glucagon (rDNA), dextrose, sodium chloride flush, sodium chloride, ondansetron **OR** ondansetron, polyethylene glycol, acetaminophen **OR** acetaminophen, potassium chloride, magnesium sulfate  ADULT DIET; Clear Liquid  ADULT ORAL NUTRITION SUPPLEMENT; Breakfast, Lunch, Dinner; Clear Liquid Oral Supplement     Lab and other Data:     Recent Labs     06/20/22 0135 06/22/22 0229   WBC 18.0* 17.4*   HGB 13.3 13.0   PLT 54* 67*     Recent Labs     06/20/22 0135 06/22/22 0229    144   K 4.0 3.4*    102   CO2 28 31*   BUN 50* 45*   CREATININE 1.0* 0.8   GLUCOSE 190* 148*     Recent Labs     06/20/22 0135 06/22/22 0229   * 92*   ALT 69* 52*   BILITOT 3.5* 2.8*   ALKPHOS 189* 206*     RAD:   CT ABDOMEN PELVIS WO CONTRAST Additional Contrast? None  Result Date: 6/15/2022  1. There is 6 mm obstructive calculus in the left upper ureter with mild proximal hydroureteronephrosis and perinephric inflammatory changes. 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 Bilateral non-obstructing renal calculi noted. 2. Mild hepatomegaly with mild hepatic steatosis. 3. Colonic diverticulosis without evidence of acute diverticulitis. 4. Small fat containing umbilical and supra umbilical hernia noted.  Recommendation: Follow up as clinically indicated. All CT scans at this facility utilize dose modulation, iterative reconstruction, and/or weight based dosing when appropriate to reduce radiation dose to as low as reasonably achievable. Amended by Nuno Zambrano MD at 15-Shaquille-2022 02:44:19 AM Electronically Signed by Nuno Zambrano MD at 15-Shaquille-2022 02:29:46 AM             XR CHEST PORTABLE  Result Date: 6/15/2022  1. Questionable trace left pleural effusion with basilar atelectasis. 2. Right sided central line is in place with its tip in SVC. Recommendation: Follow up as clinically indicated. Electronically Signed by Gerry Carmona MD at 15-Shaquille-2022 06:55:39 AM             XR CHEST PORTABLE  Result Date: 6/14/2022  1. Unremarkable radiograph of chest. Recommendation: Follow up as clinically indicated. Electronically Signed by Ashley Monterroso MD at 15-Shaquille-2022 12:21:20 AM             Assessment/Plan   Principal Problem:    Obstructive uropathy  Active Problems:    Obstructive pyelonephritis    Sepsis with acute renal failure (HCC)    Left ureteral calculus    Renal calculus, bilateral    BINH (acute kidney injury) ([address]    Hyperglycemia due to diabetes mellitus ([address]    Hypertension    Dehydration    FREDDY and COPD overlap syndrome (HCC)    Degenerative joint disease    Palliative care patient    Septicemia ([address]    Chronic pain syndrome  Resolved Problems:    * No resolved hospital problems. *      Visit Summary:  Chart reviewed. Ms. Zoraida Chavez is seen at bedside this morning with OT present. She is more alert today than she has been entire hospital stay. She is answering questions appropriately and able to participate in conversation. We discussed current status and pain regimen. SW assisting with placement to SNF for continued rehab. I called and spoke with her daughter, Renee Jones, today who is on her way back to [place] from [facility] for continued assistance in d/c planning for her mom.  She reports she received a call from [facility] today after referral had been sent and is working with a  regarding POA. She is hopeful her mother will stay alert in order to have continued conversations regarding goals of care with her. [facility] can follow patient at both [facility] and [facility] once discharged. Will continue to follow. Recommendations:   1. Palliative Care- GOC d/c to SNF for rehab if pt qualifies with [facility] services. CM following for d/c planning and placement. Code status- DNR    2, Obstructive left pyelonephritits 2/2 proximal left urteral calculus- S/p ureteral stent placement 6/15/22 per Dr. Atnhony He. Urology following. Levaquin day #8    3. Septic shock with E coli bacteremia- 2/2 above, follow repeat blood cultures, NGTD. Abx per hospitalist. Weaned from pressors    4. BINH- improved. 5. Chronic pain syndrome-  chronic back pain with Norco and gabapentin baseline. Potential overuse at home noted. Pain regimen with ultram prn at this time    6. Afib with RVR new onset- suspect 2/2 above. NSR now and off amio gtt    7. Transaminitis-  Noted, unclear baseline LFTs. Continue to monitor labs    8. Acute hypoxic respiratory failure- mgmt per hospitalist, suspect multifactorial. Weaned to 6L    Thank you for consulting Palliative Care and allowing us to participate in the care of this patient.    Time Spent Counseling > 50%:  YES                                   Total Time Spent with patient/family counseling, workup/treatment review, counseling and placement of orders/preparation of this note: 29 minutes    Electronically signed by MATTHEW Jordan CNP on 6/22/2022 at 8:39 AM    (Please note that portions of this note were completed with a voice recognition program.  Rosario Hardin made to edit the dictations but occasionally words are mis-transcribed.)

## 2022-06-22 NOTE — CARE COORDINATION
Dtr Harmony Kwong expected from [facility] tomorrow AM. Dtr to provide medicaid application ppwrk necessary for hospice payor source. Once provided SNF can offer services under skilled services for admit and have medicaid pending for hospice services if necessary.

## 2022-06-22 NOTE — PROGRESS NOTES
Urology Progress Note    SUBJECTIVE: Patient resting in bed. Awake, more alert and oriented than yesterday. Answers questions appropriately and asks appropriate questions as well. OBJECTIVE:   Levaquin day #8  Had 2 days of cefepime while awaiting culture results which also is appropriate   So far, 10 days total of appropriate, culture sensitive antibiotics therapy    Review of Systems   Constitutional: Negative for chills and fever. Gastrointestinal: Negative for nausea and vomiting. Genitourinary: Negative for decreased urine volume. Musculoskeletal: Positive for back pain (R>L). Neurological: Positive for weakness. Psychiatric/Behavioral: Negative for confusion. Physical  VITALS:  BP (!) 161/69   Pulse 87   Temp 97 °F (36.1 °C) (Temporal)   Resp 24   Ht 5' 5\" (1.651 m)   Wt (!) 348 lb 9 oz (158.1 kg)   SpO2 95%   BMI 58.00 kg/m²   TEMPERATURE:  Current - Temp: 97 °F (36.1 °C); Max - Temp  Av.2 °F (36.2 °C)  Min: 96.5 °F (35.8 °C)  Max: 97.8 °F (36.6 °C)   24 HR I&O     Intake/Output Summary (Last 24 hours) at 2022 1207  Last data filed at 2022 6346  Gross per 24 hour   Intake 1000 ml   Output 1850 ml   Net -850 ml     BACK: flank tenderness: bilateral  ABDOMEN:  non-distended and non-tender  HEART:  normal rate  CHEST: Normal respiratory effort  GENITAL/URINARY: Krishna catheter to bedside drainage with dark yellow urine. Urine output adequate.     Data  CBC:   Recent Labs     22   WBC 18.0* 17.4*   HGB 13.3 13.0   HCT 43.0 41.5   PLT 54* 67*     BMP:    Recent Labs     22    144   K 4.0 3.4*    102   CO2 28 31*   BUN 50* 45*   CREATININE 1.0* 0.8   GLUCOSE 190* 148*       U/A:    Lab Results   Component Value Date    COLORU DARK YELLOW 06/15/2022    PHUR 5.0 06/15/2022    WBCUA 2-4 06/15/2022    YEAST Present 06/15/2022    BACTERIA 1+ 06/15/2022    CLARITYU TURBID 06/15/2022    SPECGRAV 1.026 06/15/2022 LEUKOCYTESUR SMALL 06/15/2022    UROBILINOGEN 1.0 06/15/2022    BILIRUBINUR MODERATE 06/15/2022    BLOODU MODERATE 06/15/2022    GLUCOSEU 100 06/15/2022    AMORPHOUS 2+ 06/15/2022       ASSESSMENT AND PLAN    Patient Active Problem List   Diagnosis    Obstructive pyelonephritis    Sepsis with acute renal failure ([address]    Left ureteral calculus    Renal calculus, bilateral    BINH (acute kidney injury) ([address]    Obstructive uropathy    Hyperglycemia due to diabetes mellitus [facility]    Hypertension    Dehydration    FREDDY and COPD overlap syndrome ([facility]    Degenerative joint disease    Palliative care patient    Septicemia ([facility]    Chronic pain syndrome         1.   Sepsis with septic shock secondary to left obstructive pyelonephritis from obstructing left ureteral stone status post left ureteral stent placement on 6/15/2022. Leukocytosis basically stable from yesterday. Patient remains afebrile. Has had a total of 10 days of antibiotic therapy at this point. 2 days of cefepime and 8 of Levaquin. We will plan to continue Levaquin until surgical intervention next week. Repeat blood cultures drawn on 6/18/2022 have no growth to date    2. Left obstructing proximal ureteral stone status post left ureteral stent placement as described above. Definitive treatment to occur next week after patient has received at least 14 days of antibiotic therapy.       MATTHEW BUSTILLOS CNP  6/22/2022 12:07 PM

## 2022-06-22 NOTE — PROGRESS NOTES
Facility/Department: [facility] PROGRESSIVE CARE  Daily Treatment Note  NAME: Fernanda Comer  : 1955  MRN: [id number]    Date of Service: 2022    Discharge Recommendations:  Patient would benefit from continued therapy after discharge       Assessment   Assessment: OOB to chair. Emphasis of tx was on mobility for ADL, UE function, and beginning level ADL. Improved overall today. Treatment Diagnosis: Obstructive uropathy, septic shock, hypoxic respiratory failure  Activity Tolerance  Activity Tolerance: Patient Tolerated treatment well  Activity Tolerance Comments: significantly less fatigued after therapy         Patient Diagnosis(es): The primary encounter diagnosis was Acute kidney injury ([facility] Diagnoses of Lower abdominal pain, Acute cystitis with hematuria, Septicemia ([facility] Ureteral calculus, Kidney stone, and Urinary tract infection without hematuria, site unspecified were also pertinent to this visit. has a past medical history of Arthritis and HTN (hypertension). has a past surgical history that includes Cholecystectomy; Ankle fracture surgery (Right); Hysterectomy; Incontinence surgery; and Bladder surgery (Left, 6/15/2022). Restrictions  Restrictions/Precautions  Restrictions/Precautions: Fall Risk  Position Activity Restriction  Other position/activity restrictions: whiting, fecal tube    Subjective   General  Chart Reviewed: Yes  Patient assessed for rehabilitation services?: Yes  Family / Caregiver Present: No  Pre Treatment Pain Screening  Pain at present: 2  Scale Used: Numeric Score  Intervention List: Patient able to continue with treatment     Objective    ADL  Feeding: Supervision (after special positioning and set up for select items, up in recliner while completing task)  Grooming:  Moderate assistance (able comb right side of head, has to dip head down to meet comb due to proximal arm weakness)  UE Bathing: Maximum assistance  LE Bathing: Maximum assistance (of two supine)  UE Dressing: Moderate assistance (in supported sitting)  LE Dressing: Maximum assistance (of two supine)  Toileting: Dependent/Total (whiting catheter, rectal tube, morbid obesity)        Toilet Transfers  Toilet Transfer: Dependent/Total  Bed mobility  Rolling to Left: Maximum assistance;2 Person assistance  Rolling to Right: Maximum assistance;2 Person assistance  Bed Mobility Comments: Much improved with active participation with rolling--initiation and active assist noted with head/neck movements and UE's grasping rails and overhead trapeze with assist.  Transfers  Transfer Comments: [place] M to recliner. Patient was actively involved in grasping bars with UE's, maintaining head control when in tall positions within the lift, also actively involved in rolling for sling placement                       Cognition  Cognition Comment: Awake, alert, making appropriate conversation. Intermittent confusion. Recalls getting into a shower earlier this morning. Response times are improved from yesterday. 06/22/22 1143   OT Exercises   Exercise Treatment AAROM BUE, trunk exercises,  AAROM LE exercises. Noted patient was able to raise RUE proximal arm partially against gravity                                   Plan   Plan  Times per Week: 4-8    Goals (On-Going)   Short Term Goals  Short Term Goal 1: Feed self with supervision after set up  Short Term Goal 2: Demo ability to participate in beginning level standing tasks from recliner  Short Term Goal 3: Independent with UE exercises, trunk exercises, therapeutic positioning  Long Term Goals  Long Term Goal 1: Upgrade as tolerated.        Tx Time  Minutes  Richar 6626 OT/L  Electronically signed by Emmanuelle Mendoza OT/L on 6/22/2022 at 11:47 AM.

## 2022-06-22 NOTE — PROGRESS NOTES
Hospitalist Progress Note  [facility]     Patient: Manjit Sampson  : 1955  MRN: [id number]  Code Status: DNR    Hospital Day: 7   Date of Service: 2022    Subjective:   Patient seen and examined. No acute distress. Past Medical History:   Diagnosis Date    Arthritis     HTN (hypertension)        Past Surgical History:   Procedure Laterality Date    ANKLE FRACTURE SURGERY Right     BLADDER SURGERY Left 6/15/2022    CYSTOSCOPY, LEFT URETEROSCOPY, RETROGRADE PYELOGRAM, STENT INSERTION performed by Eulalia Cross MD at [facility] (CERVIX STATUS UNKNOWN)      INCONTINENCE SURGERY         Family History   Problem Relation Age of Onset    Cancer Mother     Cancer Father        Social History     Socioeconomic History    Marital status: Single     Spouse name: Not on file    Number of children: Not on file    Years of education: Not on file    Highest education level: Not on file   Occupational History    Not on file   Tobacco Use    Smoking status: Former Smoker    Smokeless tobacco: Never Used    Tobacco comment: quit 10 years ago   Vaping Use    Vaping Use: Never used   Substance and Sexual Activity    Alcohol use: Never    Drug use: Never    Sexual activity: Not on file   Other Topics Concern    Not on file   Social History Narrative    Not on file     Social Determinants of Health     Financial Resource Strain:     Difficulty of Paying Living Expenses: Not on file   Food Insecurity:     Worried About [address] [employer] in the Last Year: Not on file    [address] in the Last Year: Not on file   Transportation Needs:     Lack of Transportation (Medical): Not on file    Lack of Transportation (Non-Medical):  Not on file   Physical Activity:     Days of Exercise per Week: Not on file    Minutes of Exercise per Session: Not on file   Stress:     Feeling of Stress : Not on file   Social Connections:     Frequency of Communication with Friends and Family: Not on file    Frequency of Social Gatherings with Friends and Family: Not on file    Attends Amish Services: Not on file    Active Member of Clubs or Organizations: Not on file    Attends Club or Organization Meetings: Not on file    Marital Status: Not on file   Intimate Partner Violence:     Fear of Current or Ex-Partner: Not on file    Emotionally Abused: Not on file    Physically Abused: Not on file    Sexually Abused: Not on file   Housing Stability:     Unable to Pay for Housing in the Last Year: Not on file    Number of [place] in the Last Year: Not on file    Unstable Housing in the Last Year: Not on file       Current Facility-Administered Medications   Medication Dose Route Frequency Provider Last Rate Last Admin    labetalol (NORMODYNE;TRANDATE) injection 10 mg  10 mg IntraVENous Q4H PRN Martha Cross MD   10 mg at 06/19/22 0334    lisinopril (PRINIVIL;ZESTRIL) tablet 20 mg  20 mg Oral BID Esthela Reza MD   20 mg at 06/22/22 1001    And    hydroCHLOROthiazide (HYDRODIURIL) tablet 12.5 mg  12.5 mg Oral BID Esthela Reza MD   12.5 mg at 06/22/22 1002    metoprolol tartrate (LOPRESSOR) tablet 100 mg  100 mg Oral BID Esthela Reza MD   100 mg at 06/22/22 1001    levoFLOXacin (LEVAQUIN) 750 MG/150ML infusion 750 mg  750 mg IntraVENous Q24H Kirsten Jang MD   Stopped at 06/22/22 0737    traMADol (ULTRAM) tablet 50 mg  50 mg Oral Q6H PRN Esthela Reza MD   50 mg at 06/22/22 1123    busPIRone (BUSPAR) tablet 10 mg  10 mg Oral TID Esthela Reza MD   10 mg at 06/22/22 1324    gabapentin (NEURONTIN) tablet 600 mg  600 mg Oral 4x Daily Kirsten Jang MD   600 mg at 06/22/22 1324    pravastatin (PRAVACHOL) tablet 40 mg  40 mg Oral Daily Kirsten Jang MD   40 mg at 06/22/22 1001    glucose chewable tablet 16 g  4 tablet Oral PRN Martha Cross MD        dextrose bolus 10% 125 mL  125 mL IntraVENous PRN Chow Biju Brendan Jones MD        Or    dextrose bolus 10% 250 mL  250 mL IntraVENous PRN Narayan Ramirez MD        glucagon (rDNA) injection 1 mg  1 mg IntraMUSCular PRN Narayan Ramirez MD        dextrose 5 % solution  100 mL/hr IntraVENous PRN Narayan Ramirez MD        insulin lispro (HUMALOG) injection vial 0-18 Units  0-18 Units SubCUTAneous TID WC Narayan Rmairez MD   3 Units at 06/22/22 1324    insulin lispro (HUMALOG) injection vial 0-9 Units  0-9 Units SubCUTAneous Nightly Narayan Ramirez MD   2 Units at 06/19/22 2123    insulin glargine (LANTUS) injection vial 10 Units  10 Units SubCUTAneous Nightly Narayan Ramirez MD   10 Units at 06/21/22 2147    sodium chloride flush 0.9 % injection 5-40 mL  5-40 mL IntraVENous 2 times per day Narayan Ramirez MD   10 mL at 06/22/22 1002    sodium chloride flush 0.9 % injection 5-40 mL  5-40 mL IntraVENous PRN Narayan Ramirez MD        0.9 % sodium chloride infusion   IntraVENous PRN Narayan Ramirez MD        ondansetron (ZOFRAN-ODT) disintegrating tablet 4 mg  4 mg Oral Q8H PRN Narayan Ramirez MD   4 mg at 06/21/22 1042    Or    ondansetron (ZOFRAN) injection 4 mg  4 mg IntraVENous Q6H PRN Narayan Ramirez MD   4 mg at 06/22/22 1448    polyethylene glycol (GLYCOLAX) packet 17 g  17 g Oral Daily PRN Narayan Ramirez MD        acetaminophen (TYLENOL) tablet 650 mg  650 mg Oral Q6H PRN Narayan Ramirez MD   650 mg at 06/22/22 1448    Or    acetaminophen (TYLENOL) suppository 650 mg  650 mg Rectal Q6H PRN Narayan Ramirez MD        potassium chloride 10 mEq/100 mL IVPB (Peripheral Line)  10 mEq IntraVENous PRN Narayan Ramirez MD        magnesium sulfate 2000 mg in 50 mL IVPB premix  2,000 mg IntraVENous PRN Narayan Ramirez MD   Stopped at 06/16/22 0640    ipratropium-albuterol (DUONEB) nebulizer solution 1 ampule  1 ampule Inhalation Q4H WA Triny Cabral MD   1 ampule at 06/22/22 1425    lactated ringers infusion   IntraVENous Continuous Enma Quintanilla MD 30 mL/hr at 06/19/22 1600 Rate Verify at 06/19/22 1600         dextrose      sodium chloride      lactated ringers 30 mL/hr at 06/19/22 1600        Objective:   BP (!) 130/55   Pulse 77   Temp 98.6 °F (37 °C) (Oral)   Resp 22   Ht 5' 5\" (1.651 m)   Wt (!) 348 lb 9 oz (158.1 kg)   SpO2 95%   BMI 58.00 kg/m²     General: no acute distress  HEENT: normocephalic, atraumatic  Neck: supple, symmetrical, trachea midline   Lungs: clear to auscultation bilaterally   Cardiovascular: s1 and s2 normal  Abdomen: soft, positive bowel sounds  Extremities: no edema or cyanosis   Neuro: alert, no acute focal motor deficits    Recent Labs     06/20/22 0135 06/22/22 0229   WBC 18.0* 17.4*   RBC 4.50 4.38   HGB 13.3 13.0   HCT 43.0 41.5   MCV 95.6 94.7   MCH 29.6 29.7   MCHC 30.9* 31.3*   PLT 54* 67*     Recent Labs     06/20/22 0135 06/22/22 0229    144   K 4.0 3.4*   ANIONGAP 10 11    102   CO2 28 31*   BUN 50* 45*   CREATININE 1.0* 0.8   GLUCOSE 190* 148*   CALCIUM 8.8 8.4*     Recent Labs     06/20/22 0135 06/22/22 0229   MG 2.0 1.9     Recent Labs     06/20/22 0135 06/22/22 0229   * 92*   ALT 69* 52*   BILITOT 3.5* 2.8*   ALKPHOS 189* 206*     No results for input(s): PH, PO2, PCO2, HCO3, BE, O2SAT in the last 72 hours. No results for input(s): TROPONINI in the last 72 hours. No results for input(s): INR in the last 72 hours. No results for input(s): LACTA in the last 72 hours. Intake/Output Summary (Last 24 hours) at 6/22/2022 1539  Last data filed at 6/22/2022 0648  Gross per 24 hour   Intake 1000 ml   Output 1400 ml   Net -400 ml       No results found.      Assessment and Plan:   Septic shock  Shock resolved  Secondary to below  Agents as below  IVF  Since weaned off phenylephrine gtt  Lactate improved overall     Obstructive left E. coli pyelonephritis  Urology following  Secondary to proximal left ureteral calculus  S/p left ureteral stent placement 6/15/2022 per Dr. Anibal Smith per sensitivities     E. coli bacteremia  Secondary to above  Levaquin per sensitivities  Repeat blood cultures NGTD     BINH  Resolved with current treatment plan  Avoid offending agents  Follow renal function/urine output/electrolytes     DM2  HbA1c 9.1  Poor control  Counseled  Meds on board     Morbid obesity  Counseled     Chronic pain syndrome  Patient has been bedbound for the last 5 years  Patient/daughter state she has been taking opioids for many years  I extensively explained how these agents can run counter to her current treatment plan  Patient continues to request home pain meds despite extensive counseling  Palliative/social work following per patient/family request  Possible transition to hospice but nothing official as of yet     New onset atrial fibrillation with RVR  Remains in sinus rhythm  Suspect secondary to above processes  AC contraindicated given thrombocytopenia  Serial troponin 0.01 > 0.02 > 0.02 > (<0.01)  Follow electrolytes     Acute hypoxic respiratory failure  Suspect multifactorial  Currently requiring HFNC 5-6 L, overall requirement decreased     DVT prophylaxis  SCDs given thrombocytopenia     Extensive discussions with patient, daughter Zulema Danielle), and son Ramón) in regards to current clinical state/goals of care.  All questions sought and answered.     Tristin Salmon MD   6/22/2022 3:39 PM

## 2022-06-23 LAB
ALBUMIN SERPL-MCNC: 2 G/DL (ref 3.5–5.2)
ALP BLD-CCNC: 206 U/L (ref 35–104)
ALT SERPL-CCNC: 39 U/L (ref 5–33)
ANION GAP SERPL CALCULATED.3IONS-SCNC: 8 MMOL/L (ref 7–19)
AST SERPL-CCNC: 75 U/L (ref 5–32)
BASOPHILS ABSOLUTE: 0.1 K/UL (ref 0–0.2)
BASOPHILS RELATIVE PERCENT: 0.3 % (ref 0–1)
BILIRUB SERPL-MCNC: 2.2 MG/DL (ref 0.2–1.2)
BLOOD CULTURE, ROUTINE: NORMAL
BUN BLDV-MCNC: 38 MG/DL (ref 8–23)
CALCIUM SERPL-MCNC: 8.6 MG/DL (ref 8.8–10.2)
CHLORIDE BLD-SCNC: 99 MMOL/L (ref 98–111)
CO2: 35 MMOL/L (ref 22–29)
CREAT SERPL-MCNC: 0.8 MG/DL (ref 0.5–0.9)
CULTURE, BLOOD 2: NORMAL
EOSINOPHILS ABSOLUTE: 0.1 K/UL (ref 0–0.6)
EOSINOPHILS RELATIVE PERCENT: 0.3 % (ref 0–5)
GFR AFRICAN AMERICAN: >59
GFR NON-AFRICAN AMERICAN: >60
GLUCOSE BLD-MCNC: 119 MG/DL (ref 70–99)
GLUCOSE BLD-MCNC: 123 MG/DL (ref 70–99)
GLUCOSE BLD-MCNC: 123 MG/DL (ref 74–109)
GLUCOSE BLD-MCNC: 90 MG/DL (ref 70–99)
GLUCOSE BLD-MCNC: 99 MG/DL (ref 70–99)
HCT VFR BLD CALC: 39.8 % (ref 37–47)
HEMOGLOBIN: 12.2 G/DL (ref 12–16)
IMMATURE GRANULOCYTES #: 1.1 K/UL
LYMPHOCYTES ABSOLUTE: 1.9 K/UL (ref 1.1–4.5)
LYMPHOCYTES RELATIVE PERCENT: 10.8 % (ref 20–40)
MAGNESIUM: 1.7 MG/DL (ref 1.6–2.4)
MCH RBC QN AUTO: 29.6 PG (ref 27–31)
MCHC RBC AUTO-ENTMCNC: 30.7 G/DL (ref 33–37)
MCV RBC AUTO: 96.6 FL (ref 81–99)
MONOCYTES ABSOLUTE: 1 K/UL (ref 0–0.9)
MONOCYTES RELATIVE PERCENT: 5.6 % (ref 0–10)
NEUTROPHILS ABSOLUTE: 13.4 K/UL (ref 1.5–7.5)
NEUTROPHILS RELATIVE PERCENT: 76.5 % (ref 50–65)
PDW BLD-RTO: 15.9 % (ref 11.5–14.5)
PERFORMED ON: ABNORMAL
PERFORMED ON: ABNORMAL
PERFORMED ON: NORMAL
PERFORMED ON: NORMAL
PLATELET # BLD: 78 K/UL (ref 130–400)
PMV BLD AUTO: 12.3 FL (ref 9.4–12.3)
POTASSIUM SERPL-SCNC: 3.5 MMOL/L (ref 3.5–5)
RBC # BLD: 4.12 M/UL (ref 4.2–5.4)
REASON FOR REJECTION: NORMAL
REJECTED TEST: NORMAL
SODIUM BLD-SCNC: 142 MMOL/L (ref 136–145)
TOTAL PROTEIN: 5.8 G/DL (ref 6.6–8.7)
WBC # BLD: 17.5 K/UL (ref 4.8–10.8)

## 2022-06-23 PROCEDURE — 6370000000 HC RX 637 (ALT 250 FOR IP): Performed by: INTERNAL MEDICINE

## 2022-06-23 PROCEDURE — 6360000002 HC RX W HCPCS: Performed by: INTERNAL MEDICINE

## 2022-06-23 PROCEDURE — 2580000003 HC RX 258: Performed by: INTERNAL MEDICINE

## 2022-06-23 PROCEDURE — 2700000000 HC OXYGEN THERAPY PER DAY

## 2022-06-23 PROCEDURE — 94761 N-INVAS EAR/PLS OXIMETRY MLT: CPT

## 2022-06-23 PROCEDURE — 94640 AIRWAY INHALATION TREATMENT: CPT

## 2022-06-23 PROCEDURE — 82947 ASSAY GLUCOSE BLOOD QUANT: CPT

## 2022-06-23 PROCEDURE — 6360000002 HC RX W HCPCS: Performed by: UROLOGY

## 2022-06-23 PROCEDURE — 80053 COMPREHEN METABOLIC PANEL: CPT

## 2022-06-23 PROCEDURE — 83735 ASSAY OF MAGNESIUM: CPT

## 2022-06-23 PROCEDURE — 6370000000 HC RX 637 (ALT 250 FOR IP): Performed by: UROLOGY

## 2022-06-23 PROCEDURE — 85025 COMPLETE CBC W/AUTO DIFF WBC: CPT

## 2022-06-23 PROCEDURE — 97530 THERAPEUTIC ACTIVITIES: CPT

## 2022-06-23 PROCEDURE — 97535 SELF CARE MNGMENT TRAINING: CPT

## 2022-06-23 PROCEDURE — 36415 COLL VENOUS BLD VENIPUNCTURE: CPT

## 2022-06-23 PROCEDURE — 99233 SBSQ HOSP IP/OBS HIGH 50: CPT

## 2022-06-23 PROCEDURE — 99232 SBSQ HOSP IP/OBS MODERATE 35: CPT | Performed by: NURSE PRACTITIONER

## 2022-06-23 PROCEDURE — 2140000000 HC CCU INTERMEDIATE R&B

## 2022-06-23 RX ORDER — GUAIFENESIN 200 MG/1
400 TABLET ORAL EVERY 8 HOURS
Status: DISCONTINUED | OUTPATIENT
Start: 2022-06-23 | End: 2022-07-02 | Stop reason: HOSPADM

## 2022-06-23 RX ADMIN — SALINE NASAL SPRAY 1 SPRAY: 1.5 SOLUTION NASAL at 15:04

## 2022-06-23 RX ADMIN — INSULIN GLARGINE 10 UNITS: 100 INJECTION, SOLUTION SUBCUTANEOUS at 20:34

## 2022-06-23 RX ADMIN — HYDROCHLOROTHIAZIDE 12.5 MG: 25 TABLET ORAL at 20:32

## 2022-06-23 RX ADMIN — SODIUM CHLORIDE, PRESERVATIVE FREE 10 ML: 5 INJECTION INTRAVENOUS at 07:52

## 2022-06-23 RX ADMIN — GABAPENTIN 600 MG: 600 TABLET, FILM COATED ORAL at 20:33

## 2022-06-23 RX ADMIN — LISINOPRIL 20 MG: 20 TABLET ORAL at 20:32

## 2022-06-23 RX ADMIN — METOPROLOL TARTRATE 100 MG: 50 TABLET, FILM COATED ORAL at 20:33

## 2022-06-23 RX ADMIN — SODIUM CHLORIDE, PRESERVATIVE FREE 10 ML: 5 INJECTION INTRAVENOUS at 20:33

## 2022-06-23 RX ADMIN — LEVOFLOXACIN 750 MG: 5 INJECTION, SOLUTION INTRAVENOUS at 05:56

## 2022-06-23 RX ADMIN — IPRATROPIUM BROMIDE AND ALBUTEROL SULFATE 1 AMPULE: 2.5; .5 SOLUTION RESPIRATORY (INHALATION) at 14:36

## 2022-06-23 RX ADMIN — GABAPENTIN 600 MG: 600 TABLET, FILM COATED ORAL at 07:51

## 2022-06-23 RX ADMIN — METOPROLOL TARTRATE 100 MG: 50 TABLET, FILM COATED ORAL at 07:51

## 2022-06-23 RX ADMIN — PRAVASTATIN SODIUM 40 MG: 20 TABLET ORAL at 07:51

## 2022-06-23 RX ADMIN — GUAIFENESIN 400 MG: 200 TABLET ORAL at 15:03

## 2022-06-23 RX ADMIN — GUAIFENESIN 400 MG: 200 TABLET ORAL at 20:32

## 2022-06-23 RX ADMIN — IPRATROPIUM BROMIDE AND ALBUTEROL SULFATE 1 AMPULE: 2.5; .5 SOLUTION RESPIRATORY (INHALATION) at 18:58

## 2022-06-23 RX ADMIN — IPRATROPIUM BROMIDE AND ALBUTEROL SULFATE 1 AMPULE: 2.5; .5 SOLUTION RESPIRATORY (INHALATION) at 10:28

## 2022-06-23 RX ADMIN — TRAMADOL HYDROCHLORIDE 50 MG: 50 TABLET, COATED ORAL at 07:51

## 2022-06-23 RX ADMIN — GABAPENTIN 600 MG: 600 TABLET, FILM COATED ORAL at 15:03

## 2022-06-23 RX ADMIN — BUSPIRONE HYDROCHLORIDE 10 MG: 10 TABLET ORAL at 15:04

## 2022-06-23 RX ADMIN — HYDROCHLOROTHIAZIDE 12.5 MG: 25 TABLET ORAL at 07:51

## 2022-06-23 RX ADMIN — BUSPIRONE HYDROCHLORIDE 10 MG: 10 TABLET ORAL at 20:32

## 2022-06-23 RX ADMIN — BUSPIRONE HYDROCHLORIDE 10 MG: 10 TABLET ORAL at 07:51

## 2022-06-23 RX ADMIN — LISINOPRIL 20 MG: 20 TABLET ORAL at 07:51

## 2022-06-23 RX ADMIN — IPRATROPIUM BROMIDE AND ALBUTEROL SULFATE 1 AMPULE: 2.5; .5 SOLUTION RESPIRATORY (INHALATION) at 06:53

## 2022-06-23 RX ADMIN — GABAPENTIN 600 MG: 600 TABLET, FILM COATED ORAL at 18:11

## 2022-06-23 RX ADMIN — ONDANSETRON HYDROCHLORIDE 4 MG: 2 SOLUTION INTRAMUSCULAR; INTRAVENOUS at 07:50

## 2022-06-23 ASSESSMENT — PAIN SCALES - WONG BAKER
WONGBAKER_NUMERICALRESPONSE: 0

## 2022-06-23 ASSESSMENT — ENCOUNTER SYMPTOMS
NAUSEA: 0
VOMITING: 0
BACK PAIN: 1

## 2022-06-23 NOTE — PROGRESS NOTES
Hospitalist Progress Note  [facility]     Patient: Priya Ocasio  : 1955  MRN: [id number]  Code Status: DNR    Hospital Day: 8   Date of Service: 2022    Subjective:   Patient seen and examined. No acute distress. Past Medical History:   Diagnosis Date    Arthritis     HTN (hypertension)        Past Surgical History:   Procedure Laterality Date    ANKLE FRACTURE SURGERY Right     BLADDER SURGERY Left 6/15/2022    CYSTOSCOPY, LEFT URETEROSCOPY, RETROGRADE PYELOGRAM, STENT INSERTION performed by Gurinder Webber MD at [facility] (CERVIX STATUS UNKNOWN)      INCONTINENCE SURGERY         Family History   Problem Relation Age of Onset    Cancer Mother     Cancer Father        Social History     Socioeconomic History    Marital status: Single     Spouse name: Not on file    Number of children: Not on file    Years of education: Not on file    Highest education level: Not on file   Occupational History    Not on file   Tobacco Use    Smoking status: Former Smoker    Smokeless tobacco: Never Used    Tobacco comment: quit 10 years ago   Vaping Use    Vaping Use: Never used   Substance and Sexual Activity    Alcohol use: Never    Drug use: Never    Sexual activity: Not on file   Other Topics Concern    Not on file   Social History Narrative    Not on file     Social Determinants of Health     Financial Resource Strain:     Difficulty of Paying Living Expenses: Not on file   Food Insecurity:     Worried About [address] [employer] in the Last Year: Not on file    [address] in the Last Year: Not on file   Transportation Needs:     Lack of Transportation (Medical): Not on file    Lack of Transportation (Non-Medical):  Not on file   Physical Activity:     Days of Exercise per Week: Not on file    Minutes of Exercise per Session: Not on file   Stress:     Feeling of Stress : Not on file   Social Connections:     Frequency of Communication with Friends and Family: Not on file    Frequency of Social Gatherings with Friends and Family: Not on file    Attends Orthodox Services: Not on file    Active Member of Clubs or Organizations: Not on file    Attends Club or Organization Meetings: Not on file    Marital Status: Not on file   Intimate Partner Violence:     Fear of Current or Ex-Partner: Not on file    Emotionally Abused: Not on file    Physically Abused: Not on file    Sexually Abused: Not on file   Housing Stability:     Unable to Pay for Housing in the Last Year: Not on file    Number of [place] in the Last Year: Not on file    Unstable Housing in the Last Year: Not on file       Current Facility-Administered Medications   Medication Dose Route Frequency Provider Last Rate Last Admin    magnesium sulfate 1000 mg in dextrose 5% 100 mL IVPB  1,000 mg IntraVENous PRN Tristin Salmon MD        potassium chloride (KLOR-CON M) extended release tablet 40 mEq  40 mEq Oral PRN Tristin Salmon MD   40 mEq at 06/22/22 1740    Or    potassium bicarb-citric acid (EFFER-K) effervescent tablet 40 mEq  40 mEq Oral PRN Tristin Salmon MD        Or    potassium chloride 10 mEq/100 mL IVPB (Peripheral Line)  10 mEq IntraVENous PRN Tristin Salmon MD        labetalol (NORMODYNE;TRANDATE) injection 10 mg  10 mg IntraVENous Q4H PRN Sienna Kyle MD   10 mg at 06/19/22 0334    lisinopril (PRINIVIL;ZESTRIL) tablet 20 mg  20 mg Oral BID Tristin Samlon MD   20 mg at 06/23/22 0751    And    hydroCHLOROthiazide (HYDRODIURIL) tablet 12.5 mg  12.5 mg Oral BID Tristin Salmon MD   12.5 mg at 06/23/22 0751    metoprolol tartrate (LOPRESSOR) tablet 100 mg  100 mg Oral BID Tristin Salmon MD   100 mg at 06/23/22 0751    levoFLOXacin (LEVAQUIN) 750 MG/150ML infusion 750 mg  750 mg IntraVENous Q24H Cal Vargas MD   Stopped at 06/23/22 0755    traMADol (ULTRAM) tablet 50 mg  50 mg Oral Q6H PRN Tristin Salmon MD   50 mg at 06/23/22 9788    busPIRone (BUSPAR) tablet 10 mg  10 mg Oral TID Tristin Salmon MD   10 mg at 06/23/22 0751    gabapentin (NEURONTIN) tablet 600 mg  600 mg Oral 4x Daily Cal Vargas MD   600 mg at 06/23/22 0751    pravastatin (PRAVACHOL) tablet 40 mg  40 mg Oral Daily Cal Vargas MD   40 mg at 06/23/22 0751    glucose chewable tablet 16 g  4 tablet Oral PRN Sienna Kyle MD        dextrose bolus 10% 125 mL  125 mL IntraVENous PRN Sienna Kyle MD        Or    dextrose bolus 10% 250 mL  250 mL IntraVENous PRN Sienna Kyle MD        glucagon (rDNA) injection 1 mg  1 mg IntraMUSCular PRN Sienna Kyle MD        dextrose 5 % solution  100 mL/hr IntraVENous PRN Sienna Kyle MD        insulin lispro (HUMALOG) injection vial 0-18 Units  0-18 Units SubCUTAneous TID WC Sienna Kyle MD   3 Units at 06/22/22 1753    insulin lispro (HUMALOG) injection vial 0-9 Units  0-9 Units SubCUTAneous Nightly Sienna Kyle MD   2 Units at 06/19/22 2123    insulin glargine (LANTUS) injection vial 10 Units  10 Units SubCUTAneous Nightly Sienna Kyle MD   10 Units at 06/22/22 2008    sodium chloride flush 0.9 % injection 5-40 mL  5-40 mL IntraVENous 2 times per day Sienna Kyle MD   10 mL at 06/23/22 0752    sodium chloride flush 0.9 % injection 5-40 mL  5-40 mL IntraVENous PRN Sienna Kyle MD        0.9 % sodium chloride infusion   IntraVENous PRN Sienna Kyle MD        ondansetron (ZOFRAN-ODT) disintegrating tablet 4 mg  4 mg Oral Q8H PRN Sienna Kyle MD   4 mg at 06/21/22 1042    Or    ondansetron (ZOFRAN) injection 4 mg  4 mg IntraVENous Q6H PRN Sienna Kyle MD   4 mg at 06/23/22 0750    polyethylene glycol (GLYCOLAX) packet 17 g  17 g Oral Daily PRN Sienna Kyle MD        acetaminophen (TYLENOL) tablet 650 mg  650 mg Oral Q6H PRN Sienna Kyle MD   650 mg at 06/22/22 1448    Or    acetaminophen (TYLENOL) suppository 650 mg  650 mg Rectal Q6H PRN Narayan Ramirez MD        potassium chloride 10 mEq/100 mL IVPB (Peripheral Line)  10 mEq IntraVENous PRN Narayan Ramirez MD        magnesium sulfate 2000 mg in 50 mL IVPB premix  2,000 mg IntraVENous PRN Narayan Ramirez MD   Stopped at 06/16/22 0640    ipratropium-albuterol (DUONEB) nebulizer solution 1 ampule  1 ampule Inhalation Q4H WA Narayan Ramirez MD   1 ampule at 06/23/22 1028    lactated ringers infusion   IntraVENous Continuous John Falk MD 30 mL/hr at 06/19/22 1600 Rate Verify at 06/19/22 1600         dextrose      sodium chloride      lactated ringers 30 mL/hr at 06/19/22 1600        Objective:   /67   Pulse 76   Temp 96.8 °F (36 °C)   Resp 17   Ht 5' 5\" (1.651 m)   Wt (!) 348 lb 9 oz (158.1 kg)   SpO2 93%   BMI 58.00 kg/m²     General: no acute distress  HEENT: normocephalic, atraumatic  Neck: supple, symmetrical, trachea midline   Lungs: clear to auscultation bilaterally   Cardiovascular: s1 and s2 normal  Abdomen: soft, positive bowel sounds  Extremities: no edema or cyanosis   Neuro: alert, no acute focal motor deficits    Recent Labs     06/22/22 0229 06/23/22 0227   WBC 17.4* 17.5*   RBC 4.38 4.12*   HGB 13.0 12.2   HCT 41.5 39.8   MCV 94.7 96.6   MCH 29.7 29.6   MCHC 31.3* 30.7*   PLT 67* 78*     Recent Labs     06/22/22 0229 06/23/22  0359    142   K 3.4* 3.5   ANIONGAP 11 8    99   CO2 31* 35*   BUN 45* 38*   CREATININE 0.8 0.8   GLUCOSE 148* 123*   CALCIUM 8.4* 8.6*     Recent Labs     06/22/22 0229 06/23/22  0359   MG 1.9 1.7     Recent Labs     06/22/22 0229 06/23/22  0359   AST 92* 75*   ALT 52* 39*   BILITOT 2.8* 2.2*   ALKPHOS 206* 206*     No results for input(s): PH, PO2, PCO2, HCO3, BE, O2SAT in the last 72 hours. No results for input(s): TROPONINI in the last 72 hours. No results for input(s): INR in the last 72 hours.   No results for input(s): LACTA in the last 72 hours. Intake/Output Summary (Last 24 hours) at 6/23/2022 1200  Last data filed at 6/23/2022 1016  Gross per 24 hour   Intake 360 ml   Output 2050 ml   Net -1690 ml       No results found.      Assessment and Plan:   Septic shock  Shock resolved  Secondary to below  Agents as below  IVF  Since weaned off phenylephrine gtt  Lactate improved overall     Obstructive left E. coli pyelonephritis  Urology following  Secondary to proximal left ureteral calculus  S/p left ureteral stent placement 6/15/2022 per Dr. Alex Hamm per sensitivities  Plans for definitive treatment per urology next week     E. coli bacteremia  Secondary to above  Levaquin per sensitivities  Repeat blood cultures NGTD     BINH  Resolved with current treatment plan  Avoid offending agents  Follow renal function/urine output/electrolytes     DM2  HbA1c 9.1  Poor control  Counseled  Meds on board     Morbid obesity  Counseled     Chronic pain syndrome  Patient has been bedbound for the last 5 years  Patient/daughter state she has been taking opioids for many years  I extensively explained how these agents can run counter to her current treatment plan  Patient continues to request home pain meds despite extensive counseling  Palliative/social work following per patient/family request  Possible transition to hospice but nothing official as of yet     New onset atrial fibrillation with RVR  Remains in sinus rhythm  Suspect secondary to above processes  AC contraindicated given thrombocytopenia  Serial troponin 0.01 > 0.02 > 0.02 > (<0.01)  Follow electrolytes     Acute hypoxic respiratory failure  Suspect multifactorial  Currently requiring HFNC 5-6 L, overall requirement decreased  Continue to wean as tolerated     DVT prophylaxis  SCDs given thrombocytopenia     Extensive discussions with patient, daughter Nae Solano), and son (Ev[place]) in regards to current clinical state/goals of care.  All questions sought and answered.     Shira Cowart MD   6/23/2022 12:00 PM

## 2022-06-23 NOTE — PROGRESS NOTES
Facility/Department: [facility] PROGRESSIVE CARE  Daily Treatment Note  NAME: Rafat Lopez  : 1955  MRN: [id number]    Date of Service: 2022    Discharge Recommendations:  Patient would benefit from continued therapy after discharge       Assessment   Assessment: Assisted back to bed with Maxi Move, participated in ADL training, UE exercises, and bed mobility. Good participation  Treatment Diagnosis: Obstructive uropathy, septic shock, hypoxic respiratory failure  Activity Tolerance  Activity Tolerance: Patient Tolerated treatment well         Patient Diagnosis(es): The primary encounter diagnosis was Acute kidney injury ([address] Diagnoses of Lower abdominal pain, Acute cystitis with hematuria, Septicemia ([address] Ureteral calculus, Kidney stone, and Urinary tract infection without hematuria, site unspecified were also pertinent to this visit. has a past medical history of Arthritis and HTN (hypertension). has a past surgical history that includes Cholecystectomy; Ankle fracture surgery (Right); Hysterectomy; Incontinence surgery; and Bladder surgery (Left, 6/15/2022).     Restrictions  Restrictions/Precautions  Restrictions/Precautions: Fall Risk  Position Activity Restriction  Other position/activity restrictions: whiting, fecal tube    Subjective   General  Chart Reviewed: Yes  Patient assessed for rehabilitation services?: Yes  Family / Caregiver Present: Yes (dtr)  Pre Treatment Pain Screening  Pain at present: 4  Scale Used: Numeric Score  Intervention List: Patient able to continue with treatment  Comments / Details: left knee pain with movement, no pain at rest     Objective    ADL  Feeding: Supervision (with fingerfoods)  Grooming: Supervision;Setup (brushing teeth)  Toileting: Dependent/Total (patient participates by active involvement in rolling and maintaining sidelying during kermit hygiene tasks)             22 1108   Observation/Palpation   Observation The patient was able to scoot forward one hip at a time  with just min A  while in recliner to get her feet in good contact with the floor. She was able to sit unsupported with good upright posture but does fatigue after just a few minutes and asks to lean back. Did have the patiet attempt sit to stand from recliner. The patient did initiate the movement  but was unable to lift hips off seat of recliner with assist of two.      06/23/22 1112   OT Exercises   Exercise Treatment Participated in AAROM and AROM BUE. Left is weaker than right     Bed mobility  Rolling to Left: Maximum assistance;2 Person assistance (active participation is primarily with UB involvement)  Rolling to Right: Maximum assistance;2 Person assistance  Transfers  Transfer Comments: Maxi Move back to bed. Patient was actively involved in grasping bars with UE's, maintaining head control when in tall positions within the lift, also actively involved in rolling for sling removal                       Cognition  Cognition Comment: Awake, alert, following commands. States she was hallucinating last night about sparkly things. Plan   Plan  Times per Week: 4-8    Goals (On-Going)   Short Term Goals  Short Term Goal 1: Feed self with supervision after set up  Short Term Goal 2: Demo ability to participate in beginning level standing tasks from recliner  Short Term Goal 3: Independent with UE exercises, trunk exercises, therapeutic positioning  Long Term Goals  Long Term Goal 1: Upgrade as tolerated.        Tx Time  Minutes  Bienvenido Luis OT/L  Electronically signed by Shea Mcardle OT/L on 6/23/2022 at 11:14 AM.

## 2022-06-23 NOTE — PROGRESS NOTES
Palliative Care Progress Note  6/23/2022 8:10 AM    Patient:  Jh Suarez  YOB: 1955  Primary Care Physician: Jesus Todd MD  Advance Directive: DNR  Admit Date: 6/14/2022       Hospital Day: 8  Portions of this note have been copied forward, however, changed to reflect the most current clinical status of this patient. CHIEF COMPLAINT/REASON FOR CONSULTATION goals of care, code status discussion, and family support. SUBJECTIVE:  Ms. Tasneem Elliott is alert and sitting up in recliner chair. She denies pain this morning. PT/OT working with her. Review of Systems:   14 point review of systems is negative except as specifically addressed above. Objective:   VITALS:  /67   Pulse 76   Temp 96.8 °F (36 °C)   Resp 17   Ht 5' 5\" (1.651 m)   Wt (!) 348 lb 9 oz (158.1 kg)   SpO2 93%   BMI 58.00 kg/m²   24HR INTAKE/OUTPUT:      Intake/Output Summary (Last 24 hours) at 6/23/2022 0810  Last data filed at 6/23/2022 0534  Gross per 24 hour   Intake 240 ml   Output 2050 ml   Net -1810 ml     General appearance: 79 yo female, chronically ill appearing, NAD  Head: Normocephalic, without obvious abnormality, atraumatic  Eyes: conjunctivae/corneas clear. PERRL, EOM's intact. Ears: normal external ears and nose  Neck: no JVD, supple, symmetrical, trachea midline   Lungs: clear and diminished in bases to auscultation bilaterally, shallow, NC in place  Heart: RRR, S1, S2 normal, no murmur  Abdomen: Obese, taut, no grimacing to palpation, normal bowel sounds, rectal tube in place with diarrhea noted  Extremities: BLE 1+ edema,  No erythema, no to palpation  Skin: Warm, dry/flaky, pale,   Neurologic: Alert, oriented x3, generalized weakness, speech fluent, follows commands.       Medications:      dextrose      sodium chloride      lactated ringers 30 mL/hr at 06/19/22 1600      lisinopril  20 mg Oral BID    And    hydroCHLOROthiazide  12.5 mg Oral BID    metoprolol  100 mg Oral BID    levofloxacin  750 mg IntraVENous Q24H    busPIRone  10 mg Oral TID    gabapentin  600 mg Oral 4x Daily    pravastatin  40 mg Oral Daily    insulin lispro  0-18 Units SubCUTAneous TID     insulin lispro  0-9 Units SubCUTAneous Nightly    insulin glargine  10 Units SubCUTAneous Nightly    sodium chloride flush  5-40 mL IntraVENous 2 times per day    ipratropium-albuterol  1 ampule Inhalation Q4H WA     magnesium sulfate, potassium chloride **OR** potassium alternative oral replacement **OR** potassium chloride, labetalol, traMADol, glucose, dextrose bolus **OR** dextrose bolus, glucagon (rDNA), dextrose, sodium chloride flush, sodium chloride, ondansetron **OR** ondansetron, polyethylene glycol, acetaminophen **OR** acetaminophen, potassium chloride, magnesium sulfate  ADULT ORAL NUTRITION SUPPLEMENT; Breakfast, Lunch, Dinner; Clear Liquid Oral Supplement  ADULT DIET; Dysphagia - Soft and Bite Sized; 4 carb choices (60 gm/meal); Low Fat/Low Chol/High Fiber/2 gm Na     Lab and other Data:     Recent Labs     06/22/22 0229 06/23/22 0227   WBC 17.4* 17.5*   HGB 13.0 12.2   PLT 67* 78*     Recent Labs     06/22/22 0229 06/23/22  0359    142   K 3.4* 3.5    99   CO2 31* 35*   BUN 45* 38*   CREATININE 0.8 0.8   GLUCOSE 148* 123*     Recent Labs     06/22/22 0229 06/23/22  0359   AST 92* 75*   ALT 52* 39*   BILITOT 2.8* 2.2*   ALKPHOS 206* 206*     RAD:   CT ABDOMEN PELVIS WO CONTRAST Additional Contrast? None  Result Date: 6/15/2022  1. There is 6 mm obstructive calculus in the left upper ureter with mild proximal hydroureteronephrosis and perinephric inflammatory changes. 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 Bilateral non-obstructing renal calculi noted. 2. Mild hepatomegaly with mild hepatic steatosis. 3. Colonic diverticulosis without evidence of acute diverticulitis.  4. Small fat containing umbilical and supra umbilical hernia noted. Recommendation: Follow up as clinically indicated. All CT scans at this facility utilize dose modulation, iterative reconstruction, and/or weight based dosing when appropriate to reduce radiation dose to as low as reasonably achievable. Amended by Velvet Fuller MD at 15-Shaquille-2022 02:44:19 AM Electronically Signed by Velvet Fuller MD at 15-[place]aquille-2022 02:29:46 AM             XR CHEST PORTABLE  Result Date: 6/15/2022  1. Questionable trace left pleural effusion with basilar atelectasis. 2. Right sided central line is in place with its tip in SVC. Recommendation: Follow up as clinically indicated. Electronically Signed by Pamela Cortes MD at 15-[place]aquille-2022 06:55:39 AM             XR CHEST PORTABLE  Result Date: 6/14/2022  1. Unremarkable radiograph of chest. Recommendation: Follow up as clinically indicated. Electronically Signed by Renay Nunez MD at 15-[place]aquille-2022 12:21:20 AM             Assessment/Plan   Principal Problem:    Obstructive uropathy  Active Problems:    Obstructive pyelonephritis    Sepsis with acute renal failure (HCC)    Left ureteral calculus    Renal calculus, bilateral    BINH (acute kidney injury) ([address]    Hyperglycemia due to diabetes mellitus ([address]    Hypertension    Dehydration    FREDDY and COPD overlap syndrome (HCC)    Degenerative joint disease    Palliative care patient    Septicemia ([address]    Chronic pain syndrome  Resolved Problems:    * No resolved hospital problems. *      Visit Summary:  Chart reviewed. Report obtained from RN with no acute events overnight. Ms. Mumtaz Nava is seen at bedside this morning with her daughter, Merideth Saint. She is much improved today and working with therapy. She reports having a rough night with little sleep last night and feels she over did it when her family came to visit yesterday evening.  Merideth Saint is requesting to speak with the hospitalist. After pt was finished with therapy the hospitalist, RN, and I all saw pt and updated Hiram Lesch together. We discussed pt status and plan of care. Goal remains for pt to d/c SNF for continued rehab following surgery 6/28/22 with urology. [facility] is to following once medically stable to d/c. Opportunity for questions and emotional support provided. Will follow. Recommendations:   1. Palliative Care- GOC d/c to SNF for rehab with [facility] services. CM following for d/c planning and placement. Code status- DNR    2, Obstructive left pyelonephritits 2/2 proximal left urteral calculus- S/p ureteral stent placement 6/15/22 per Dr. Anthony He. Urology following. Levaquin day #9. Plans for OR 6/28/22    3. Septic shock with E coli bacteremia- 2/2 above, follow repeat blood cultures, NGTD. Abx per hospitalist. Weaned from pressors    4. BINH- improved. 5. Chronic pain syndrome-  chronic back pain with Norco and gabapentin baseline. Potential overuse at home noted. Pain regimen with ultram prn at this time    6. Afib with RVR new onset- suspect 2/2 above. NSR now and off amio gtt    7. Transaminitis-  Noted, unclear baseline LFTs. Continue to monitor labs    8. Acute hypoxic respiratory failure- mgmt per hospitalist, suspect multifactorial. Attempting further O2 weaning today. Thank you for consulting Palliative Care and allowing us to participate in the care of this patient.    Time Spent Counseling > 50%:  YES                                   Total Time Spent with patient/family counseling, workup/treatment review, counseling and placement of orders/preparation of this note: 36 minutes    Electronically signed by MATTHEW Jordan CNP on 6/23/2022 at 8:10 AM    (Please note that portions of this note were completed with a voice recognition program.  Rosario Hardin made to edit the dictations but occasionally words are mis-transcribed.)

## 2022-06-23 NOTE — PLAN OF CARE
Problem: Discharge Planning  Goal: Discharge to home or other facility with appropriate resources  Recent Flowsheet Documentation  Taken 6/22/2022 2020 by Tyrel Combs RN  Discharge to home or other facility with appropriate resources:   Identify barriers to discharge with patient and caregiver   Arrange for needed discharge resources and transportation as appropriate     Problem: Safety - Adult  Goal: Free from fall injury  Recent Flowsheet Documentation  Taken 6/23/2022 0001 by Tyrel Combs RN  Free From Fall Injury: Instruct family/caregiver on patient safety     Problem: ABCDS Injury Assessment  Goal: Absence of physical injury  Recent Flowsheet Documentation  Taken 6/23/2022 0001 by Tyrel Combs RN  Absence of Physical Injury: Implement safety measures based on patient assessment     Problem: Chronic Conditions and Co-morbidities  Goal: Patient's chronic conditions and co-morbidity symptoms are monitored and maintained or improved  Recent Flowsheet Documentation  Taken 6/22/2022 2020 by Dwight Fatima 34 - Patient's Chronic Conditions and Co-Morbidity Symptoms are Monitored and Maintained or Improved:   Monitor and assess patient's chronic conditions and comorbid symptoms for stability, deterioration, or improvement   Collaborate with multidisciplinary team to address chronic and comorbid conditions and prevent exacerbation or deterioration     Problem: Neurosensory - Adult  Goal: Achieves stable or improved neurological status  Recent Flowsheet Documentation  Taken 6/22/2022 2020 by Tyrel Combs RN  Achieves stable or improved neurological status:   Assess for and report changes in neurological status   Maintain blood pressure and fluid volume within ordered parameters to optimize cerebral perfusion and minimize risk of hemorrhage     Problem: Respiratory - Adult  Goal: Achieves optimal ventilation and oxygenation  Recent Flowsheet Documentation  Taken 6/22/2022 2020 by Tyrel Combs RN  Achieves optimal ventilation and oxygenation:   Assess for changes in respiratory status   Assess for changes in mentation and behavior   Position to facilitate oxygenation and minimize respiratory effort   Oxygen supplementation based on oxygen saturation or arterial blood gases     Problem: Cardiovascular - Adult  Goal: Absence of cardiac dysrhythmias or at baseline  Recent Flowsheet Documentation  Taken 6/22/2022 2020 by Nolberto Womack RN  Absence of cardiac dysrhythmias or at baseline: Monitor cardiac rate and rhythm     Problem: Skin/Tissue Integrity - Adult  Goal: Skin integrity remains intact  Recent Flowsheet Documentation  Taken 6/23/2022 0001 by Nolberto Womack RN  Skin Integrity Remains Intact: Monitor for areas of redness and/or skin breakdown  Taken 6/22/2022 2020 by Nolberto Womack RN  Skin Integrity Remains Intact: Monitor for areas of redness and/or skin breakdown     Problem: Musculoskeletal - Adult  Goal: Maintain proper alignment of affected body part  Recent Flowsheet Documentation  Taken 6/22/2022 2020 by Nolberto Womack RN  Maintain proper alignment of affected body part: Support and protect limb and body alignment per provider's orders     Problem: Genitourinary - Adult  Goal: Urinary catheter remains patent  Recent Flowsheet Documentation  Taken 6/22/2022 2020 by Nolberto Womack RN  Urinary catheter remains patent: Assess patency of urinary catheter     Problem: Infection - Adult  Goal: Absence of infection during hospitalization  Recent Flowsheet Documentation  Taken 6/22/2022 2020 by Nolberto Womack RN  Absence of infection during hospitalization:   Assess and monitor for signs and symptoms of infection   Monitor lab/diagnostic results   Monitor all insertion sites i.e., indwelling lines, tubes and drains     Problem: Metabolic/Fluid and Electrolytes - Adult  Goal: Electrolytes maintained within normal limits  Recent Flowsheet Documentation  Taken 6/22/2022 2020 by Nolberto Womack RN  Electrolytes maintained within normal limits:   Monitor labs and assess patient for signs and symptoms of electrolyte imbalances   Administer electrolyte replacement as ordered  Goal: Glucose maintained within prescribed range  Recent Flowsheet Documentation  Taken 6/22/2022 2020 by Kellee Felix RN  Glucose maintained within prescribed range:   Monitor blood glucose as ordered   Assess for signs and symptoms of hyperglycemia and hypoglycemia     Problem: Confusion  Goal: Confusion, delirium, dementia, or psychosis is improved or at baseline  Description: INTERVENTIONS:  1. Assess for possible contributors to thought disturbance, including medications, impaired vision or hearing, underlying metabolic abnormalities, dehydration, psychiatric diagnoses, and notify attending LIP  2. Welcome high risk fall precautions, as indicated  3. Provide frequent short contacts to provide reality reorientation, refocusing and direction  4. Decrease environmental stimuli, including noise as appropriate  5. Monitor and intervene to maintain adequate nutrition, hydration, elimination, sleep and activity  6. If unable to ensure safety without constant attention obtain sitter and review sitter guidelines with assigned personnel  7.  Initiate Psychosocial CNS and Spiritual Care consult, as indicated  Recent Flowsheet Documentation  Taken 6/22/2022 2020 by Kellee Felix RN  Effect of thought disturbance (confusion, delirium, dementia, or psychosis) are managed with adequate functional status: Assess for contributors to thought disturbance, including medications, impaired vision or hearing, underlying metabolic abnormalities, dehydration, psychiatric diagnoses, notify LIP

## 2022-06-23 NOTE — PROGRESS NOTES
Patient had poor sleep last night. Will continue to monitor . Patient is up in the chair. Tolerating well. Call light is within reach .

## 2022-06-23 NOTE — PROGRESS NOTES
Urology Progress Note    SUBJECTIVE: Patient sitting up in the chair. Daughter, Lui Kaufman, at bedside. Answered several questions about planned surgical intervention next week after completing course of antibiotics. Patient did not sleep well last night, so she is somewhat tired, but is not confused and answers questions appropriately. OBJECTIVE:   Levaquin day #9  Cefepime x2  Total of 11 days of appropriate, culture sensitive antibiotic therapy    Review of Systems   Constitutional: Negative for chills and fever. Gastrointestinal: Negative for nausea and vomiting. Genitourinary: Negative for decreased urine volume. Musculoskeletal: Positive for back pain (R>L). Neurological: Positive for weakness. Psychiatric/Behavioral: Negative for confusion. Physical  VITALS:  BP (!) 144/68   Pulse 81   Temp 97.7 °F (36.5 °C) (Oral)   Resp 18   Ht 5' 5\" (1.651 m)   Wt (!) 348 lb 9 oz (158.1 kg)   SpO2 91%   BMI 58.00 kg/m²   TEMPERATURE:  Current - Temp: 97.7 °F (36.5 °C); Max - Temp  Av.5 °F (36.4 °C)  Min: 96.8 °F (36 °C)  Max: 99 °F (37.2 °C)   24 HR I&O     Intake/Output Summary (Last 24 hours) at 2022 1713  Last data filed at 2022 1628  Gross per 24 hour   Intake 560 ml   Output 3200 ml   Net -2640 ml     BACK: flank tenderness: bilateral  ABDOMEN:  non-distended and non-tender  HEART:  normal rate  CHEST: Normal respiratory effort  GENITAL/URINARY: Krishna catheter to bedside drainage with dark yellow urine. Urine output continues to be active.     Data  CBC:   Recent Labs     22   WBC 17.4* 17.5*   HGB 13.0 12.2   HCT 41.5 39.8   PLT 67* 78*     BMP:    Recent Labs     22  0359    142   K 3.4* 3.5    99   CO2 31* 35*   BUN 45* 38*   CREATININE 0.8 0.8   GLUCOSE 148* 123*       U/A:    Lab Results   Component Value Date    COLORU DARK YELLOW 06/15/2022    PHUR 5.0 06/15/2022    WBCUA 2-4 06/15/2022    YEAST Present 06/15/2022    BACTERIA 1+ 06/15/2022    CLARITYU TURBID 06/15/2022    SPECGRAV 1.026 06/15/2022    LEUKOCYTESUR SMALL 06/15/2022    UROBILINOGEN 1.0 06/15/2022    BILIRUBINUR MODERATE 06/15/2022    BLOODU MODERATE 06/15/2022    GLUCOSEU 100 06/15/2022    AMORPHOUS 2+ 06/15/2022       ASSESSMENT AND PLAN    Patient Active Problem List   Diagnosis    Obstructive pyelonephritis    Sepsis with acute renal failure ([address]    Left ureteral calculus    Renal calculus, bilateral    BINH (acute kidney injury) ([address]    Obstructive uropathy    Hyperglycemia due to diabetes mellitus ([address]    Hypertension    Dehydration    FREDDY and COPD overlap syndrome ([address]    Degenerative joint disease    Palliative care patient    [facility]ic[facility]    Chronic pain syndrome       1.     Sepsis with septic shock secondary to left obstructive pyelonephritis from obstructing left ureteral stone status post left ureteral stent placement on 6/15/2022. Leukocytosis remains stable. 1 instance of low-grade fever up to 99 °F.  11 days of antibiotic therapy completed, 2 days of cefepime and 9 of Levaquin. We will continue Levaquin until surgical intervention on 6/28/2022. Repeat blood cultures drawn on 6/18/2022 continue with no growth.     2.     Left obstructing proximal ureteral stone status post left ureteral stent placement as described above. Definitive treatment to occur next week after patient has received 14 days of antibiotic therapy. We will continue external Krishna catheter for drainage at this time. Patient extremely weak and unable to maneuver herself to the restroom. Will attempt voiding trial when she becomes stronger.       JAVY HERNANDEZ, MATTHEW - CNP  6/23/2022 5:13 PM

## 2022-06-24 PROBLEM — J96.01 ACUTE RESPIRATORY FAILURE WITH HYPOXIA (HCC): Status: ACTIVE | Noted: 2022-06-24

## 2022-06-24 LAB
ALBUMIN SERPL-MCNC: 2.4 G/DL (ref 3.5–5.2)
ALP BLD-CCNC: 208 U/L (ref 35–104)
ALT SERPL-CCNC: 31 U/L (ref 5–33)
ANION GAP SERPL CALCULATED.3IONS-SCNC: 10 MMOL/L (ref 7–19)
AST SERPL-CCNC: 67 U/L (ref 5–32)
BASOPHILS ABSOLUTE: 0.1 K/UL (ref 0–0.2)
BASOPHILS RELATIVE PERCENT: 0.4 % (ref 0–1)
BILIRUB SERPL-MCNC: 2.4 MG/DL (ref 0.2–1.2)
BUN BLDV-MCNC: 32 MG/DL (ref 8–23)
CALCIUM SERPL-MCNC: 8.1 MG/DL (ref 8.8–10.2)
CHLORIDE BLD-SCNC: 97 MMOL/L (ref 98–111)
CO2: 37 MMOL/L (ref 22–29)
CREAT SERPL-MCNC: 0.7 MG/DL (ref 0.5–0.9)
EOSINOPHILS ABSOLUTE: 0 K/UL (ref 0–0.6)
EOSINOPHILS RELATIVE PERCENT: 0.2 % (ref 0–5)
GFR AFRICAN AMERICAN: >59
GFR NON-AFRICAN AMERICAN: >60
GLUCOSE BLD-MCNC: 107 MG/DL (ref 70–99)
GLUCOSE BLD-MCNC: 114 MG/DL (ref 70–99)
GLUCOSE BLD-MCNC: 114 MG/DL (ref 70–99)
GLUCOSE BLD-MCNC: 123 MG/DL (ref 70–99)
GLUCOSE BLD-MCNC: 125 MG/DL (ref 74–109)
GLUCOSE BLD-MCNC: 86 MG/DL (ref 70–99)
HCT VFR BLD CALC: 40.1 % (ref 37–47)
HEMOGLOBIN: 12.6 G/DL (ref 12–16)
IMMATURE GRANULOCYTES #: 1.2 K/UL
LYMPHOCYTES ABSOLUTE: 2.2 K/UL (ref 1.1–4.5)
LYMPHOCYTES RELATIVE PERCENT: 11.3 % (ref 20–40)
MAGNESIUM: 1.7 MG/DL (ref 1.6–2.4)
MCH RBC QN AUTO: 29.7 PG (ref 27–31)
MCHC RBC AUTO-ENTMCNC: 31.4 G/DL (ref 33–37)
MCV RBC AUTO: 94.6 FL (ref 81–99)
MONOCYTES ABSOLUTE: 1.1 K/UL (ref 0–0.9)
MONOCYTES RELATIVE PERCENT: 5.7 % (ref 0–10)
NEUTROPHILS ABSOLUTE: 15.1 K/UL (ref 1.5–7.5)
NEUTROPHILS RELATIVE PERCENT: 76.3 % (ref 50–65)
PDW BLD-RTO: 15.7 % (ref 11.5–14.5)
PERFORMED ON: ABNORMAL
PERFORMED ON: NORMAL
PLATELET # BLD: 93 K/UL (ref 130–400)
PMV BLD AUTO: 12.2 FL (ref 9.4–12.3)
POTASSIUM SERPL-SCNC: 3.1 MMOL/L (ref 3.5–5)
RBC # BLD: 4.24 M/UL (ref 4.2–5.4)
SODIUM BLD-SCNC: 144 MMOL/L (ref 136–145)
TOTAL PROTEIN: 5.5 G/DL (ref 6.6–8.7)
WBC # BLD: 19.8 K/UL (ref 4.8–10.8)

## 2022-06-24 PROCEDURE — 1210000000 HC MED SURG R&B

## 2022-06-24 PROCEDURE — 94761 N-INVAS EAR/PLS OXIMETRY MLT: CPT

## 2022-06-24 PROCEDURE — 94660 CPAP INITIATION&MGMT: CPT

## 2022-06-24 PROCEDURE — 82947 ASSAY GLUCOSE BLOOD QUANT: CPT

## 2022-06-24 PROCEDURE — 6370000000 HC RX 637 (ALT 250 FOR IP)

## 2022-06-24 PROCEDURE — 6370000000 HC RX 637 (ALT 250 FOR IP): Performed by: UROLOGY

## 2022-06-24 PROCEDURE — 83735 ASSAY OF MAGNESIUM: CPT

## 2022-06-24 PROCEDURE — 80053 COMPREHEN METABOLIC PANEL: CPT

## 2022-06-24 PROCEDURE — 6370000000 HC RX 637 (ALT 250 FOR IP): Performed by: INTERNAL MEDICINE

## 2022-06-24 PROCEDURE — 2700000000 HC OXYGEN THERAPY PER DAY

## 2022-06-24 PROCEDURE — 85025 COMPLETE CBC W/AUTO DIFF WBC: CPT

## 2022-06-24 PROCEDURE — 6360000002 HC RX W HCPCS: Performed by: INTERNAL MEDICINE

## 2022-06-24 PROCEDURE — 6360000002 HC RX W HCPCS: Performed by: UROLOGY

## 2022-06-24 PROCEDURE — 99232 SBSQ HOSP IP/OBS MODERATE 35: CPT

## 2022-06-24 PROCEDURE — 36415 COLL VENOUS BLD VENIPUNCTURE: CPT

## 2022-06-24 PROCEDURE — 2580000003 HC RX 258: Performed by: INTERNAL MEDICINE

## 2022-06-24 PROCEDURE — 94640 AIRWAY INHALATION TREATMENT: CPT

## 2022-06-24 RX ORDER — MECOBALAMIN 5000 MCG
10 TABLET,DISINTEGRATING ORAL NIGHTLY
Status: DISCONTINUED | OUTPATIENT
Start: 2022-06-24 | End: 2022-07-02 | Stop reason: HOSPADM

## 2022-06-24 RX ADMIN — GABAPENTIN 600 MG: 600 TABLET, FILM COATED ORAL at 21:40

## 2022-06-24 RX ADMIN — GUAIFENESIN 400 MG: 200 TABLET ORAL at 14:33

## 2022-06-24 RX ADMIN — IPRATROPIUM BROMIDE AND ALBUTEROL SULFATE 1 AMPULE: 2.5; .5 SOLUTION RESPIRATORY (INHALATION) at 20:03

## 2022-06-24 RX ADMIN — TRAMADOL HYDROCHLORIDE 50 MG: 50 TABLET, COATED ORAL at 21:53

## 2022-06-24 RX ADMIN — GABAPENTIN 600 MG: 600 TABLET, FILM COATED ORAL at 09:18

## 2022-06-24 RX ADMIN — PRAVASTATIN SODIUM 40 MG: 20 TABLET ORAL at 09:18

## 2022-06-24 RX ADMIN — SODIUM CHLORIDE, PRESERVATIVE FREE 10 ML: 5 INJECTION INTRAVENOUS at 21:39

## 2022-06-24 RX ADMIN — ONDANSETRON HYDROCHLORIDE 4 MG: 2 SOLUTION INTRAMUSCULAR; INTRAVENOUS at 18:32

## 2022-06-24 RX ADMIN — BUSPIRONE HYDROCHLORIDE 10 MG: 10 TABLET ORAL at 14:33

## 2022-06-24 RX ADMIN — TRAMADOL HYDROCHLORIDE 50 MG: 50 TABLET, COATED ORAL at 03:01

## 2022-06-24 RX ADMIN — LISINOPRIL 20 MG: 20 TABLET ORAL at 21:39

## 2022-06-24 RX ADMIN — IPRATROPIUM BROMIDE AND ALBUTEROL SULFATE 1 AMPULE: 2.5; .5 SOLUTION RESPIRATORY (INHALATION) at 06:10

## 2022-06-24 RX ADMIN — POTASSIUM CHLORIDE 40 MEQ: 20 TABLET, EXTENDED RELEASE ORAL at 05:13

## 2022-06-24 RX ADMIN — TRAMADOL HYDROCHLORIDE 50 MG: 50 TABLET, COATED ORAL at 14:33

## 2022-06-24 RX ADMIN — HYDROCHLOROTHIAZIDE 12.5 MG: 25 TABLET ORAL at 09:18

## 2022-06-24 RX ADMIN — LEVOFLOXACIN 750 MG: 5 INJECTION, SOLUTION INTRAVENOUS at 05:04

## 2022-06-24 RX ADMIN — LISINOPRIL 20 MG: 20 TABLET ORAL at 09:18

## 2022-06-24 RX ADMIN — GUAIFENESIN 400 MG: 200 TABLET ORAL at 04:51

## 2022-06-24 RX ADMIN — METOPROLOL TARTRATE 100 MG: 50 TABLET, FILM COATED ORAL at 21:40

## 2022-06-24 RX ADMIN — BUSPIRONE HYDROCHLORIDE 10 MG: 10 TABLET ORAL at 21:40

## 2022-06-24 RX ADMIN — GABAPENTIN 600 MG: 600 TABLET, FILM COATED ORAL at 14:33

## 2022-06-24 RX ADMIN — INSULIN GLARGINE 10 UNITS: 100 INJECTION, SOLUTION SUBCUTANEOUS at 21:41

## 2022-06-24 RX ADMIN — IPRATROPIUM BROMIDE AND ALBUTEROL SULFATE 1 AMPULE: 2.5; .5 SOLUTION RESPIRATORY (INHALATION) at 14:36

## 2022-06-24 RX ADMIN — HYDROCHLOROTHIAZIDE 12.5 MG: 25 TABLET ORAL at 21:40

## 2022-06-24 RX ADMIN — ONDANSETRON HYDROCHLORIDE 4 MG: 2 SOLUTION INTRAMUSCULAR; INTRAVENOUS at 04:51

## 2022-06-24 RX ADMIN — Medication 10 MG: at 21:39

## 2022-06-24 RX ADMIN — METOPROLOL TARTRATE 100 MG: 50 TABLET, FILM COATED ORAL at 09:18

## 2022-06-24 RX ADMIN — BUSPIRONE HYDROCHLORIDE 10 MG: 10 TABLET ORAL at 09:18

## 2022-06-24 RX ADMIN — GUAIFENESIN 400 MG: 200 TABLET ORAL at 21:39

## 2022-06-24 RX ADMIN — IPRATROPIUM BROMIDE AND ALBUTEROL SULFATE 1 AMPULE: 2.5; .5 SOLUTION RESPIRATORY (INHALATION) at 10:15

## 2022-06-24 ASSESSMENT — PAIN SCALES - GENERAL
PAINLEVEL_OUTOF10: 0
PAINLEVEL_OUTOF10: 10
PAINLEVEL_OUTOF10: 10
PAINLEVEL_OUTOF10: 0

## 2022-06-24 ASSESSMENT — PAIN DESCRIPTION - DESCRIPTORS
DESCRIPTORS: ACHING
DESCRIPTORS: PRESSURE

## 2022-06-24 ASSESSMENT — PAIN DESCRIPTION - FREQUENCY: FREQUENCY: INTERMITTENT

## 2022-06-24 ASSESSMENT — ENCOUNTER SYMPTOMS
NAUSEA: 1
BACK PAIN: 1
VOMITING: 0

## 2022-06-24 ASSESSMENT — PAIN DESCRIPTION - LOCATION
LOCATION: BACK
LOCATION: BACK;KNEE;OTHER (COMMENT)

## 2022-06-24 ASSESSMENT — PAIN DESCRIPTION - ONSET: ONSET: AWAKENED FROM SLEEP

## 2022-06-24 ASSESSMENT — PAIN DESCRIPTION - PAIN TYPE: TYPE: CHRONIC PAIN

## 2022-06-24 ASSESSMENT — PAIN DESCRIPTION - ORIENTATION
ORIENTATION: LOWER;RIGHT;LEFT
ORIENTATION: LOWER

## 2022-06-24 ASSESSMENT — PAIN - FUNCTIONAL ASSESSMENT: PAIN_FUNCTIONAL_ASSESSMENT: PREVENTS OR INTERFERES WITH ALL ACTIVE AND SOME PASSIVE ACTIVITIES

## 2022-06-24 NOTE — PROGRESS NOTES
Urology Progress Note      SUBJECTIVE: Patient resting in bed eating breakfast.  States she does not necessarily feel that well this morning. She is somewhat nauseous and is having some pain in her back and her right knee. She is asking to be moved to the chair. OBJECTIVE:   Review of Systems   Constitutional: Negative for chills and fever. Gastrointestinal: Positive for nausea. Negative for vomiting. Genitourinary: Negative for decreased urine volume. Musculoskeletal: Positive for arthralgias and back pain (R>L). Neurological: Positive for weakness. Psychiatric/Behavioral: Negative for confusion. Physical  VITALS:  /60   Pulse 90   Temp 99 °F (37.2 °C) (Oral)   Resp 20   Ht 5' 5\" (1.651 m)   Wt (!) 348 lb 9 oz (158.1 kg)   SpO2 93%   BMI 58.00 kg/m²   TEMPERATURE:  Current - Temp: 99 °F (37.2 °C); Max - Temp  Av.9 °F (36.6 °C)  Min: 96.8 °F (36 °C)  Max: 99.2 °F (37.3 °C)   24 HR I&O     Intake/Output Summary (Last 24 hours) at 2022 1755  Last data filed at 2022 1745  Gross per 24 hour   Intake 120 ml   Output 1975 ml   Net -1855 ml     BACK: Tenderness in bilateral flanks. ABDOMEN: Nondistended or tender  HEART: Normal rate  CHEST: Normal respiratory effort, reports some difficulty last night with oxygenation  GENITAL/URINARY: Krishna catheter in place to bedside drainage with dark yellow to josy-colored urine.     Data  CBC:   Recent Labs     22   WBC 17.4* 17.5* 19.8*   HGB 13.0 12.2 12.6   HCT 41.5 39.8 40.1   PLT 67* 78* 93*     BMP:    Recent Labs     22    142 144   K 3.4* 3.5 3.1*    99 97*   CO2 31* 35* 37*   BUN 45* 38* 32*   CREATININE 0.8 0.8 0.7   GLUCOSE 148* 123* 125*       U/A:    Lab Results   Component Value Date    COLORU DARK YELLOW 06/15/2022    PHUR 5.0 06/15/2022    WBCUA 2-4 06/15/2022    YEAST Present 06/15/2022    BACTERIA 1+ 06/15/2022

## 2022-06-24 NOTE — PROGRESS NOTES
[id number] ml    Nutrition Diagnosis:   · Inadequate oral intake,Altered nutrition-related lab values related to acute injury/trauma,endocrine dysfuntion as evidenced by intake 0-25%,lab values      Nutrition Interventions:   Food and/or Nutrient Delivery: Continue Current Diet,Modify Oral Nutrition Supplement  Nutrition Education/Counseling: Education needed  Coordination of Nutrition Care: Continue to monitor while inpatient       Goals:  Previous Goal Met: Progressing toward Goal(s)  Goals: PO intake 50% or greater       Nutrition Monitoring and Evaluation:   Behavioral-Environmental Outcomes: Knowledge or Skill  Food/Nutrient Intake Outcomes: Diet Advancement/Tolerance,Food and Nutrient Intake,Supplement Intake  Physical Signs/Symptoms Outcomes: Biochemical Data,Weight,Skin,Fluid Status or Edema    Discharge Planning:     Too soon to determine     Kaylah Corral MS, RD, LD  Contact: [phone]

## 2022-06-24 NOTE — PROGRESS NOTES
Attempted to call report. rn getting report on another pt and another rn dealing with low bs. Will call back,pt eating at this time. tgreeno rn

## 2022-06-24 NOTE — PROGRESS NOTES
Report called to 412. Sherie Fink. Informed her to pass along Monday am let Dinesh Jimenez know pt/family have declined lithotripsy.  tgreeno rn

## 2022-06-24 NOTE — PLAN OF CARE
Problem: Discharge Planning  Goal: Discharge to home or other facility with appropriate resources  Outcome: Progressing     Problem: Pain  Goal: Verbalizes/displays adequate comfort level or baseline comfort level  Outcome: Progressing     Problem: Safety - Adult  Goal: Free from fall injury  Outcome: Progressing     Problem: ABCDS Injury Assessment  Goal: Absence of physical injury  Outcome: Progressing  Flowsheets (Taken 6/23/2022 1412 by Haris Prado RN)  Absence of Physical Injury: Implement safety measures based on patient assessment     Problem: Skin/Tissue Integrity  Goal: Absence of new skin breakdown  Description: 1. Monitor for areas of redness and/or skin breakdown  2. Assess vascular access sites hourly  3. Every 4-6 hours minimum:  Change oxygen saturation probe site  4. Every 4-6 hours:  If on nasal continuous positive airway pressure, respiratory therapy assess nares and determine need for appliance change or resting period.   Outcome: Progressing     Problem: Chronic Conditions and Co-morbidities  Goal: Patient's chronic conditions and co-morbidity symptoms are monitored and maintained or improved  Outcome: Progressing     Problem: Neurosensory - Adult  Goal: Achieves stable or improved neurological status  Outcome: Progressing     Problem: Respiratory - Adult  Goal: Achieves optimal ventilation and oxygenation  Outcome: Progressing     Problem: Cardiovascular - Adult  Goal: Absence of cardiac dysrhythmias or at baseline  Outcome: Progressing     Problem: Skin/Tissue Integrity - Adult  Goal: Skin integrity remains intact  Outcome: Progressing  Flowsheets (Taken 6/23/2022 1412 by Haris Prado RN)  Skin Integrity Remains Intact: Monitor for areas of redness and/or skin breakdown     Problem: Musculoskeletal - Adult  Goal: Return mobility to safest level of function  Outcome: Progressing  Goal: Maintain proper alignment of affected body part  Outcome: Progressing     Problem: Genitourinary - Adult  Goal: Urinary catheter remains patent  Outcome: Progressing     Problem: Infection - Adult  Goal: Absence of infection during hospitalization  Outcome: Progressing     Problem: Metabolic/Fluid and Electrolytes - Adult  Goal: Electrolytes maintained within normal limits  Outcome: Progressing  Goal: Glucose maintained within prescribed range  Outcome: Progressing     Problem: Confusion  Goal: Confusion, delirium, dementia, or psychosis is improved or at baseline  Description: INTERVENTIONS:  1. Assess for possible contributors to thought disturbance, including medications, impaired vision or hearing, underlying metabolic abnormalities, dehydration, psychiatric diagnoses, and notify attending LIP  2. [place] high risk fall precautions, as indicated  3. Provide frequent short contacts to provide reality reorientation, refocusing and direction  4. Decrease environmental stimuli, including noise as appropriate  5. Monitor and intervene to maintain adequate nutrition, hydration, elimination, sleep and activity  6. If unable to ensure safety without constant attention obtain sitter and review sitter guidelines with assigned personnel  7.  Initiate Psychosocial CNS and Spiritual Care consult, as indicated  Outcome: Progressing     Problem: Nutrition Deficit:  Goal: Optimize nutritional status  Outcome: Progressing

## 2022-06-24 NOTE — PROGRESS NOTES
Patient O2 sat was dropping to 78% on NC 6L. Attempted to reposition and instructed patient on deep breathing. O2 sat would increase with deep breathing, but would drop back down to 78-80 % when patient would fall asleep. Patient is also a mouth breather. Contacted respiratory. Placed patient on venturi mask 50% at 10L to maintain O2 saturation of greater than 88%. Switched patient back to NC 6L at [address] on 6/24. Patient is tolerating well at this time. O2 saturation is 88-93 %.     Electronically signed by John Martinez RN on 6/24/2022 at 4:43 AM

## 2022-06-24 NOTE — PROGRESS NOTES
Pt and family have decided they do not want lithotripsy. [facility]  Waiting on precert.tgreeno rn

## 2022-06-24 NOTE — CARE COORDINATION
Received phone call from Nadeen, Liaison for [facility]. She states that they will hold a bed for pt until Tuesday 6/28, awaiting confirmation of Medicaid pending application.    Electronically signed by Javier Pedraza on 6/24/2022 at 10:32 AM

## 2022-06-24 NOTE — PROGRESS NOTES
Hospitalist Progress Note  [facility]     Patient: Reggie Richardson  : 1955  MRN: [id number]  Code Status: DNR    Hospital Day: 9   Date of Service: 2022    Subjective:   Patient seen and examined. Sitting in chair. Complains of chronic pain. Past Medical History:   Diagnosis Date    Arthritis     HTN (hypertension)        Past Surgical History:   Procedure Laterality Date    ANKLE FRACTURE SURGERY Right     BLADDER SURGERY Left 6/15/2022    CYSTOSCOPY, LEFT URETEROSCOPY, RETROGRADE PYELOGRAM, STENT INSERTION performed by Christina Lopez MD at [facility] (CERVIX STATUS UNKNOWN)      INCONTINENCE SURGERY         Family History   Problem Relation Age of Onset    Cancer Mother     Cancer Father        Social History     Socioeconomic History    Marital status: Single     Spouse name: Not on file    Number of children: Not on file    Years of education: Not on file    Highest education level: Not on file   Occupational History    Not on file   Tobacco Use    Smoking status: Former Smoker    Smokeless tobacco: Never Used    Tobacco comment: quit 10 years ago   Vaping Use    Vaping Use: Never used   Substance and Sexual Activity    Alcohol use: Never    Drug use: Never    Sexual activity: Not on file   Other Topics Concern    Not on file   Social History Narrative    Not on file     Social Determinants of Health     Financial Resource Strain:     Difficulty of Paying Living Expenses: Not on file   Food Insecurity:     Worried About [address] in the Last Year: Not on file    [address] in the Last Year: Not on file   Transportation Needs:     Lack of Transportation (Medical): Not on file    Lack of Transportation (Non-Medical):  Not on file   Physical Activity:     Days of Exercise per Week: Not on file    Minutes of Exercise per Session: Not on file   Stress:     Feeling of Stress : Not on file   Social Connections:     Frequency of Communication with Friends and Family: Not on file    Frequency of Social Gatherings with Friends and Family: Not on file    Attends Islam Services: Not on file    Active Member of Clubs or Organizations: Not on file    Attends Club or Organization Meetings: Not on file    Marital Status: Not on file   Intimate Partner Violence:     Fear of Current or Ex-Partner: Not on file    Emotionally Abused: Not on file    Physically Abused: Not on file    Sexually Abused: Not on file   Housing Stability:     Unable to Pay for Housing in the Last Year: Not on file    Number of [place] in the Last Year: Not on file    Unstable Housing in the Last Year: Not on file       Current Facility-Administered Medications   Medication Dose Route Frequency Provider Last Rate Last Admin    guaiFENesin tablet 400 mg  400 mg Oral Q8H Devika Mondragon MD   400 mg at 06/24/22 0451    sodium chloride (OCEAN, BABY AYR) 0.65 % nasal spray 1 spray  1 spray Each Nostril Q4H PRN Devika Mondragon MD   1 spray at 06/23/22 1504    magnesium sulfate 1000 mg in dextrose 5% 100 mL IVPB  1,000 mg IntraVENous PRN Devika Mondragon MD        potassium chloride (KLOR-CON M) extended release tablet 40 mEq  40 mEq Oral PRN Devika Mondragon MD   40 mEq at 06/24/22 4270    Or    potassium bicarb-citric acid (EFFER-K) effervescent tablet 40 mEq  40 mEq Oral PRN Devika Mondragon MD        Or    potassium chloride 10 mEq/100 mL IVPB (Peripheral Line)  10 mEq IntraVENous PRN Devika Mondragon MD        labetalol (NORMODYNE;TRANDATE) injection 10 mg  10 mg IntraVENous Q4H PRN Mi Crump MD   10 mg at 06/19/22 0334    lisinopril (PRINIVIL;ZESTRIL) tablet 20 mg  20 mg Oral BID Devika Mondragon MD   20 mg at 06/24/22 2009    And    hydroCHLOROthiazide (HYDRODIURIL) tablet 12.5 mg  12.5 mg Oral BID Devika Mondragon MD   12.5 mg at 06/24/22 4593    metoprolol tartrate (LOPRESSOR) tablet 100 mg  100 mg Oral BID Rayne Lizama Oscar Torres MD   100 mg at 06/24/22 0918    levoFLOXacin (LEVAQUIN) 750 MG/150ML infusion 750 mg  750 mg IntraVENous Q24H Saran Valdez MD   Stopped at 06/24/22 6749    traMADol (ULTRAM) tablet 50 mg  50 mg Oral Q6H PRN Shira Cowart MD   50 mg at 06/24/22 0301    busPIRone (BUSPAR) tablet 10 mg  10 mg Oral TID Shira Cowart MD   10 mg at 06/24/22 6499    gabapentin (NEURONTIN) tablet 600 mg  600 mg Oral 4x Daily Saran Valdez MD   600 mg at 06/24/22 7464    pravastatin (PRAVACHOL) tablet 40 mg  40 mg Oral Daily Saran Valdez MD   40 mg at 06/24/22 8642    glucose chewable tablet 16 g  4 tablet Oral PRN Trish Storey MD        dextrose bolus 10% 125 mL  125 mL IntraVENous PRN Trish Storey MD        Or    dextrose bolus 10% 250 mL  250 mL IntraVENous PRN Trish Storey MD        glucagon (rDNA) injection 1 mg  1 mg IntraMUSCular PRN Trish Storey MD        dextrose 5 % solution  100 mL/hr IntraVENous PREDGARD Storey MD        insulin lispro (HUMALOG) injection vial 0-18 Units  0-18 Units SubCUTAneous TID WC Trish Storey MD   3 Units at 06/22/22 1753    insulin lispro (HUMALOG) injection vial 0-9 Units  0-9 Units SubCUTAneous Nightly Trish Storey MD   2 Units at 06/19/22 2123    insulin glargine (LANTUS) injection vial 10 Units  10 Units SubCUTAneous Nightly Trish Storey MD   10 Units at 06/23/22 2034    sodium chloride flush 0.9 % injection 5-40 mL  5-40 mL IntraVENous 2 times per day Trish Storey MD   10 mL at 06/23/22 2033    sodium chloride flush 0.9 % injection 5-40 mL  5-40 mL IntraVENous PREDGARD Storey MD        0.9 % sodium chloride infusion   IntraVENous PREDGARD Storey MD        ondansetron (ZOFRAN-ODT) disintegrating tablet 4 mg  4 mg Oral Q8H PRN Trish Storey MD   4 mg at 06/21/22 1042    Or    ondansetron (ZOFRAN) injection 4 mg  4 mg IntraVENous Q6H PRN Madelin Charles MD   4 mg at 06/24/22 0451    polyethylene glycol (GLYCOLAX) packet 17 g  17 g Oral Daily PRN Madelin Charles MD        acetaminophen (TYLENOL) tablet 650 mg  650 mg Oral Q6H PRN Madelin Charles MD   650 mg at 06/22/22 1448    Or    acetaminophen (TYLENOL) suppository 650 mg  650 mg Rectal Q6H PRN Madelin Charles MD        potassium chloride 10 mEq/100 mL IVPB (Peripheral Line)  10 mEq IntraVENous PRN Madelin Charles MD        magnesium sulfate 2000 mg in 50 mL IVPB premix  2,000 mg IntraVENous PRN Madelin Charles MD   Stopped at 06/16/22 0640    ipratropium-albuterol (DUONEB) nebulizer solution 1 ampule  1 ampule Inhalation Q4H WA Madelin Charles MD   1 ampule at 06/24/22 1015    lactated ringers infusion   IntraVENous Continuous Dianna Horton MD 30 mL/hr at 06/19/22 1600 Rate Verify at 06/19/22 1600         dextrose      sodium chloride      lactated ringers 30 mL/hr at 06/19/22 1600        Objective:   BP (!) 149/74   Pulse 80   Temp 96.8 °F (36 °C) (Temporal)   Resp 20   Ht 5' 5\" (1.651 m)   Wt (!) 348 lb 9 oz (158.1 kg)   SpO2 96% Comment: O2 decreased to 3 lpm. SaO2 = 96%.   BMI 58.00 kg/m²     General: no acute distress  HEENT: normocephalic, atraumatic  Neck: supple, symmetrical, trachea midline   Lungs: clear to auscultation bilaterally   Cardiovascular: s1 and s2 normal  Abdomen: soft, positive bowel sounds  Extremities: no edema or cyanosis   Neuro: alert, no acute focal motor deficits    Recent Labs     06/22/22 0229 06/23/22 0227 06/24/22 0226   WBC 17.4* 17.5* 19.8*   RBC 4.38 4.12* 4.24   HGB 13.0 12.2 12.6   HCT 41.5 39.8 40.1   MCV 94.7 96.6 94.6   MCH 29.7 29.6 29.7   MCHC 31.3* 30.7* 31.4*   PLT 67* 78* 93*     Recent Labs     06/22/22  0229 06/23/22  0359 06/24/22 0226    142 144   K 3.4* 3.5 3.1*   ANIONGAP 11 8 10    99 97*   CO2 31* 35* 37*   BUN 45* 38* 32*   CREATININE 0.8 0.8 0.7 GLUCOSE 148* 123* 125*   CALCIUM 8.4* 8.6* 8.1*     Recent Labs     06/22/22 0229 06/23/22  0359 06/24/22 0226   MG 1.9 1.7 1.7     Recent Labs     06/22/22 0229 06/23/22 0359 06/24/22 0226   AST 92* 75* 67*   ALT 52* 39* 31   BILITOT 2.8* 2.2* 2.4*   ALKPHOS 206* 206* 208*     No results for input(s): PH, PO2, PCO2, HCO3, BE, O2SAT in the last 72 hours. No results for input(s): TROPONINI in the last 72 hours. No results for input(s): INR in the last 72 hours. No results for input(s): LACTA in the last 72 hours. Intake/Output Summary (Last 24 hours) at 6/24/2022 1259  Last data filed at 6/24/2022 0959  Gross per 24 hour   Intake 320 ml   Output 2325 ml   Net -2005 ml       No results found.      Assessment and Plan:   Septic shock  Shock resolved  Secondary to below  Agents as below  IVF  Since weaned off phenylephrine gtt  Lactate improved overall     Obstructive left E. coli pyelonephritis  Urology following  Secondary to proximal left ureteral calculus  S/p left ureteral stent placement 6/15/2022 per Dr. Pacheco Marking per sensitivities  Plans for definitive treatment per urology next week     E. coli bacteremia  Secondary to above  Levaquin per sensitivities  Repeat blood cultures NGTD     BINH  Resolved with current treatment plan  Avoid offending agents  Follow renal function/urine output/electrolytes     DM2  HbA1c 9.1  Poor control  Counseled  Meds on board     Morbid obesity  Counseled     Chronic pain syndrome  Patient has been bedbound for the last 5 years  Patient/daughter state she has been taking opioids for many years  I extensively explained how these agents can run counter to her current treatment plan  Patient continues to request home pain meds despite extensive counseling  Palliative/social work following per patient/family request  Possible transition to hospice but nothing official as of yet     New onset atrial fibrillation with RVR  Resolved  Remains in sinus rhythm  Suspect secondary to above processes  AC contraindicated given thrombocytopenia  Serial troponin 0.01 > 0.02 > 0.02 > (<0.01)  Follow electrolytes     Acute hypoxic respiratory failure  Continues to improve  Suspect multifactorial  Currently requiring 3 L supplemental O2, decreasing requirement  Goal sats 88-92%, discussed with staff  BiPAP with sleep     DVT prophylaxis  SCDs given thrombocytopenia     Extensive discussions with patient, daughter Teddy Walton), and son (Evens) in regards to current clinical state/goals of care.  All questions sought and answered.     Vernida Osler, MD   6/24/2022 12:59 PM

## 2022-06-24 NOTE — PROGRESS NOTES
Palliative Care Progress Note  6/24/2022 8:25 AM    Patient:  Hussein Slade  YOB: 1955  Primary Care Physician: Jai Velazquez MD  Advance Directive: DNR  Admit Date: 6/14/2022       Hospital Day: 9  Portions of this note have been copied forward, however, changed to reflect the most current clinical status of this patient. CHIEF COMPLAINT/REASON FOR CONSULTATION goals of care, code status discussion, and family support. SUBJECTIVE:  Ms. Silas Mcdonald is up in recliner chair. Complaining of knee pain today. Remains alert and oriented. Review of Systems:   14 point review of systems is negative except as specifically addressed above. Objective:   VITALS:  BP (!) 149/74   Pulse 80   Temp 96.8 °F (36 °C) (Temporal)   Resp 16   Ht 5' 5\" (1.651 m)   Wt (!) 348 lb 9 oz (158.1 kg)   SpO2 91%   BMI 58.00 kg/m²   24HR INTAKE/OUTPUT:      Intake/Output Summary (Last 24 hours) at 6/24/2022 0825  Last data filed at 6/24/2022 0645  Gross per 24 hour   Intake 320 ml   Output 2325 ml   Net -2005 ml     General appearance: 77 yo female, chronically ill appearing, no acute distress  Head: Normocephalic, without obvious abnormality, atraumatic  Eyes: conjunctivae/corneas clear. PERRL, EOM's intact. Ears: normal external ears and nose  Neck: no JVD, supple, symmetrical, trachea midline   Lungs: clear and diminished in bases to auscultation bilaterally, shallow, NC in place  Heart: RRR, S1, S2 normal, no murmur  Abdomen: Obese, taut, no grimacing to palpation, normal bowel sounds, rectal tube in place with diarrhea noted  Extremities: BLE 1+ edema,  No erythema, no to palpation  Skin: Warm, dry/flaky, pale,   Neurologic: Alert, oriented x3, generalized weakness, speech fluent, follows commands.       Medications:      dextrose      sodium chloride      lactated ringers 30 mL/hr at 06/19/22 1600      guaiFENesin  400 mg Oral Q8H    lisinopril  20 mg Oral BID    And    hydroCHLOROthiazide  12.5 mg non-obstructing renal calculi noted. 2. Mild hepatomegaly with mild hepatic steatosis. 3. Colonic diverticulosis without evidence of acute diverticulitis. 4. Small fat containing umbilical and supra umbilical hernia noted. Recommendation: Follow up as clinically indicated. All CT scans at this facility utilize dose modulation, iterative reconstruction, and/or weight based dosing when appropriate to reduce radiation dose to as low as reasonably achievable. Amended by Stephie Robledo MD at 15-[place]aqu[place]-2022 02:44:19 AM Electronically Signed by Stephie Robledo MD at 15-[place]aqu[place]-2022 02:29:46 AM             XR CHEST PORTABLE  Result Date: 6/15/2022  1. Questionable trace left pleural effusion with basilar atelectasis. 2. Right sided central line is in place with its tip in SVC. Recommendation: Follow up as clinically indicated. Electronically Signed by Celena Mccurdy MD at 15-[place]-2022 06:55:39 AM             XR CHEST PORTABLE  Result Date: 6/14/2022  1. Unremarkable radiograph of chest. Recommendation: Follow up as clinically indicated. Electronically Signed by Daniela Clements MD at 15-[place]-2022 12:21:20 AM             Assessment/Plan   Principal Problem:    Obstructive uropathy  Active Problems:    Obstructive pyelonephritis    Sepsis with acute renal failure (HCC)    Left ureteral calculus    Renal calculus, bilateral    BINH (acute kidney injury) ([address]    Hyperglycemia due to diabetes mellitus ([address]    Hypertension    Dehydration    FREDDY and COPD overlap syndrome (HCC)    Degenerative joint disease    Palliative care patient    Septicemia ([address]    Chronic pain syndrome  Resolved Problems:    * No resolved hospital problems. *      Visit Summary:  Chart reviewed. Ms. Sylvie Zavala is seen at bedside this morning with her daughter present. We discussed her current status and plan of care. Pt continues to endorse intermittent nausea. Pain is worse today in her knee but pt does have known chronic pain issues.  I provided education that pt can have prn zofran and tramadol Q6H but these medications were not scheduled. Would suggest nursing staff assess for need Q6h as ordered. Melatonin added at bedtime to help with sleep. Goal remains for pt to d/c SNF for continued rehab following surgery 6/28/22 with urology. [facility] is to following once medically stable to d/c. Opportunity for questions and emotional support provided. Will follow. Recommendations:   1. Palliative Care- GOC d/c to SNF for rehab with [facility] services. CM following for d/c planning and placement. Code status- DNR    2, Obstructive left pyelonephritits 2/2 proximal left urteral calculus- S/p ureteral stent placement 6/15/22 per Dr. Jasbir Cardona. Urology following. Levaquin day #10. Plans for OR 6/28/22    3. Septic shock with E coli bacteremia- 2/2 above, follow repeat blood cultures, NGTD. Abx per hospitalist. Weaned from pressors    4. BINH- improved. 5. Chronic pain syndrome-  chronic back pain with Norco and gabapentin baseline. Potential overuse at home noted. Pain regimen with ultram prn at this time    6. Afib with RVR new onset- suspect 2/2 above. NSR now and off amio gtt    7. Transaminitis-  Noted, unclear baseline LFTs. Continue to monitor labs    8. Acute hypoxic respiratory failure- mgmt per hospitalist, suspect multifactorial. Attempting further O2 weaning today. Thank you for consulting Palliative Care and allowing us to participate in the care of this patient.    Time Spent Counseling > 50%:  YES                                   Total Time Spent with patient/family counseling, workup/treatment review, counseling and placement of orders/preparation of this note: 29 minutes    Electronically signed by MATTHEW Pedro CNP on 6/24/2022 at 8:25 AM    (Please note that portions of this note were completed with a voice recognition program.  Jaylon Pavon made to edit the dictations but occasionally words are mis-transcribed.)

## 2022-06-25 LAB
ALBUMIN SERPL-MCNC: 2.2 G/DL (ref 3.5–5.2)
ALP BLD-CCNC: 183 U/L (ref 35–104)
ALT SERPL-CCNC: 22 U/L (ref 5–33)
ANION GAP SERPL CALCULATED.3IONS-SCNC: 8 MMOL/L (ref 7–19)
AST SERPL-CCNC: 46 U/L (ref 5–32)
BASOPHILS ABSOLUTE: 0.1 K/UL (ref 0–0.2)
BASOPHILS RELATIVE PERCENT: 0.4 % (ref 0–1)
BILIRUB SERPL-MCNC: 1.7 MG/DL (ref 0.2–1.2)
BUN BLDV-MCNC: 29 MG/DL (ref 8–23)
CALCIUM SERPL-MCNC: 7.7 MG/DL (ref 8.8–10.2)
CHLORIDE BLD-SCNC: 97 MMOL/L (ref 98–111)
CO2: 38 MMOL/L (ref 22–29)
CREAT SERPL-MCNC: 0.7 MG/DL (ref 0.5–0.9)
EOSINOPHILS ABSOLUTE: 0.1 K/UL (ref 0–0.6)
EOSINOPHILS RELATIVE PERCENT: 0.4 % (ref 0–5)
GFR AFRICAN AMERICAN: >59
GFR NON-AFRICAN AMERICAN: >60
GLUCOSE BLD-MCNC: 103 MG/DL (ref 70–99)
GLUCOSE BLD-MCNC: 115 MG/DL (ref 70–99)
GLUCOSE BLD-MCNC: 124 MG/DL (ref 74–109)
GLUCOSE BLD-MCNC: 126 MG/DL (ref 70–99)
GLUCOSE BLD-MCNC: 136 MG/DL (ref 70–99)
HCT VFR BLD CALC: 34.7 % (ref 37–47)
HEMOGLOBIN: 10.9 G/DL (ref 12–16)
IMMATURE GRANULOCYTES #: 0.8 K/UL
LYMPHOCYTES ABSOLUTE: 2.3 K/UL (ref 1.1–4.5)
LYMPHOCYTES RELATIVE PERCENT: 13.3 % (ref 20–40)
MAGNESIUM: 1.6 MG/DL (ref 1.6–2.4)
MCH RBC QN AUTO: 29.4 PG (ref 27–31)
MCHC RBC AUTO-ENTMCNC: 31.4 G/DL (ref 33–37)
MCV RBC AUTO: 93.5 FL (ref 81–99)
MONOCYTES ABSOLUTE: 1.1 K/UL (ref 0–0.9)
MONOCYTES RELATIVE PERCENT: 6.4 % (ref 0–10)
NEUTROPHILS ABSOLUTE: 12.8 K/UL (ref 1.5–7.5)
NEUTROPHILS RELATIVE PERCENT: 75.1 % (ref 50–65)
PDW BLD-RTO: 15.7 % (ref 11.5–14.5)
PERFORMED ON: ABNORMAL
PLATELET # BLD: 116 K/UL (ref 130–400)
PMV BLD AUTO: 12.3 FL (ref 9.4–12.3)
POTASSIUM SERPL-SCNC: 3.2 MMOL/L (ref 3.5–5)
RBC # BLD: 3.71 M/UL (ref 4.2–5.4)
SODIUM BLD-SCNC: 143 MMOL/L (ref 136–145)
TOTAL PROTEIN: 5.1 G/DL (ref 6.6–8.7)
WBC # BLD: 17.1 K/UL (ref 4.8–10.8)

## 2022-06-25 PROCEDURE — 6370000000 HC RX 637 (ALT 250 FOR IP): Performed by: UROLOGY

## 2022-06-25 PROCEDURE — 85025 COMPLETE CBC W/AUTO DIFF WBC: CPT

## 2022-06-25 PROCEDURE — 2700000000 HC OXYGEN THERAPY PER DAY

## 2022-06-25 PROCEDURE — 6370000000 HC RX 637 (ALT 250 FOR IP): Performed by: INTERNAL MEDICINE

## 2022-06-25 PROCEDURE — 36415 COLL VENOUS BLD VENIPUNCTURE: CPT

## 2022-06-25 PROCEDURE — 2580000003 HC RX 258: Performed by: INTERNAL MEDICINE

## 2022-06-25 PROCEDURE — 6360000002 HC RX W HCPCS: Performed by: INTERNAL MEDICINE

## 2022-06-25 PROCEDURE — 80053 COMPREHEN METABOLIC PANEL: CPT

## 2022-06-25 PROCEDURE — 99232 SBSQ HOSP IP/OBS MODERATE 35: CPT | Performed by: NURSE PRACTITIONER

## 2022-06-25 PROCEDURE — 94660 CPAP INITIATION&MGMT: CPT

## 2022-06-25 PROCEDURE — 82947 ASSAY GLUCOSE BLOOD QUANT: CPT

## 2022-06-25 PROCEDURE — 6370000000 HC RX 637 (ALT 250 FOR IP)

## 2022-06-25 PROCEDURE — 1210000000 HC MED SURG R&B

## 2022-06-25 PROCEDURE — 6360000002 HC RX W HCPCS: Performed by: UROLOGY

## 2022-06-25 PROCEDURE — 94640 AIRWAY INHALATION TREATMENT: CPT

## 2022-06-25 PROCEDURE — 83735 ASSAY OF MAGNESIUM: CPT

## 2022-06-25 PROCEDURE — 94761 N-INVAS EAR/PLS OXIMETRY MLT: CPT

## 2022-06-25 RX ORDER — PROCHLORPERAZINE MALEATE 10 MG
5 TABLET ORAL EVERY 6 HOURS PRN
Status: DISCONTINUED | OUTPATIENT
Start: 2022-06-25 | End: 2022-07-02 | Stop reason: HOSPADM

## 2022-06-25 RX ADMIN — METOPROLOL TARTRATE 100 MG: 50 TABLET, FILM COATED ORAL at 08:14

## 2022-06-25 RX ADMIN — METOPROLOL TARTRATE 100 MG: 50 TABLET, FILM COATED ORAL at 21:12

## 2022-06-25 RX ADMIN — PROCHLORPERAZINE MALEATE 5 MG: 10 TABLET ORAL at 18:46

## 2022-06-25 RX ADMIN — PROCHLORPERAZINE MALEATE 5 MG: 10 TABLET ORAL at 12:41

## 2022-06-25 RX ADMIN — LEVOFLOXACIN 750 MG: 5 INJECTION, SOLUTION INTRAVENOUS at 05:15

## 2022-06-25 RX ADMIN — BUSPIRONE HYDROCHLORIDE 10 MG: 10 TABLET ORAL at 15:56

## 2022-06-25 RX ADMIN — PRAVASTATIN SODIUM 40 MG: 20 TABLET ORAL at 08:15

## 2022-06-25 RX ADMIN — IPRATROPIUM BROMIDE AND ALBUTEROL SULFATE 1 AMPULE: 2.5; .5 SOLUTION RESPIRATORY (INHALATION) at 14:19

## 2022-06-25 RX ADMIN — GUAIFENESIN 400 MG: 200 TABLET ORAL at 15:55

## 2022-06-25 RX ADMIN — GABAPENTIN 600 MG: 600 TABLET, FILM COATED ORAL at 17:22

## 2022-06-25 RX ADMIN — IPRATROPIUM BROMIDE AND ALBUTEROL SULFATE 1 AMPULE: 2.5; .5 SOLUTION RESPIRATORY (INHALATION) at 10:23

## 2022-06-25 RX ADMIN — GUAIFENESIN 400 MG: 200 TABLET ORAL at 23:08

## 2022-06-25 RX ADMIN — IPRATROPIUM BROMIDE AND ALBUTEROL SULFATE 1 AMPULE: 2.5; .5 SOLUTION RESPIRATORY (INHALATION) at 19:39

## 2022-06-25 RX ADMIN — HYDROCHLOROTHIAZIDE 12.5 MG: 25 TABLET ORAL at 08:14

## 2022-06-25 RX ADMIN — Medication 10 MG: at 21:12

## 2022-06-25 RX ADMIN — IPRATROPIUM BROMIDE AND ALBUTEROL SULFATE 1 AMPULE: 2.5; .5 SOLUTION RESPIRATORY (INHALATION) at 06:41

## 2022-06-25 RX ADMIN — GABAPENTIN 600 MG: 600 TABLET, FILM COATED ORAL at 12:41

## 2022-06-25 RX ADMIN — INSULIN GLARGINE 10 UNITS: 100 INJECTION, SOLUTION SUBCUTANEOUS at 21:18

## 2022-06-25 RX ADMIN — BUSPIRONE HYDROCHLORIDE 10 MG: 10 TABLET ORAL at 21:12

## 2022-06-25 RX ADMIN — SODIUM CHLORIDE, PRESERVATIVE FREE 10 ML: 5 INJECTION INTRAVENOUS at 21:13

## 2022-06-25 RX ADMIN — BUSPIRONE HYDROCHLORIDE 10 MG: 10 TABLET ORAL at 08:15

## 2022-06-25 RX ADMIN — SODIUM CHLORIDE, POTASSIUM CHLORIDE, SODIUM LACTATE AND CALCIUM CHLORIDE: 600; 310; 30; 20 INJECTION, SOLUTION INTRAVENOUS at 05:14

## 2022-06-25 RX ADMIN — HYDROCHLOROTHIAZIDE 12.5 MG: 25 TABLET ORAL at 21:12

## 2022-06-25 RX ADMIN — LISINOPRIL 20 MG: 20 TABLET ORAL at 08:15

## 2022-06-25 RX ADMIN — GABAPENTIN 600 MG: 600 TABLET, FILM COATED ORAL at 08:14

## 2022-06-25 RX ADMIN — LISINOPRIL 20 MG: 20 TABLET ORAL at 21:13

## 2022-06-25 RX ADMIN — GUAIFENESIN 400 MG: 200 TABLET ORAL at 05:17

## 2022-06-25 RX ADMIN — POTASSIUM CHLORIDE 40 MEQ: 20 TABLET, EXTENDED RELEASE ORAL at 08:42

## 2022-06-25 RX ADMIN — GABAPENTIN 600 MG: 600 TABLET, FILM COATED ORAL at 21:12

## 2022-06-25 RX ADMIN — TRAMADOL HYDROCHLORIDE 50 MG: 50 TABLET, COATED ORAL at 05:17

## 2022-06-25 RX ADMIN — ONDANSETRON HYDROCHLORIDE 4 MG: 2 SOLUTION INTRAMUSCULAR; INTRAVENOUS at 08:15

## 2022-06-25 ASSESSMENT — PAIN DESCRIPTION - FREQUENCY: FREQUENCY: INTERMITTENT

## 2022-06-25 ASSESSMENT — PAIN SCALES - GENERAL
PAINLEVEL_OUTOF10: 0
PAINLEVEL_OUTOF10: 10
PAINLEVEL_OUTOF10: 0

## 2022-06-25 ASSESSMENT — ENCOUNTER SYMPTOMS
VOMITING: 0
ABDOMINAL DISTENTION: 0
NAUSEA: 1
BACK PAIN: 1
ABDOMINAL PAIN: 0

## 2022-06-25 ASSESSMENT — PAIN DESCRIPTION - ONSET: ONSET: AWAKENED FROM SLEEP

## 2022-06-25 ASSESSMENT — PAIN DESCRIPTION - ORIENTATION: ORIENTATION: LEFT

## 2022-06-25 ASSESSMENT — PAIN DESCRIPTION - LOCATION: LOCATION: KNEE;ANKLE

## 2022-06-25 ASSESSMENT — PAIN DESCRIPTION - PAIN TYPE: TYPE: CHRONIC PAIN

## 2022-06-25 ASSESSMENT — PAIN DESCRIPTION - DESCRIPTORS: DESCRIPTORS: ACHING;THROBBING

## 2022-06-25 ASSESSMENT — PAIN - FUNCTIONAL ASSESSMENT: PAIN_FUNCTIONAL_ASSESSMENT: PREVENTS OR INTERFERES WITH ALL ACTIVE AND SOME PASSIVE ACTIVITIES

## 2022-06-25 NOTE — PLAN OF CARE
Problem: Discharge Planning  Goal: Discharge to home or other facility with appropriate resources  Outcome: Progressing  Flowsheets (Taken 6/24/2022 2155)  Discharge to home or other facility with appropriate resources:   Identify barriers to discharge with patient and caregiver   Arrange for needed discharge resources and transportation as appropriate   Identify discharge learning needs (meds, wound care, etc)   Refer to discharge planning if patient needs post-hospital services based on physician order or complex needs related to functional status, cognitive ability or social support system     Problem: Pain  Goal: Verbalizes/displays adequate comfort level or baseline comfort level  Outcome: Progressing  Flowsheets (Taken 6/25/2022 0000)  Verbalizes/displays adequate comfort level or baseline comfort level:   Encourage patient to monitor pain and request assistance   Assess pain using appropriate pain scale   Administer analgesics based on type and severity of pain and evaluate response   Implement non-pharmacological measures as appropriate and evaluate response   Consider cultural and social influences on pain and pain management   Notify Licensed Independent Practitioner if interventions unsuccessful or patient reports new pain     Problem: Safety - Adult  Goal: Free from fall injury  Outcome: Progressing  Flowsheets (Taken 6/25/2022 0002)  Free From Fall Injury: Instruct family/caregiver on patient safety     Problem: ABCDS Injury Assessment  Goal: Absence of physical injury  Outcome: Progressing  Flowsheets (Taken 6/25/2022 0002)  Absence of Physical Injury: Implement safety measures based on patient assessment     Problem: Skin/Tissue Integrity  Goal: Absence of new skin breakdown  Description: 1. Monitor for areas of redness and/or skin breakdown  2. Every 4-6 hours minimum:  Change oxygen saturation probe site  3.   Every 4-6 hours:  If on nasal continuous positive airway pressure, respiratory therapy Progressing  Flowsheets (Taken 6/24/2022 2155)  Maintain proper alignment of affected body part: Support and protect limb and body alignment per provider's orders     Problem: Genitourinary - Adult  Goal: Urinary catheter remains patent  Outcome: Progressing  Flowsheets (Taken 6/24/2022 2155)  Urinary catheter remains patent: Assess patency of urinary catheter     Problem: Infection - Adult  Goal: Absence of infection during hospitalization  Outcome: Progressing  Flowsheets  Taken 6/25/2022 0002  Absence of infection during hospitalization: Assess and monitor for signs and symptoms of infection  Taken 6/24/2022 2155  Absence of infection during hospitalization: Assess and monitor for signs and symptoms of infection     Problem: Metabolic/Fluid and Electrolytes - Adult  Goal: Electrolytes maintained within normal limits  Outcome: Progressing  Flowsheets (Taken 6/24/2022 2155)  Electrolytes maintained within normal limits: Monitor labs and assess patient for signs and symptoms of electrolyte imbalances  Goal: Glucose maintained within prescribed range  Outcome: Progressing  Flowsheets (Taken 6/24/2022 2155)  Glucose maintained within prescribed range: Monitor blood glucose as ordered     Problem: Confusion  Goal: Confusion, delirium, dementia, or psychosis is improved or at baseline  Description: INTERVENTIONS:  1. Assess for possible contributors to thought disturbance, including medications, impaired vision or hearing, underlying metabolic abnormalities, dehydration, psychiatric diagnoses, and notify attending LIP  2. [place] high risk fall precautions, as indicated  3. Provide frequent short contacts to provide reality reorientation, refocusing and direction  4. Decrease environmental stimuli, including noise as appropriate  5. Monitor and intervene to maintain adequate nutrition, hydration, elimination, sleep and activity  6.  If unable to ensure safety without constant attention obtain sitter and review sitter guidelines with assigned personnel  7.  Initiate Psychosocial CNS and Spiritual Care consult, as indicated  Outcome: Progressing  Flowsheets (Taken 6/23/2022 0745 by Anthony Strauss, ABDI)  Effect of thought disturbance (confusion, delirium, dementia, or psychosis) are managed with adequate functional status: Assess for contributors to thought disturbance, including medications, impaired vision or hearing, underlying metabolic abnormalities, dehydration, psychiatric diagnoses, notify LIP     Problem: Nutrition Deficit:  Goal: Optimize nutritional status  6/25/2022 0312 by Rhina Ortiz, LPN  Outcome: Progressing  Flowsheets (Taken 6/25/2022 0312)  Nutrient intake appropriate for improving, restoring, or maintaining nutritional needs:   Assess nutritional status and recommend course of action   Monitor oral intake, labs, and treatment plans   Provide specific nutrition education to patient or family as appropriate  6/24/2022 1407 by Moshe Whitfield, MS, RD, LD  Outcome: Progressing

## 2022-06-25 NOTE — PROGRESS NOTES
Urology Progress Note    SUBJECTIVE: Some mild complains of nausea and generalized arthritic pain this afternoon. OBJECTIVE:   Review of Systems   Constitutional: Negative for chills and fever. Gastrointestinal: Positive for nausea. Negative for abdominal distention, abdominal pain and vomiting. Genitourinary: Negative for difficulty urinating, dysuria, flank pain, frequency, hematuria and urgency. Musculoskeletal: Positive for arthralgias, back pain and gait problem. Neurological: Positive for weakness. Psychiatric/Behavioral: Negative for agitation and confusion. Physical  VITALS:  /66   Pulse 73   Temp 99 °F (37.2 °C) (Temporal)   Resp 20   Ht 5' 5\" (1.651 m)   Wt (!) 348 lb 9 oz (158.1 kg)   SpO2 91%   BMI 58.00 kg/m²   TEMPERATURE:  Current - Temp: 99 °F (37.2 °C); Max - Temp  Av.5 °F (36.9 °C)  Min: 97.7 °F (36.5 °C)  Max: 99 °F (37.2 °C)   24 HR I&O   Intake/Output Summary (Last 24 hours) at 2022 1339  Last data filed at 2022 1045  Gross per 24 hour   Intake 120 ml   Output 2100 ml   Net -1980 ml       Physical Exam   BACK: not done  ABDOMEN:  soft, obese, non-distended and non-tender  HEART:  normal rate and regular rhythm  CHEST: Normal respiratory effort  GENITAL/URINARY: Krishna catheter in place draining dark yellow to josy-colored urine. Data  CBC:   Recent Labs     226 22  0215   WBC 17.5* 19.8* 17.1*   HGB 12.2 12.6 10.9*   HCT 39.8 40.1 34.7*   PLT 78* 93* 116*     BMP:    Recent Labs     22  0359 226 22  0215    144 143   K 3.5 3.1* 3.2*   CL 99 97* 97*   CO2 35* 37* 38*   BUN 38* 32* 29*   CREATININE 0.8 0.7 0.7   GLUCOSE 123* 125* 124*       No results for input(s): LABURIN in the last 72 hours. No results for input(s): BC in the last 72 hours. No results for input(s): Carson Herron in the last 72 hours.       U/A:    Lab Results   Component Value Date    COLORU DARK YELLOW 06/15/2022 left ureteral stent placement as described above. Spoke with patient and daughter today. We will reevaluate tomorrow. Continue Krishna catheter for external drainage.     Alli Munoz, APRN - CNP  6/25/2022 1:39 PM

## 2022-06-25 NOTE — PROGRESS NOTES
Connections:     Frequency of Communication with Friends and Family: Not on file    Frequency of Social Gatherings with Friends and Family: Not on file    Attends Yazidi Services: Not on file    Active Member of Clubs or Organizations: Not on file    Attends Club or Organization Meetings: Not on file    Marital Status: Not on file   Intimate Partner Violence:     Fear of Current or Ex-Partner: Not on file    Emotionally Abused: Not on file    Physically Abused: Not on file    Sexually Abused: Not on file   Housing Stability:     Unable to Pay for Housing in the Last Year: Not on file    Number of [place] in the Last Year: Not on file    Unstable Housing in the Last Year: Not on file       Current Facility-Administered Medications   Medication Dose Route Frequency Provider Last Rate Last Admin    melatonin disintegrating tablet 10 mg  10 mg Oral Nightly MATTHEW Moore - CNP   10 mg at 06/24/22 2139    guaiFENesin tablet 400 mg  400 mg Oral Q8H Dianna Horton MD   400 mg at 06/25/22 0517    sodium chloride (OCEAN, BABY AYR) 0.65 % nasal spray 1 spray  1 spray Each Nostril Q4H PRN Dianna Horton MD   1 spray at 06/23/22 1504    magnesium sulfate 1000 mg in dextrose 5% 100 mL IVPB  1,000 mg IntraVENous PRN Dianna Horton MD        potassium chloride (KLOR-CON M) extended release tablet 40 mEq  40 mEq Oral PRN Dianna Horton MD   40 mEq at 06/25/22 1444    Or    potassium bicarb-citric acid (EFFER-K) effervescent tablet 40 mEq  40 mEq Oral PRN Dianna Horton MD        Or    potassium chloride 10 mEq/100 mL IVPB (Peripheral Line)  10 mEq IntraVENous PRN Dianna Horton MD        labetalol (NORMODYNE;TRANDATE) injection 10 mg  10 mg IntraVENous Q4H PRN Madelin Charles MD   10 mg at 06/19/22 0334    lisinopril (PRINIVIL;ZESTRIL) tablet 20 mg  20 mg Oral BID Dianna Horton MD   20 mg at 06/25/22 0815    And    hydroCHLOROthiazide (HYDRODIURIL) tablet 12.5 mg  12.5 mg Oral BID Zachary Meza Marcelle Barton MD   12.5 mg at 06/25/22 0814    metoprolol tartrate (LOPRESSOR) tablet 100 mg  100 mg Oral BID Jan Machado MD   100 mg at 06/25/22 0814    levoFLOXacin (LEVAQUIN) 750 MG/150ML infusion 750 mg  750 mg IntraVENous Q24H Jacquie Hauser  mL/hr at 06/25/22 0515 750 mg at 06/25/22 0515    traMADol (ULTRAM) tablet 50 mg  50 mg Oral Q6H PRN Jan Machado MD   50 mg at 06/25/22 0517    busPIRone (BUSPAR) tablet 10 mg  10 mg Oral TID Jan Machado MD   10 mg at 06/25/22 0815    gabapentin (NEURONTIN) tablet 600 mg  600 mg Oral 4x Daily Jacquie Hauser MD   600 mg at 06/25/22 3373    pravastatin (PRAVACHOL) tablet 40 mg  40 mg Oral Daily Jacquie Hauser MD   40 mg at 06/25/22 0815    glucose chewable tablet 16 g  4 tablet Oral PRN Ac Huber MD        dextrose bolus 10% 125 mL  125 mL IntraVENous BILLIE Huber MD        Or    dextrose bolus 10% 250 mL  250 mL IntraVENous BILLIE Huber MD        glucagon (rDNA) injection 1 mg  1 mg IntraMUSCular PREDGARD Huber MD        dextrose 5 % solution  100 mL/hr IntraVENous BILLIE Huber MD        insulin lispro (HUMALOG) injection vial 0-18 Units  0-18 Units SubCUTAneous TID WC Ac Huber MD   3 Units at 06/22/22 1753    insulin lispro (HUMALOG) injection vial 0-9 Units  0-9 Units SubCUTAneous Nightly Ac Huber MD   2 Units at 06/19/22 2123    insulin glargine (LANTUS) injection vial 10 Units  10 Units SubCUTAneous Nightly Ac Huber MD   10 Units at 06/24/22 2141    sodium chloride flush 0.9 % injection 5-40 mL  5-40 mL IntraVENous 2 times per day Ac Huber MD   10 mL at 06/24/22 2139    sodium chloride flush 0.9 % injection 5-40 mL  5-40 mL IntraVENous BILLIE Huber MD        0.9 % sodium chloride infusion   IntraVENous BILLIE Huber MD        ondansetron (ZOFRAN-ODT) disintegrating tablet 4 ANIONGAP 8 10 8   CL 99 97* 97*   CO2 35* 37* 38*   BUN 38* 32* 29*   CREATININE 0.8 0.7 0.7   GLUCOSE 123* 125* 124*   CALCIUM 8.6* 8.1* 7.7*     Recent Labs     06/23/22  0359 06/24/22  0226 06/25/22  0215   MG 1.7 1.7 1.6     Recent Labs     06/23/22  0359 06/24/22  0226 06/25/22  0215   AST 75* 67* 46*   ALT 39* 31 22   BILITOT 2.2* 2.4* 1.7*   ALKPHOS 206* 208* 183*     No results for input(s): PH, PO2, PCO2, HCO3, BE, O2SAT in the last 72 hours. No results for input(s): TROPONINI in the last 72 hours. No results for input(s): INR in the last 72 hours. No results for input(s): LACTA in the last 72 hours. Intake/Output Summary (Last 24 hours) at 6/25/2022 1035  Last data filed at 6/25/2022 1011  Gross per 24 hour   Intake 120 ml   Output 1900 ml   Net -1780 ml       No results found.      Assessment and Plan:   Septic shock  Shock resolved  Secondary to below  Agents as below  IVF  Since weaned off phenylephrine gtt  Lactate improved overall     Obstructive left E. coli pyelonephritis  Urology following  Secondary to proximal left ureteral calculus  S/p left ureteral stent placement 6/15/2022 per Dr. Anibal Smith per sensitivities  Plans for definitive treatment per urology next week however patient/family declined     E. coli bacteremia  Secondary to above  Levaquin per sensitivities  Repeat blood cultures NGTD     BINH  Resolved with current treatment plan  Avoid offending agents  Follow renal function/urine output/electrolytes     DM2  HbA1c 9.1  Poor control  Counseled  Meds on board     Morbid obesity  Counseled     Chronic pain syndrome  Patient has been bedbound for the last 5 years  Patient/daughter state she has been taking opioids for many years  I extensively explained how these agents can run counter to her current treatment plan  Patient continues to request home pain meds despite extensive counseling  Palliative/social work following per patient/family request  Possible transition to hospice but nothing official as of yet     New onset atrial fibrillation with RVR  Resolved  Remains in sinus rhythm  Suspect secondary to above processes  AC contraindicated given thrombocytopenia  Serial troponin 0.01 > 0.02 > 0.02 > (<0.01)  Follow electrolytes     Acute hypoxic respiratory failure  Continues to improve  Suspect multifactorial  Currently requiring 2-3 L supplemental O2, decreasing requirement overall  Goal sats 88-92%, discussed with staff  BiPAP with sleep     DVT prophylaxis  SCDs given thrombocytopenia     Extensive discussions with patient, daughter [place]), and son ([place]) in regards to current clinical state/goals of care.  All questions sought and answered.     Leny Sellers MD   6/25/2022 10:35 AM

## 2022-06-25 NOTE — PROGRESS NOTES
Comprehensive Nutrition Assessment    Type and Reason for Visit:  Reassess    Nutrition Recommendations/Plan:   1. Continue to follow for diet education     Malnutrition Assessment:  Malnutrition Status: At risk for malnutrition (Comment) (06/25/22 1409)    Context:  Acute Illness     Findings of the 6 clinical characteristics of malnutrition:  Energy Intake:  50% or less of estimated energy requirements for 5 or more days  Weight Loss:  No significant weight loss     Body Fat Loss:  No significant body fat loss     Muscle Mass Loss:  No significant muscle mass loss    Fluid Accumulation:  Moderate to Severe Extremities   Strength:  Not Performed    Nutrition Assessment:    POintaks still decreased. No new wt. Attepted to explain Carb Control diet to pt and daughter. Not able to do instruction--pta dn daughter upset over MD visit. and conversation. Information given and will discuss at a later date    Nutrition Related Findings:    Family considering Hospice Wound Type: None       Current Nutrition Intake & Therapies:    Average Meal Intake: 1-25%,26-50%  Average Supplements Intake: 26-50%  ADULT DIET; Dysphagia - Soft and Bite Sized; 4 carb choices (60 gm/meal); Low Fat/Low Chol/High Fiber/2 gm Na  ADULT ORAL NUTRITION SUPPLEMENT; Dinner; Fortified Pudding Oral Supplement    Anthropometric Measures:  Height: 5' 5\" (165.1 cm)  Ideal Body Weight (IBW): 125 lbs (57 kg)       Current Body Weight: 348 lb 9 oz (158.1 kg), 278.9 % IBW.     Current BMI (kg/m2): 58        Weight Adjustment For: No Adjustment  BMI Categories: Obese Class 3 (BMI 40.0 or greater)    Estimated Daily Nutrient Needs:  Energy Requirements Based On: Kcal/kg     Energy (kcal/day): 7871-1472 kcals (8-11 kcals/kg)  Weight Used for Protein Requirements: Ideal  Protein (g/day): 114g  Method Used for Fluid Requirements: 1 ml/kcal  Fluid (ml/day): 4775-6042 ml    Nutrition Diagnosis:   · Inadequate oral intake,Altered nutrition-related lab values related to acute injury/trauma,endocrine dysfuntion as evidenced by intake 0-25%,intake 26-50%,lab values      Nutrition Interventions:   Food and/or Nutrient Delivery: Continue Current Diet,Continue Oral Nutrition Supplement  Nutrition Education/Counseling: Education needed  Coordination of Nutrition Care: Continue to monitor while inpatient  Plan of Care discussed with: pt & daughter    Goals:  Previous Goal Met: Progressing toward Goal(s)  Goals: Meet at least 75% of estimated needs,PO intake 50% or greater       Nutrition Monitoring and Evaluation:   Behavioral-Environmental Outcomes: Knowledge or Skill  Food/Nutrient Intake Outcomes: Food and Nutrient Intake,Supplement Intake  Physical Signs/Symptoms Outcomes: Biochemical Data,Weight,Skin,Fluid Status or Edema    Discharge Planning:     Too soon to determine     Augustus Bergeron MS, RD, LD  Contact: [phone]

## 2022-06-26 LAB
ALBUMIN SERPL-MCNC: 2.4 G/DL (ref 3.5–5.2)
ALP BLD-CCNC: 182 U/L (ref 35–104)
ALT SERPL-CCNC: 19 U/L (ref 5–33)
ANION GAP SERPL CALCULATED.3IONS-SCNC: 10 MMOL/L (ref 7–19)
AST SERPL-CCNC: 46 U/L (ref 5–32)
BASOPHILS ABSOLUTE: 0 K/UL (ref 0–0.2)
BASOPHILS RELATIVE PERCENT: 0.3 % (ref 0–1)
BILIRUB SERPL-MCNC: 1.6 MG/DL (ref 0.2–1.2)
BUN BLDV-MCNC: 25 MG/DL (ref 8–23)
CALCIUM SERPL-MCNC: 7.8 MG/DL (ref 8.8–10.2)
CHLORIDE BLD-SCNC: 95 MMOL/L (ref 98–111)
CO2: 37 MMOL/L (ref 22–29)
CREAT SERPL-MCNC: 0.7 MG/DL (ref 0.5–0.9)
EOSINOPHILS ABSOLUTE: 0.1 K/UL (ref 0–0.6)
EOSINOPHILS RELATIVE PERCENT: 0.4 % (ref 0–5)
GFR AFRICAN AMERICAN: >59
GFR NON-AFRICAN AMERICAN: >60
GLUCOSE BLD-MCNC: 107 MG/DL (ref 70–99)
GLUCOSE BLD-MCNC: 108 MG/DL (ref 70–99)
GLUCOSE BLD-MCNC: 111 MG/DL (ref 70–99)
GLUCOSE BLD-MCNC: 126 MG/DL (ref 74–109)
GLUCOSE BLD-MCNC: 89 MG/DL (ref 70–99)
HCT VFR BLD CALC: 38.4 % (ref 37–47)
HEMOGLOBIN: 11.7 G/DL (ref 12–16)
IMMATURE GRANULOCYTES #: 0.4 K/UL
LYMPHOCYTES ABSOLUTE: 2.1 K/UL (ref 1.1–4.5)
LYMPHOCYTES RELATIVE PERCENT: 13.6 % (ref 20–40)
MAGNESIUM: 1.6 MG/DL (ref 1.6–2.4)
MCH RBC QN AUTO: 29.1 PG (ref 27–31)
MCHC RBC AUTO-ENTMCNC: 30.5 G/DL (ref 33–37)
MCV RBC AUTO: 95.5 FL (ref 81–99)
MONOCYTES ABSOLUTE: 1.2 K/UL (ref 0–0.9)
MONOCYTES RELATIVE PERCENT: 7.5 % (ref 0–10)
NEUTROPHILS ABSOLUTE: 11.8 K/UL (ref 1.5–7.5)
NEUTROPHILS RELATIVE PERCENT: 75.6 % (ref 50–65)
PDW BLD-RTO: 15.9 % (ref 11.5–14.5)
PERFORMED ON: ABNORMAL
PERFORMED ON: NORMAL
PLATELET # BLD: 150 K/UL (ref 130–400)
PMV BLD AUTO: 11.5 FL (ref 9.4–12.3)
POTASSIUM SERPL-SCNC: 3.6 MMOL/L (ref 3.5–5)
RBC # BLD: 4.02 M/UL (ref 4.2–5.4)
SODIUM BLD-SCNC: 142 MMOL/L (ref 136–145)
TOTAL PROTEIN: 5.5 G/DL (ref 6.6–8.7)
WBC # BLD: 15.6 K/UL (ref 4.8–10.8)

## 2022-06-26 PROCEDURE — 94640 AIRWAY INHALATION TREATMENT: CPT

## 2022-06-26 PROCEDURE — 6370000000 HC RX 637 (ALT 250 FOR IP)

## 2022-06-26 PROCEDURE — 94660 CPAP INITIATION&MGMT: CPT

## 2022-06-26 PROCEDURE — 82947 ASSAY GLUCOSE BLOOD QUANT: CPT

## 2022-06-26 PROCEDURE — 94761 N-INVAS EAR/PLS OXIMETRY MLT: CPT

## 2022-06-26 PROCEDURE — 2700000000 HC OXYGEN THERAPY PER DAY

## 2022-06-26 PROCEDURE — 2580000003 HC RX 258: Performed by: INTERNAL MEDICINE

## 2022-06-26 PROCEDURE — 99232 SBSQ HOSP IP/OBS MODERATE 35: CPT | Performed by: NURSE PRACTITIONER

## 2022-06-26 PROCEDURE — 85025 COMPLETE CBC W/AUTO DIFF WBC: CPT

## 2022-06-26 PROCEDURE — 83735 ASSAY OF MAGNESIUM: CPT

## 2022-06-26 PROCEDURE — 1210000000 HC MED SURG R&B

## 2022-06-26 PROCEDURE — 6360000002 HC RX W HCPCS: Performed by: UROLOGY

## 2022-06-26 PROCEDURE — 6370000000 HC RX 637 (ALT 250 FOR IP): Performed by: UROLOGY

## 2022-06-26 PROCEDURE — 6370000000 HC RX 637 (ALT 250 FOR IP): Performed by: INTERNAL MEDICINE

## 2022-06-26 PROCEDURE — 80053 COMPREHEN METABOLIC PANEL: CPT

## 2022-06-26 PROCEDURE — 36415 COLL VENOUS BLD VENIPUNCTURE: CPT

## 2022-06-26 RX ADMIN — METOPROLOL TARTRATE 100 MG: 50 TABLET, FILM COATED ORAL at 20:53

## 2022-06-26 RX ADMIN — HYDROCHLOROTHIAZIDE 12.5 MG: 25 TABLET ORAL at 10:00

## 2022-06-26 RX ADMIN — HYDROCHLOROTHIAZIDE 12.5 MG: 25 TABLET ORAL at 20:53

## 2022-06-26 RX ADMIN — GUAIFENESIN 400 MG: 200 TABLET ORAL at 21:01

## 2022-06-26 RX ADMIN — Medication 10 MG: at 20:53

## 2022-06-26 RX ADMIN — GABAPENTIN 600 MG: 600 TABLET, FILM COATED ORAL at 10:00

## 2022-06-26 RX ADMIN — BUSPIRONE HYDROCHLORIDE 10 MG: 10 TABLET ORAL at 13:23

## 2022-06-26 RX ADMIN — SODIUM CHLORIDE, PRESERVATIVE FREE 10 ML: 5 INJECTION INTRAVENOUS at 10:02

## 2022-06-26 RX ADMIN — IPRATROPIUM BROMIDE AND ALBUTEROL SULFATE 1 AMPULE: 2.5; .5 SOLUTION RESPIRATORY (INHALATION) at 07:37

## 2022-06-26 RX ADMIN — LISINOPRIL 20 MG: 20 TABLET ORAL at 10:00

## 2022-06-26 RX ADMIN — TRAMADOL HYDROCHLORIDE 50 MG: 50 TABLET, COATED ORAL at 21:08

## 2022-06-26 RX ADMIN — METOPROLOL TARTRATE 100 MG: 50 TABLET, FILM COATED ORAL at 10:00

## 2022-06-26 RX ADMIN — IPRATROPIUM BROMIDE AND ALBUTEROL SULFATE 1 AMPULE: 2.5; .5 SOLUTION RESPIRATORY (INHALATION) at 11:43

## 2022-06-26 RX ADMIN — TRAMADOL HYDROCHLORIDE 50 MG: 50 TABLET, COATED ORAL at 13:23

## 2022-06-26 RX ADMIN — PRAVASTATIN SODIUM 40 MG: 20 TABLET ORAL at 10:00

## 2022-06-26 RX ADMIN — LEVOFLOXACIN 750 MG: 5 INJECTION, SOLUTION INTRAVENOUS at 05:08

## 2022-06-26 RX ADMIN — BUSPIRONE HYDROCHLORIDE 10 MG: 10 TABLET ORAL at 20:53

## 2022-06-26 RX ADMIN — GUAIFENESIN 400 MG: 200 TABLET ORAL at 13:23

## 2022-06-26 RX ADMIN — IPRATROPIUM BROMIDE AND ALBUTEROL SULFATE 1 AMPULE: 2.5; .5 SOLUTION RESPIRATORY (INHALATION) at 19:27

## 2022-06-26 RX ADMIN — GUAIFENESIN 400 MG: 200 TABLET ORAL at 06:33

## 2022-06-26 RX ADMIN — GABAPENTIN 600 MG: 600 TABLET, FILM COATED ORAL at 18:35

## 2022-06-26 RX ADMIN — IPRATROPIUM BROMIDE AND ALBUTEROL SULFATE 1 AMPULE: 2.5; .5 SOLUTION RESPIRATORY (INHALATION) at 16:10

## 2022-06-26 RX ADMIN — LISINOPRIL 20 MG: 20 TABLET ORAL at 20:53

## 2022-06-26 RX ADMIN — TRAMADOL HYDROCHLORIDE 50 MG: 50 TABLET, COATED ORAL at 00:51

## 2022-06-26 RX ADMIN — GABAPENTIN 600 MG: 600 TABLET, FILM COATED ORAL at 20:53

## 2022-06-26 RX ADMIN — INSULIN GLARGINE 10 UNITS: 100 INJECTION, SOLUTION SUBCUTANEOUS at 21:06

## 2022-06-26 RX ADMIN — TRAMADOL HYDROCHLORIDE 50 MG: 50 TABLET, COATED ORAL at 06:33

## 2022-06-26 RX ADMIN — BUSPIRONE HYDROCHLORIDE 10 MG: 10 TABLET ORAL at 10:00

## 2022-06-26 RX ADMIN — GABAPENTIN 600 MG: 600 TABLET, FILM COATED ORAL at 13:23

## 2022-06-26 ASSESSMENT — PAIN DESCRIPTION - ORIENTATION
ORIENTATION: RIGHT;LEFT

## 2022-06-26 ASSESSMENT — PAIN SCALES - GENERAL
PAINLEVEL_OUTOF10: 0
PAINLEVEL_OUTOF10: 9
PAINLEVEL_OUTOF10: 0
PAINLEVEL_OUTOF10: 0
PAINLEVEL_OUTOF10: 8
PAINLEVEL_OUTOF10: 9
PAINLEVEL_OUTOF10: 5
PAINLEVEL_OUTOF10: 8
PAINLEVEL_OUTOF10: 9
PAINLEVEL_OUTOF10: 9

## 2022-06-26 ASSESSMENT — PAIN DESCRIPTION - FREQUENCY
FREQUENCY: INTERMITTENT

## 2022-06-26 ASSESSMENT — PAIN DESCRIPTION - PAIN TYPE
TYPE: CHRONIC PAIN

## 2022-06-26 ASSESSMENT — PAIN DESCRIPTION - ONSET
ONSET: AWAKENED FROM SLEEP

## 2022-06-26 ASSESSMENT — PAIN DESCRIPTION - LOCATION
LOCATION: KNEE
LOCATION: OTHER (COMMENT)
LOCATION: GENERALIZED
LOCATION: KNEE

## 2022-06-26 ASSESSMENT — PAIN - FUNCTIONAL ASSESSMENT
PAIN_FUNCTIONAL_ASSESSMENT: PREVENTS OR INTERFERES WITH ALL ACTIVE AND SOME PASSIVE ACTIVITIES
PAIN_FUNCTIONAL_ASSESSMENT: PREVENTS OR INTERFERES WITH MANY ACTIVE NOT PASSIVE ACTIVITIES
PAIN_FUNCTIONAL_ASSESSMENT: PREVENTS OR INTERFERES WITH ALL ACTIVE AND SOME PASSIVE ACTIVITIES

## 2022-06-26 ASSESSMENT — PAIN DESCRIPTION - DESCRIPTORS
DESCRIPTORS: STABBING
DESCRIPTORS: STABBING
DESCRIPTORS: THROBBING

## 2022-06-26 NOTE — PROGRESS NOTES
Urology Progress Note      SUBJECTIVE: Denies any new  complaints. Some complaints of generalized arthritic pain. OBJECTIVE:   Review of Systems     Physical  VITALS:  /63   Pulse 87   Temp 97 °F (36.1 °C) (Temporal)   Resp 18   Ht 5' 5\" (1.651 m)   Wt (!) 348 lb 9 oz (158.1 kg)   SpO2 94%   BMI 58.00 kg/m²   TEMPERATURE:  Current - Temp: 97 °F (36.1 °C); Max - Temp  Av.9 °F (36.6 °C)  Min: 97 °F (36.1 °C)  Max: 99 °F (37.2 °C)   24 HR I&O   Intake/Output Summary (Last 24 hours) at 2022 0959  Last data filed at 2022 0249  Gross per 24 hour   Intake 520 ml   Output 2300 ml   Net -1780 ml       Physical Exam   BACK: no tenderness in spine or flanks  ABDOMEN:  soft, obese, non-distended and non-tender  HEART:  normal rate and regular rhythm  CHEST: Normal respiratory effort  GENITAL/URINARY: Krishna catheter bedside drainage. Data  CBC:   Recent Labs     226 22  0215 22  0402   WBC 19.8* 17.1* 15.6*   HGB 12.6 10.9* 11.7*   HCT 40.1 34.7* 38.4   PLT 93* 116* 150     BMP:    Recent Labs     226 22  0215 22  0402    143 142   K 3.1* 3.2* 3.6   CL 97* 97* 95*   CO2 37* 38* 37*   BUN 32* 29* 25*   CREATININE 0.7 0.7 0.7   GLUCOSE 125* 124* 126*       No results for input(s): LABURIN in the last 72 hours. No results for input(s): BC in the last 72 hours. No results for input(s): Mike Puff in the last 72 hours. U/A:    Lab Results   Component Value Date    COLORU DARK YELLOW 06/15/2022    PHUR 5.0 06/15/2022    WBCUA 2-4 06/15/2022    YEAST Present 06/15/2022    BACTERIA 1+ 06/15/2022    CLARITYU TURBID 06/15/2022    SPECGRAV 1.026 06/15/2022    LEUKOCYTESUR SMALL 06/15/2022    UROBILINOGEN 1.0 06/15/2022    BILIRUBINUR MODERATE 06/15/2022    BLOODU MODERATE 06/15/2022    GLUCOSEU 100 06/15/2022    AMORPHOUS 2+ 06/15/2022       Radiology:   No results found.      ASSESSMENT AND PLAN    Patient Active Problem List   Diagnosis    Obstructive pyelonephritis    Sepsis with acute renal failure (HCC)    Left ureteral calculus    Renal calculus, bilateral    BINH (acute kidney injury) ([address]    Obstructive uropathy    Hyperglycemia due to diabetes mellitus ([address]    Hypertension    Dehydration    FREDDY and COPD overlap syndrome ([address]    Degenerative joint disease    Palliative care patient    [facility]icemia [facility]    Chronic pain syndrome    Acute respiratory failure with hypoxia ([address]     1. Sepsis with septic shock secondary to left obstructive pyelonephritis from obstructing ureteral stone status post left ureteral stent placement on 6/15/2022. Patient has been on culture specific antibiotics. 13 days of antibiotic therapy completed 2 of cefepime and and 12 of Levaquin. After further discussion of stone removal versus stent removal patient would like to continue with definitive scheduled surgical intervention on Tuesday with left ureteroscopy stone extraction. I discussed this with the patient, the daughter and the son. 2.  Left obstructing proximal ureteral stone status post left ureteral stent placement as described above. Plan for definitive stone treatment on Tuesday as scheduled. Continue Krishna catheter.     MATTHEW Darby CNP  6/26/2022 9:59 AM

## 2022-06-26 NOTE — PROGRESS NOTES
The daughter, Addie Rogers, would like to have the doctor to call and update her about her mother;  Addie Rogers phone number is [phone]. Electronically signed by Rupesh Luis LPN on 0/03/2669 at 2:25 AM

## 2022-06-26 NOTE — PROGRESS NOTES
Hospitalist Progress Note  [facility]     Patient: Lalito Corral  : 1955  MRN: [id number]  Code Status: DNR    Hospital Day: 11   Date of Service: 2022    Subjective:   Patient seen and examined. Complains of chronic pain. No further complaints. Past Medical History:   Diagnosis Date    Arthritis     HTN (hypertension)        Past Surgical History:   Procedure Laterality Date    ANKLE FRACTURE SURGERY Right     BLADDER SURGERY Left 6/15/2022    CYSTOSCOPY, LEFT URETEROSCOPY, RETROGRADE PYELOGRAM, STENT INSERTION performed by Orly Rodriguez MD at [facility] (CERVIX STATUS UNKNOWN)      INCONTINENCE SURGERY         Family History   Problem Relation Age of Onset    Cancer Mother     Cancer Father        Social History     Socioeconomic History    Marital status: Single     Spouse name: Not on file    Number of children: Not on file    Years of education: Not on file    Highest education level: Not on file   Occupational History    Not on file   Tobacco Use    Smoking status: Former Smoker    Smokeless tobacco: Never Used    Tobacco comment: quit 10 years ago   Vaping Use    Vaping Use: Never used   Substance and Sexual Activity    Alcohol use: Never    Drug use: Never    Sexual activity: Not on file   Other Topics Concern    Not on file   Social History Narrative    Not on file     Social Determinants of Health     Financial Resource Strain:     Difficulty of Paying Living Expenses: Not on file   Food Insecurity:     Worried About [address] in the Last Year: Not on file    [address] in the Last Year: Not on file   Transportation Needs:     Lack of Transportation (Medical): Not on file    Lack of Transportation (Non-Medical):  Not on file   Physical Activity:     Days of Exercise per Week: Not on file    Minutes of Exercise per Session: Not on file   Stress:     Feeling of Stress : Not on file   Social Connections:     Frequency of Communication with Friends and Family: Not on file    Frequency of Social Gatherings with Friends and Family: Not on file    Attends Cheondoism Services: Not on file    Active Member of Clubs or Organizations: Not on file    Attends Club or Organization Meetings: Not on file    Marital Status: Not on file   Intimate Partner Violence:     Fear of Current or Ex-Partner: Not on file    Emotionally Abused: Not on file    Physically Abused: Not on file    Sexually Abused: Not on file   Housing Stability:     Unable to Pay for Housing in the Last Year: Not on file    Number of [place] in the Last Year: Not on file    Unstable Housing in the Last Year: Not on file       Current Facility-Administered Medications   Medication Dose Route Frequency Provider Last Rate Last Admin    prochlorperazine (COMPAZINE) tablet 5 mg  5 mg Oral Q6H PRN Flavia Hudson MD   5 mg at 06/25/22 1846    melatonin disintegrating tablet 10 mg  10 mg Oral Nightly Sydney Dense, APRN - CNP   10 mg at 06/25/22 2112    guaiFENesin tablet 400 mg  400 mg Oral Q8H Flavia Hudson MD   400 mg at 06/26/22 8866    sodium chloride (OCEAN, BABY AYR) 0.65 % nasal spray 1 spray  1 spray Each Nostril Q4H PRN Flavia Hudson MD   1 spray at 06/23/22 1504    magnesium sulfate 1000 mg in dextrose 5% 100 mL IVPB  1,000 mg IntraVENous PRN Flavia Hudson MD        potassium chloride (KLOR-CON M) extended release tablet 40 mEq  40 mEq Oral PRN Flavia Hudson MD   40 mEq at 06/25/22 1030    Or    potassium bicarb-citric acid (EFFER-K) effervescent tablet 40 mEq  40 mEq Oral PRN Flavia Hudson MD        Or    potassium chloride 10 mEq/100 mL IVPB (Peripheral Line)  10 mEq IntraVENous PRN Flavia Hudson MD        labetalol (NORMODYNE;TRANDATE) injection 10 mg  10 mg IntraVENous Q4H PRN Negar Cunningham MD   10 mg at 06/19/22 0334    lisinopril (PRINIVIL;ZESTRIL) tablet 20 mg  20 mg Oral BID Flavia Hudson MD   20 mg at 06/26/22 1000    And    hydroCHLOROthiazide (HYDRODIURIL) tablet 12.5 mg  12.5 mg Oral BID Vernida Osler, MD   12.5 mg at 06/26/22 1000    metoprolol tartrate (LOPRESSOR) tablet 100 mg  100 mg Oral BID Vernida Osler, MD   100 mg at 06/26/22 1000    levoFLOXacin (LEVAQUIN) 750 MG/150ML infusion 750 mg  750 mg IntraVENous Q24H Andriy Reid MD   Stopped at 06/26/22 0807    traMADol (ULTRAM) tablet 50 mg  50 mg Oral Q6H PRN Vernida Osler, MD   50 mg at 06/26/22 6305    busPIRone (BUSPAR) tablet 10 mg  10 mg Oral TID Vernida Osler, MD   10 mg at 06/26/22 1000    gabapentin (NEURONTIN) tablet 600 mg  600 mg Oral 4x Daily Andriy Reid MD   600 mg at 06/26/22 1000    pravastatin (PRAVACHOL) tablet 40 mg  40 mg Oral Daily Andriy Reid MD   40 mg at 06/26/22 1000    glucose chewable tablet 16 g  4 tablet Oral PRN Felipe Yee MD        dextrose bolus 10% 125 mL  125 mL IntraVENous PRN Felipe Yee MD        Or    dextrose bolus 10% 250 mL  250 mL IntraVENous PRN Felipe Yee MD        glucagon (rDNA) injection 1 mg  1 mg IntraMUSCular PRN Felipe Yee MD        dextrose 5 % solution  100 mL/hr IntraVENous PRN Felipe Yee MD        insulin lispro (HUMALOG) injection vial 0-18 Units  0-18 Units SubCUTAneous TID WC Felipe Yee MD   3 Units at 06/22/22 1753    insulin lispro (HUMALOG) injection vial 0-9 Units  0-9 Units SubCUTAneous Nightly Felipe Yee MD   2 Units at 06/19/22 2123    insulin glargine (LANTUS) injection vial 10 Units  10 Units SubCUTAneous Nightly Felipe Yee MD   10 Units at 06/25/22 2118    sodium chloride flush 0.9 % injection 5-40 mL  5-40 mL IntraVENous 2 times per day Felipe Yee MD   10 mL at 06/26/22 1002    sodium chloride flush 0.9 % injection 5-40 mL  5-40 mL IntraVENous PRN Felipe Yee MD        0.9 % sodium chloride infusion   IntraVENous PRN Claudette Furrow, MD        ondansetron (ZOFRAN-ODT) disintegrating tablet 4 mg  4 mg Oral Q8H PRN Claudette Furrow, MD   4 mg at 06/21/22 1042    Or    ondansetron (ZOFRAN) injection 4 mg  4 mg IntraVENous Q6H PRN Claudette Furrow, MD   4 mg at 06/25/22 0815    polyethylene glycol (GLYCOLAX) packet 17 g  17 g Oral Daily PRN Claudette Furrow, MD        acetaminophen (TYLENOL) tablet 650 mg  650 mg Oral Q6H PRN Claudette Furrow, MD   650 mg at 06/22/22 1448    Or    acetaminophen (TYLENOL) suppository 650 mg  650 mg Rectal Q6H PRN Claudette Furrow, MD        potassium chloride 10 mEq/100 mL IVPB (Peripheral Line)  10 mEq IntraVENous PRN Claudette Furrow, MD        magnesium sulfate 2000 mg in 50 mL IVPB premix  2,000 mg IntraVENous PRN Claudette Furrow, MD   Stopped at 06/16/22 0640    ipratropium-albuterol (DUONEB) nebulizer solution 1 ampule  1 ampule Inhalation Q4H WA Claudette Furrow, MD   1 ampule at 06/26/22 0737    lactated ringers infusion   IntraVENous Continuous Leny Sellers MD 30 mL/hr at 06/25/22 0514 New Bag at 06/25/22 0514         dextrose      sodium chloride      lactated ringers 30 mL/hr at 06/25/22 0514        Objective:   /63   Pulse 87   Temp 97 °F (36.1 °C) (Temporal)   Resp 18   Ht 5' 5\" (1.651 m)   Wt (!) 348 lb 9 oz (158.1 kg)   SpO2 94%   BMI 58.00 kg/m²     General: no acute distress  HEENT: normocephalic, atraumatic  Neck: supple, symmetrical, trachea midline   Lungs: clear to auscultation bilaterally   Cardiovascular: s1 and s2 normal  Abdomen: soft, positive bowel sounds  Extremities: no edema or cyanosis   Neuro: aaox3, no acute focal motor deficits    Recent Labs     06/24/22  0226 06/25/22  0215 06/26/22  0402   WBC 19.8* 17.1* 15.6*   RBC 4.24 3.71* 4.02*   HGB 12.6 10.9* 11.7*   HCT 40.1 34.7* 38.4   MCV 94.6 93.5 95.5   MCH 29.7 29.4 29.1   MCHC 31.4* 31.4* 30.5*   PLT 93* 116* 150     Recent Labs 06/24/22 0226 06/25/22 0215 06/26/22  0402    143 142   K 3.1* 3.2* 3.6   ANIONGAP 10 8 10   CL 97* 97* 95*   CO2 37* 38* 37*   BUN 32* 29* 25*   CREATININE 0.7 0.7 0.7   GLUCOSE 125* 124* 126*   CALCIUM 8.1* 7.7* 7.8*     Recent Labs     06/24/22 0226 06/25/22 0215 06/26/22  0402   MG 1.7 1.6 1.6     Recent Labs     06/24/22 0226 06/25/22 0215 06/26/22  0402   AST 67* 46* 46*   ALT 31 22 19   BILITOT 2.4* 1.7* 1.6*   ALKPHOS 208* 183* 182*     No results for input(s): PH, PO2, PCO2, HCO3, BE, O2SAT in the last 72 hours. No results for input(s): TROPONINI in the last 72 hours. No results for input(s): INR in the last 72 hours. No results for input(s): LACTA in the last 72 hours. Intake/Output Summary (Last 24 hours) at 6/26/2022 1113  Last data filed at 6/26/2022 1003  Gross per 24 hour   Intake 520 ml   Output 1900 ml   Net -1380 ml       No results found.      Assessment and Plan:   Septic shock  Shock resolved  Secondary to below  Agents as below  IVF  Since weaned off phenylephrine gtt  Lactate improved overall     Obstructive left E. coli pyelonephritis  Urology following  Secondary to proximal left ureteral calculus  S/p left ureteral stent placement 6/15/2022 per Dr. Zayra Campbell per sensitivities  Plans for definitive treatment per urology in the next few days  Extensive discussion with patient/family in this regard  They are now agreeable for above definitive urological treatment    E. coli bacteremia  Secondary to above  Levaquin per sensitivities  Repeat blood cultures NGTD     BINH  Resolved with current treatment plan  Avoid offending agents  Follow renal function/urine output/electrolytes     DM2  HbA1c 9.1  Poor control  Counseled  Meds on board     Morbid obesity  Counseled     Chronic pain syndrome  Patient has been bedbound for the last 5 years  Patient/daughter state she has been taking opioids for many years  I extensively explained how these agents can run counter to her current treatment plan  Patient continues to request home pain meds despite extensive counseling  Palliative/social work following per patient/family request     New onset atrial fibrillation with RVR  Resolved  Remains in sinus rhythm  Secondary to above processes  Serial troponin 0.01 > 0.02 > 0.02 > (<0.01)  Follow electrolytes     Acute hypoxic respiratory failure  Continues to improve  Suspect multifactorial  Currently requiring 2-3 L supplemental O2, decreasing requirement overall  Goal sats 88-92%, discussed with staff  BiPAP with sleep     DVT prophylaxis  SCDs     Extensive discussions with patient, daughter Christal Balderrama), and son (Evens) in regards to current clinical state/goals of care.  All questions sought and answered.     Shilo Gaspar MD   6/26/2022 11:13 AM

## 2022-06-26 NOTE — PLAN OF CARE
Problem: Discharge Planning  Goal: Discharge to home or other facility with appropriate resources  Outcome: Progressing  Flowsheets (Taken 6/25/2022 2113)  Discharge to home or other facility with appropriate resources: Identify barriers to discharge with patient and caregiver     Problem: Pain  Goal: Verbalizes/displays adequate comfort level or baseline comfort level  Outcome: Progressing  Flowsheets (Taken 6/25/2022 2113)  Verbalizes/displays adequate comfort level or baseline comfort level:   Encourage patient to monitor pain and request assistance   Assess pain using appropriate pain scale   Administer analgesics based on type and severity of pain and evaluate response   Implement non-pharmacological measures as appropriate and evaluate response   Consider cultural and social influences on pain and pain management   Notify Licensed Independent Practitioner if interventions unsuccessful or patient reports new pain     Problem: Safety - Adult  Goal: Free from fall injury  Outcome: Progressing  Flowsheets (Taken 6/26/2022 0012)  Free From Fall Injury: Instruct family/caregiver on patient safety     Problem: ABCDS Injury Assessment  Goal: Absence of physical injury  Outcome: Progressing  Flowsheets (Taken 6/26/2022 0012)  Absence of Physical Injury: Implement safety measures based on patient assessment     Problem: Skin/Tissue Integrity  Goal: Absence of new skin breakdown  Description: 1. Monitor for areas of redness and/or skin breakdown  2. Every 4-6 hours minimum:  Change oxygen saturation probe site  3. Every 4-6 hours:  If on nasal continuous positive airway pressure, respiratory therapy assess nares and determine need for appliance change or resting period.   Outcome: Progressing     Problem: Chronic Conditions and Co-morbidities  Goal: Patient's chronic conditions and co-morbidity symptoms are monitored and maintained or improved  Outcome: Progressing  Flowsheets (Taken 6/25/2022 2113)  Care Plan - Patient's Chronic Conditions and Co-Morbidity Symptoms are Monitored and Maintained or Improved: Monitor and assess patient's chronic conditions and comorbid symptoms for stability, deterioration, or improvement     Problem: Neurosensory - Adult  Goal: Achieves stable or improved neurological status  Outcome: Progressing  Flowsheets (Taken 6/25/2022 2113)  Achieves stable or improved neurological status: Assess for and report changes in neurological status     Problem: Respiratory - Adult  Goal: Achieves optimal ventilation and oxygenation  Outcome: Progressing  Flowsheets (Taken 6/25/2022 2113)  Achieves optimal ventilation and oxygenation: Assess for changes in respiratory status     Problem: Cardiovascular - Adult  Goal: Absence of cardiac dysrhythmias or at baseline  Outcome: Progressing  Flowsheets (Taken 6/25/2022 2113)  Absence of cardiac dysrhythmias or at baseline: Monitor cardiac rate and rhythm     Problem: Skin/Tissue Integrity - Adult  Goal: Skin integrity remains intact  Outcome: Progressing  Flowsheets  Taken 6/26/2022 0012  Skin Integrity Remains Intact: Monitor for areas of redness and/or skin breakdown  Taken 6/25/2022 2113  Skin Integrity Remains Intact: Monitor for areas of redness and/or skin breakdown     Problem: Musculoskeletal - Adult  Goal: Return mobility to safest level of function  Outcome: Progressing  Flowsheets (Taken 6/25/2022 2113)  Return Mobility to Safest Level of Function: Assess patient stability and activity tolerance for standing, transferring and ambulating with or without assistive devices  Goal: Maintain proper alignment of affected body part  Outcome: Progressing  Flowsheets (Taken 6/25/2022 2113)  Maintain proper alignment of affected body part: Support and protect limb and body alignment per provider's orders     Problem: Genitourinary - Adult  Goal: Urinary catheter remains patent  Outcome: Progressing  Flowsheets (Taken 6/25/2022 2113)  Urinary catheter remains patent: Assess patency of urinary catheter     Problem: Infection - Adult  Goal: Absence of infection during hospitalization  Outcome: Progressing  Flowsheets  Taken 6/26/2022 0012  Absence of infection during hospitalization: Assess and monitor for signs and symptoms of infection  Taken 6/25/2022 2113  Absence of infection during hospitalization: Assess and monitor for signs and symptoms of infection     Problem: Metabolic/Fluid and Electrolytes - Adult  Goal: Electrolytes maintained within normal limits  Outcome: Progressing  Flowsheets (Taken 6/25/2022 2113)  Electrolytes maintained within normal limits: Monitor labs and assess patient for signs and symptoms of electrolyte imbalances  Goal: Glucose maintained within prescribed range  Outcome: Progressing  Flowsheets (Taken 6/25/2022 2113)  Glucose maintained within prescribed range: Monitor blood glucose as ordered     Problem: Confusion  Goal: Confusion, delirium, dementia, or psychosis is improved or at baseline  Description: INTERVENTIONS:  1. Assess for possible contributors to thought disturbance, including medications, impaired vision or hearing, underlying metabolic abnormalities, dehydration, psychiatric diagnoses, and notify attending LIP  2. [place] high risk fall precautions, as indicated  3. Provide frequent short contacts to provide reality reorientation, refocusing and direction  4. Decrease environmental stimuli, including noise as appropriate  5. Monitor and intervene to maintain adequate nutrition, hydration, elimination, sleep and activity  6. If unable to ensure safety without constant attention obtain sitter and review sitter guidelines with assigned personnel  7.  Initiate Psychosocial CNS and Spiritual Care consult, as indicated  Outcome: Progressing  Flowsheets (Taken 6/25/2022 2113)  Effect of thought disturbance (confusion, delirium, dementia, or psychosis) are managed with adequate functional status: Assess for contributors to thought disturbance, including medications, impaired vision or hearing, underlying metabolic abnormalities, dehydration, psychiatric diagnoses, notify LIP     Problem: Nutrition Deficit:  Goal: Optimize nutritional status  Outcome: Progressing  Flowsheets (Taken 6/25/2022 0312)  Nutrient intake appropriate for improving, restoring, or maintaining nutritional needs:   Assess nutritional status and recommend course of action   Monitor oral intake, labs, and treatment plans   Provide specific nutrition education to patient or family as appropriate

## 2022-06-27 LAB
ALBUMIN SERPL-MCNC: 2 G/DL (ref 3.5–5.2)
ALP BLD-CCNC: 149 U/L (ref 35–104)
ALT SERPL-CCNC: 14 U/L (ref 5–33)
ANION GAP SERPL CALCULATED.3IONS-SCNC: 8 MMOL/L (ref 7–19)
AST SERPL-CCNC: 36 U/L (ref 5–32)
BASOPHILS ABSOLUTE: 0.1 K/UL (ref 0–0.2)
BASOPHILS RELATIVE PERCENT: 0.4 % (ref 0–1)
BILIRUB SERPL-MCNC: 1.3 MG/DL (ref 0.2–1.2)
BUN BLDV-MCNC: 23 MG/DL (ref 8–23)
CALCIUM SERPL-MCNC: 7.8 MG/DL (ref 8.8–10.2)
CHLORIDE BLD-SCNC: 97 MMOL/L (ref 98–111)
CO2: 37 MMOL/L (ref 22–29)
CREAT SERPL-MCNC: 0.7 MG/DL (ref 0.5–0.9)
EOSINOPHILS ABSOLUTE: 0.1 K/UL (ref 0–0.6)
EOSINOPHILS RELATIVE PERCENT: 0.5 % (ref 0–5)
GFR AFRICAN AMERICAN: >59
GFR NON-AFRICAN AMERICAN: >60
GLUCOSE BLD-MCNC: 101 MG/DL (ref 70–99)
GLUCOSE BLD-MCNC: 105 MG/DL (ref 70–99)
GLUCOSE BLD-MCNC: 106 MG/DL (ref 70–99)
GLUCOSE BLD-MCNC: 115 MG/DL (ref 74–109)
GLUCOSE BLD-MCNC: 147 MG/DL (ref 70–99)
HCT VFR BLD CALC: 36.3 % (ref 37–47)
HEMOGLOBIN: 11 G/DL (ref 12–16)
IMMATURE GRANULOCYTES #: 0.2 K/UL
LYMPHOCYTES ABSOLUTE: 2.2 K/UL (ref 1.1–4.5)
LYMPHOCYTES RELATIVE PERCENT: 15.9 % (ref 20–40)
MAGNESIUM: 1.6 MG/DL (ref 1.6–2.4)
MCH RBC QN AUTO: 29 PG (ref 27–31)
MCHC RBC AUTO-ENTMCNC: 30.3 G/DL (ref 33–37)
MCV RBC AUTO: 95.8 FL (ref 81–99)
MONOCYTES ABSOLUTE: 1.1 K/UL (ref 0–0.9)
MONOCYTES RELATIVE PERCENT: 8.4 % (ref 0–10)
NEUTROPHILS ABSOLUTE: 9.9 K/UL (ref 1.5–7.5)
NEUTROPHILS RELATIVE PERCENT: 73.2 % (ref 50–65)
PDW BLD-RTO: 15.8 % (ref 11.5–14.5)
PERFORMED ON: ABNORMAL
PLATELET # BLD: 170 K/UL (ref 130–400)
PMV BLD AUTO: 11.8 FL (ref 9.4–12.3)
POTASSIUM SERPL-SCNC: 3.5 MMOL/L (ref 3.5–5)
RBC # BLD: 3.79 M/UL (ref 4.2–5.4)
SODIUM BLD-SCNC: 142 MMOL/L (ref 136–145)
TOTAL PROTEIN: 5.9 G/DL (ref 6.6–8.7)
WBC # BLD: 13.5 K/UL (ref 4.8–10.8)

## 2022-06-27 PROCEDURE — 94761 N-INVAS EAR/PLS OXIMETRY MLT: CPT

## 2022-06-27 PROCEDURE — 6370000000 HC RX 637 (ALT 250 FOR IP)

## 2022-06-27 PROCEDURE — 36415 COLL VENOUS BLD VENIPUNCTURE: CPT

## 2022-06-27 PROCEDURE — 97530 THERAPEUTIC ACTIVITIES: CPT

## 2022-06-27 PROCEDURE — 99232 SBSQ HOSP IP/OBS MODERATE 35: CPT | Performed by: NURSE PRACTITIONER

## 2022-06-27 PROCEDURE — 80053 COMPREHEN METABOLIC PANEL: CPT

## 2022-06-27 PROCEDURE — 2700000000 HC OXYGEN THERAPY PER DAY

## 2022-06-27 PROCEDURE — 6370000000 HC RX 637 (ALT 250 FOR IP): Performed by: UROLOGY

## 2022-06-27 PROCEDURE — 94640 AIRWAY INHALATION TREATMENT: CPT

## 2022-06-27 PROCEDURE — 6370000000 HC RX 637 (ALT 250 FOR IP): Performed by: INTERNAL MEDICINE

## 2022-06-27 PROCEDURE — 94660 CPAP INITIATION&MGMT: CPT

## 2022-06-27 PROCEDURE — 6360000002 HC RX W HCPCS: Performed by: UROLOGY

## 2022-06-27 PROCEDURE — 99232 SBSQ HOSP IP/OBS MODERATE 35: CPT

## 2022-06-27 PROCEDURE — 1210000000 HC MED SURG R&B

## 2022-06-27 PROCEDURE — 2580000003 HC RX 258: Performed by: INTERNAL MEDICINE

## 2022-06-27 PROCEDURE — 83735 ASSAY OF MAGNESIUM: CPT

## 2022-06-27 PROCEDURE — 85025 COMPLETE CBC W/AUTO DIFF WBC: CPT

## 2022-06-27 PROCEDURE — 82947 ASSAY GLUCOSE BLOOD QUANT: CPT

## 2022-06-27 RX ADMIN — INSULIN GLARGINE 10 UNITS: 100 INJECTION, SOLUTION SUBCUTANEOUS at 21:30

## 2022-06-27 RX ADMIN — IPRATROPIUM BROMIDE AND ALBUTEROL SULFATE 1 AMPULE: 2.5; .5 SOLUTION RESPIRATORY (INHALATION) at 18:25

## 2022-06-27 RX ADMIN — GUAIFENESIN 400 MG: 200 TABLET ORAL at 21:30

## 2022-06-27 RX ADMIN — LEVOFLOXACIN 750 MG: 5 INJECTION, SOLUTION INTRAVENOUS at 05:05

## 2022-06-27 RX ADMIN — GABAPENTIN 600 MG: 600 TABLET, FILM COATED ORAL at 21:30

## 2022-06-27 RX ADMIN — TRAMADOL HYDROCHLORIDE 50 MG: 50 TABLET, COATED ORAL at 14:54

## 2022-06-27 RX ADMIN — HYDROCHLOROTHIAZIDE 12.5 MG: 25 TABLET ORAL at 21:31

## 2022-06-27 RX ADMIN — IPRATROPIUM BROMIDE AND ALBUTEROL SULFATE 1 AMPULE: 2.5; .5 SOLUTION RESPIRATORY (INHALATION) at 14:40

## 2022-06-27 RX ADMIN — POTASSIUM CHLORIDE 40 MEQ: 20 TABLET, EXTENDED RELEASE ORAL at 05:07

## 2022-06-27 RX ADMIN — LISINOPRIL 20 MG: 20 TABLET ORAL at 08:53

## 2022-06-27 RX ADMIN — BUSPIRONE HYDROCHLORIDE 10 MG: 10 TABLET ORAL at 13:35

## 2022-06-27 RX ADMIN — METOPROLOL TARTRATE 100 MG: 50 TABLET, FILM COATED ORAL at 08:54

## 2022-06-27 RX ADMIN — LISINOPRIL 20 MG: 20 TABLET ORAL at 21:30

## 2022-06-27 RX ADMIN — GUAIFENESIN 400 MG: 200 TABLET ORAL at 05:06

## 2022-06-27 RX ADMIN — TRAMADOL HYDROCHLORIDE 50 MG: 50 TABLET, COATED ORAL at 21:30

## 2022-06-27 RX ADMIN — BUSPIRONE HYDROCHLORIDE 10 MG: 10 TABLET ORAL at 21:30

## 2022-06-27 RX ADMIN — DICLOFENAC 2 G: 10 GEL TOPICAL at 13:35

## 2022-06-27 RX ADMIN — GABAPENTIN 600 MG: 600 TABLET, FILM COATED ORAL at 08:54

## 2022-06-27 RX ADMIN — GABAPENTIN 600 MG: 600 TABLET, FILM COATED ORAL at 17:56

## 2022-06-27 RX ADMIN — GABAPENTIN 600 MG: 600 TABLET, FILM COATED ORAL at 13:35

## 2022-06-27 RX ADMIN — SODIUM CHLORIDE, POTASSIUM CHLORIDE, SODIUM LACTATE AND CALCIUM CHLORIDE: 600; 310; 30; 20 INJECTION, SOLUTION INTRAVENOUS at 05:05

## 2022-06-27 RX ADMIN — DICLOFENAC 2 G: 10 GEL TOPICAL at 21:32

## 2022-06-27 RX ADMIN — HYDROCHLOROTHIAZIDE 12.5 MG: 25 TABLET ORAL at 08:53

## 2022-06-27 RX ADMIN — TRAMADOL HYDROCHLORIDE 50 MG: 50 TABLET, COATED ORAL at 05:07

## 2022-06-27 RX ADMIN — IPRATROPIUM BROMIDE AND ALBUTEROL SULFATE 1 AMPULE: 2.5; .5 SOLUTION RESPIRATORY (INHALATION) at 10:07

## 2022-06-27 RX ADMIN — Medication 10 MG: at 21:30

## 2022-06-27 RX ADMIN — PRAVASTATIN SODIUM 40 MG: 20 TABLET ORAL at 08:53

## 2022-06-27 RX ADMIN — SODIUM CHLORIDE, PRESERVATIVE FREE 10 ML: 5 INJECTION INTRAVENOUS at 21:32

## 2022-06-27 RX ADMIN — BUSPIRONE HYDROCHLORIDE 10 MG: 10 TABLET ORAL at 08:53

## 2022-06-27 RX ADMIN — GUAIFENESIN 400 MG: 200 TABLET ORAL at 13:35

## 2022-06-27 RX ADMIN — METOPROLOL TARTRATE 100 MG: 50 TABLET, FILM COATED ORAL at 21:30

## 2022-06-27 RX ADMIN — INSULIN LISPRO 3 UNITS: 100 INJECTION, SOLUTION INTRAVENOUS; SUBCUTANEOUS at 17:57

## 2022-06-27 RX ADMIN — IPRATROPIUM BROMIDE AND ALBUTEROL SULFATE 1 AMPULE: 2.5; .5 SOLUTION RESPIRATORY (INHALATION) at 07:30

## 2022-06-27 ASSESSMENT — PAIN DESCRIPTION - ONSET
ONSET: AWAKENED FROM SLEEP
ONSET: AWAKENED FROM SLEEP

## 2022-06-27 ASSESSMENT — PAIN - FUNCTIONAL ASSESSMENT
PAIN_FUNCTIONAL_ASSESSMENT: ACTIVITIES ARE NOT PREVENTED
PAIN_FUNCTIONAL_ASSESSMENT: PREVENTS OR INTERFERES WITH ALL ACTIVE AND SOME PASSIVE ACTIVITIES

## 2022-06-27 ASSESSMENT — PAIN DESCRIPTION - PAIN TYPE
TYPE: CHRONIC PAIN
TYPE: CHRONIC PAIN

## 2022-06-27 ASSESSMENT — PAIN SCALES - GENERAL
PAINLEVEL_OUTOF10: 5
PAINLEVEL_OUTOF10: 6
PAINLEVEL_OUTOF10: 7
PAINLEVEL_OUTOF10: 7
PAINLEVEL_OUTOF10: 8

## 2022-06-27 ASSESSMENT — PAIN DESCRIPTION - ORIENTATION
ORIENTATION: RIGHT;LEFT
ORIENTATION: RIGHT;LEFT

## 2022-06-27 ASSESSMENT — PAIN DESCRIPTION - FREQUENCY
FREQUENCY: INTERMITTENT
FREQUENCY: INTERMITTENT

## 2022-06-27 ASSESSMENT — PAIN DESCRIPTION - DESCRIPTORS
DESCRIPTORS: ACHING;THROBBING
DESCRIPTORS: THROBBING

## 2022-06-27 ASSESSMENT — ENCOUNTER SYMPTOMS
NAUSEA: 1
ABDOMINAL DISTENTION: 0
ABDOMINAL PAIN: 0
VOMITING: 0
BACK PAIN: 1

## 2022-06-27 ASSESSMENT — PAIN DESCRIPTION - LOCATION
LOCATION: KNEE
LOCATION: OTHER (COMMENT)

## 2022-06-27 NOTE — PLAN OF CARE
Problem: Discharge Planning  Goal: Discharge to home or other facility with appropriate resources  Outcome: Progressing  Flowsheets (Taken 6/26/2022 2110)  Discharge to home or other facility with appropriate resources: Identify barriers to discharge with patient and caregiver     Problem: Pain  Goal: Verbalizes/displays adequate comfort level or baseline comfort level  Outcome: Progressing  Flowsheets (Taken 6/27/2022 0006)  Verbalizes/displays adequate comfort level or baseline comfort level:   Encourage patient to monitor pain and request assistance   Assess pain using appropriate pain scale   Administer analgesics based on type and severity of pain and evaluate response   Implement non-pharmacological measures as appropriate and evaluate response   Consider cultural and social influences on pain and pain management   Notify Licensed Independent Practitioner if interventions unsuccessful or patient reports new pain     Problem: Safety - Adult  Goal: Free from fall injury  Outcome: Progressing  Flowsheets (Taken 6/27/2022 0015)  Free From Fall Injury: Instruct family/caregiver on patient safety     Problem: ABCDS Injury Assessment  Goal: Absence of physical injury  Outcome: Progressing  Flowsheets (Taken 6/27/2022 0015)  Absence of Physical Injury: Implement safety measures based on patient assessment     Problem: Skin/Tissue Integrity  Goal: Absence of new skin breakdown  Description: 1. Monitor for areas of redness and/or skin breakdown  2. Every 4-6 hours minimum:  Change oxygen saturation probe site  3. Every 4-6 hours:  If on nasal continuous positive airway pressure, respiratory therapy assess nares and determine need for appliance change or resting period.   Outcome: Progressing     Problem: Chronic Conditions and Co-morbidities  Goal: Patient's chronic conditions and co-morbidity symptoms are monitored and maintained or improved  Outcome: Progressing  Flowsheets (Taken 6/26/2022 2110)  Care Plan - Patient's Chronic Conditions and Co-Morbidity Symptoms are Monitored and Maintained or Improved: Monitor and assess patient's chronic conditions and comorbid symptoms for stability, deterioration, or improvement     Problem: Neurosensory - Adult  Goal: Achieves stable or improved neurological status  Outcome: Progressing  Flowsheets (Taken 6/26/2022 2110)  Achieves stable or improved neurological status: Assess for and report changes in neurological status     Problem: Respiratory - Adult  Goal: Achieves optimal ventilation and oxygenation  Outcome: Progressing  Flowsheets (Taken 6/26/2022 2110)  Achieves optimal ventilation and oxygenation: Assess for changes in respiratory status     Problem: Cardiovascular - Adult  Goal: Absence of cardiac dysrhythmias or at baseline  Outcome: Progressing  Flowsheets (Taken 6/26/2022 2110)  Absence of cardiac dysrhythmias or at baseline: Monitor cardiac rate and rhythm     Problem: Skin/Tissue Integrity - Adult  Goal: Skin integrity remains intact  Outcome: Progressing  Flowsheets  Taken 6/27/2022 0306  Skin Integrity Remains Intact: Monitor for areas of redness and/or skin breakdown  Taken 6/27/2022 0015  Skin Integrity Remains Intact: Monitor for areas of redness and/or skin breakdown  Taken 6/26/2022 2110  Skin Integrity Remains Intact: Monitor for areas of redness and/or skin breakdown     Problem: Musculoskeletal - Adult  Goal: Return mobility to safest level of function  Outcome: Progressing  Flowsheets (Taken 6/26/2022 2110)  Return Mobility to Safest Level of Function: Assess patient stability and activity tolerance for standing, transferring and ambulating with or without assistive devices  Goal: Maintain proper alignment of affected body part  Outcome: Progressing  Flowsheets (Taken 6/26/2022 2110)  Maintain proper alignment of affected body part: Support and protect limb and body alignment per provider's orders     Problem: Genitourinary - Adult  Goal: Urinary catheter remains patent  Outcome: Progressing  Flowsheets (Taken 6/26/2022 2110)  Urinary catheter remains patent: Assess patency of urinary catheter     Problem: Infection - Adult  Goal: Absence of infection during hospitalization  Outcome: Progressing  Flowsheets  Taken 6/27/2022 0015  Absence of infection during hospitalization: Assess and monitor for signs and symptoms of infection  Taken 6/26/2022 2110  Absence of infection during hospitalization: Assess and monitor for signs and symptoms of infection     Problem: Metabolic/Fluid and Electrolytes - Adult  Goal: Electrolytes maintained within normal limits  Outcome: Progressing  Flowsheets (Taken 6/26/2022 2110)  Electrolytes maintained within normal limits: Monitor labs and assess patient for signs and symptoms of electrolyte imbalances  Goal: Glucose maintained within prescribed range  Outcome: Progressing  Flowsheets (Taken 6/26/2022 2110)  Glucose maintained within prescribed range: Monitor blood glucose as ordered     Problem: Confusion  Goal: Confusion, delirium, dementia, or psychosis is improved or at baseline  Description: INTERVENTIONS:  1. Assess for possible contributors to thought disturbance, including medications, impaired vision or hearing, underlying metabolic abnormalities, dehydration, psychiatric diagnoses, and notify attending LIP  2. [place] high risk fall precautions, as indicated  3. Provide frequent short contacts to provide reality reorientation, refocusing and direction  4. Decrease environmental stimuli, including noise as appropriate  5. Monitor and intervene to maintain adequate nutrition, hydration, elimination, sleep and activity  6. If unable to ensure safety without constant attention obtain sitter and review sitter guidelines with assigned personnel  7.  Initiate Psychosocial CNS and Spiritual Care consult, as indicated  Outcome: Progressing  Flowsheets (Taken 6/26/2022 2110)  Effect of thought disturbance (confusion, delirium, dementia, or psychosis) are managed with adequate functional status: Assess for contributors to thought disturbance, including medications, impaired vision or hearing, underlying metabolic abnormalities, dehydration, psychiatric diagnoses, notify LIP     Problem: Nutrition Deficit:  Goal: Optimize nutritional status  Outcome: Progressing  Flowsheets (Taken 6/25/2022 0312)  Nutrient intake appropriate for improving, restoring, or maintaining nutritional needs:   Assess nutritional status and recommend course of action   Monitor oral intake, labs, and treatment plans   Provide specific nutrition education to patient or family as appropriate

## 2022-06-27 NOTE — PROGRESS NOTES
Palliative Care Progress Note  6/27/2022 8:02 AM    Patient:  Astrid Angela  YOB: 1955  Primary Care Physician: Macario Benton MD  Advance Directive: DNR  Admit Date: 6/14/2022       Hospital Day: 15  Portions of this note have been copied forward, however, changed to reflect the most current clinical status of this patient. CHIEF COMPLAINT/REASON FOR CONSULTATION goals of care, code status discussion, and family support. SUBJECTIVE:  Ms. Bradford Scheuermann remains alert and oriented. Resting comfortably supine in bed. C/o right knee pain. Slept better over the weekend. Review of Systems:   14 point review of systems is negative except as specifically addressed above. Objective:   VITALS:  BP (!) 114/59   Pulse 86   Temp 96.8 °F (36 °C) (Temporal)   Resp 18   Ht 5' 5\" (1.651 m)   Wt (!) 348 lb 9 oz (158.1 kg)   SpO2 93%   BMI 58.00 kg/m²   24HR INTAKE/OUTPUT:      Intake/Output Summary (Last 24 hours) at 6/27/2022 0802  Last data filed at 6/27/2022 7370  Gross per 24 hour   Intake 1615.51 ml   Output 1850 ml   Net -234.49 ml     General appearance: 79 yo female, chronically ill appearing, NAD  Head: Normocephalic, without obvious abnormality, atraumatic  Eyes: conjunctivae/corneas clear. PERRL, EOM's intact. Ears: normal external ears and nose  Neck: no JVD, supple, symmetrical, trachea midline   Lungs: clear and diminished in bases to auscultation bilaterally, shallow, NC in place  Heart: RRR, S1, S2 normal, no murmur  Abdomen: Obese, taut, no grimacing to palpation, normal bowel sounds, rectal tube in place with diarrhea slowing  Extremities: BLE 1+ edema,  No erythema, no to palpation  Skin: Warm, dry/flaky, pale,   Neurologic: Alert, oriented x3, generalized weakness, speech fluent, follows commands.       Medications:      dextrose      sodium chloride      lactated ringers 30 mL/hr at 06/27/22 0505      [START ON 6/28/2022] cefepime  2,000 mg IntraVENous On Call to [address] [place] [address] melatonin  10 mg Oral Nightly    guaiFENesin  400 mg Oral Q8H    lisinopril  20 mg Oral BID    And    hydroCHLOROthiazide  12.5 mg Oral BID    metoprolol  100 mg Oral BID    levofloxacin  750 mg IntraVENous Q24H    busPIRone  10 mg Oral TID    gabapentin  600 mg Oral 4x Daily    pravastatin  40 mg Oral Daily    insulin lispro  0-18 Units SubCUTAneous TID WC    insulin lispro  0-9 Units SubCUTAneous Nightly    insulin glargine  10 Units SubCUTAneous Nightly    sodium chloride flush  5-40 mL IntraVENous 2 times per day    ipratropium-albuterol  1 ampule Inhalation Q4H WA     prochlorperazine, sodium chloride, magnesium sulfate, potassium chloride **OR** potassium alternative oral replacement **OR** potassium chloride, labetalol, traMADol, glucose, dextrose bolus **OR** dextrose bolus, glucagon (rDNA), dextrose, sodium chloride flush, sodium chloride, ondansetron **OR** ondansetron, polyethylene glycol, acetaminophen **OR** acetaminophen, potassium chloride, magnesium sulfate  ADULT DIET; Dysphagia - Soft and Bite Sized; 4 carb choices (60 gm/meal); Low Fat/Low Chol/High Fiber/2 gm Na  ADULT ORAL NUTRITION SUPPLEMENT; Dinner; Fortified Pudding Oral Supplement  Diet NPO     Lab and other Data:     Recent Labs     06/25/22 0215 06/26/22  0402 06/27/22  0340   WBC 17.1* 15.6* 13.5*   HGB 10.9* 11.7* 11.0*   * 150 170     Recent Labs     06/25/22 0215 06/26/22  0402 06/27/22  0340    142 142   K 3.2* 3.6 3.5   CL 97* 95* 97*   CO2 38* 37* 37*   BUN 29* 25* 23   CREATININE 0.7 0.7 0.7   GLUCOSE 124* 126* 115*     Recent Labs     06/25/22 0215 06/26/22  0402 06/27/22  0340   AST 46* 46* 36*   ALT 22 19 14   BILITOT 1.7* 1.6* 1.3*   ALKPHOS 183* 182* 149*     RAD:   CT ABDOMEN PELVIS WO CONTRAST Additional Contrast? None  Result Date: 6/15/2022  1. There is 6 mm obstructive calculus in the left upper ureter with mild proximal hydroureteronephrosis and perinephric inflammatory changes.  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 Bilateral non-obstructing renal calculi noted. 2. Mild hepatomegaly with mild hepatic steatosis. 3. Colonic diverticulosis without evidence of acute diverticulitis. 4. Small fat containing umbilical and supra umbilical hernia noted. Recommendation: Follow up as clinically indicated. All CT scans at this facility utilize dose modulation, iterative reconstruction, and/or weight based dosing when appropriate to reduce radiation dose to as low as reasonably achievable. Amended by Shara Alpers MD at 15-[place]aqu[place]-2022 02:44:19 AM Electronically Signed by Shara Alpers MD at 15-[place]-2022 02:29:46 AM             XR CHEST PORTABLE  Result Date: 6/15/2022  1. Questionable trace left pleural effusion with basilar atelectasis. 2. Right sided central line is in place with its tip in SVC. Recommendation: Follow up as clinically indicated. Electronically Signed by Kayla Santamaria MD at 15-[place]-2022 06:55:39 AM             XR CHEST PORTABLE  Result Date: 6/14/2022  1. Unremarkable radiograph of chest. Recommendation: Follow up as clinically indicated. Electronically Signed by Gallito Clayton MD at 15-[place]ille-2022 12:21:20 AM             Assessment/Plan   Principal Problem:    Obstructive uropathy  Active Problems:    Obstructive pyelonephritis    Sepsis with acute renal failure (HCC)    Left ureteral calculus    Renal calculus, bilateral    BINH (acute kidney injury) ([address]    Hyperglycemia due to diabetes mellitus ([address]    Hypertension    Dehydration    FREDDY and COPD overlap syndrome (HCC)    Degenerative joint disease    Palliative care patient    Septicemia ([address]    Chronic pain syndrome    Acute respiratory failure with hypoxia ([address]  Resolved Problems:    * No resolved hospital problems. *      Visit Summary:  Chart reviewed. Ms. Bradford Scheuermann is seen at bedside this morning with RN present.   No acute events over the weekend and patient has planned procedure with urology tomorrow. Patient states that her pain is controlled other than continued chronic right knee pain. She states she has used CBD oil on the knee at home in the past.  I will order Voltaren gel for topical relief and monitor for improvement. She also reports that she has slept better over the weekend with addition of melatonin and nausea has been controlled with as needed Compazine available. I called her daughter, Tyron Schafer, to update on patient's status and plan of care. We discussed urology procedure in time for OR tomorrow. Patient's son Kaitlynn Fuller will be at hospitalist Tyron Schafer had to go back to [facility]. We discussed bed hold at [facility] until 6/28/22 and potential for patient to discharge to [facility] for continued rehab following procedure if she is stable but will defer this to hospitalist.  I reviewed labs with Tyron Schafer and answered additional questions. Emotional support provided. Will continue to follow. Recommendations:   1. Palliative Care- GOC d/c to [facility] for rehab with [facility] services following procedure with urology 6/28/22. CM following for d/c planning and placement. Code status- DNR    2, Obstructive left pyelonephritits 2/2 proximal left urteral calculus- S/p ureteral stent placement 6/15/22 per Dr. Jasbir Cardona. Urology following. Levaquin and cefepime continue. Plans for OR 6/28/22    3. Septic shock with E coli bacteremia- 2/2 above, follow repeat blood cultures, NGTD. Abx per hospitalist. Weaned from pressors    4. BINH- improved. 5. Chronic pain syndrome-  chronic back pain with Norco and gabapentin baseline. Potential overuse at home noted. Pain regimen with ultram prn at this time. Voltaren gel added for right knee arthritis pain    6. Afib with RVR new onset- suspect 2/2 above. NSR now and off amio gtt    7. Transaminitis-  Noted, unclear baseline LFTs. Mgmt per hospitalist. Continue to monitor labs    8.  Acute hypoxic respiratory failure- mgmt per hospitalist, suspect multifactorial. Weaned to 3 L NC. Thank you for consulting Palliative Care and allowing us to participate in the care of this patient.    Time Spent Counseling > 50%:  YES                                   Total Time Spent with patient/family counseling, workup/treatment review, counseling and placement of orders/preparation of this note: 28 minutes    Electronically signed by MATTHEW Snider CNP on 6/27/2022 at 8:02 AM    (Please note that portions of this note were completed with a voice recognition program.  Raisa Alva made to edit the dictations but occasionally words are mis-transcribed.)

## 2022-06-27 NOTE — PROGRESS NOTES
Nutrition Assessment     Type and Reason for Visit: Reassess    Nutrition Recommendations/Plan:   1. Education completed, questions answered. 2. Provided pt with handouts and contact information. 3. Advised against grapefruit consumption. Malnutrition Assessment:  Malnutrition Status: At risk for malnutrition (Comment)    Nutrition Assessment:  Pt with questions re diet education attempt this weekend- voiced appropriate questions and able to identify CHO containing foods. Pt advised to avoid grapefruit d/t medication interactions- pt voiced understanding. Pt reprots improving appetite, however po intake remains fair-poor. Per RN, pt ok for presoaked dry cereal on current diet. Will continue to monitor. Estimated Daily Nutrient Needs:  Energy (kcal):  9477-5214 kcals (8-11 kcals/kg)       Protein (g):  114g Weight Used for Protein Requirements: Ideal        Fluid (ml/day):  9911-8367 ml Method Used for Fluid Requirements: 1 ml/kcal    Nutrition Related Findings:   hyperactive BS, +2 generalized, BUE, RLE and +3 LLE edema; LR @ 30 ml/hr; glucose , hgb A1C 9.1% Wound Type: None    Current Nutrition Therapies:    ADULT ORAL NUTRITION SUPPLEMENT; Dinner; Fortified Pudding Oral Supplement  Diet NPO  ADULT DIET; Dysphagia - Soft and Bite Sized; 4 carb choices (60 gm/meal);  Low Sodium (2 gm)    Anthropometric Measures:  · Height: 5' 5\" (165.1 cm)  · Current Body Wt: 348 lb 9 oz (158.1 kg)   · BMI: 58    Nutrition Diagnosis:   · Inadequate oral intake,Altered nutrition-related lab values related to acute injury/trauma,endocrine dysfuntion as evidenced by intake 0-25%,intake 26-50%,lab values    Nutrition Interventions:   Food and/or Nutrient Delivery: Continue Current Diet,Continue Oral Nutrition Supplement  Nutrition Education/Counseling: Education completed  Coordination of Nutrition Care: Continue to monitor while inpatient  Plan of Care discussed with: Patient    Goals:  Previous Goal Met: Progressing toward Goal(s)  Goals: Meet at least 75% of estimated needs,PO intake 50% or greater       Nutrition Monitoring and Evaluation:   Behavioral-Environmental Outcomes: Knowledge or Skill  Food/Nutrient Intake Outcomes: Food and Nutrient Intake,Supplement Intake  Physical Signs/Symptoms Outcomes: Biochemical Data,Weight,Skin,Fluid Status or Edema    Discharge Planning:    Continue current diet     Dylon Blackmon MS, RD, LD  Contact: [phone]

## 2022-06-27 NOTE — PROGRESS NOTES
Urology Progress Note    SUBJECTIVE:  Patient resting in bed with nebulizer treatment going. Discussed upcoming surgery with the patient. She voices understanding. She states there was some misunderstanding over the weekend and there were questions about whether or not she would proceed. She reports everything is back on track and she is ready for surgery tomorrow. Dr. Alejandra Salazar also saw the patient face-to-face with me this morning    OBJECTIVE:   Review of Systems   Constitutional: Negative for chills and fever. Gastrointestinal: Positive for nausea. Negative for abdominal distention, abdominal pain and vomiting. Genitourinary: Negative for difficulty urinating, dysuria, flank pain, frequency, hematuria and urgency. Musculoskeletal: Positive for arthralgias, back pain and gait problem. Neurological: Positive for weakness. Psychiatric/Behavioral: Negative for agitation and confusion. Physical  VITALS:  BP (!) 114/59   Pulse 86   Temp 96.8 °F (36 °C) (Temporal)   Resp 18   Ht 5' 5\" (1.651 m)   Wt (!) 348 lb 9 oz (158.1 kg)   SpO2 93%   BMI 58.00 kg/m²   TEMPERATURE:  Current - Temp: 96.8 °F (36 °C);  Max - Temp  Av.8 °F (36 °C)  Min: 96.6 °F (35.9 °C)  Max: 97 °F (36.1 °C)   24 HR I&O     Intake/Output Summary (Last 24 hours) at 2022 0906  Last data filed at 2022 0805  Gross per 24 hour   Intake 2914.8 ml   Output 1850 ml   Net 1064.8 ml     BACK: not done  ABDOMEN:  non-distended and non-tender  HEART:  normal rate  CHEST:  Normal respiratory effort; nebulizer treatment in place  GENITAL/URINARY:  Krishna catheter in place, dark yellow to josy urine    Data  CBC:   Recent Labs     22  0215 22  0402 22  0340   WBC 17.1* 15.6* 13.5*   HGB 10.9* 11.7* 11.0*   HCT 34.7* 38.4 36.3*   * 150 170     BMP:    Recent Labs     22  0215 22  0402 22  0340    142 142   K 3.2* 3.6 3.5   CL 97* 95* 97*   CO2 38* 37* 37*   BUN 29* 25* 23 CREATININE 0.7 0.7 0.7   GLUCOSE 124* 126* 115*       U/A:    Lab Results   Component Value Date    COLORU DARK YELLOW 06/15/2022    PHUR 5.0 06/15/2022    WBCUA 2-4 06/15/2022    YEAST Present 06/15/2022    BACTERIA 1+ 06/15/2022    CLARITYU TURBID 06/15/2022    SPECGRAV 1.026 06/15/2022    LEUKOCYTESUR SMALL 06/15/2022    UROBILINOGEN 1.0 06/15/2022    BILIRUBINUR MODERATE 06/15/2022    BLOODU MODERATE 06/15/2022    GLUCOSEU 100 06/15/2022    AMORPHOUS 2+ 06/15/2022       ASSESSMENT AND PLAN    Patient Active Problem List   Diagnosis    Obstructive pyelonephritis    Sepsis with acute renal failure ([address]    Left ureteral calculus    Renal calculus, bilateral    BINH (acute kidney injury) ([address]    Obstructive uropathy    Hyperglycemia due to diabetes mellitus ([address]    Hypertension    Dehydration    FREDDY and COPD overlap syndrome ([address]    Degenerative joint disease    Palliative care patient    Septicemia ([address]    Chronic pain syndrome    Acute respiratory failure with hypoxia ([address]       1. Sepsis with septic shock secondary to left obstructive pyelonephritis from an obstructing left ureteral stone status post left ureteral stent placement on 6/15/2020. Blood cultures remain negative to date. She has been on culture specific antibiotic  with 17 days of antibiotic therapy completed, to cefepime and 12 of Levaquin. We will treat as planned with definitive stone treatment tomorrow. All questions were answered. Encourage patient to have her family call me if there were any questions.     2.    Left obstructing proximal ureteral stone status post left ureteral stent placement as described above. Again, we will pursue definitive stone treatment tomorrow. Continue Krishna catheter for external drainage.     MATTHEW BUSTILLOS - CNP  6/27/2022 9:06 AM

## 2022-06-27 NOTE — PROGRESS NOTES
Hospitalist Progress Note  [facility]     Patient: Mark Anthony Pedraza  : 1955  MRN: [id number]  Code Status: DNR    Hospital Day: 12   Date of Service: 2022    Subjective:   Patient seen and examined. No current complaints. Past Medical History:   Diagnosis Date    Arthritis     HTN (hypertension)        Past Surgical History:   Procedure Laterality Date    ANKLE FRACTURE SURGERY Right     BLADDER SURGERY Left 6/15/2022    CYSTOSCOPY, LEFT URETEROSCOPY, RETROGRADE PYELOGRAM, STENT INSERTION performed by Andriy Reid MD at [facility] (CERVIX STATUS UNKNOWN)      INCONTINENCE SURGERY         Family History   Problem Relation Age of Onset    Cancer Mother     Cancer Father        Social History     Socioeconomic History    Marital status: Single     Spouse name: Not on file    Number of children: Not on file    Years of education: Not on file    Highest education level: Not on file   Occupational History    Not on file   Tobacco Use    Smoking status: Former Smoker    Smokeless tobacco: Never Used    Tobacco comment: quit 10 years ago   Vaping Use    Vaping Use: Never used   Substance and Sexual Activity    Alcohol use: Never    Drug use: Never    Sexual activity: Not on file   Other Topics Concern    Not on file   Social History Narrative    Not on file     Social Determinants of Health     Financial Resource Strain:     Difficulty of Paying Living Expenses: Not on file   Food Insecurity:     Worried About [employer] [employer] in the Last Year: Not on file    [address] in the Last Year: Not on file   Transportation Needs:     Lack of Transportation (Medical): Not on file    Lack of Transportation (Non-Medical):  Not on file   Physical Activity:     Days of Exercise per Week: Not on file    Minutes of Exercise per Session: Not on file   Stress:     Feeling of Stress : Not on file   Social Connections:     Frequency of Communication with Friends and Family: Not on file    Frequency of Social Gatherings with Friends and Family: Not on file    Attends Gnosticism Services: Not on file    Active Member of Clubs or Organizations: Not on file    Attends Club or Organization Meetings: Not on file    Marital Status: Not on file   Intimate Partner Violence:     Fear of Current or Ex-Partner: Not on file    Emotionally Abused: Not on file    Physically Abused: Not on file    Sexually Abused: Not on file   Housing Stability:     Unable to Pay for Housing in the Last Year: Not on file    Number of [place] in the Last Year: Not on file    Unstable Housing in the Last Year: Not on file       Current Facility-Administered Medications   Medication Dose Route Frequency Provider Last Rate Last Admin    [START ON 6/28/2022] cefepime (MAXIPIME) 2000 mg IVPB minibag in NS  2,000 mg IntraVENous On Call to Selvin Nevarez MD        diclofenac sodium (VOLTAREN) 1 % gel 2 g  2 g Topical BID MATTHEW Moore - CNP        prochlorperazine (COMPAZINE) tablet 5 mg  5 mg Oral Q6H PREDGARD Horton MD   5 mg at 06/25/22 1846    melatonin disintegrating tablet 10 mg  10 mg Oral Nightly MATTHEW Byrne - CNP   10 mg at 06/26/22 2053    guaiFENesin tablet 400 mg  400 mg Oral Q8H Dianna Horton MD   400 mg at 06/27/22 0506    sodium chloride (OCEAN, BABY AYR) 0.65 % nasal spray 1 spray  1 spray Each Nostril Q4H PREDGARD Horton MD   1 spray at 06/23/22 1504    magnesium sulfate 1000 mg in dextrose 5% 100 mL IVPB  1,000 mg IntraVENous PRN Dianna Horton MD        potassium chloride (KLOR-CON M) extended release tablet 40 mEq  40 mEq Oral PREDGARD Horton MD   40 mEq at 06/27/22 0507    Or    potassium bicarb-citric acid (EFFER-K) effervescent tablet 40 mEq  40 mEq Oral PREDGARD Horton MD        Or    potassium chloride 10 mEq/100 mL IVPB (Peripheral Line)  10 mEq IntraVENous PREDGARD Horton MD        labetalol (NORMODYNE;TRANDATE) injection 10 mg  10 mg IntraVENous Q4H PRN Compa Santamaria MD   10 mg at 06/19/22 0334    lisinopril (PRINIVIL;ZESTRIL) tablet 20 mg  20 mg Oral BID Enma Quintanilla MD   20 mg at 06/27/22 8903    And    hydroCHLOROthiazide (HYDRODIURIL) tablet 12.5 mg  12.5 mg Oral BID Enma Quintanilla MD   12.5 mg at 06/27/22 0853    metoprolol tartrate (LOPRESSOR) tablet 100 mg  100 mg Oral BID Enma Quintanilla MD   100 mg at 06/27/22 0854    levoFLOXacin (LEVAQUIN) 750 MG/150ML infusion 750 mg  750 mg IntraVENous Q24H Neela Miller MD   Stopped at 06/27/22 0854    traMADol (ULTRAM) tablet 50 mg  50 mg Oral Q6H PRN Enma Quintanilla MD   50 mg at 06/27/22 0507    busPIRone (BUSPAR) tablet 10 mg  10 mg Oral TID Enma Quintanilla MD   10 mg at 06/27/22 0853    gabapentin (NEURONTIN) tablet 600 mg  600 mg Oral 4x Daily Neela Miller MD   600 mg at 06/27/22 0854    pravastatin (PRAVACHOL) tablet 40 mg  40 mg Oral Daily Neela Miller MD   40 mg at 06/27/22 0853    glucose chewable tablet 16 g  4 tablet Oral PRN Compa Santamaria MD        dextrose bolus 10% 125 mL  125 mL IntraVENous PRN Compa Santamaria MD        Or    dextrose bolus 10% 250 mL  250 mL IntraVENous PRN Compa Santamaria MD        glucagon (rDNA) injection 1 mg  1 mg IntraMUSCular PRN Compa Santamaria MD        dextrose 5 % solution  100 mL/hr IntraVENous PRN Compa Santamaria MD        insulin lispro (HUMALOG) injection vial 0-18 Units  0-18 Units SubCUTAneous TID WC Compa Santamaria MD   3 Units at 06/22/22 1753    insulin lispro (HUMALOG) injection vial 0-9 Units  0-9 Units SubCUTAneous Nightly Compa Santamaria MD   2 Units at 06/19/22 2123    insulin glargine (LANTUS) injection vial 10 Units  10 Units SubCUTAneous Nightly Compa Santamaria MD   10 Units at 06/26/22 2106    sodium chloride flush 0.9 % injection 5-40 mL  5-40 mL IntraVENous 2 times per day Eri GODINEZ MD Chi   10 mL at 06/26/22 1002    sodium chloride flush 0.9 % injection 5-40 mL  5-40 mL IntraVENous PRN Niki Leong MD        0.9 % sodium chloride infusion   IntraVENous PRN Niki Leong MD        ondansetron (ZOFRAN-ODT) disintegrating tablet 4 mg  4 mg Oral Q8H PRN Niki Leong MD   4 mg at 06/21/22 1042    Or    ondansetron (ZOFRAN) injection 4 mg  4 mg IntraVENous Q6H PRN Niki Leong MD   4 mg at 06/25/22 0815    polyethylene glycol (GLYCOLAX) packet 17 g  17 g Oral Daily PRN Niki Leong MD        acetaminophen (TYLENOL) tablet 650 mg  650 mg Oral Q6H PRN Niki Leong MD   650 mg at 06/22/22 1448    Or    acetaminophen (TYLENOL) suppository 650 mg  650 mg Rectal Q6H PRN Niki Leong MD        potassium chloride 10 mEq/100 mL IVPB (Peripheral Line)  10 mEq IntraVENous PRN Niki Leong MD        magnesium sulfate 2000 mg in 50 mL IVPB premix  2,000 mg IntraVENous PRN Niki Leong MD   Stopped at 06/16/22 0640    ipratropium-albuterol (DUONEB) nebulizer solution 1 ampule  1 ampule Inhalation Q4H BLAYNE Leong MD   1 ampule at 06/27/22 1007    lactated ringers infusion   IntraVENous Continuous Arletbarber Anne MD 30 mL/hr at 06/27/22 0805 Rate Verify at 06/27/22 0805         dextrose      sodium chloride      lactated ringers 30 mL/hr at 06/27/22 0805        Objective:   BP (!) 116/94   Pulse 72   Temp 97.5 °F (36.4 °C) (Temporal)   Resp 20   Ht 5' 5\" (1.651 m)   Wt (!) 348 lb 9 oz (158.1 kg)   SpO2 92%   BMI 58.00 kg/m²     General: no acute distress  HEENT: normocephalic, atraumatic  Neck: supple, symmetrical, trachea midline   Lungs: clear to auscultation bilaterally   Cardiovascular: s1 and s2 normal  Abdomen: soft, positive bowel sounds  Extremities: no edema or cyanosis   Neuro: aaox3, no acute focal motor deficits    Recent Labs     06/25/22  0215 06/26/22  4148 06/27/22  0340   WBC 17.1* 15.6* 13.5*   RBC 3.71* 4.02* 3.79*   HGB 10.9* 11.7* 11.0*   HCT 34.7* 38.4 36.3*   MCV 93.5 95.5 95.8   MCH 29.4 29.1 29.0   MCHC 31.4* 30.5* 30.3*   * 150 170     Recent Labs     06/25/22 0215 06/26/22  0402 06/27/22  0340    142 142   K 3.2* 3.6 3.5   ANIONGAP 8 10 8   CL 97* 95* 97*   CO2 38* 37* 37*   BUN 29* 25* 23   CREATININE 0.7 0.7 0.7   GLUCOSE 124* 126* 115*   CALCIUM 7.7* 7.8* 7.8*     Recent Labs     06/25/22 0215 06/26/22  0402 06/27/22  0340   MG 1.6 1.6 1.6     Recent Labs     06/25/22 0215 06/26/22  0402 06/27/22  0340   AST 46* 46* 36*   ALT 22 19 14   BILITOT 1.7* 1.6* 1.3*   ALKPHOS 183* 182* 149*     No results for input(s): PH, PO2, PCO2, HCO3, BE, O2SAT in the last 72 hours. No results for input(s): TROPONINI in the last 72 hours. No results for input(s): INR in the last 72 hours. No results for input(s): LACTA in the last 72 hours. Intake/Output Summary (Last 24 hours) at 6/27/2022 1141  Last data filed at 6/27/2022 1027  Gross per 24 hour   Intake 3034.8 ml   Output 1850 ml   Net 1184.8 ml       No results found.      Assessment and Plan:   Septic shock  Shock resolved  Secondary to below  Agents as below  IVF  Since weaned off phenylephrine gtt  Lactate improved overall     Obstructive left E. coli pyelonephritis  Urology following  Secondary to proximal left ureteral calculus  S/p left ureteral stent placement 6/15/2022 per Dr. Solares [place] per sensitivities  Plans for definitive treatment per urology  Extensive discussion with patient/family in this regard  They are now agreeable for above definitive urological treatment planned for tomorrow     E. coli bacteremia  Secondary to above  Levaquin per sensitivities  Repeat blood cultures NGTD     BINH  Resolved with current treatment plan  Avoid offending agents  Follow renal function/urine output/electrolytes     DM2  HbA1c 9.1  Poor control  Counseled  Meds on board     Morbid obesity  Counseled     Chronic pain syndrome  Patient has been bedbound for the last 5 years  Patient/daughter state she has been taking opioids for many years  I extensively explained how these agents can run counter to her current treatment plan  Patient continues to request home pain meds despite extensive counseling  Palliative/social work following per patient/family request  Outpatient pain management follow-up as warranted     New onset atrial fibrillation with RVR  Resolved  Remains in sinus rhythm  Secondary to above processes  Serial troponin 0.01 > 0.02 > 0.02 > (<0.01)  Follow electrolytes     Acute hypoxic respiratory failure  Continues to improve  Suspect multifactorial  Currently requiring 2-3 L supplemental O2, decreased requirement overall  Goal sats 88-92%, discussed with staff  BiPAP with sleep     DVT prophylaxis  SCDs     Extensive discussions with patient, daughter Middletonmarvel Kelly), and son (Evens) in regards to current clinical state/goals of care.  All questions sought and answered.     Eusebio Cervantes MD   6/27/2022 11:41 AM

## 2022-06-27 NOTE — PROGRESS NOTES
Physical Therapy  Name: Carla Owens  MRN:  [id number]  Date of service:  6/27/2022 06/27/22 1440   Restrictions/Precautions   Restrictions/Precautions Fall Risk   Position Activity Restriction   Other position/activity restrictions whiting, fecal tube   Subjective   Subjective I would love to try and stand up. Oxygen Therapy   O2 Device Nasal cannula   Bed Mobility   Supine to Sit Moderate assistance   Sit to Supine Maximal assistance  (x 2)   Transfers   Sit to Stand Moderate Assistance;Maximum Assistance   Stand to sit Minimal Assistance   Other Activities   Comment Patient stood at EOB twice and once for 25 seconds    Short Term Goals   Time Frame for Short term goals 14 days   Short term goal 1 ROLLING MOD ASSIST   Short term goal 2 SUP TO/FROM SIT MOD ASSIST   Short term goal 3 BED TO CHAIR MOD-MAX ASSIST   Conditions Requiring Skilled Therapeutic Intervention   Body Structures, Functions, Activity Limitations Requiring Skilled Therapeutic Intervention Decreased functional mobility ; Decreased ADL status; Decreased ROM; Decreased strength;Decreased cognition;Decreased balance;Decreased posture   Assessment Patient was eager to try sitting EOB. Once sitting EOB she was agreeable to attempt standing. She was able to stand for nursing to change scarum dressing. Plan   Plan 3-5 times per week   Current Treatment Recommendations Strengthening;ROM;Balance training;Functional mobility training;Transfer training; Safety education & training;Patient/Caregiver education & training   PT Plan of Care   Monday X   Safety Devices   Type of Devices Nurse notified; Left in bed;Call light within reach   PT Whiteboard Notes   Therapy Whiteboard RE 7/5  SEPTIC SHOCK, RESP FAIL       Electronically signed by Carolyn Pappas PTA on 6/27/2022 at 2:47 PM

## 2022-06-27 NOTE — PROGRESS NOTES
Facility/Department: [facility] ONCOLOGY UNIT  Daily Treatment Note  NAME: Priya Ocasio  : 1955  MRN: [id number]    Date of Service: 2022    Discharge Recommendations:  Patient would benefit from continued therapy after discharge       Assessment   Assessment: Emphasis of treatment was bilateral UE function and training in bed level exercises, especially for left. Treatment Diagnosis: Obstructive uropathy, septic shock, hypoxic respiratory failure     Activity Tolerance  Activity Tolerance: Patient Tolerated treatment well  Activity Tolerance Comments: worked with patient in bed         Patient Diagnosis(es): The primary encounter diagnosis was Acute kidney injury ([address] Diagnoses of Lower abdominal pain, Acute cystitis with hematuria, Septicemia ([address] Ureteral calculus, Kidney stone, Urinary tract infection without hematuria, site unspecified, Chronic pain syndrome, Obstructive uropathy, Sepsis with acute renal failure and tubular necrosis without septic shock, due to unspecified organism ([address] and FREDDY and COPD overlap syndrome ([address] were also pertinent to this visit. has a past medical history of Arthritis and HTN (hypertension). has a past surgical history that includes Cholecystectomy; Ankle fracture surgery (Right); Hysterectomy; Incontinence surgery; and Bladder surgery (Left, 6/15/2022). Restrictions  Restrictions/Precautions  Restrictions/Precautions: Fall Risk  Position Activity Restriction  Other position/activity restrictions: whiting, fecal tube    Subjective   General  Chart Reviewed: Yes  Patient assessed for rehabilitation services?: Yes  Family / Caregiver Present: Yes (dtr)        Objective           22 1646   OT Exercises   Exercise Treatment Able to raise RUE off bed to complete comprehensive exercises for AROM. Distal LUE is WNL but required great effort and substitution patterns to lift her shoulder in semi recline.   Worked on teaching patient appropriate exercises for Left shoulder                                      Cognition  Cognition Comment: Awake and very alert. Recalling events from previous sessions. No overt deficits noted. Plan   Plan  Times per Week: 4-8    Goals (On-Going)   Short Term Goals  Short Term Goal 1: Feed self with supervision after set up  Short Term Goal 2: Demo ability to participate in beginning level standing tasks from recliner  Short Term Goal 3: Independent with UE exercises, trunk exercises, therapeutic positioning  Long Term Goals  Long Term Goal 1: Upgrade as tolerated.        Tx Time  Minutes  [address], OT/L  Electronically signed by Gregory Tyler OT/L on 6/27/2022 at 4:51 PM.

## 2022-06-28 ENCOUNTER — ANESTHESIA EVENT (OUTPATIENT)
Dept: OPERATING ROOM | Age: 67
DRG: 853 | End: 2022-06-28
Payer: MEDICARE

## 2022-06-28 ENCOUNTER — APPOINTMENT (OUTPATIENT)
Dept: GENERAL RADIOLOGY | Age: 67
DRG: 853 | End: 2022-06-28
Payer: MEDICARE

## 2022-06-28 ENCOUNTER — ANESTHESIA (OUTPATIENT)
Dept: OPERATING ROOM | Age: 67
DRG: 853 | End: 2022-06-28
Payer: MEDICARE

## 2022-06-28 PROBLEM — N20.1 LEFT URETERAL CALCULUS: Status: RESOLVED | Noted: 2022-06-15 | Resolved: 2022-06-28

## 2022-06-28 PROBLEM — N11.1 OBSTRUCTIVE PYELONEPHRITIS: Status: RESOLVED | Noted: 2022-06-15 | Resolved: 2022-06-28

## 2022-06-28 PROBLEM — N13.9 OBSTRUCTIVE UROPATHY: Status: RESOLVED | Noted: 2022-06-15 | Resolved: 2022-06-28

## 2022-06-28 LAB
ALBUMIN SERPL-MCNC: 2.3 G/DL (ref 3.5–5.2)
ALP BLD-CCNC: 139 U/L (ref 35–104)
ALT SERPL-CCNC: 12 U/L (ref 5–33)
ANION GAP SERPL CALCULATED.3IONS-SCNC: 6 MMOL/L (ref 7–19)
AST SERPL-CCNC: 35 U/L (ref 5–32)
BASOPHILS ABSOLUTE: 0.1 K/UL (ref 0–0.2)
BASOPHILS RELATIVE PERCENT: 0.5 % (ref 0–1)
BILIRUB SERPL-MCNC: 1.1 MG/DL (ref 0.2–1.2)
BUN BLDV-MCNC: 20 MG/DL (ref 8–23)
CALCIUM SERPL-MCNC: 7.7 MG/DL (ref 8.8–10.2)
CHLORIDE BLD-SCNC: 96 MMOL/L (ref 98–111)
CO2: 35 MMOL/L (ref 22–29)
CREAT SERPL-MCNC: 0.5 MG/DL (ref 0.5–0.9)
EOSINOPHILS ABSOLUTE: 0.1 K/UL (ref 0–0.6)
EOSINOPHILS RELATIVE PERCENT: 0.5 % (ref 0–5)
GFR AFRICAN AMERICAN: >59
GFR NON-AFRICAN AMERICAN: >60
GLUCOSE BLD-MCNC: 104 MG/DL (ref 74–109)
GLUCOSE BLD-MCNC: 109 MG/DL (ref 70–99)
GLUCOSE BLD-MCNC: 117 MG/DL (ref 70–99)
GLUCOSE BLD-MCNC: 96 MG/DL (ref 70–99)
GLUCOSE BLD-MCNC: 99 MG/DL (ref 70–99)
HCT VFR BLD CALC: 36.1 % (ref 37–47)
HEMOGLOBIN: 11 G/DL (ref 12–16)
IMMATURE GRANULOCYTES #: 0.2 K/UL
LYMPHOCYTES ABSOLUTE: 1.9 K/UL (ref 1.1–4.5)
LYMPHOCYTES RELATIVE PERCENT: 14.6 % (ref 20–40)
MAGNESIUM: 1.7 MG/DL (ref 1.6–2.4)
MCH RBC QN AUTO: 29.6 PG (ref 27–31)
MCHC RBC AUTO-ENTMCNC: 30.5 G/DL (ref 33–37)
MCV RBC AUTO: 97 FL (ref 81–99)
MONOCYTES ABSOLUTE: 1.3 K/UL (ref 0–0.9)
MONOCYTES RELATIVE PERCENT: 9.5 % (ref 0–10)
NEUTROPHILS ABSOLUTE: 9.8 K/UL (ref 1.5–7.5)
NEUTROPHILS RELATIVE PERCENT: 73.8 % (ref 50–65)
PDW BLD-RTO: 15.9 % (ref 11.5–14.5)
PERFORMED ON: ABNORMAL
PERFORMED ON: ABNORMAL
PERFORMED ON: NORMAL
PERFORMED ON: NORMAL
PLATELET # BLD: 187 K/UL (ref 130–400)
PMV BLD AUTO: 11.6 FL (ref 9.4–12.3)
POTASSIUM SERPL-SCNC: 3.9 MMOL/L (ref 3.5–5)
RBC # BLD: 3.72 M/UL (ref 4.2–5.4)
SODIUM BLD-SCNC: 137 MMOL/L (ref 136–145)
TOTAL PROTEIN: 5.4 G/DL (ref 6.6–8.7)
WBC # BLD: 13.2 K/UL (ref 4.8–10.8)

## 2022-06-28 PROCEDURE — 2709999900 HC NON-CHARGEABLE SUPPLY: Performed by: UROLOGY

## 2022-06-28 PROCEDURE — 2720000010 HC SURG SUPPLY STERILE: Performed by: UROLOGY

## 2022-06-28 PROCEDURE — 7100000000 HC PACU RECOVERY - FIRST 15 MIN: Performed by: UROLOGY

## 2022-06-28 PROCEDURE — 6370000000 HC RX 637 (ALT 250 FOR IP): Performed by: INTERNAL MEDICINE

## 2022-06-28 PROCEDURE — 94761 N-INVAS EAR/PLS OXIMETRY MLT: CPT

## 2022-06-28 PROCEDURE — 6360000002 HC RX W HCPCS: Performed by: UROLOGY

## 2022-06-28 PROCEDURE — C1769 GUIDE WIRE: HCPCS | Performed by: UROLOGY

## 2022-06-28 PROCEDURE — 3600000014 HC SURGERY LEVEL 4 ADDTL 15MIN: Performed by: UROLOGY

## 2022-06-28 PROCEDURE — 3209999900 FLUORO FOR SURGICAL PROCEDURES

## 2022-06-28 PROCEDURE — 94640 AIRWAY INHALATION TREATMENT: CPT

## 2022-06-28 PROCEDURE — 6370000000 HC RX 637 (ALT 250 FOR IP): Performed by: UROLOGY

## 2022-06-28 PROCEDURE — 88300 SURGICAL PATH GROSS: CPT

## 2022-06-28 PROCEDURE — 80053 COMPREHEN METABOLIC PANEL: CPT

## 2022-06-28 PROCEDURE — 82360 CALCULUS ASSAY QUANT: CPT

## 2022-06-28 PROCEDURE — 2580000003 HC RX 258: Performed by: UROLOGY

## 2022-06-28 PROCEDURE — 3700000000 HC ANESTHESIA ATTENDED CARE: Performed by: UROLOGY

## 2022-06-28 PROCEDURE — 0TP98DZ REMOVAL OF INTRALUMINAL DEVICE FROM URETER, VIA NATURAL OR ARTIFICIAL OPENING ENDOSCOPIC: ICD-10-PCS | Performed by: UROLOGY

## 2022-06-28 PROCEDURE — 82947 ASSAY GLUCOSE BLOOD QUANT: CPT

## 2022-06-28 PROCEDURE — 2500000003 HC RX 250 WO HCPCS: Performed by: NURSE ANESTHETIST, CERTIFIED REGISTERED

## 2022-06-28 PROCEDURE — 94660 CPAP INITIATION&MGMT: CPT

## 2022-06-28 PROCEDURE — 52352 CYSTOURETERO W/STONE REMOVE: CPT | Performed by: UROLOGY

## 2022-06-28 PROCEDURE — 6360000002 HC RX W HCPCS: Performed by: NURSE ANESTHETIST, CERTIFIED REGISTERED

## 2022-06-28 PROCEDURE — 85025 COMPLETE CBC W/AUTO DIFF WBC: CPT

## 2022-06-28 PROCEDURE — 0TC78ZZ EXTIRPATION OF MATTER FROM LEFT URETER, VIA NATURAL OR ARTIFICIAL OPENING ENDOSCOPIC: ICD-10-PCS | Performed by: UROLOGY

## 2022-06-28 PROCEDURE — 3600000004 HC SURGERY LEVEL 4 BASE: Performed by: UROLOGY

## 2022-06-28 PROCEDURE — 2580000003 HC RX 258: Performed by: INTERNAL MEDICINE

## 2022-06-28 PROCEDURE — C1758 CATHETER, URETERAL: HCPCS | Performed by: UROLOGY

## 2022-06-28 PROCEDURE — 2700000000 HC OXYGEN THERAPY PER DAY

## 2022-06-28 PROCEDURE — 2580000003 HC RX 258: Performed by: ANESTHESIOLOGY

## 2022-06-28 PROCEDURE — 83735 ASSAY OF MAGNESIUM: CPT

## 2022-06-28 PROCEDURE — 99232 SBSQ HOSP IP/OBS MODERATE 35: CPT

## 2022-06-28 PROCEDURE — 1210000000 HC MED SURG R&B

## 2022-06-28 PROCEDURE — 36415 COLL VENOUS BLD VENIPUNCTURE: CPT

## 2022-06-28 PROCEDURE — 7100000001 HC PACU RECOVERY - ADDTL 15 MIN: Performed by: UROLOGY

## 2022-06-28 PROCEDURE — 3700000001 HC ADD 15 MINUTES (ANESTHESIA): Performed by: UROLOGY

## 2022-06-28 RX ORDER — FENTANYL CITRATE 50 UG/ML
INJECTION, SOLUTION INTRAMUSCULAR; INTRAVENOUS PRN
Status: DISCONTINUED | OUTPATIENT
Start: 2022-06-28 | End: 2022-06-28 | Stop reason: SDUPTHER

## 2022-06-28 RX ORDER — DIPHENHYDRAMINE HYDROCHLORIDE 50 MG/ML
12.5 INJECTION INTRAMUSCULAR; INTRAVENOUS
Status: DISCONTINUED | OUTPATIENT
Start: 2022-06-28 | End: 2022-06-28 | Stop reason: HOSPADM

## 2022-06-28 RX ORDER — METOCLOPRAMIDE HYDROCHLORIDE 5 MG/ML
10 INJECTION INTRAMUSCULAR; INTRAVENOUS
Status: DISCONTINUED | OUTPATIENT
Start: 2022-06-28 | End: 2022-06-28 | Stop reason: HOSPADM

## 2022-06-28 RX ORDER — ONDANSETRON 2 MG/ML
INJECTION INTRAMUSCULAR; INTRAVENOUS PRN
Status: DISCONTINUED | OUTPATIENT
Start: 2022-06-28 | End: 2022-06-28 | Stop reason: SDUPTHER

## 2022-06-28 RX ORDER — PROPOFOL 10 MG/ML
INJECTION, EMULSION INTRAVENOUS PRN
Status: DISCONTINUED | OUTPATIENT
Start: 2022-06-28 | End: 2022-06-28 | Stop reason: SDUPTHER

## 2022-06-28 RX ORDER — SCOLOPAMINE TRANSDERMAL SYSTEM 1 MG/1
1 PATCH, EXTENDED RELEASE TRANSDERMAL
Status: DISCONTINUED | OUTPATIENT
Start: 2022-06-28 | End: 2022-06-28 | Stop reason: HOSPADM

## 2022-06-28 RX ORDER — FAMOTIDINE 20 MG/1
20 TABLET, FILM COATED ORAL ONCE
Status: DISCONTINUED | OUTPATIENT
Start: 2022-06-28 | End: 2022-06-28 | Stop reason: HOSPADM

## 2022-06-28 RX ORDER — LIDOCAINE HYDROCHLORIDE 10 MG/ML
1 INJECTION, SOLUTION EPIDURAL; INFILTRATION; INTRACAUDAL; PERINEURAL
Status: DISCONTINUED | OUTPATIENT
Start: 2022-06-28 | End: 2022-06-28 | Stop reason: HOSPADM

## 2022-06-28 RX ORDER — LIDOCAINE HYDROCHLORIDE 20 MG/ML
INJECTION, SOLUTION INFILTRATION; PERINEURAL PRN
Status: DISCONTINUED | OUTPATIENT
Start: 2022-06-28 | End: 2022-06-28 | Stop reason: SDUPTHER

## 2022-06-28 RX ORDER — MORPHINE SULFATE 4 MG/ML
4 INJECTION, SOLUTION INTRAMUSCULAR; INTRAVENOUS EVERY 5 MIN PRN
Status: DISCONTINUED | OUTPATIENT
Start: 2022-06-28 | End: 2022-06-28 | Stop reason: HOSPADM

## 2022-06-28 RX ORDER — MEPERIDINE HYDROCHLORIDE 25 MG/ML
12.5 INJECTION INTRAMUSCULAR; INTRAVENOUS; SUBCUTANEOUS EVERY 5 MIN PRN
Status: DISCONTINUED | OUTPATIENT
Start: 2022-06-28 | End: 2022-06-28 | Stop reason: HOSPADM

## 2022-06-28 RX ORDER — SODIUM CHLORIDE, SODIUM LACTATE, POTASSIUM CHLORIDE, CALCIUM CHLORIDE 600; 310; 30; 20 MG/100ML; MG/100ML; MG/100ML; MG/100ML
INJECTION, SOLUTION INTRAVENOUS CONTINUOUS
Status: DISCONTINUED | OUTPATIENT
Start: 2022-06-28 | End: 2022-06-29

## 2022-06-28 RX ORDER — ROCURONIUM BROMIDE 10 MG/ML
INJECTION, SOLUTION INTRAVENOUS PRN
Status: DISCONTINUED | OUTPATIENT
Start: 2022-06-28 | End: 2022-06-28 | Stop reason: SDUPTHER

## 2022-06-28 RX ORDER — MIDAZOLAM HYDROCHLORIDE 1 MG/ML
2 INJECTION INTRAMUSCULAR; INTRAVENOUS
Status: DISCONTINUED | OUTPATIENT
Start: 2022-06-28 | End: 2022-06-28 | Stop reason: HOSPADM

## 2022-06-28 RX ORDER — MORPHINE SULFATE 2 MG/ML
2 INJECTION, SOLUTION INTRAMUSCULAR; INTRAVENOUS EVERY 5 MIN PRN
Status: DISCONTINUED | OUTPATIENT
Start: 2022-06-28 | End: 2022-06-28 | Stop reason: HOSPADM

## 2022-06-28 RX ORDER — DEXMEDETOMIDINE HYDROCHLORIDE 100 UG/ML
INJECTION, SOLUTION INTRAVENOUS PRN
Status: DISCONTINUED | OUTPATIENT
Start: 2022-06-28 | End: 2022-06-28 | Stop reason: SDUPTHER

## 2022-06-28 RX ORDER — EPHEDRINE SULFATE 50 MG/ML
INJECTION, SOLUTION INTRAVENOUS PRN
Status: DISCONTINUED | OUTPATIENT
Start: 2022-06-28 | End: 2022-06-28 | Stop reason: SDUPTHER

## 2022-06-28 RX ADMIN — PHENYLEPHRINE HYDROCHLORIDE 50 MCG: 10 INJECTION INTRAVENOUS at 11:46

## 2022-06-28 RX ADMIN — PHENYLEPHRINE HYDROCHLORIDE 100 MCG: 10 INJECTION INTRAVENOUS at 11:35

## 2022-06-28 RX ADMIN — IPRATROPIUM BROMIDE AND ALBUTEROL SULFATE 1 AMPULE: 2.5; .5 SOLUTION RESPIRATORY (INHALATION) at 19:29

## 2022-06-28 RX ADMIN — BUSPIRONE HYDROCHLORIDE 10 MG: 10 TABLET ORAL at 15:07

## 2022-06-28 RX ADMIN — PROPOFOL 150 MG: 10 INJECTION, EMULSION INTRAVENOUS at 11:22

## 2022-06-28 RX ADMIN — GABAPENTIN 600 MG: 600 TABLET, FILM COATED ORAL at 21:13

## 2022-06-28 RX ADMIN — BUSPIRONE HYDROCHLORIDE 10 MG: 10 TABLET ORAL at 21:13

## 2022-06-28 RX ADMIN — IPRATROPIUM BROMIDE AND ALBUTEROL SULFATE 1 AMPULE: 2.5; .5 SOLUTION RESPIRATORY (INHALATION) at 14:44

## 2022-06-28 RX ADMIN — IPRATROPIUM BROMIDE AND ALBUTEROL SULFATE 1 AMPULE: 2.5; .5 SOLUTION RESPIRATORY (INHALATION) at 07:52

## 2022-06-28 RX ADMIN — LISINOPRIL 20 MG: 20 TABLET ORAL at 21:12

## 2022-06-28 RX ADMIN — LEVOFLOXACIN 750 MG: 5 INJECTION, SOLUTION INTRAVENOUS at 04:36

## 2022-06-28 RX ADMIN — PHENYLEPHRINE HYDROCHLORIDE 100 MCG: 10 INJECTION INTRAVENOUS at 11:26

## 2022-06-28 RX ADMIN — SODIUM CHLORIDE, SODIUM LACTATE, POTASSIUM CHLORIDE, AND CALCIUM CHLORIDE: 600; 310; 30; 20 INJECTION, SOLUTION INTRAVENOUS at 11:00

## 2022-06-28 RX ADMIN — DICLOFENAC 2 G: 10 GEL TOPICAL at 21:13

## 2022-06-28 RX ADMIN — CEFEPIME HYDROCHLORIDE 2000 MG: 2 INJECTION, POWDER, FOR SOLUTION INTRAVENOUS at 11:30

## 2022-06-28 RX ADMIN — FENTANYL CITRATE 50 MCG: 50 INJECTION, SOLUTION INTRAMUSCULAR; INTRAVENOUS at 11:22

## 2022-06-28 RX ADMIN — LIDOCAINE HYDROCHLORIDE 50 MG: 20 INJECTION, SOLUTION INFILTRATION; PERINEURAL at 11:22

## 2022-06-28 RX ADMIN — GUAIFENESIN 400 MG: 200 TABLET ORAL at 15:07

## 2022-06-28 RX ADMIN — GABAPENTIN 600 MG: 600 TABLET, FILM COATED ORAL at 17:53

## 2022-06-28 RX ADMIN — GUAIFENESIN 400 MG: 200 TABLET ORAL at 04:45

## 2022-06-28 RX ADMIN — SODIUM CHLORIDE, PRESERVATIVE FREE 10 ML: 5 INJECTION INTRAVENOUS at 21:12

## 2022-06-28 RX ADMIN — HYDROCHLOROTHIAZIDE 12.5 MG: 25 TABLET ORAL at 21:12

## 2022-06-28 RX ADMIN — DEXMEDETOMIDINE HYDROCHLORIDE 8 MCG: 100 INJECTION, SOLUTION, CONCENTRATE INTRAVENOUS at 11:22

## 2022-06-28 RX ADMIN — GUAIFENESIN 400 MG: 200 TABLET ORAL at 21:13

## 2022-06-28 RX ADMIN — ROCURONIUM BROMIDE 10 MG: 10 INJECTION, SOLUTION INTRAVENOUS at 11:50

## 2022-06-28 RX ADMIN — SUGAMMADEX 500 MG: 100 INJECTION, SOLUTION INTRAVENOUS at 11:53

## 2022-06-28 RX ADMIN — EPHEDRINE SULFATE 10 MG: 50 INJECTION INTRAMUSCULAR; INTRAVENOUS; SUBCUTANEOUS at 11:46

## 2022-06-28 RX ADMIN — ROCURONIUM BROMIDE 50 MG: 10 INJECTION, SOLUTION INTRAVENOUS at 11:22

## 2022-06-28 RX ADMIN — TRAMADOL HYDROCHLORIDE 50 MG: 50 TABLET, COATED ORAL at 04:44

## 2022-06-28 RX ADMIN — METOPROLOL TARTRATE 100 MG: 50 TABLET, FILM COATED ORAL at 21:12

## 2022-06-28 RX ADMIN — SODIUM CHLORIDE, PRESERVATIVE FREE 10 ML: 5 INJECTION INTRAVENOUS at 08:07

## 2022-06-28 RX ADMIN — TRAMADOL HYDROCHLORIDE 50 MG: 50 TABLET, COATED ORAL at 15:07

## 2022-06-28 RX ADMIN — PHENYLEPHRINE HYDROCHLORIDE 150 MCG: 10 INJECTION INTRAVENOUS at 11:44

## 2022-06-28 RX ADMIN — ONDANSETRON 4 MG: 2 INJECTION INTRAMUSCULAR; INTRAVENOUS at 11:32

## 2022-06-28 RX ADMIN — Medication 10 MG: at 21:13

## 2022-06-28 ASSESSMENT — PAIN DESCRIPTION - DESCRIPTORS: DESCRIPTORS: ACHING

## 2022-06-28 ASSESSMENT — LIFESTYLE VARIABLES: SMOKING_STATUS: 0

## 2022-06-28 ASSESSMENT — PAIN SCALES - GENERAL
PAINLEVEL_OUTOF10: 8
PAINLEVEL_OUTOF10: 6
PAINLEVEL_OUTOF10: 0
PAINLEVEL_OUTOF10: 0
PAINLEVEL_OUTOF10: 8
PAINLEVEL_OUTOF10: 3

## 2022-06-28 ASSESSMENT — PAIN DESCRIPTION - ORIENTATION: ORIENTATION: LOWER

## 2022-06-28 ASSESSMENT — PAIN DESCRIPTION - LOCATION
LOCATION: BACK
LOCATION: GENERALIZED

## 2022-06-28 ASSESSMENT — PAIN - FUNCTIONAL ASSESSMENT: PAIN_FUNCTIONAL_ASSESSMENT: ACTIVITIES ARE NOT PREVENTED

## 2022-06-28 ASSESSMENT — ENCOUNTER SYMPTOMS: SHORTNESS OF BREATH: 0

## 2022-06-28 NOTE — PROGRESS NOTES
Palliative Care Progress Note  6/28/2022 7:56 AM    Patient:  Jamir Perez  YOB: 1955  Primary Care Physician: Fransisco Brown MD  Advance Directive: DNR  Admit Date: 6/14/2022       Hospital Day: 15  Portions of this note have been copied forward, however, changed to reflect the most current clinical status of this patient. CHIEF COMPLAINT/REASON FOR CONSULTATION goals of care, code status discussion, and family support. SUBJECTIVE:  Ms. oT Walker is back to 4th floor from OR. She is alert and oriented. C/o pain and nausea. No s/s distress noted    Review of Systems:   14 point review of systems is negative except as specifically addressed above. Objective:   VITALS:  /69   Pulse 81   Temp 98.1 °F (36.7 °C) (Oral)   Resp 18   Ht 5' 5\" (1.651 m)   Wt (!) 348 lb 9 oz (158.1 kg)   SpO2 92%   BMI 58.00 kg/m²   24HR INTAKE/OUTPUT:      Intake/Output Summary (Last 24 hours) at 6/28/2022 0756  Last data filed at 6/28/2022 0510  Gross per 24 hour   Intake 2372.29 ml   Output 1875 ml   Net 497.29 ml     General appearance: 79 yo female, chronically ill appearing, NAD  Head: Normocephalic, without obvious abnormality, atraumatic  Eyes: conjunctivae/corneas clear. PERRL, EOM's intact. Ears: normal external ears and nose  Neck: no JVD, supple, symmetrical, trachea midline   Lungs:  diminished in bases to auscultation bilaterally, shallow, NC in place  Heart: RRR, S1, S2 normal, no murmur  Abdomen: Obese, taut, no grimacing to palpation, normal bowel sounds, rectal tube in place with diarrhea slowing  Extremities: BLE 1+ edema,  No erythema, no to palpation  Skin: Warm, dry/flaky, pale,   Neurologic: Alert and oriented x3, generalized weakness, speech fluent, follows commands.       Medications:      dextrose      sodium chloride      lactated ringers 30 mL/hr at 06/27/22 0805      cefepime  2,000 mg IntraVENous On Call to OR    diclofenac sodium  2 g Topical BID    melatonin  10 mg hepatic steatosis. 3. Colonic diverticulosis without evidence of acute diverticulitis. 4. Small fat containing umbilical and supra umbilical hernia noted. Recommendation: Follow up as clinically indicated. All CT scans at this facility utilize dose modulation, iterative reconstruction, and/or weight based dosing when appropriate to reduce radiation dose to as low as reasonably achievable. Amended by Gopi Jara MD at 15-Shaquille-2022 02:44:19 AM Electronically Signed by Gopi Jara MD at 15-Shaquille-2022 02:29:46 AM             XR CHEST PORTABLE  Result Date: 6/15/2022  1. Questionable trace left pleural effusion with basilar atelectasis. 2. Right sided central line is in place with its tip in SVC. Recommendation: Follow up as clinically indicated. Electronically Signed by Curry Mendoza MD at 15-Shaquille-2022 06:55:39 AM             XR CHEST PORTABLE  Result Date: 6/14/2022  1. Unremarkable radiograph of chest. Recommendation: Follow up as clinically indicated. Electronically Signed by Jeff Xie MD at 15-Shaquille-2022 12:21:20 AM             Assessment/Plan   Principal Problem:    Obstructive uropathy  Active Problems:    Obstructive pyelonephritis    Sepsis with acute renal failure (HCC)    Left ureteral calculus    Renal calculus, bilateral    BINH (acute kidney injury) ([address]    Hyperglycemia due to diabetes mellitus ([address]    Hypertension    Dehydration    FREDDY and COPD overlap syndrome (HCC)    Degenerative joint disease    Palliative care patient    Septicemia ([address]    Chronic pain syndrome    Acute respiratory failure with hypoxia ([address]  Resolved Problems:    * No resolved hospital problems. *      Visit Summary:  Chart reviewed. Ms. Cherri Winston is seen at bedside this afternoon following procedure with urology. Report obtained from RN who states stone was removed with replacement of whiting catheter and no need for new stent. She is complaining of pain/nausea and reported to RN for prn medication administration.  She remains alert and oriented and able to communicate needs well. She is in good spirits and tells me that she was able to stand yesterday with PT/OT and that it hurt but also made her excited to see the improvement in her mobility. I called her daughter, Harmony Kwong, to update on pt status and plan of care. We discussed OR with urology and discharge to [facility]. I discussed with  who reports they are awaiting medicaid pending acceptance and pt will need precert as bed was not held at facility. Harmony Kwong reports that she just got bank statements to [facility] yesterday but that she has been in contact with staff there for d/c planning as well. Will follow. Recommendations:   1. Palliative Care- [facility] d/c to [facility] for rehab with [facility] services following procedure with urology 6/28/22. CM following for d/c planning and placement. Code status- DNR    2, Obstructive left pyelonephritits 2/2 proximal left urteral calculus- urology following. S/p ureteral stent placement 6/15/22 and Cystoscopy with left stent removal and ureter stone extraction 6/28/2022 per Dr. Caitie Felipe. Levaquin continues    3. Septic shock with E coli bacteremia- 2/2 above, follow repeat blood cultures, NGTD. Abx per hospitalist. Weaned from pressors    4. BINH- improved. 5. Chronic pain syndrome-  chronic back pain with Norco and gabapentin baseline. Potential overuse at home noted. Pain regimen with ultram prn at this time. Voltaren gel added for right knee arthritis pain    6. Afib with RVR new onset- suspect 2/2 above. NSR now and off amio gtt    7. Transaminitis-  Improving,. Unclear baseline LFTs. Mgmt per hospitalist. Continue to monitor labs    8. Acute hypoxic respiratory failure- mgmt per hospitalist, suspect multifactorial. Weaned to 3 L NC. Thank you for consulting Palliative Care and allowing us to participate in the care of this patient.    Time Spent Counseling > 50%:  YES                                   Total Time Spent with

## 2022-06-28 NOTE — ANESTHESIA POSTPROCEDURE EVALUATION
Department of Anesthesiology  Postprocedure Note    Patient: Josy Paula  MRN: [id number]  YOB: 1955  Date of evaluation: 6/28/2022      Procedure Summary     Date: 06/28/22 Room / Location: [facility] OR [facility]    Anesthesia Start: 1115 Anesthesia Stop: 1210    Procedures:       CYSTOSCOP, LEFT [address] EXTRACTION (Left )      LEFT URETERAL STENT PLACEMENT (Left ) Diagnosis:       Calculus of distal left ureter      (Calculus of distal left ureter [N20.1])    Surgeons: Shady King MD Responsible Provider: MATTHEW Awan CRNA    Anesthesia Type: general ASA Status: 4          Anesthesia Type: No value filed.     Niels Phase I: Niels Score: 9    Niels Phase II:        Anesthesia Post Evaluation    Patient location during evaluation: PACU  Patient participation: complete - patient participated  Level of consciousness: sleepy but conscious  Pain score: 0  Airway patency: patent  Nausea & Vomiting: no nausea and no vomiting  Complications: no  Cardiovascular status: hemodynamically stable  Respiratory status: acceptable, spontaneous ventilation, room air and face mask  Hydration status: euvolemic

## 2022-06-28 NOTE — PLAN OF CARE
Problem: Discharge Planning  Goal: Discharge to home or other facility with appropriate resources  Outcome: Progressing  Flowsheets (Taken 6/27/2022 0609 by Manjeet Gomes LPN)  Discharge to home or other facility with appropriate resources:   Identify barriers to discharge with patient and caregiver   Arrange for needed discharge resources and transportation as appropriate   Identify discharge learning needs (meds, wound care, etc)   Refer to discharge planning if patient needs post-hospital services based on physician order or complex needs related to functional status, cognitive ability or social support system     Problem: Pain  Goal: Verbalizes/displays adequate comfort level or baseline comfort level  Outcome: Progressing  Flowsheets (Taken 6/27/2022 0628 by Manjeet Gomes LPN)  Verbalizes/displays adequate comfort level or baseline comfort level:   Encourage patient to monitor pain and request assistance   Assess pain using appropriate pain scale   Administer analgesics based on type and severity of pain and evaluate response   Implement non-pharmacological measures as appropriate and evaluate response   Consider cultural and social influences on pain and pain management   Notify Licensed Independent Practitioner if interventions unsuccessful or patient reports new pain     Problem: Safety - Adult  Goal: Free from fall injury  Outcome: Progressing  Flowsheets (Taken 6/27/2022 0646 by Manjeet Gomes LPN)  Free From Fall Injury: Instruct family/caregiver on patient safety     Problem: ABCDS Injury Assessment  Goal: Absence of physical injury  Outcome: Progressing  Flowsheets (Taken 6/27/2022 0646 by Manjeet Gomes LPN)  Absence of Physical Injury: Implement safety measures based on patient assessment     Problem: Skin/Tissue Integrity  Goal: Absence of new skin breakdown  Description: 1. Monitor for areas of redness and/or skin breakdown  2.   Every 4-6 hours minimum:  Change oxygen saturation probe site  3. Every 4-6 hours:  If on nasal continuous positive airway pressure, respiratory therapy assess nares and determine need for appliance change or resting period.   Outcome: Progressing     Problem: Chronic Conditions and Co-morbidities  Goal: Patient's chronic conditions and co-morbidity symptoms are monitored and maintained or improved  Outcome: Progressing  Flowsheets (Taken 6/27/2022 0609 by Humera Small LPN)  Care Plan - Patient's Chronic Conditions and Co-Morbidity Symptoms are Monitored and Maintained or Improved: Monitor and assess patient's chronic conditions and comorbid symptoms for stability, deterioration, or improvement     Problem: Neurosensory - Adult  Goal: Achieves stable or improved neurological status  Outcome: Progressing  Flowsheets (Taken 6/27/2022 0609 by Humera Small LPN)  Achieves stable or improved neurological status: Assess for and report changes in neurological status     Problem: Respiratory - Adult  Goal: Achieves optimal ventilation and oxygenation  Outcome: Progressing  Flowsheets (Taken 6/27/2022 0609 by Humera Small LPN)  Achieves optimal ventilation and oxygenation: Assess for changes in respiratory status     Problem: Cardiovascular - Adult  Goal: Absence of cardiac dysrhythmias or at baseline  Outcome: Progressing  Flowsheets (Taken 6/27/2022 0609 by Humera Small LPN)  Absence of cardiac dysrhythmias or at baseline: Monitor cardiac rate and rhythm     Problem: Skin/Tissue Integrity - Adult  Goal: Skin integrity remains intact  Outcome: Progressing  Flowsheets  Taken 6/27/2022 0646 by Humera Small LPN  Skin Integrity Remains Intact: Monitor for areas of redness and/or skin breakdown  Taken 6/27/2022 0609 by Humera Small LPN  Skin Integrity Remains Intact: Monitor for areas of redness and/or skin breakdown     Problem: Musculoskeletal - Adult  Goal: Return mobility to safest level of function  Outcome: Progressing  Flowsheets (Taken 6/27/2022 5015 by Fady Yu LPN)  Return Mobility to Safest Level of Function: Assess patient stability and activity tolerance for standing, transferring and ambulating with or without assistive devices  Goal: Maintain proper alignment of affected body part  Outcome: Progressing  Flowsheets (Taken 6/27/2022 0609 by Fady Yu LPN)  Maintain proper alignment of affected body part: Support and protect limb and body alignment per provider's orders     Problem: Genitourinary - Adult  Goal: Urinary catheter remains patent  Outcome: Progressing  Flowsheets (Taken 6/27/2022 0609 by Fady Yu LPN)  Urinary catheter remains patent: Assess patency of urinary catheter     Problem: Infection - Adult  Goal: Absence of infection during hospitalization  Outcome: Progressing  Flowsheets  Taken 6/27/2022 0646 by Fady Yu LPN  Absence of infection during hospitalization: Assess and monitor for signs and symptoms of infection  Taken 6/27/2022 0609 by Fady Yu LPN  Absence of infection during hospitalization: Assess and monitor for signs and symptoms of infection     Problem: Metabolic/Fluid and Electrolytes - Adult  Goal: Electrolytes maintained within normal limits  Outcome: Progressing  Flowsheets (Taken 6/27/2022 0609 by Fady Yu LPN)  Electrolytes maintained within normal limits: Monitor labs and assess patient for signs and symptoms of electrolyte imbalances  Goal: Glucose maintained within prescribed range  Outcome: Progressing  Flowsheets (Taken 6/27/2022 0609 by Fady Yu LPN)  Glucose maintained within prescribed range: Monitor blood glucose as ordered     Problem: Confusion  Goal: Confusion, delirium, dementia, or psychosis is improved or at baseline  Description: INTERVENTIONS:  1.  Assess for possible contributors to thought disturbance, including medications, impaired vision or hearing, underlying metabolic abnormalities, dehydration, psychiatric diagnoses, and notify attending LIP  2. [place] high risk fall precautions, as indicated  3. Provide frequent short contacts to provide reality reorientation, refocusing and direction  4. Decrease environmental stimuli, including noise as appropriate  5. Monitor and intervene to maintain adequate nutrition, hydration, elimination, sleep and activity  6. If unable to ensure safety without constant attention obtain sitter and review sitter guidelines with assigned personnel  7.  Initiate Psychosocial CNS and Spiritual Care consult, as indicated  Outcome: Progressing  Flowsheets (Taken 6/27/2022 0609 by Odessa Villagran LPN)  Effect of thought disturbance (confusion, delirium, dementia, or psychosis) are managed with adequate functional status: Assess for contributors to thought disturbance, including medications, impaired vision or hearing, underlying metabolic abnormalities, dehydration, psychiatric diagnoses, notify LIP     Problem: Nutrition Deficit:  Goal: Optimize nutritional status  Outcome: Progressing  Flowsheets (Taken 6/27/2022 1339 by Hasmukh Chavez, MS, RD, LD)  Nutrient intake appropriate for improving, restoring, or maintaining nutritional needs:   Assess nutritional status and recommend course of action   Monitor oral intake, labs, and treatment plans   Provide specific nutrition education to patient or family as appropriate

## 2022-06-28 NOTE — PROGRESS NOTES
06/28/22 1000   Subjective   Subjective Attempt out to [facility]   Physical Therapy    Electronically signed by Levon Smith PTA on 6/28/2022 at 10:58 AM

## 2022-06-28 NOTE — OP NOTE
Brief operative Note      Patient: Astrid Angela  YOB: 1955  MRN: [id number]    Date of Procedure: 6/28/2022    Pre-Op Diagnosis: Calculus of proximal left ureter [N20.1]    Post-Op Diagnosis: Same       Procedure(s):  CYSTOSCOPY, LEFT URETERAL STENT REMOVAL, LEFT URETEROSCOPY STONE BASKET EXTRACTION. Surgeon(s):  Jimbo Lucas MD    Assistant:   * No surgical staff found *    Anesthesia: General    Estimated Blood Loss (mL): 0    Complications: None    Specimens:   ID Type Source Tests Collected by Time Destination   1 : URETERAL CALCULI Stone (Calculus) Ureter STONE ANALYSIS Jimbo Lucas MD 6/28/2022 1201        Implants:  * No implants in log *      Drains:   Urinary Catheter 2 Way (Active)   Catheter Indications Need for fluid volume management of the critically ill patient in a critical care setting 06/28/22 0931   Site Assessment Moist 06/28/22 0931   Urine Color Yellow 06/28/22 1110   Urine Appearance Clear 06/28/22 1110   Urine Odor Other (Comment) 06/28/22 0931   Collection Container Standard 06/28/22 1110   Securement Method Securing device (Describe) 06/28/22 0931   Catheter Care Completed Yes 06/28/22 0756   Catheter Best Practices  Drainage tube clipped to bed;Catheter secured to thigh; Tamper seal intact; Bag below bladder;Bag not on floor; Lack of dependent loop in tubing;Drainage bag less than half full 06/28/22 0931   Status Draining 06/28/22 1110   Output (mL) 525 mL 06/27/22 2136       Urinary Catheter Krishna (Active)       [REMOVED] Rectal Tube (Removed)   Site Assessment Clean, dry & intact 06/27/22 2000   Stool Appearance Watery 06/27/22 2000   Stool Color Brown 06/27/22 2000   Stool Amount Small 06/27/22 2000   Rectal Tube Output (mL) 0 ml 06/27/22 2000       Findings: Proximal left ureteral calculus. This was removed atraumatically with basket extraction via ureteroscopy. Therefore stent was not replaced. I did replace a 12 Kinyarwanda Krishna catheter.     Detailed Description of Procedure:   See dictated report: [id number]    Disposition to PACU then admit back to fourth floor. Remove Krishna catheter for spontaneous voiding when clinically appropriate. Okay to skilled nursing facility. Patient family desires treatment of nonobstructing renal calculi can follow-up with  clinic outpatient with KUB prior.   If no treatment desired no follow-up needed    Electronically signed by Erling Phoenix, MD on 6/28/2022 at 12:11 PM

## 2022-06-28 NOTE — PROGRESS NOTES
Hospitalist Progress Note  [facility]     Patient: Michela Bonilla  : 1955  MRN: [id number]  Code Status: DNR    Hospital Day: 13   Date of Service: 2022    Subjective:   Patient seen and examined. No current complaints. Past Medical History:   Diagnosis Date    Arthritis     HTN (hypertension)        Past Surgical History:   Procedure Laterality Date    ANKLE FRACTURE SURGERY Right     BLADDER SURGERY Left 6/15/2022    CYSTOSCOPY, LEFT URETEROSCOPY, RETROGRADE PYELOGRAM, STENT INSERTION performed by Maribell Bueno MD at [facility] (CERVIX STATUS UNKNOWN)      INCONTINENCE SURGERY         Family History   Problem Relation Age of Onset    Cancer Mother     Cancer Father        Social History     Socioeconomic History    Marital status: Single     Spouse name: Not on file    Number of children: Not on file    Years of education: Not on file    Highest education level: Not on file   Occupational History    Not on file   Tobacco Use    Smoking status: Former Smoker    Smokeless tobacco: Never Used    Tobacco comment: quit 10 years ago   Vaping Use    Vaping Use: Never used   Substance and Sexual Activity    Alcohol use: Never    Drug use: Never    Sexual activity: Not on file   Other Topics Concern    Not on file   Social History Narrative    Not on file     Social Determinants of Health     Financial Resource Strain:     Difficulty of Paying Living Expenses: Not on file   Food Insecurity:     Worried About [address] in the Last Year: Not on file    [address] in the Last Year: Not on file   Transportation Needs:     Lack of Transportation (Medical): Not on file    Lack of Transportation (Non-Medical):  Not on file   Physical Activity:     Days of Exercise per Week: Not on file    Minutes of Exercise per Session: Not on file   Stress:     Feeling of Stress : Not on file   Social Connections:     Frequency of Communication with Friends and Family: Not on file    Frequency of Social Gatherings with Friends and Family: Not on file    Attends Zoroastrianism Services: Not on file    Active Member of Clubs or Organizations: Not on file    Attends Club or Organization Meetings: Not on file    Marital Status: Not on file   Intimate Partner Violence:     Fear of Current or Ex-Partner: Not on file    Emotionally Abused: Not on file    Physically Abused: Not on file    Sexually Abused: Not on file   Housing Stability:     Unable to Pay for Housing in the Last Year: Not on file    Number of [place] in the Last Year: Not on file    Unstable Housing in the Last Year: Not on file       Current Facility-Administered Medications   Medication Dose Route Frequency Provider Last Rate Last Admin    [MAR Hold] cefepime (MAXIPIME) 2000 mg IVPB minibag in NS  2,000 mg IntraVENous On Call to Jaydon Cristina MD        [facility] Hold] diclofenac sodium (VOLTAREN) 1 % gel 2 g  2 g Topical BID Paradise Oh APRN - CNP   2 g at 06/27/22 2132    [MAR Hold] prochlorperazine (COMPAZINE) tablet 5 mg  5 mg Oral Q6H PRN Corey Beltran MD   5 mg at 06/25/22 1846    [MAR Hold] melatonin disintegrating tablet 10 mg  10 mg Oral Nightly Alisyn MATTHEW Castelan - CNP   10 mg at 06/27/22 2130    [MAR Hold] guaiFENesin tablet 400 mg  400 mg Oral Q8H Corey Beltran MD   400 mg at 06/28/22 0445    [MAR Hold] sodium chloride (OCEAN, BABY AYR) 0.65 % nasal spray 1 spray  1 spray Each Nostril Q4H PRN Corey Beltran MD   1 spray at 06/23/22 1504    [MAR Hold] magnesium sulfate 1000 mg in dextrose 5% 100 mL IVPB  1,000 mg IntraVENous PRN Corey Beltran MD        [facility] Hold] potassium chloride (KLOR-CON M) extended release tablet 40 mEq  40 mEq Oral PRN Corey Beltran MD   40 mEq at 06/27/22 0507    Or    [MAR Hold] potassium bicarb-citric acid (EFFER-K) effervescent tablet 40 mEq  40 mEq Oral PRN Corey Beltran MD        Or   Warden Waldrop [facility] Hold] potassium chloride 10 mEq/100 mL IVPB (Peripheral Line)  10 mEq IntraVENous PRN Jossy Schafer MD        [facility] Hold] labetalol (NORMODYNE;TRANDATE) injection 10 mg  10 mg IntraVENous Q4H PRN Garrick Navarro MD   10 mg at 06/19/22 0334    [MAR Hold] lisinopril (PRINIVIL;ZESTRIL) tablet 20 mg  20 mg Oral BID Jossy Schafer MD   20 mg at 06/27/22 2130    And    [MAR Hold] hydroCHLOROthiazide (HYDRODIURIL) tablet 12.5 mg  12.5 mg Oral BID Jossy Schafer MD   12.5 mg at 06/27/22 2131    [MAR Hold] metoprolol tartrate (LOPRESSOR) tablet 100 mg  100 mg Oral BID Jossy Schafer MD   100 mg at 06/27/22 2130    [MAR Hold] levoFLOXacin (LEVAQUIN) 750 MG/150ML infusion 750 mg  750 mg IntraVENous Q24H Patti Wilder MD   Stopped at 06/28/22 0550    [MAR Hold] traMADol (ULTRAM) tablet 50 mg  50 mg Oral Q6H PRN Jossy Schafer MD   50 mg at 06/28/22 0444    [MAR Hold] busPIRone (BUSPAR) tablet 10 mg  10 mg Oral TID Jossy Schafer MD   10 mg at 06/27/22 2130    [MAR Hold] gabapentin (NEURONTIN) tablet 600 mg  600 mg Oral 4x Daily Patti Wilder MD   600 mg at 06/27/22 2130    [MAR Hold] pravastatin (PRAVACHOL) tablet 40 mg  40 mg Oral Daily Patti Wilder MD   40 mg at 06/27/22 0853    [MAR Hold] glucose chewable tablet 16 g  4 tablet Oral PRN MD Carlos A Broussard Cha [facility] Hold] dextrose bolus 10% 125 mL  125 mL IntraVENous PRN Garrick Navarro MD        Or   Carlos A Vásquez [facility] Hold] dextrose bolus 10% 250 mL  250 mL IntraVENous PRN Garrick Navarro MD        [facility] Hold] glucagon (rDNA) injection 1 mg  1 mg IntraMUSCular PRN Garrick Navarro MD        [facility] Hold] dextrose 5 % solution  100 mL/hr IntraVENous PRN Garrick Navarro MD        [facility] Hold] insulin lispro (HUMALOG) injection vial 0-18 Units  0-18 Units SubCUTAneous TID  Garrick Navarro MD   3 Units at 06/27/22 1757    [MAR Hold] insulin lispro (HUMALOG) injection vial 0-9 Units  0-9 Units SubCUTAneous Nightly Eri GODINEZ Bryn Hickey MD   2 Units at 06/19/22 2123    [MAR Hold] insulin glargine (LANTUS) injection vial 10 Units  10 Units SubCUTAneous Nightly Felipe Yee MD   10 Units at 06/27/22 2130    [MAR Hold] sodium chloride flush 0.9 % injection 5-40 mL  5-40 mL IntraVENous 2 times per day Felipe Yee MD   10 mL at 06/28/22 0807    [MAR Hold] sodium chloride flush 0.9 % injection 5-40 mL  5-40 mL IntraVENous PRN Felipe Yee MD        [facility] Hold] 0.9 % sodium chloride infusion   IntraVENous PRN Felipe Yee MD        [facility] Hold] ondansetron (ZOFRAN-ODT) disintegrating tablet 4 mg  4 mg Oral Q8H PRN Felipe Yee MD   4 mg at 06/21/22 1042    Or    [MAR Hold] ondansetron (ZOFRAN) injection 4 mg  4 mg IntraVENous Q6H PRN Felipe Yee MD   4 mg at 06/25/22 0815    [MAR Hold] polyethylene glycol (GLYCOLAX) packet 17 g  17 g Oral Daily PRN Felipe Yee MD        [facility] Hold] acetaminophen (TYLENOL) tablet 650 mg  650 mg Oral Q6H PRN Felipe Yee MD   650 mg at 06/22/22 1448    Or    [MAR Hold] acetaminophen (TYLENOL) suppository 650 mg  650 mg Rectal Q6H PRN Felipe Yee MD        [facility] Hold] potassium chloride 10 mEq/100 mL IVPB (Peripheral Line)  10 mEq IntraVENous PRN Felipe Yee MD        [facility] Hold] magnesium sulfate 2000 mg in 50 mL IVPB premix  2,000 mg IntraVENous PRN Felipe Yee MD   Stopped at 06/16/22 0640    [MAR Hold] ipratropium-albuterol (DUONEB) nebulizer solution 1 ampule  1 ampule Inhalation Q4H WA Felipe Yee MD   1 ampule at 06/28/22 0752    [MAR Hold] lactated ringers infusion   IntraVENous Continuous Vernida Osler, MD 30 mL/hr at 06/27/22 0805 Rate Verify at 06/27/22 0805         [MAR Hold] dextrose      [MAR Hold] sodium chloride      [MAR Hold] lactated ringers 30 mL/hr at 06/27/22 0805        Objective:   /69   Pulse 81   Temp 98.1 °F (36.7 °C) (Oral) urology  Extensive discussion with patient/family in this regard  They are now agreeable for above definitive urological treatment planned for today     E. coli bacteremia  Secondary to above  Levaquin per sensitivities  Repeat blood cultures NGTD     BINH  Resolved with current treatment plan  Avoid offending agents  Follow renal function/urine output/electrolytes     DM2  HbA1c 9.1  Poor control  Counseled  Meds on board     Morbid obesity  Counseled     Chronic pain syndrome  Patient has been bedbound for the last 5 years  Patient/daughter state she has been taking opioids for many years  I extensively explained how these agents can run counter to her current treatment plan  Patient continues to request home pain meds despite extensive counseling  Palliative/social work following per patient/family request  Outpatient pain management follow-up as warranted     New onset atrial fibrillation with RVR  Resolved  Remains in sinus rhythm  Secondary to above processes  Serial troponin 0.01 > 0.02 > 0.02 > (<0.01)  Follow electrolytes     Acute hypoxic respiratory failure  Continues to improve  Suspect multifactorial  Currently requiring 2-3 L supplemental O2, decreased requirement overall  Goal sats 88-92%, discussed with staff  BiPAP with sleep     DVT prophylaxis  SCDs     Extensive discussions with patient, daughter Alta Félix), and son (Evens) in regards to current clinical state/goals of care.  All questions sought and answered.     Kajal Irwin MD   6/28/2022 10:30 AM

## 2022-06-29 LAB
ALBUMIN SERPL-MCNC: 2.2 G/DL (ref 3.5–5.2)
ALP BLD-CCNC: 125 U/L (ref 35–104)
ALT SERPL-CCNC: 11 U/L (ref 5–33)
ANION GAP SERPL CALCULATED.3IONS-SCNC: 10 MMOL/L (ref 7–19)
AST SERPL-CCNC: 35 U/L (ref 5–32)
BASOPHILS ABSOLUTE: 0.1 K/UL (ref 0–0.2)
BASOPHILS RELATIVE PERCENT: 0.5 % (ref 0–1)
BILIRUB SERPL-MCNC: 1.1 MG/DL (ref 0.2–1.2)
BUN BLDV-MCNC: 18 MG/DL (ref 8–23)
CALCIUM SERPL-MCNC: 7.5 MG/DL (ref 8.8–10.2)
CHLORIDE BLD-SCNC: 97 MMOL/L (ref 98–111)
CO2: 34 MMOL/L (ref 22–29)
CREAT SERPL-MCNC: 0.6 MG/DL (ref 0.5–0.9)
EOSINOPHILS ABSOLUTE: 0.1 K/UL (ref 0–0.6)
EOSINOPHILS RELATIVE PERCENT: 0.4 % (ref 0–5)
GFR AFRICAN AMERICAN: >59
GFR NON-AFRICAN AMERICAN: >60
GLUCOSE BLD-MCNC: 113 MG/DL (ref 74–109)
GLUCOSE BLD-MCNC: 116 MG/DL (ref 70–99)
GLUCOSE BLD-MCNC: 116 MG/DL (ref 70–99)
GLUCOSE BLD-MCNC: 121 MG/DL (ref 70–99)
GLUCOSE BLD-MCNC: 129 MG/DL (ref 70–99)
HCT VFR BLD CALC: 34.1 % (ref 37–47)
HEMOGLOBIN: 10.4 G/DL (ref 12–16)
IMMATURE GRANULOCYTES #: 0.1 K/UL
LYMPHOCYTES ABSOLUTE: 1.5 K/UL (ref 1.1–4.5)
LYMPHOCYTES RELATIVE PERCENT: 12.3 % (ref 20–40)
MAGNESIUM: 1.6 MG/DL (ref 1.6–2.4)
MCH RBC QN AUTO: 29.2 PG (ref 27–31)
MCHC RBC AUTO-ENTMCNC: 30.5 G/DL (ref 33–37)
MCV RBC AUTO: 95.8 FL (ref 81–99)
MONOCYTES ABSOLUTE: 1.2 K/UL (ref 0–0.9)
MONOCYTES RELATIVE PERCENT: 9.8 % (ref 0–10)
NEUTROPHILS ABSOLUTE: 9.1 K/UL (ref 1.5–7.5)
NEUTROPHILS RELATIVE PERCENT: 76.2 % (ref 50–65)
PDW BLD-RTO: 15.9 % (ref 11.5–14.5)
PERFORMED ON: ABNORMAL
PLATELET # BLD: 170 K/UL (ref 130–400)
PLATELET SLIDE REVIEW: ADEQUATE
PMV BLD AUTO: 12.3 FL (ref 9.4–12.3)
POTASSIUM SERPL-SCNC: 3.9 MMOL/L (ref 3.5–5)
RBC # BLD: 3.56 M/UL (ref 4.2–5.4)
SODIUM BLD-SCNC: 141 MMOL/L (ref 136–145)
TOTAL PROTEIN: 5.2 G/DL (ref 6.6–8.7)
WBC # BLD: 12 K/UL (ref 4.8–10.8)

## 2022-06-29 PROCEDURE — 97530 THERAPEUTIC ACTIVITIES: CPT

## 2022-06-29 PROCEDURE — 94640 AIRWAY INHALATION TREATMENT: CPT

## 2022-06-29 PROCEDURE — 82947 ASSAY GLUCOSE BLOOD QUANT: CPT

## 2022-06-29 PROCEDURE — 1210000000 HC MED SURG R&B

## 2022-06-29 PROCEDURE — 2580000003 HC RX 258: Performed by: UROLOGY

## 2022-06-29 PROCEDURE — 94761 N-INVAS EAR/PLS OXIMETRY MLT: CPT

## 2022-06-29 PROCEDURE — 6360000002 HC RX W HCPCS: Performed by: UROLOGY

## 2022-06-29 PROCEDURE — 99232 SBSQ HOSP IP/OBS MODERATE 35: CPT | Performed by: NURSE PRACTITIONER

## 2022-06-29 PROCEDURE — 99232 SBSQ HOSP IP/OBS MODERATE 35: CPT

## 2022-06-29 PROCEDURE — 6370000000 HC RX 637 (ALT 250 FOR IP): Performed by: UROLOGY

## 2022-06-29 PROCEDURE — 85025 COMPLETE CBC W/AUTO DIFF WBC: CPT

## 2022-06-29 PROCEDURE — 80053 COMPREHEN METABOLIC PANEL: CPT

## 2022-06-29 PROCEDURE — 83735 ASSAY OF MAGNESIUM: CPT

## 2022-06-29 PROCEDURE — 6370000000 HC RX 637 (ALT 250 FOR IP)

## 2022-06-29 PROCEDURE — 97535 SELF CARE MNGMENT TRAINING: CPT

## 2022-06-29 PROCEDURE — 36415 COLL VENOUS BLD VENIPUNCTURE: CPT

## 2022-06-29 PROCEDURE — 2700000000 HC OXYGEN THERAPY PER DAY

## 2022-06-29 PROCEDURE — 94660 CPAP INITIATION&MGMT: CPT

## 2022-06-29 RX ORDER — LIDOCAINE 4 G/G
2 PATCH TOPICAL DAILY
Status: DISCONTINUED | OUTPATIENT
Start: 2022-06-29 | End: 2022-07-02 | Stop reason: HOSPADM

## 2022-06-29 RX ADMIN — LISINOPRIL 20 MG: 20 TABLET ORAL at 20:58

## 2022-06-29 RX ADMIN — PRAVASTATIN SODIUM 40 MG: 20 TABLET ORAL at 10:42

## 2022-06-29 RX ADMIN — SODIUM CHLORIDE, PRESERVATIVE FREE 10 ML: 5 INJECTION INTRAVENOUS at 20:58

## 2022-06-29 RX ADMIN — Medication 10 MG: at 20:58

## 2022-06-29 RX ADMIN — TRAMADOL HYDROCHLORIDE 50 MG: 50 TABLET, COATED ORAL at 21:08

## 2022-06-29 RX ADMIN — PROCHLORPERAZINE MALEATE 5 MG: 10 TABLET ORAL at 10:41

## 2022-06-29 RX ADMIN — METOPROLOL TARTRATE 100 MG: 50 TABLET, FILM COATED ORAL at 10:42

## 2022-06-29 RX ADMIN — GABAPENTIN 600 MG: 600 TABLET, FILM COATED ORAL at 20:59

## 2022-06-29 RX ADMIN — LEVOFLOXACIN 750 MG: 5 INJECTION, SOLUTION INTRAVENOUS at 05:31

## 2022-06-29 RX ADMIN — IPRATROPIUM BROMIDE AND ALBUTEROL SULFATE 1 AMPULE: 2.5; .5 SOLUTION RESPIRATORY (INHALATION) at 18:35

## 2022-06-29 RX ADMIN — GUAIFENESIN 400 MG: 200 TABLET ORAL at 22:22

## 2022-06-29 RX ADMIN — GUAIFENESIN 400 MG: 200 TABLET ORAL at 05:31

## 2022-06-29 RX ADMIN — GABAPENTIN 600 MG: 600 TABLET, FILM COATED ORAL at 18:34

## 2022-06-29 RX ADMIN — TRAMADOL HYDROCHLORIDE 50 MG: 50 TABLET, COATED ORAL at 00:37

## 2022-06-29 RX ADMIN — BUSPIRONE HYDROCHLORIDE 10 MG: 10 TABLET ORAL at 20:58

## 2022-06-29 RX ADMIN — HYDROCHLOROTHIAZIDE 12.5 MG: 25 TABLET ORAL at 20:58

## 2022-06-29 RX ADMIN — METOPROLOL TARTRATE 100 MG: 50 TABLET, FILM COATED ORAL at 20:59

## 2022-06-29 RX ADMIN — GUAIFENESIN 400 MG: 200 TABLET ORAL at 14:47

## 2022-06-29 RX ADMIN — BUSPIRONE HYDROCHLORIDE 10 MG: 10 TABLET ORAL at 10:42

## 2022-06-29 RX ADMIN — GABAPENTIN 600 MG: 600 TABLET, FILM COATED ORAL at 10:42

## 2022-06-29 RX ADMIN — IPRATROPIUM BROMIDE AND ALBUTEROL SULFATE 1 AMPULE: 2.5; .5 SOLUTION RESPIRATORY (INHALATION) at 10:12

## 2022-06-29 RX ADMIN — SODIUM CHLORIDE, PRESERVATIVE FREE 10 ML: 5 INJECTION INTRAVENOUS at 10:42

## 2022-06-29 RX ADMIN — DICLOFENAC 2 G: 10 GEL TOPICAL at 20:57

## 2022-06-29 RX ADMIN — INSULIN GLARGINE 10 UNITS: 100 INJECTION, SOLUTION SUBCUTANEOUS at 20:59

## 2022-06-29 RX ADMIN — GABAPENTIN 600 MG: 600 TABLET, FILM COATED ORAL at 14:47

## 2022-06-29 RX ADMIN — BUSPIRONE HYDROCHLORIDE 10 MG: 10 TABLET ORAL at 14:47

## 2022-06-29 RX ADMIN — SODIUM CHLORIDE 25 ML: 9 INJECTION, SOLUTION INTRAVENOUS at 03:25

## 2022-06-29 RX ADMIN — LISINOPRIL 20 MG: 20 TABLET ORAL at 10:42

## 2022-06-29 RX ADMIN — DICLOFENAC 2 G: 10 GEL TOPICAL at 10:47

## 2022-06-29 RX ADMIN — ONDANSETRON HYDROCHLORIDE 4 MG: 2 SOLUTION INTRAMUSCULAR; INTRAVENOUS at 04:53

## 2022-06-29 RX ADMIN — MAGNESIUM SULFATE HEPTAHYDRATE 2000 MG: 40 INJECTION, SOLUTION INTRAVENOUS at 03:27

## 2022-06-29 RX ADMIN — HYDROCHLOROTHIAZIDE 12.5 MG: 25 TABLET ORAL at 10:42

## 2022-06-29 RX ADMIN — IPRATROPIUM BROMIDE AND ALBUTEROL SULFATE 1 AMPULE: 2.5; .5 SOLUTION RESPIRATORY (INHALATION) at 06:23

## 2022-06-29 RX ADMIN — TRAMADOL HYDROCHLORIDE 50 MG: 50 TABLET, COATED ORAL at 14:47

## 2022-06-29 ASSESSMENT — ENCOUNTER SYMPTOMS
VOICE CHANGE: 0
RHINORRHEA: 0
BACK PAIN: 1
NAUSEA: 0
SHORTNESS OF BREATH: 0
DIARRHEA: 0
ABDOMINAL DISTENTION: 0
CONSTIPATION: 0
ABDOMINAL PAIN: 0
BACK PAIN: 0
COLOR CHANGE: 0
VOMITING: 0
COUGH: 0

## 2022-06-29 ASSESSMENT — PAIN - FUNCTIONAL ASSESSMENT
PAIN_FUNCTIONAL_ASSESSMENT: ACTIVITIES ARE NOT PREVENTED
PAIN_FUNCTIONAL_ASSESSMENT: PREVENTS OR INTERFERES SOME ACTIVE ACTIVITIES AND ADLS

## 2022-06-29 ASSESSMENT — PAIN SCALES - GENERAL
PAINLEVEL_OUTOF10: 8
PAINLEVEL_OUTOF10: 3
PAINLEVEL_OUTOF10: 5
PAINLEVEL_OUTOF10: 8
PAINLEVEL_OUTOF10: 2
PAINLEVEL_OUTOF10: 4

## 2022-06-29 ASSESSMENT — PAIN DESCRIPTION - DESCRIPTORS
DESCRIPTORS: ACHING
DESCRIPTORS: ACHING;DISCOMFORT;SORE;SHARP

## 2022-06-29 ASSESSMENT — PAIN DESCRIPTION - ORIENTATION: ORIENTATION: RIGHT;LEFT;LOWER

## 2022-06-29 ASSESSMENT — PAIN DESCRIPTION - LOCATION
LOCATION: GENERALIZED
LOCATION: GENERALIZED

## 2022-06-29 ASSESSMENT — PAIN DESCRIPTION - PAIN TYPE: TYPE: CHRONIC PAIN;ACUTE PAIN

## 2022-06-29 NOTE — OP NOTE
RONNA Kidamom [facility] [address], [address] [facility]                                OPERATIVE REPORT    PATIENT NAME: Chema Winston                       :        1955  MED REC NO:   [id number]                              ROOM:       [facility]  ACCOUNT NO:   [de-identified]                           ADMIT DATE: 2022  PROVIDER:     Luther Centeno MD    DATE OF PROCEDURE:  2022    TITLE OF OPERATION:  1. Cystoscopy, left ureteral stent removal.  2.  Left ureteroscopy, stone basket extraction. PREOPERATIVE DIAGNOSIS:  Proximal left ureteral calculus. POSTOPERATIVE DIAGNOSIS:  Proximal left ureteral calculus. ANESTHETIC:  General anesthetic. ATTENDING SURGEON:  Luther Centeno MD    EBL=0    HISTORY:  The patient is a 14-year-old female who presented with  obstructing proximal left ureteral calculus with obstructive  pyelonephritis with sepsis. She now has completed 14 days of  culture-specific antibiotics consistent with Levaquin and has recovered  from her sepsis. She presents now to undergo definitive treatment of  the stone. Therefore, we planned for cystoscopy, left ureteral stent  removal, left ureteroscopy, laser lithotripsy and stone extraction,  possible stent replacement. Risks and complications including risk from  anesthetic, injury to the ureter, need for subsequent adjuvant or repeat  procedures were discussed and she understands. She has bilateral lower  pole nonobstructing renal calculi. She does understand we are not  treating these stones today. DESCRIPTION OF PROCEDURE:  The patient was brought to the operating  room, underwent general anesthetic. She was placed in the lithotomy  position, her genitalia was prepped and draped per routine sterile  fashion. She received cefepime and a time-out was performed. The 22-Macedonian cystoscope was inserted into the meatus.   Stent was seen  protruding from the left ureteral orifice. The bladder itself otherwise  appeared normal.  The stent was grasped with alligator graspers and  brought up the meatus. A 0.035 sensor tip guidewire was then inserted  through the stent under fluoroscopic guidance up into the left kidney  without difficulty. The stent was removed and leaving the guidewire in  place. I then performed a flexible ureteroscopy alongside the guidewire. The  flexible ureteroscope was then inserted into the patient's bladder and  then under direct vision into the left ureter alongside the guidewire. I was able to pass the cystoscope all the way up to the proximal ureter  and I saw a stone within the proximal ureter. I felt the ureter was  allowed for basket extraction, so the stone was engaged and trapped into  a [place] basket and I was able to completely extract the stone  atraumatically, little bit tight at the orifice, but otherwise it came  out very easily. Therefore, I felt since the stone was removed  atraumatically without any further manipulation, the stent was not  needed to be replaced. Because of her obesity and plus she is  bed-bound, I went ahead and replaced her Krishna catheter. The stone was  sent for analysis. The procedure was terminated. Estimated blood loss  was 0 mL. She was awakened from her anesthetic and taken to the  recovery room in stable condition. I think she has now completed full  course of antibiotics, she can be discharged to the skilled nursing  facility at any time, no further antibiotics needed. The Krishna catheter  can be removed when clinically appropriate, when patient is ambulatory  and able to get up to the toilet. Regarding her other nonobstructing  stones in both kidneys, these could be treated electively in the future  if the patient apparently desires and they could follow up to see us in  the office with KUB prior. If they desire no treatment for the stones,  then no further followup is needed.         Delight Kehr Krysta Segura MD    D: 06/28/2022 13:11:35      T: 06/28/2022 20:30:37     [email or web address]  Job#: [id number]     Doc#: [id number]    CC:

## 2022-06-29 NOTE — CARE COORDINATION
06/29/22 1530   IMM Letter   IMM Letter given to Patient/Family/Significant other/Guardian/POA/by: given to Patient by BETTYE Monzon   IMM Letter date given: 06/29/22   IMM Letter time given: 1530     Important Message from Gely De La Rosa letter given to patient by BETTYE Monzon. All questions answered, pt voiced understanding. Copy of IMM placed in pt's soft chart.    Electronically signed by Marcela Mancini on 6/29/2022 at 3:30 PM

## 2022-06-29 NOTE — CARE COORDINATION
Spoke with Nadeen, SNF liaison. Family chose not to hold pt's bed at the facility. She is checking the status of pt's pending Medicaid application. Will need pre-cert for skilled therapy at [facility], awaiting confirmation of Medicaid prior. SW following.    Electronically signed by Laura Jackman on 6/29/2022 at 7:41 AM

## 2022-06-29 NOTE — PLAN OF CARE
Problem: Discharge Planning  Goal: Discharge to home or other facility with appropriate resources  Outcome: Progressing     Problem: Pain  Goal: Verbalizes/displays adequate comfort level or baseline comfort level  Outcome: Progressing     Problem: Safety - Adult  Goal: Free from fall injury  Outcome: Progressing     Problem: ABCDS Injury Assessment  Goal: Absence of physical injury  Outcome: Progressing     Problem: Skin/Tissue Integrity  Goal: Absence of new skin breakdown  Description: 1. Monitor for areas of redness and/or skin breakdown  2. Every 4-6 hours minimum:  Change oxygen saturation probe site  3. Every 4-6 hours:  If on nasal continuous positive airway pressure, respiratory therapy assess nares and determine need for appliance change or resting period.   Outcome: Progressing     Problem: Chronic Conditions and Co-morbidities  Goal: Patient's chronic conditions and co-morbidity symptoms are monitored and maintained or improved  Outcome: Progressing     Problem: Neurosensory - Adult  Goal: Achieves stable or improved neurological status  Outcome: Progressing     Problem: Respiratory - Adult  Goal: Achieves optimal ventilation and oxygenation  Outcome: Progressing     Problem: Cardiovascular - Adult  Goal: Absence of cardiac dysrhythmias or at baseline  Outcome: Progressing     Problem: Skin/Tissue Integrity - Adult  Goal: Skin integrity remains intact  Outcome: Progressing     Problem: Musculoskeletal - Adult  Goal: Return mobility to safest level of function  Outcome: Progressing  Goal: Maintain proper alignment of affected body part  Outcome: Progressing     Problem: Genitourinary - Adult  Goal: Urinary catheter remains patent  Outcome: Progressing     Problem: Infection - Adult  Goal: Absence of infection during hospitalization  Outcome: Progressing     Problem: Metabolic/Fluid and Electrolytes - Adult  Goal: Electrolytes maintained within normal limits  Outcome: Progressing  Goal: Glucose maintained within prescribed range  Outcome: Progressing     Problem: Confusion  Goal: Confusion, delirium, dementia, or psychosis is improved or at baseline  Description: INTERVENTIONS:  1. Assess for possible contributors to thought disturbance, including medications, impaired vision or hearing, underlying metabolic abnormalities, dehydration, psychiatric diagnoses, and notify attending LIP  2. [place] high risk fall precautions, as indicated  3. Provide frequent short contacts to provide reality reorientation, refocusing and direction  4. Decrease environmental stimuli, including noise as appropriate  5. Monitor and intervene to maintain adequate nutrition, hydration, elimination, sleep and activity  6. If unable to ensure safety without constant attention obtain sitter and review sitter guidelines with assigned personnel  7.  Initiate Psychosocial CNS and Spiritual Care consult, as indicated  Outcome: Progressing     Problem: Nutrition Deficit:  Goal: Optimize nutritional status  Outcome: Progressing

## 2022-06-29 NOTE — PROGRESS NOTES
Urology Progress Note    SUBJECTIVE: Patient resting in bed. Surgical intervention yesterday with Dr. Uvaldo Jimenez. Patient complains of pain in her back and tailbone that is slightly worsened since the procedure, but these are not new complaints. OBJECTIVE:   Review of Systems   Constitutional: Negative for chills and fever. Gastrointestinal: Negative for abdominal distention, abdominal pain, nausea and vomiting. Genitourinary: Negative for difficulty urinating, dysuria, flank pain, frequency, hematuria and urgency. Musculoskeletal: Positive for arthralgias, back pain and gait problem. Neurological: Positive for weakness. Psychiatric/Behavioral: Negative for agitation and confusion. Physical  VITALS:  /69   Pulse 89   Temp 96.8 °F (36 °C)   Resp 20   Ht 5' 5\" (1.651 m)   Wt (!) 348 lb 9 oz (158.1 kg)   SpO2 95%   BMI 58.00 kg/m²   TEMPERATURE:  Current - Temp: 96.8 °F (36 °C);  Max - Temp  Av °F (36.1 °C)  Min: 96.8 °F (36 °C)  Max: 97.3 °F (36.3 °C)   24 HR I&O     Intake/Output Summary (Last 24 hours) at 2022 1255  Last data filed at 2022 1103  Gross per 24 hour   Intake 150 ml   Output 1900 ml   Net -1750 ml     BACK: Pain in lower back  ABDOMEN:  non-distended and non-tender  HEART:  normal rate  CHEST: Normal respiratory effort  GENITAL/URINARY: Krishna catheter to bedside drainage with dark yellow urine    Data  CBC:   Recent Labs     22  0340 22  0429 22  0212   WBC 13.5* 13.2* 12.0*   HGB 11.0* 11.0* 10.4*   HCT 36.3* 36.1* 34.1*    187 170     BMP:    Recent Labs     22  0340 22  0429 22  0212    137 141   K 3.5 3.9 3.9   CL 97* 96* 97*   CO2 37* 35* 34*   BUN 23 20 18   CREATININE 0.7 0.5 0.6   GLUCOSE 115* 104 113*       U/A:    Lab Results   Component Value Date    COLORU DARK YELLOW 06/15/2022    PHUR 5.0 06/15/2022    WBCUA 2-4 06/15/2022    YEAST Present 06/15/2022    BACTERIA 1+ 06/15/2022    CLARITYU TURBID 06/15/2022    SPECGRAV 1.026 06/15/2022    LEUKOCYTESUR SMALL 06/15/2022    UROBILINOGEN 1.0 06/15/2022    BILIRUBINUR MODERATE 06/15/2022    BLOODU MODERATE 06/15/2022    GLUCOSEU 100 06/15/2022    AMORPHOUS 2+ 06/15/2022     ASSESSMENT AND PLAN    Patient Active Problem List   Diagnosis    Sepsis with acute renal failure ([address]    Renal calculus, bilateral    BINH (acute kidney injury) ([address]    Hyperglycemia due to diabetes mellitus ([address]    Hypertension    Dehydration    FREDDY and COPD overlap syndrome ([address]    Degenerative joint disease    Palliative care patient    Septicemia ([address]    Chronic pain syndrome    Acute respiratory failure with hypoxia ([address]       1. Sepsis with septic shock secondary to lexacaftor, peripheral obstructing left ureteral stone status post left ureteral stent placement on 6/15/2022 and subsequent definitive stone treatment with left ureteroscopy on 6/28/2022 with Dr. Llewellyn Cowden. Blood cultures remain negative to date. Levaquin continues. Can likely discontinue tomorrow as patient has had adequate therapy with resolving leukocytosis.  2.   Left obstructing proximal ureteral stone status post definitive treatment yesterday with Dr. Llewellyn Cowden. Continue Krishna catheter for external drainage for now. Will try voiding trial in the next couple of days. Imperative for physical therapy to work with the patient and regaining her strength so that she can ambulate back and forth to the restroom per her baseline status prior to admission. 3.  Bilateral nonobstructing nephrolithiasis. Had a discussion with the patient today that given her stone burden, the   preferred method of removal would be PCNL, however, we cannot accommodate this at our facility. We did discuss possible staged ESWL's, however, patient is not willing to undergo at this time. If she does not wish to pursue further treatment for her stones, she does not require follow-up in the clinic.   If she would like treatment for her stones, we can follow-up with [facility]. Patient voices understanding. We will gladly speak with her family if they had additional questions.     MATTHEW Hudson - CNP  6/29/2022 12:55 PM

## 2022-06-29 NOTE — PROGRESS NOTES
Palliative Care Progress Note  6/29/2022 8:47 AM    Patient:  Lo Meeks  YOB: 1955  Primary Care Physician: Deborah Alva MD  Advance Directive: DNR  Admit Date: 6/14/2022       Hospital Day: 15  Portions of this note have been copied forward, however, changed to reflect the most current clinical status of this patient. CHIEF COMPLAINT/REASON FOR CONSULTATION goals of care, code status discussion, and family support. SUBJECTIVE:  Ms. Thelma Hilliard is resting comfortably in bed. Wakes easily and reports feeling improved today. Voltaren gel has helped with knee pain. Requesting lidocaine patch for lower back. Nausea well controlled    Review of Systems:   14 point review of systems is negative except as specifically addressed above. Objective:   VITALS:  /66   Pulse 87   Temp 96.8 °F (36 °C)   Resp 18   Ht 5' 5\" (1.651 m)   Wt (!) 348 lb 9 oz (158.1 kg)   SpO2 94%   BMI 58.00 kg/m²   24HR INTAKE/OUTPUT:      Intake/Output Summary (Last 24 hours) at 6/29/2022 0847  Last data filed at 6/29/2022 0436  Gross per 24 hour   Intake 300 ml   Output 1300 ml   Net -1000 ml     General appearance: 77 yo female, chronically ill appearing, no acute distress  Head: Normocephalic, without obvious abnormality, atraumatic  Eyes: conjunctivae/corneas clear. PERRL, EOM's intact. Ears: normal external ears and nose  Neck: no JVD, supple, symmetrical, trachea midline   Lungs:  diminished in bases to auscultation bilaterally, shallow, NC in place  Heart: RRR, S1, S2 normal, no murmur  Abdomen: Obese, taut, no grimacing to palpation, normal bowel sounds, rectal tube in place with diarrhea slowing  Extremities: BLE 1+ edema,  No erythema, no to palpation  Skin: Warm, dry/flaky, pale,   Neurologic: Wakes to voice and oriented x3, generalized weakness, speech fluent, follows commands.       Medications:      lactated ringers 100 mL/hr at 06/28/22 1115    dextrose      sodium chloride 25 mL (06/29/22 80)    lactated ringers 30 mL/hr at 06/27/22 0805      diclofenac sodium  2 g Topical BID    melatonin  10 mg Oral Nightly    guaiFENesin  400 mg Oral Q8H    lisinopril  20 mg Oral BID    And    hydroCHLOROthiazide  12.5 mg Oral BID    metoprolol  100 mg Oral BID    levofloxacin  750 mg IntraVENous Q24H    busPIRone  10 mg Oral TID    gabapentin  600 mg Oral 4x Daily    pravastatin  40 mg Oral Daily    insulin lispro  0-18 Units SubCUTAneous TID     insulin lispro  0-9 Units SubCUTAneous Nightly    insulin glargine  10 Units SubCUTAneous Nightly    sodium chloride flush  5-40 mL IntraVENous 2 times per day    ipratropium-albuterol  1 ampule Inhalation Q4H WA     prochlorperazine, sodium chloride, magnesium sulfate, potassium chloride **OR** potassium alternative oral replacement **OR** potassium chloride, labetalol, traMADol, glucose, dextrose bolus **OR** dextrose bolus, glucagon (rDNA), dextrose, sodium chloride flush, sodium chloride, ondansetron **OR** ondansetron, polyethylene glycol, acetaminophen **OR** acetaminophen, potassium chloride, magnesium sulfate  ADULT DIET; Dysphagia - Soft and Bite Sized; 4 carb choices (60 gm/meal)  ADULT ORAL NUTRITION SUPPLEMENT; AM Snack, PM Snack, HS Snack; Standard High Calorie/High Protein Oral Supplement     Lab and other Data:     Recent Labs     06/27/22 0340 06/28/22 0429 06/29/22 0212   WBC 13.5* 13.2* 12.0*   HGB 11.0* 11.0* 10.4*    187 170     Recent Labs     06/27/22  0340 06/28/22 0429 06/29/22  0212    137 141   K 3.5 3.9 3.9   CL 97* 96* 97*   CO2 37* 35* 34*   BUN 23 20 18   CREATININE 0.7 0.5 0.6   GLUCOSE 115* 104 113*     Recent Labs     06/27/22 0340 06/28/22 0429 06/29/22 0212   AST 36* 35* 35*   ALT 14 12 11   BILITOT 1.3* 1.1 1.1   ALKPHOS 149* 139* 125*     RAD:   CT ABDOMEN PELVIS WO CONTRAST Additional Contrast? None  Result Date: 6/15/2022  1.  There is 6 mm obstructive calculus in the left upper ureter with mild proximal hydroureteronephrosis and perinephric inflammatory changes. 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 Bilateral non-obstructing renal calculi noted. 2. Mild hepatomegaly with mild hepatic steatosis. 3. Colonic diverticulosis without evidence of acute diverticulitis. 4. Small fat containing umbilical and supra umbilical hernia noted. Recommendation: Follow up as clinically indicated. All CT scans at this facility utilize dose modulation, iterative reconstruction, and/or weight based dosing when appropriate to reduce radiation dose to as low as reasonably achievable. Amended by Khurram Underwood MD at 15-[place]-2022 02:44:19 AM Electronically Signed by Khurram Underwood MD at 15-[place]-2022 02:29:46 AM             XR CHEST PORTABLE  Result Date: 6/15/2022  1. Questionable trace left pleural effusion with basilar atelectasis. 2. Right sided central line is in place with its tip in SVC. Recommendation: Follow up as clinically indicated. Electronically Signed by Albert Mcfarlane MD at 15-[place]-2022 06:55:39 AM             XR CHEST PORTABLE  Result Date: 6/14/2022  1. Unremarkable radiograph of chest. Recommendation: Follow up as clinically indicated. Electronically Signed by Taras Esparza MD at 15-[place]-2022 12:21:20 AM             Assessment/Plan   Principal Problem (Resolved): Obstructive uropathy  Active Problems:    Sepsis with acute renal failure (HCC)    Renal calculus, bilateral    BINH (acute kidney injury) ([address]    Hyperglycemia due to diabetes mellitus ([address]    Hypertension    Dehydration    FREDDY and COPD overlap syndrome (HCC)    Degenerative joint disease    Palliative care patient    Septicemia ([address]    Chronic pain syndrome    Acute respiratory failure with hypoxia ([address]  Resolved Problems:    Obstructive pyelonephritis    Left ureteral calculus      Visit Summary:  Chart reviewed.   Report obtained from RN with no acute events overnight. Ms. Ting Casanova is seen at bedside this afternoon with reports of improved symptom control. She remains in good spirits today and reports that her neck step is for Krishna removal and attempting to use BSC to void on her own. She is requesting lidocaine patch for her lower back which I have ordered and will monitor for improvement. SW following for discharge planning and placement. Will continue to follow. Recommendations:   1. Palliative Care- GOC d/c to [facility] for rehab with [facility] services pending medicaid approval. CM following for d/c planning and placement. Code status- DNR    2, Obstructive left pyelonephritits 2/2 proximal left urteral calculus- urology following. S/p ureteral stent placement 6/15/22 and Cystoscopy with left stent removal and ureter stone extraction 6/28/2022 per Dr. Hasmukh Layne. Levaquin continues    3. Septic shock with E coli bacteremia- 2/2 above, follow repeat blood cultures, NGTD. Abx per hospitalist. Weaned from pressors    4. BINH- improved. 5. Chronic pain syndrome-  chronic back pain with Norco and gabapentin baseline. Potential overuse at home noted. Pain regimen with ultram prn at this time. Voltaren gel added for right knee arthritis pain    6. Afib with RVR new onset- suspect 2/2 above. NSR now and off amio gtt    7. Transaminitis-  Improving,. Unclear baseline LFTs. Mgmt per hospitalist. Continue to monitor labs    8. Acute hypoxic respiratory failure- mgmt per hospitalist, suspect multifactorial. Weaned to 3 L NC. Thank you for consulting Palliative Care and allowing us to participate in the care of this patient.    Time Spent Counseling > 50%:  YES                                   Total Time Spent with patient/family counseling, workup/treatment review, counseling and placement of orders/preparation of this note: 26 minutes    Electronically signed by MATTHEW Peng - CNP on 6/29/2022 at 8:47 AM    (Please note that portions of this note were completed with a voice recognition program.  Effortswere made to edit the dictations but occasionally words are mis-transcribed.)

## 2022-06-29 NOTE — PROGRESS NOTES
Physical Therapy  Name: Priya Ocasio  MRN:  [id number]  Date of service:  6/29/2022 06/29/22 1620   Restrictions/Precautions   Restrictions/Precautions Fall Risk   Position Activity Restriction   Other position/activity restrictions whiting, fecal tube   Subjective   Subjective Patient is laying in bed and agrees to therapy    Pain Assessment   Pain Assessment 0-10   Pain Level 5   Pain Location Back; Coccyx;Knee   Pain Orientation Right;Left;Lower   Pain Descriptors Aching;Discomfort;Sore;Sharp   Functional Pain Assessment Activities are not prevented   Pain Type Chronic pain;Acute pain   Non-Pharmaceutical Pain Intervention(s) Ambulation/Increased Activity;Repositioned; Rest   Response to Pain Intervention Patient satisfied   Bed Mobility   Rolling Moderate assistance  (x 2 )   Scooting Minimal assistance; Moderate assistance;2 Person assistance  (in chair )   Comment Patient rolling for sling positioning being able to help with her upper body by using grab bar    Transfers   Sit to Stand Moderate Assistance;2 Person Assistance   Stand to sit Minimal Assistance   Bed to Chair   (Maxi move )   Comment Pt activly holding on to crab bars and holding neck upright for transfer   Short Term Goals   Time Frame for Short term goals 14 days   Short term goal 1 ROLLING MOD ASSIST   Short term goal 2 SUP TO/FROM SIT MOD ASSIST   Short term goal 3 BED TO CHAIR MOD-MAX ASSIST   Conditions Requiring Skilled Therapeutic Intervention   Body Structures, Functions, Activity Limitations Requiring Skilled Therapeutic Intervention Decreased functional mobility ; Decreased ADL status; Decreased ROM; Decreased strength;Decreased cognition;Decreased balance;Decreased posture   Assessment Patient is able to activly participate in bed mobility this date with cues for technique and sequence. She is able to scoot back in the recliner one hip at a time needing little assist. Patient stood once at bed side for approx 8 sec before needing to sit.  She was left sitting up in her recliner with all needs in reach. Will conjtinue to follow and progress as able.     Activity Tolerance   Activity Tolerance Patient limited by fatigue;Patient limited by endurance;Treatment limited secondary to decreased cognition   PT Plan of Care   Wednesday X   Safety Devices   Type of Devices Chair alarm in place;Call light within reach;Gait belt;Left in chair     Electronically signed by Venkata Felipe PTA on 6/29/2022 at 4:28 PM

## 2022-06-29 NOTE — FLOWSHEET NOTE
06/29/22 1329   Encounter Summary   Encounter Overview/Reason  Spiritual/Emotional Needs   Service Provided For: Patient   Complexity of Encounter Low   Begin Time 1300   End Time  1329   Total Time Calculated 29 min   Spiritual/Emotional needs   Type Spiritual Support   Assessment/Intervention/Outcome   Assessment Coping; Hopeful   Intervention Active listening;Discussed belief system/Orthodox practices/waqas; Explored/Affirmed feelings, thoughts, concerns;Prayer (assurance of)/[place]   Outcome Expressed feelings, needs, and concerns;Expressed Gratitude     Received referral from [place] to visit the pt. Mrs Joyce Kate confirm her believe in God. She is very thankful to God for her recovery. Provided prayer as request. Pt voiced her gratitude for the visit.   Electronically signed by Stuart Weinberg on 6/29/2022 at 1:33 PM

## 2022-06-29 NOTE — PROGRESS NOTES
Hospitalist Progress Note    Patient:  Vidhi Elizabeth  YOB: 1955  Date of Service: 6/29/2022  MRN: [id number]   Acct: [de-identified]   Primary Care Physician: Luciana Arita MD  Advance Directive: DNR  Admit Date: 6/14/2022       Hospital Day: 14  Referring Provider: Sabas Kaminski DO    Patient Seen, Chart, Consults, Notes, Labs, Radiology studies reviewed. Subjective:  Vidhi Elizabeth is a 78 y.o. female  whom we are following for obstructive pyelonephritis, sepsis, E. coli bacteremia, acute kidney injury. She is feeling significantly better. Hemodynamics are stable. Renal function has normalized.  is working on disposition. Her white count is normalizing.     Allergies:  Codeine    Medicines:  Current Facility-Administered Medications   Medication Dose Route Frequency Provider Last Rate Last Admin    lidocaine 4 % external patch 2 patch  2 patch TransDERmal Daily MATTHEW Kirk - CNP   2 patch at 06/29/22 1448    lactated ringers infusion   IntraVENous Continuous Saran Valdez  mL/hr at 06/28/22 1115 NoRateChange at 06/28/22 1115    diclofenac sodium (VOLTAREN) 1 % gel 2 g  2 g Topical BID Saran Valdez MD   2 g at 06/29/22 1047    prochlorperazine (COMPAZINE) tablet 5 mg  5 mg Oral Q6H PRN Saran Valdez MD   5 mg at 06/29/22 1041    melatonin disintegrating tablet 10 mg  10 mg Oral Nightly Saran Valdez MD   10 mg at 06/28/22 2113    guaiFENesin tablet 400 mg  400 mg Oral Q8H Saran Valdez MD   400 mg at 06/29/22 1447    sodium chloride (OCEAN, BABY AYR) 0.65 % nasal spray 1 spray  1 spray Each Nostril Q4H PRN Saran Valdez MD   1 spray at 06/23/22 1504    magnesium sulfate 1000 mg in dextrose 5% 100 mL IVPB  1,000 mg IntraVENous PRN Saran Valdez MD        potassium chloride Ez Merritts M) extended release tablet 40 mEq  40 mEq Oral PRN Saran Valdez MD   40 mEq at 06/27/22 0507    Or    potassium bicarb-citric acid (EFFER-K) effervescent tablet 40 mEq  40 mEq Oral PRN Raquel Oh MD        Or    potassium chloride 10 mEq/100 mL IVPB (Peripheral Line)  10 mEq IntraVENous PRN Raquel Oh MD        labetalol (NORMODYNE;TRANDATE) injection 10 mg  10 mg IntraVENous Q4H PRN Raquel Oh MD   10 mg at 06/19/22 0334    lisinopril (PRINIVIL;ZESTRIL) tablet 20 mg  20 mg Oral BID Raquel Oh MD   20 mg at 06/29/22 1042    And    hydroCHLOROthiazide (HYDRODIURIL) tablet 12.5 mg  12.5 mg Oral BID Raquel Oh MD   12.5 mg at 06/29/22 1042    metoprolol tartrate (LOPRESSOR) tablet 100 mg  100 mg Oral BID Raquel Oh MD   100 mg at 06/29/22 1042    levoFLOXacin (LEVAQUIN) 750 MG/150ML infusion 750 mg  750 mg IntraVENous Q24H Raquel Oh MD   Stopped at 06/29/22 7474    traMADol (ULTRAM) tablet 50 mg  50 mg Oral Q6H PRN Raquel Oh MD   50 mg at 06/29/22 1447    busPIRone (BUSPAR) tablet 10 mg  10 mg Oral TID Raquel Oh MD   10 mg at 06/29/22 1447    gabapentin (NEURONTIN) tablet 600 mg  600 mg Oral 4x Daily Raquel Oh MD   600 mg at 06/29/22 1447    pravastatin (PRAVACHOL) tablet 40 mg  40 mg Oral Daily Raquel hO MD   40 mg at 06/29/22 1042    glucose chewable tablet 16 g  4 tablet Oral PRN Raquel Oh MD        dextrose bolus 10% 125 mL  125 mL IntraVENous PRN Raquel Oh MD        Or    dextrose bolus 10% 250 mL  250 mL IntraVENous PRN Raquel Oh MD        glucagon (rDNA) injection 1 mg  1 mg IntraMUSCular PRN Raquel Oh MD        dextrose 5 % solution  100 mL/hr IntraVENous PRN Raquel Oh MD        insulin lispro (HUMALOG) injection vial 0-18 Units  0-18 Units SubCUTAneous TID WC Raquel Oh MD   3 Units at 06/27/22 1757    insulin lispro (HUMALOG) injection vial 0-9 Units  0-9 Units SubCUTAneous Nightly Raquel Oh MD   2 Units at 06/19/22 2123    insulin glargine (LANTUS) injection vial 10 Units  10 Units SubCUTAneous Nightly Reginaldo Odom MD   10 Units at 06/27/22 2130    sodium chloride flush 0.9 % injection 5-40 mL  5-40 mL IntraVENous 2 times per day Reginaldo Odom MD   10 mL at 06/29/22 1042    sodium chloride flush 0.9 % injection 5-40 mL  5-40 mL IntraVENous PRN Reginaldo Odom MD        0.9 % sodium chloride infusion   IntraVENous PRN Reginaldo Odom  mL/hr at 06/29/22 0325 25 mL at 06/29/22 0325    ondansetron (ZOFRAN-ODT) disintegrating tablet 4 mg  4 mg Oral Q8H PRN Reginaldo Odom MD   4 mg at 06/21/22 1042    Or    ondansetron (ZOFRAN) injection 4 mg  4 mg IntraVENous Q6H PRN Reginaldo Odom MD   4 mg at 06/29/22 0453    polyethylene glycol (GLYCOLAX) packet 17 g  17 g Oral Daily PRN Reginaldo Odom MD        acetaminophen (TYLENOL) tablet 650 mg  650 mg Oral Q6H PRN Reginaldo Odom MD   650 mg at 06/22/22 1448    Or    acetaminophen (TYLENOL) suppository 650 mg  650 mg Rectal Q6H PRN Reginaldo Odom MD        potassium chloride 10 mEq/100 mL IVPB (Peripheral Line)  10 mEq IntraVENous PRN Reginaldo Odom MD        magnesium sulfate 2000 mg in 50 mL IVPB premix  2,000 mg IntraVENous PRN Reginaldo Odom MD   Stopped at 06/29/22 0528    ipratropium-albuterol (DUONEB) nebulizer solution 1 ampule  1 ampule Inhalation Q4H WA Reginaldo Odom MD   1 ampule at 06/29/22 1012    lactated ringers infusion   IntraVENous Continuous Reginaldo Odom MD 30 mL/hr at 06/27/22 0805 Rate Verify at 06/27/22 0805       Past Medical History:  Past Medical History:   Diagnosis Date    Arthritis     HTN (hypertension)        Past Surgical History:  Past Surgical History:   Procedure Laterality Date    ANKLE FRACTURE SURGERY Right     BLADDER SURGERY Left 6/15/2022    CYSTOSCOPY, LEFT URETEROSCOPY, RETROGRADE PYELOGRAM, STENT INSERTION performed by Reginaldo Odom MD at [facility] Left 6/28/2022    CYSTOSCOPy, LEFT URETEROSCOPY, LEFT URETEREAL  STONE BASKET EXTRACTION performed by Reginaldo Odom MD at [address] Left 6/28/2022    LEFT URETERAL STENT PLACEMENT performed by Reginaldo Odom MD at [address] [facility] (CERVIX STATUS UNKNOWN)      INCONTINENCE SURGERY         Family History  Family History   Problem Relation Age of Onset    Cancer Mother     Cancer Father        Social History  Social History     Socioeconomic History    Marital status: Single     Spouse name: Not on file    Number of children: Not on file    Years of education: Not on file    Highest education level: Not on file   Occupational History    Not on file   Tobacco Use    Smoking status: Former Smoker    Smokeless tobacco: Never Used    Tobacco comment: quit 10 years ago   Vaping Use    Vaping Use: Never used   Substance and Sexual Activity    Alcohol use: Never    Drug use: Never    Sexual activity: Not on file   Other Topics Concern    Not on file   Social History Narrative    Not on file     Social Determinants of Health     Financial Resource Strain:     Difficulty of Paying Living Expenses: Not on file   Food Insecurity:     Worried About [address] in the Last Year: Not on file    [address] in the Last Year: Not on file   Transportation Needs:     Lack of Transportation (Medical): Not on file    Lack of Transportation (Non-Medical):  Not on file   Physical Activity:     Days of Exercise per Week: Not on file    Minutes of Exercise per Session: Not on file   Stress:     Feeling of Stress : Not on file   Social Connections:     Frequency of Communication with Friends and Family: Not on file    Frequency of Social Gatherings with Friends and Family: Not on file    Attends Taoism Services: Not on file    Active Member of Clubs or Organizations: Not on file    Attends Club or Organization Meetings: Not on file    Marital Status: Not on file   Intimate Partner Violence:     Fear of Current or Ex-Partner: Not on file    Emotionally Abused: Not on file    Physically Abused: Not on file    Sexually Abused: Not on file   Housing Stability:     Unable to Pay for Housing in the Last Year: Not on file    Number of Places Lived in the Last Year: Not on file    Unstable Housing in the Last Year: Not on file         Review of Systems:    Review of Systems   Constitutional: Negative for activity change and fatigue. HENT: Negative for rhinorrhea and voice change. Eyes: Negative for visual disturbance. Respiratory: Negative for cough and shortness of breath. Cardiovascular: Positive for leg swelling. Negative for chest pain. Gastrointestinal: Negative for constipation, diarrhea, nausea and vomiting. Endocrine: Negative for polyuria. Genitourinary: Negative for difficulty urinating and dysuria. Musculoskeletal: Positive for gait problem. Negative for arthralgias and back pain. Skin: Negative for color change. Allergic/Immunologic: Negative for immunocompromised state. Neurological: Positive for weakness. Negative for dizziness and headaches. Psychiatric/Behavioral: Negative for confusion. Objective:  Blood pressure 125/69, pulse 89, temperature 96.8 °F (36 °C), resp. rate 16, height 5' 5\" (1.651 m), weight (!) 348 lb 9 oz (158.1 kg), SpO2 95 %. Intake/Output Summary (Last 24 hours) at 6/29/2022 1615  Last data filed at 6/29/2022 1447  Gross per 24 hour   Intake 150 ml   Output 1900 ml   Net -1750 ml       Physical Exam  Vitals and nursing note reviewed. HENT:      Head: Normocephalic and atraumatic. Right Ear: External ear normal.      Left Ear: External ear normal.      Nose: Nose normal.      Mouth/Throat:      Mouth: Mucous membranes are moist.   Eyes:      Conjunctiva/sclera: Conjunctivae normal.      Pupils: Pupils are equal, round, and reactive to light. Cardiovascular:      Rate and Rhythm: Normal rate and regular rhythm.       Heart sounds: Normal heart sounds. Pulmonary:      Effort: Pulmonary effort is normal.      Breath sounds: Normal breath sounds. Abdominal:      General: Abdomen is flat. Palpations: Abdomen is soft. Musculoskeletal:         General: Normal range of motion. Cervical back: Neck supple. No rigidity. No muscular tenderness. Skin:     General: Skin is warm and dry. Neurological:      Mental Status: She is alert and oriented to person, place, and time. Psychiatric:         Mood and Affect: Mood normal.         Labs:  BMP:   Recent Labs     06/27/22 0340 06/28/22 0429 06/29/22 0212    137 141   K 3.5 3.9 3.9   CL 97* 96* 97*   CO2 37* 35* 34*   BUN 23 20 18   CREATININE 0.7 0.5 0.6   CALCIUM 7.8* 7.7* 7.5*     CBC:   Recent Labs     06/27/22 0340 06/28/22 0429 06/29/22 0212   WBC 13.5* 13.2* 12.0*   HGB 11.0* 11.0* 10.4*   HCT 36.3* 36.1* 34.1*   MCV 95.8 97.0 95.8    187 170     LIVER PROFILE:   Recent Labs     06/27/22 0340 06/28/22 0429 06/29/22 0212   AST 36* 35* 35*   ALT 14 12 11   BILITOT 1.3* 1.1 1.1   ALKPHOS 149* 139* 125*     PT/INR: No results for input(s): PROTIME, INR in the last 72 hours. APTT: No results for input(s): APTT in the last 72 hours. BNP:  No results for input(s): BNP in the last 72 hours. Ionized Calcium:No results for input(s): IONCA in the last 72 hours. Magnesium:  Recent Labs     06/27/22 0340 06/28/22 0429 06/29/22 0212   MG 1.6 1.7 1.6     Phosphorus:No results for input(s): PHOS in the last 72 hours. HgbA1C: No results for input(s): LABA1C in the last 72 hours. Hepatic:   Recent Labs     06/27/22 0340 06/28/22 0429 06/29/22 0212   ALKPHOS 149* 139* 125*   ALT 14 12 11   AST 36* 35* 35*   PROT 5.9* 5.4* 5.2*   BILITOT 1.3* 1.1 1.1   LABALBU 2.0* 2.3* 2.2*     Lactic Acid: No results for input(s): LACTA in the last 72 hours. Troponin: No results for input(s): CKTOTAL, CKMB, TROPONINT in the last 72 hours.   ABGs: No results for input(s): PH, PCO2, PO2, Grossly unremarkablerenal size, contour and density. No evidence of a renal mass or cyst.There is 6 mm obstructive calculus in the left upper ureter with mild proximal hydroureteronephrosis and perinephric inflammatory changes. 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 Bilateral non-obstructing renal calculi noted, largest is measuring about 17 mm in the lower pole of right kidney and 15 mm in the lower pole of  left kidney. Retroperitoneum: No enlarged retroperitoneal lymphadenopathy. The aorta and IVC appear unremarkable. Peritoneal cavity: No evidence of free air or ascites. Gastrointestinal tract: No obstruction. Colonic diverticulosis without evidence of acute diverticulitis. Appendix: Unremarkable Pelvis: The urinary bladder is suboptimally distended. Status post hysterectomy and bilateral oopherectomy. Osseous structures: No acute or destructive bony process identified. Mild degenerative changes noted in the spine and pelvis. Small fat containing umbilical and supra umbilical hernia noted. 1. There is 6 mm obstructive calculus in the left upper ureter with mild proximal hydroureteronephrosis and perinephric inflammatory changes. 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 Bilateral non-obstructing renal calculi noted. 2. Mild hepatomegaly with mild hepatic steatosis. 3. Colonic diverticulosis without evidence of acute diverticulitis. 4. Small fat containing umbilical and supra umbilical hernia noted. Recommendation: Follow up as clinically indicated. All CT scans at this facility utilize dose modulation, iterative reconstruction, and/or weight based dosing when appropriate to reduce radiation dose to as low as reasonably achievable.  Amended by Kim Howard MD at 15-[place]aqu[place]-2022 02:44:19 AM Electronically Signed by Kim Howard MD at 15-[place]-2022 02:29:46 AM             XR CHEST PORTABLE    Result Date: 6/15/2022  NO PRIOR REPORT AVAILABLE Exam: X-RAY OF [facility] Clinical data:Central line placement. Technique:Single view of the chest. Prior studies: Radiograph of the chest dated 6/14/2022. Findings: The lungs are grossly clear; noevidence of acute infiltrate. Questionable trace left pleural effusion with basilar atelectasis. Cardiac silhouette is within normal limits. No acute osseous abnormality is detected. Right sided central line is in  place with its tip in SVC. 1. Questionable trace left pleural effusion with basilar atelectasis. 2. Right sided central line is in place with its tip in SVC. Recommendation: Follow up as clinically indicated. Electronically Signed by Henrry Hanson MD at 15-[place]-2022 06:55:39 AM             XR CHEST PORTABLE    Result Date: 6/14/2022  NO PRIOR REPORT AVAILABLE Exam: X-RAY OF [facility] Clinical data:Fatigue. Technique:Single view of the chest. Prior studies: No prior studies submitted. Findings: The lungs are grossly clear; noevidence of acute infiltrate or pleural effusion. Cardiac silhouette is within normal limits. No acute osseous abnormality is detected. 1. Unremarkable radiograph of chest. Recommendation: Follow up as clinically indicated. Electronically Signed by Shari Samayoa MD at 15-[place]-2022 12:21:20 AM                Assessment     Sepsis secondary to obstructive pyelonephritis. Clinically resolved. Status post retrograde stenting. Acute kidney injury. Resolved. Type 2 diabetes. Continue glycemic control. Hypoxemic respiratory failure. Resolved. Poor functional status. Working on possible skilled nursing placement.       Lakisha Tolentino,

## 2022-06-30 LAB
ALBUMIN SERPL-MCNC: 2.5 G/DL (ref 3.5–5.2)
ALP BLD-CCNC: 127 U/L (ref 35–104)
ALT SERPL-CCNC: 11 U/L (ref 5–33)
ANION GAP SERPL CALCULATED.3IONS-SCNC: 10 MMOL/L (ref 7–19)
AST SERPL-CCNC: 33 U/L (ref 5–32)
BASOPHILS ABSOLUTE: 0.1 K/UL (ref 0–0.2)
BASOPHILS RELATIVE PERCENT: 0.7 % (ref 0–1)
BILIRUB SERPL-MCNC: 1 MG/DL (ref 0.2–1.2)
BUN BLDV-MCNC: 14 MG/DL (ref 8–23)
CALCIUM SERPL-MCNC: 7.9 MG/DL (ref 8.8–10.2)
CHLORIDE BLD-SCNC: 97 MMOL/L (ref 98–111)
CO2: 34 MMOL/L (ref 22–29)
CREAT SERPL-MCNC: 0.6 MG/DL (ref 0.5–0.9)
EOSINOPHILS ABSOLUTE: 0.1 K/UL (ref 0–0.6)
EOSINOPHILS RELATIVE PERCENT: 1.1 % (ref 0–5)
GFR AFRICAN AMERICAN: >59
GFR NON-AFRICAN AMERICAN: >60
GLUCOSE BLD-MCNC: 119 MG/DL (ref 74–109)
GLUCOSE BLD-MCNC: 126 MG/DL (ref 70–99)
GLUCOSE BLD-MCNC: 146 MG/DL (ref 70–99)
GLUCOSE BLD-MCNC: 158 MG/DL (ref 70–99)
GLUCOSE BLD-MCNC: 91 MG/DL (ref 70–99)
HCT VFR BLD CALC: 36.1 % (ref 37–47)
HEMOGLOBIN: 10.9 G/DL (ref 12–16)
IMMATURE GRANULOCYTES #: 0.1 K/UL
LYMPHOCYTES ABSOLUTE: 1.6 K/UL (ref 1.1–4.5)
LYMPHOCYTES RELATIVE PERCENT: 15.2 % (ref 20–40)
MAGNESIUM: 2 MG/DL (ref 1.6–2.4)
MCH RBC QN AUTO: 29.3 PG (ref 27–31)
MCHC RBC AUTO-ENTMCNC: 30.2 G/DL (ref 33–37)
MCV RBC AUTO: 97 FL (ref 81–99)
MONOCYTES ABSOLUTE: 1.2 K/UL (ref 0–0.9)
MONOCYTES RELATIVE PERCENT: 11.7 % (ref 0–10)
NEUTROPHILS ABSOLUTE: 7.2 K/UL (ref 1.5–7.5)
NEUTROPHILS RELATIVE PERCENT: 70.5 % (ref 50–65)
PDW BLD-RTO: 15.7 % (ref 11.5–14.5)
PERFORMED ON: ABNORMAL
PERFORMED ON: NORMAL
PLATELET # BLD: 221 K/UL (ref 130–400)
PMV BLD AUTO: 11.7 FL (ref 9.4–12.3)
POTASSIUM SERPL-SCNC: 3.7 MMOL/L (ref 3.5–5)
RBC # BLD: 3.72 M/UL (ref 4.2–5.4)
SODIUM BLD-SCNC: 141 MMOL/L (ref 136–145)
TOTAL PROTEIN: 5.6 G/DL (ref 6.6–8.7)
WBC # BLD: 10.3 K/UL (ref 4.8–10.8)

## 2022-06-30 PROCEDURE — 6360000002 HC RX W HCPCS: Performed by: UROLOGY

## 2022-06-30 PROCEDURE — 6370000000 HC RX 637 (ALT 250 FOR IP): Performed by: UROLOGY

## 2022-06-30 PROCEDURE — 85025 COMPLETE CBC W/AUTO DIFF WBC: CPT

## 2022-06-30 PROCEDURE — 99233 SBSQ HOSP IP/OBS HIGH 50: CPT

## 2022-06-30 PROCEDURE — 36415 COLL VENOUS BLD VENIPUNCTURE: CPT

## 2022-06-30 PROCEDURE — 2700000000 HC OXYGEN THERAPY PER DAY

## 2022-06-30 PROCEDURE — 2580000003 HC RX 258: Performed by: UROLOGY

## 2022-06-30 PROCEDURE — 94660 CPAP INITIATION&MGMT: CPT

## 2022-06-30 PROCEDURE — 83735 ASSAY OF MAGNESIUM: CPT

## 2022-06-30 PROCEDURE — 6370000000 HC RX 637 (ALT 250 FOR IP)

## 2022-06-30 PROCEDURE — 1210000000 HC MED SURG R&B

## 2022-06-30 PROCEDURE — 97535 SELF CARE MNGMENT TRAINING: CPT

## 2022-06-30 PROCEDURE — 94761 N-INVAS EAR/PLS OXIMETRY MLT: CPT

## 2022-06-30 PROCEDURE — 51798 US URINE CAPACITY MEASURE: CPT

## 2022-06-30 PROCEDURE — 99232 SBSQ HOSP IP/OBS MODERATE 35: CPT | Performed by: NURSE PRACTITIONER

## 2022-06-30 PROCEDURE — 80053 COMPREHEN METABOLIC PANEL: CPT

## 2022-06-30 PROCEDURE — 94640 AIRWAY INHALATION TREATMENT: CPT

## 2022-06-30 PROCEDURE — 82947 ASSAY GLUCOSE BLOOD QUANT: CPT

## 2022-06-30 RX ADMIN — TRAMADOL HYDROCHLORIDE 50 MG: 50 TABLET, COATED ORAL at 20:47

## 2022-06-30 RX ADMIN — METOPROLOL TARTRATE 100 MG: 50 TABLET, FILM COATED ORAL at 08:59

## 2022-06-30 RX ADMIN — INSULIN GLARGINE 10 UNITS: 100 INJECTION, SOLUTION SUBCUTANEOUS at 20:58

## 2022-06-30 RX ADMIN — GABAPENTIN 600 MG: 600 TABLET, FILM COATED ORAL at 17:24

## 2022-06-30 RX ADMIN — TRAMADOL HYDROCHLORIDE 50 MG: 50 TABLET, COATED ORAL at 15:59

## 2022-06-30 RX ADMIN — BUSPIRONE HYDROCHLORIDE 10 MG: 10 TABLET ORAL at 14:33

## 2022-06-30 RX ADMIN — DICLOFENAC 2 G: 10 GEL TOPICAL at 09:00

## 2022-06-30 RX ADMIN — HYDROCHLOROTHIAZIDE 12.5 MG: 25 TABLET ORAL at 20:48

## 2022-06-30 RX ADMIN — GUAIFENESIN 400 MG: 200 TABLET ORAL at 05:37

## 2022-06-30 RX ADMIN — SODIUM CHLORIDE, PRESERVATIVE FREE 10 ML: 5 INJECTION INTRAVENOUS at 20:51

## 2022-06-30 RX ADMIN — IPRATROPIUM BROMIDE AND ALBUTEROL SULFATE 1 AMPULE: 2.5; .5 SOLUTION RESPIRATORY (INHALATION) at 10:19

## 2022-06-30 RX ADMIN — METOPROLOL TARTRATE 100 MG: 50 TABLET, FILM COATED ORAL at 20:48

## 2022-06-30 RX ADMIN — GABAPENTIN 600 MG: 600 TABLET, FILM COATED ORAL at 20:48

## 2022-06-30 RX ADMIN — SODIUM CHLORIDE, PRESERVATIVE FREE 10 ML: 5 INJECTION INTRAVENOUS at 05:37

## 2022-06-30 RX ADMIN — INSULIN LISPRO 1 UNITS: 100 INJECTION, SOLUTION INTRAVENOUS; SUBCUTANEOUS at 20:58

## 2022-06-30 RX ADMIN — GUAIFENESIN 400 MG: 200 TABLET ORAL at 14:33

## 2022-06-30 RX ADMIN — GABAPENTIN 600 MG: 600 TABLET, FILM COATED ORAL at 14:33

## 2022-06-30 RX ADMIN — SODIUM CHLORIDE, PRESERVATIVE FREE 10 ML: 5 INJECTION INTRAVENOUS at 09:07

## 2022-06-30 RX ADMIN — BUSPIRONE HYDROCHLORIDE 10 MG: 10 TABLET ORAL at 08:59

## 2022-06-30 RX ADMIN — TRAMADOL HYDROCHLORIDE 50 MG: 50 TABLET, COATED ORAL at 03:39

## 2022-06-30 RX ADMIN — TRAMADOL HYDROCHLORIDE 50 MG: 50 TABLET, COATED ORAL at 09:33

## 2022-06-30 RX ADMIN — GABAPENTIN 600 MG: 600 TABLET, FILM COATED ORAL at 08:59

## 2022-06-30 RX ADMIN — LEVOFLOXACIN 750 MG: 5 INJECTION, SOLUTION INTRAVENOUS at 05:43

## 2022-06-30 RX ADMIN — IPRATROPIUM BROMIDE AND ALBUTEROL SULFATE 1 AMPULE: 2.5; .5 SOLUTION RESPIRATORY (INHALATION) at 06:58

## 2022-06-30 RX ADMIN — LISINOPRIL 20 MG: 20 TABLET ORAL at 20:48

## 2022-06-30 RX ADMIN — IPRATROPIUM BROMIDE AND ALBUTEROL SULFATE 1 AMPULE: 2.5; .5 SOLUTION RESPIRATORY (INHALATION) at 19:51

## 2022-06-30 RX ADMIN — GUAIFENESIN 400 MG: 200 TABLET ORAL at 20:48

## 2022-06-30 RX ADMIN — Medication 10 MG: at 20:48

## 2022-06-30 RX ADMIN — DICLOFENAC 2 G: 10 GEL TOPICAL at 20:47

## 2022-06-30 RX ADMIN — HYDROCHLOROTHIAZIDE 12.5 MG: 25 TABLET ORAL at 08:59

## 2022-06-30 RX ADMIN — LISINOPRIL 20 MG: 20 TABLET ORAL at 08:59

## 2022-06-30 RX ADMIN — PRAVASTATIN SODIUM 40 MG: 20 TABLET ORAL at 08:59

## 2022-06-30 RX ADMIN — IPRATROPIUM BROMIDE AND ALBUTEROL SULFATE 1 AMPULE: 2.5; .5 SOLUTION RESPIRATORY (INHALATION) at 14:33

## 2022-06-30 RX ADMIN — BUSPIRONE HYDROCHLORIDE 10 MG: 10 TABLET ORAL at 20:48

## 2022-06-30 ASSESSMENT — ENCOUNTER SYMPTOMS
VOMITING: 0
SHORTNESS OF BREATH: 0
BACK PAIN: 0
CONSTIPATION: 0
VOICE CHANGE: 0
COLOR CHANGE: 0
NAUSEA: 0
ABDOMINAL PAIN: 0
RHINORRHEA: 0
ABDOMINAL DISTENTION: 0
DIARRHEA: 0
COUGH: 0

## 2022-06-30 ASSESSMENT — PAIN DESCRIPTION - LOCATION
LOCATION: BACK
LOCATION: BACK
LOCATION: SHOULDER;KNEE
LOCATION: KNEE

## 2022-06-30 ASSESSMENT — PAIN DESCRIPTION - DESCRIPTORS
DESCRIPTORS: ACHING;DULL
DESCRIPTORS: ACHING;DULL
DESCRIPTORS: ACHING;DISCOMFORT
DESCRIPTORS: ACHING

## 2022-06-30 ASSESSMENT — PAIN - FUNCTIONAL ASSESSMENT
PAIN_FUNCTIONAL_ASSESSMENT: PREVENTS OR INTERFERES SOME ACTIVE ACTIVITIES AND ADLS
PAIN_FUNCTIONAL_ASSESSMENT: ACTIVITIES ARE NOT PREVENTED

## 2022-06-30 ASSESSMENT — PAIN SCALES - GENERAL
PAINLEVEL_OUTOF10: 8
PAINLEVEL_OUTOF10: 6
PAINLEVEL_OUTOF10: 6
PAINLEVEL_OUTOF10: 7

## 2022-06-30 ASSESSMENT — PAIN DESCRIPTION - ORIENTATION
ORIENTATION: LOWER
ORIENTATION: RIGHT;LEFT;LOWER
ORIENTATION: RIGHT;LEFT
ORIENTATION: RIGHT;LEFT

## 2022-06-30 NOTE — ADT AUTH CERT
Utilization Reviews         Urinary Tract Infection (UTI) - Care Day 15 (6/29/2022) by Poncho Guadalupe RN       Review Status Review Entered   Completed 6/30/2022 14:21      Criteria Review      Care Day: 15 Care Date: 6/29/2022 Level of Care: ICU    Guideline Day 3    Level Of Care    (X) Floor to discharge    6/30/2022 3:21 PM EDT by Carmen Price      med/surg    Clinical Status    (X) * Hemodynamic stability    6/30/2022 3:21 PM EDT by Carmen Price      bp: 125/69, pulse 89    ( ) * Mental status at baseline    ( ) * Antibiotic regimen for next level of care established    (X) * Urine output adequate    6/30/2022 3:21 PM EDT by Carmen Price      adequate output    (X) * Renal function at baseline or acceptable for next level of care    6/30/2022 3:21 PM EDT by Carmen Price      creat 0.6, gfr >60    (X) * Pain absent or managed    6/30/2022 3:21 PM EDT by Carmen Price      pain controlled with ultram 50mg po q6hrs prn    (X) * Oral intake adequate    6/30/2022 3:21 PM EDT by Carmen Price      adequate intake    (X) * Afebrile or temperature acceptable for next level of care    6/30/2022 3:21 PM EDT by Carmen Price      temp 96.8    (X) * Vomiting absent    6/30/2022 3:21 PM EDT by Carmen Price      no vomiting noted    ( ) * Discharge plans and education understood    Activity    (X) * Ambulatory or acceptable for next level of care    6/30/2022 3:21 PM EDT by Carmen Price      up with assistance    Routes    (X) * Oral hydration    6/30/2022 3:21 PM EDT by Carmen Price      oral hydration    ( ) * Oral medications or regimen acceptable for next level of care    (X) * Oral diet or acceptable for next level of care    6/30/2022 3:21 PM EDT by Carmen Price      dysphagia soft carb control low sodium diet    Interventions    (X) Renal function tests, urinalysis    6/30/2022 3:21 PM EDT by Carmen Price      wbc 12.0, hgb 10.4, hct 34.1, glucose 113, ca 7.5, total protein 5.2, albumin 2.2, alk phos 125, ast 35, mag 1.6    Medications    (X) Antibiotics    6/30/2022 3:21 PM EDT by Dwyane Smoker      levaquin 750mg iv q24hrs    (X) Possible analgesics    6/30/2022 3:21 PM EDT by Dwyane Smoker      ultram 50mg po q6hrs prn-had 3 doses    * Milestone   Additional Notes   Hospitalist Progress Note      Subjective:   Irais Kasper is a 78 y.o. female  whom we are following for obstructive pyelonephritis, sepsis, E. coli bacteremia, acute kidney injury. She is feeling significantly better.  Hemodynamics are stable.  Renal function has normalized.   is working on disposition. Kashif Talia white count is normalizing. Assessment       Sepsis secondary to obstructive pyelonephritis. Clinically resolved. Status post retrograde stenting.       Acute kidney injury. Resolved.       Type 2 diabetes. Continue glycemic control.       Hypoxemic respiratory failure. Resolved.       Poor functional status. Working on possible skilled [address], DO                     Urology Progress Note       SUBJECTIVE: Patient resting in bed.  Surgical intervention yesterday with Dr. Cinda Fuller.  Patient complains of pain in her back and tailbone that is slightly worsened since the procedure, but these are not new complaints.       1.    Sepsis with septic shock secondary to lexacaftor, peripheral obstructing left ureteral stone status post left ureteral stent placement on 6/15/2022 and subsequent definitive stone treatment with left ureteroscopy on 6/28/2022 with Dr. Cinda Fuller. [facility] cultures remain negative to date. Pam Regalado continues. Marquis Horse likely discontinue tomorrow as patient has had adequate therapy with resolving leukocytosis.         2.   Left obstructing proximal ureteral stone status post definitive treatment yesterday with Dr. Magdi James Krishna catheter for external drainage for now. Milton Mcclellan try voiding trial in the next couple of days.  Imperative for physical therapy to work with the patient and regaining her strength so that she can ambulate back and forth to the restroom per her baseline status prior to admission.       3.  Bilateral nonobstructing nephrolithiasis. Mary Jo Baum a discussion with the patient today that given her stone burden, the   preferred method of removal would be PCNL, however, we cannot accommodate this at our facility.  We did discuss possible staged ESWL's, however, patient is not willing to undergo at this time.  If she does not wish to pursue further treatment for her stones, she does not require follow-up in the clinic.  If she would like treatment for her stones, we can follow-up with [facility].  Patient voices understanding. Candy Downs will gladly speak with her family if they had additional questions.   [id number] Lamine Mccann, APRN - CNP   6/29/2022 12:55 PM                  Palliative Care Progress Note         CHIEF COMPLAINT/REASON FOR CONSULTATION goals of care, code status discussion, and family support.        SUBJECTIVE:  Ms. Jose David Oliva is resting comfortably in bed. Wakes easily and reports feeling improved today. Voltaren gel has helped with knee pain. Requesting lidocaine patch for lower back. Nausea well controlled      Assessment/Plan   Principal Problem (Resolved):     Obstructive uropathy   Active Problems:     Sepsis with acute renal failure (HCC)     Renal calculus, bilateral     BINH (acute kidney injury) ([address]     Hyperglycemia due to diabetes mellitus (HCC)     Hypertension     Dehydration     FREDDY and COPD overlap syndrome ([address]     Degenerative joint disease     Palliative care patient     Septicemia ([address]     Chronic pain syndrome     Acute respiratory failure with hypoxia ([address]   Resolved Problems:     Obstructive pyelonephritis     Left ureteral calculus           Visit Summary:   Chart reviewed.  Report obtained from RN with no acute events overnight.  Ms. Jose David Oliva is seen at bedside this afternoon with reports of improved symptom control. Ezequiel Gamble remains in good spirits today and reports that her neck step is for Whiting removal and attempting to use BSC to void on her own. Brenda Vines is requesting lidocaine patch for her lower back which I have ordered and will monitor for improvement.  SW following for discharge planning and placement.  Will continue to follow.       Recommendations:    1. Palliative Care- GOC d/c to [facility] for rehab with SCOP services pending medicaid approval. CM following for d/c planning and placement. Code status- DNR       2, Obstructive left pyelonephritits 2/2 proximal left urteral calculus- urology following. S/p ureteral stent placement 6/15/22 and Cystoscopy with left stent removal and ureter stone extraction 6/28/2022 per Dr. Raymond Rosario. Levaquin continues       3. Septic shock with E coli bacteremia- 2/2 above, follow repeat blood cultures, NGTD. Abx per hospitalist. Lennox Stack from pressors       4. BINH- improved.        5. Chronic pain syndrome-  chronic back pain with Norco and gabapentin baseline. Potential overuse at home noted. Pain regimen with ultram prn at this time. Voltaren gel added for right knee arthritis pain       6. Afib with RVR new onset- suspect 2/2 above. NSR now and off amio gtt       7. Transaminitis-  Improving,. Unclear baseline LFTs. Mgmt per hospitalist. Continue to monitor labs       8. Acute hypoxic respiratory failure- mgmt per hospitalist, suspect multifactorial. Weaned to 3 L NC. Physical Therapy   Name: Ana Valencia   MRN:  [id number]   Date of service:  4/59/8817 06/29/22 1620   Restrictions/Precautions   Restrictions/Precautions Fall Risk   Position Activity Restriction   Other position/activity restrictions whiting, fecal tube   Subjective   Subjective Patient is laying in bed and agrees to therapy    Pain Assessment   Pain Assessment 0-10   Pain Level 5   Pain Location Back; Coccyx;Knee   Pain Orientation Right;Left;Lower   Pain Descriptors Aching;Discomfort;Sore;Sharp Functional Pain Assessment Activities are not prevented   Pain Type Chronic pain;Acute pain   Non-Pharmaceutical Pain Intervention(s) Ambulation/Increased Activity;Repositioned; Rest   Response to Pain Intervention Patient satisfied   Bed Mobility   Rolling Moderate assistance   (x 2 )   Scooting Minimal assistance; Moderate assistance;2 Person assistance   (in chair )   Comment Patient rolling for sling positioning being able to help with her upper body by using grab bar    Transfers   Sit to Stand Moderate Assistance;2 Person Assistance   Stand to sit Minimal Assistance   Bed to Chair     (Maxi move )   Comment Pt activly holding on to crab bars and holding neck upright for transfer   Short Term Goals   Time Frame for Short term goals 14 days   Short term goal 1 ROLLING MOD ASSIST   Short term goal 2 SUP TO/FROM SIT MOD ASSIST   Short term goal 3 BED TO CHAIR MOD-MAX ASSIST   Conditions Requiring Skilled Therapeutic Intervention   Body Structures, Functions, Activity Limitations Requiring Skilled Therapeutic Intervention Decreased functional mobility ; Decreased ADL status; Decreased ROM; Decreased strength;Decreased cognition;Decreased balance;Decreased posture   Assessment Patient is able to activly participate in bed mobility this date with cues for technique and sequence. She is able to scoot back in the recliner one hip at a time needing little assist. Patient stood once at bed side for approx 8 sec before needing to sit. She was left sitting up in her recliner with all needs in reach. Will conjtinue to follow and progress as able.     Activity Tolerance   Activity Tolerance Patient limited by fatigue;Patient limited by endurance;Treatment limited secondary to decreased cognition   PT Plan of Care   Wednesday X   Safety Devices   Type of Devices Chair alarm in place;Call light within reach;Gait belt;Left in chair       Electronically signed by Bimal Cho PTA on 6/29/2022 at 4:28 PM                    Social work/dc planning note:      Spoke with [place], SNF liaison. Family chose not to hold pt's bed at the facility. She is checking the status of pt's pending Medicaid application. Will need pre-cert for skilled therapy at dc, awaiting confirmation of Medicaid prior. SW following.     Electronically signed by Samuel Guzman on 6/29/2022 at 7:41 AM        Urinary Tract Infection (UTI) - Care Day 14 (6/28/2022) by Lenin Luo RN       Review Status Review Entered   Completed 6/30/2022 14:12      Criteria Review      Care Day: 14 Care Date: 6/28/2022 Level of Care: ICU    Guideline Day 3    Level Of Care    (X) Floor to discharge    6/30/2022 3:12 PM EDT by Elige Sacks      med/surg    Clinical Status    (X) * Hemodynamic stability    6/30/2022 3:12 PM EDT by Elige Sacks      bp: 129/69, pulse 81    ( ) * Mental status at baseline    ( ) * Antibiotic regimen for next level of care established    (X) * Urine output adequate    6/30/2022 3:12 PM EDT by Elige Sacks      adequate output    (X) * Renal function at baseline or acceptable for next level of care    6/30/2022 3:12 PM EDT by Elige Sacks      creat 0.5, gfr >60    (X) * Pain absent or managed    6/30/2022 3:12 PM EDT by Elige Sacks      pain controlled with ultram 50mg po q6hrs prn    (X) * Oral intake adequate    6/30/2022 3:12 PM EDT by Elige Sacks      adequate intake    (X) * Afebrile or temperature acceptable for next level of care    6/30/2022 3:12 PM EDT by Elige Sacks      temp 97.6    (X) * Vomiting absent    6/30/2022 3:12 PM EDT by Elige Sacks      no vomiting noted    ( ) * Discharge plans and education understood    Activity    (X) * Ambulatory or acceptable for next level of care    6/30/2022 3:12 PM EDT by Elige Sacks      up with assitance    Routes    (X) * Oral hydration    6/30/2022 3:12 PM EDT by Elige Sacks      oral hydration    ( ) * Oral medications or regimen acceptable for next level of care    (X) * Oral diet or acceptable for next level of care    6/30/2022 3:12 PM EDT by Peewee Monique      dysphagia soft carb control low sodium diet    Interventions    (X) Renal function tests, urinalysis    6/30/2022 3:12 PM EDT by Peewee Monique      wbc 13.2, hgb 11.0, hct 36.1, mag 1.7, ca 7.7, total protein 5.4, albumin 2.3, alk phos 139, ast 35    Medications    (X) Antibiotics    6/30/2022 3:12 PM EDT by Peewee Monique      maxipime 2000mg iv x1 on call to or  levaquin 750mg iv q24hrs    (X) Possible analgesics    6/30/2022 3:12 PM EDT by Peewee Monique      ultram 50mg po q6hrs prn- had 2 doses    * Milestone   Additional Notes   Palliative Care Progress Note      CHIEF COMPLAINT/REASON FOR CONSULTATION goals of care, code status discussion, and family support.        SUBJECTIVE:  Ms. Eliseo Silverio is back to 4th floor from OR. She is alert and oriented. C/o pain and nausea. No s/s distress noted      Assessment/Plan   Principal Problem:     Obstructive uropathy   Active Problems:     Obstructive pyelonephritis     Sepsis with acute renal failure (HCC)     Left ureteral calculus     Renal calculus, bilateral     BINH (acute kidney injury) ([address]     Hyperglycemia due to diabetes mellitus ([address]     Hypertension     Dehydration     FREDDY and COPD overlap syndrome ([address]     Degenerative joint disease     Palliative care patient     Septicemia ([address]     Chronic pain syndrome     Acute respiratory failure with hypoxia ([address]   Resolved Problems:     * No resolved hospital problems. *           Visit Summary:   Chart reviewed. Ms. Eliseo Silverio is seen at bedside this afternoon following procedure with urology. Report obtained from RN who states stone was removed with replacement of whiting catheter and no need for new stent. She is complaining of pain/nausea and reported to RN for prn medication administration. She remains alert and oriented and able to communicate needs well.  She is in good spirits and tells me that she was able to stand yesterday with PT/OT and that it hurt but also made her excited to see the improvement in her mobility. I called her daughter, Franco Verdin, to update on pt status and plan of care. We discussed OR with urology and discharge to [facility]. I discussed with SW who reports they are awaiting medicaid pending acceptance and pt will need precert as bed was not held at facility. Franco Verdin reports that she just got bank statements to [employer] yesterday but that she has been in contact with staff there for d/c planning as well. Will follow.        Recommendations:    1. Palliative Care- C d/c to [facility] for rehab with SCOP services following procedure with urology 6/28/22. CM following for d/c planning and placement. Code status- DNR       2, Obstructive left pyelonephritits 2/2 proximal left urteral calculus- urology following. S/p ureteral stent placement 6/15/22 and Cystoscopy with left stent removal and ureter stone extraction 6/28/2022 per Dr. Corey Garcia. Levaquin continues       3. Septic shock with E coli bacteremia- 2/2 above, follow repeat blood cultures, NGTD. Abx per hospitalist. Nataliia Merida from pressors       4. BINH- improved.        5. Chronic pain syndrome-  chronic back pain with Norco and gabapentin baseline. Potential overuse at home noted. Pain regimen with ultram prn at this time. Voltaren gel added for right knee arthritis pain       6. Afib with RVR new onset- suspect 2/2 above. NSR now and off amio gtt       7. Transaminitis-  Improving,. Unclear baseline LFTs. Mgmt per hospitalist. Continue to monitor labs       8. Acute hypoxic respiratory failure- mgmt per hospitalist, suspect multifactorial. Weaned to 3 L NC. Hospitalist Progress Note   [facility]       Subjective:   Patient seen and examined.  No current complaints.       Assessment and Plan:   Septic shock   Shock resolved   Secondary to below   Agents as below   IVF   Since weaned off phenylephrine gtt Lactate improved overall       Obstructive left E. coli pyelonephritis   Urology following   Secondary to proximal left ureteral calculus   S/p left ureteral stent placement 6/15/2022 per Dr. Kacie Tobar per sensitivities   Plans for definitive treatment per urology   Extensive discussion with patient/family in this regard   They are now agreeable for above definitive urological treatment planned for today       E. coli bacteremia   Secondary to above   Levaquin per sensitivities   Repeat blood cultures NGTD       BINH   Resolved with current treatment plan   Avoid offending agents   Follow renal function/urine output/electrolytes       DM2   HbA1c 9.1   Poor control   Counseled   Meds on board       Morbid obesity   Counseled       Chronic pain syndrome   Patient has been bedbound for the last 5 years   Patient/daughter state she has been taking opioids for many years   I extensively explained how these agents can run counter to her current treatment plan   Patient continues to request home pain meds despite extensive counseling   Palliative/social work following per patient/family request   Outpatient pain management follow-up as warranted       New onset atrial fibrillation with RVR   Resolved   Remains in sinus rhythm   Secondary to above processes   Serial troponin 0.01 > 0.02 > 0.02 > (<0.01)   Follow electrolytes       Acute hypoxic respiratory failure   Continues to improve   Suspect multifactorial   Currently requiring 2-3 L supplemental O2, decreased requirement overall   Goal sats 88-92%, discussed with staff   BiPAP with sleep       DVT prophylaxis   SCDs       Extensive discussions with patient, daughter Ana Jasmine), and son (Evens) in regards to current clinical state/goals of care.  All questions sought and answered.       Aryan Jacobson MD    6/28/2022 10:30 AM                      OPERATIVE REPORT      DATE OF PROCEDURE:  06/28/2022       TITLE OF OPERATION:   1.  Cystoscopy, left ureteral stent removal.   2.  Left ureteroscopy, stone basket extraction.       PREOPERATIVE DIAGNOSIS:  Proximal left ureteral calculus.       POSTOPERATIVE DIAGNOSIS:  Proximal left ureteral calculus.       ANESTHETIC:  General anesthetic.       ATTENDING SURGEON: Lyle Ontiveros MD       HISTORY:  The patient is a 79-year-old female who presented with   obstructing proximal left ureteral calculus with obstructive   pyelonephritis with sepsis.  She now has completed 14 days of   culture-specific antibiotics consistent with Levaquin and has recovered   from her sepsis.  She presents now to undergo definitive treatment of   the stone.  Therefore, we planned for cystoscopy, left ureteral stent   removal, left ureteroscopy, laser lithotripsy and stone extraction,   possible stent replacement.  Risks and complications including risk from   anesthetic, injury to the ureter, need for subsequent adjuvant or repeat   procedures were discussed and she understands. Ananth Duran has bilateral lower   pole nonobstructing renal calculi.  She does understand we are not   treating these stones today.       DESCRIPTION OF PROCEDURE:  The patient was brought to the operating   room, underwent general anesthetic.  She was placed in the lithotomy   position, her genitalia was prepped and draped per routine sterile   fashion.  She received cefepime and a time-out was performed.       The 22-[place] cystoscope was inserted into the meatus.  Stent was seen   protruding from the left ureteral orifice.  The bladder itself otherwise   appeared normal.  The stent was grasped with alligator graspers and   brought up the meatus.  A 0.035 sensor tip guidewire was then inserted   through the stent under fluoroscopic guidance up into the left kidney   without difficulty.  The stent was removed and leaving the guidewire in   place.       I then performed a flexible ureteroscopy alongside the guidewire.  The   flexible ureteroscope was then inserted into the patient's bladder and   then under direct vision into the left ureter alongside the guidewire.     I was able to pass the cystoscope all the way up to the proximal ureter   and I saw a stone within the proximal ureter.  I felt the ureter was   allowed for basket extraction, so the stone was engaged and trapped into   a [place] basket and I was able to completely extract the stone   atraumatically, little bit tight at the orifice, but otherwise it came   out very easily.  Therefore, I felt since the stone was removed   atraumatically without any further manipulation, the stent was not   needed to be replaced.  Because of her obesity and plus she is   bed-bound, I went ahead and replaced her Krishna catheter.  The stone was   sent for analysis.  The procedure was terminated.  Estimated blood loss   was 0 mL.  She was awakened from her anesthetic and taken to the   recovery room in stable condition.  I think she has now completed full   course of antibiotics, she can be discharged to the skilled nursing   facility at any time, no further antibiotics needed.  The Krishna catheter   can be removed when clinically appropriate, when patient is ambulatory   and able to get up to the toilet.  Regarding her other nonobstructing   stones in both kidneys, these could be treated electively in the future   if the patient apparently desires and they could follow up to see us in   the office with [facility] prior.  If they desire no treatment for the stones,   then no further followup is needed.   David Wolf MD

## 2022-06-30 NOTE — PROGRESS NOTES
Nutrition Assessment     Type and Reason for Visit: Reassess    Nutrition Recommendations/Plan:   1. Continue current diet. 2. Po intake encouraged. Malnutrition Assessment:  Malnutrition Status: At risk for malnutrition (Comment)    Nutrition Assessment:  Pt continues with poor po intake of meals, <25% most meals. Pt states appetite is improving, however is experiencing early satiety. Preferences explored and obtained, continued to encourage po intake and DM diet compliance post d/c. Estimated Daily Nutrient Needs:  Energy (kcal):  3609-0669 kcals (8-11 kcals/kg)       Protein (g):  114g Weight Used for Protein Requirements: Ideal        Fluid (ml/day):  5719-9140 ml Method Used for Fluid Requirements: 1 ml/kcal    Nutrition Related Findings:   non-pitting generalized edema; glucose 119-129 Wound Type: None    Current Nutrition Therapies:    ADULT DIET; Dysphagia - Soft and Bite Sized; 4 carb choices (60 gm/meal);  No Added Salt (3-4 gm)    Anthropometric Measures:  · Height: 5' 5\" (165.1 cm)  · Current Body Wt: 348 lb 9 oz (158.1 kg)   · BMI: 58    Nutrition Diagnosis:   · Inadequate oral intake,Altered nutrition-related lab values related to acute injury/trauma,endocrine dysfuntion as evidenced by intake 0-25%,intake 26-50%,lab values    Nutrition Interventions:   Food and/or Nutrient Delivery: Continue Current Diet  Nutrition Education/Counseling: Education completed  Coordination of Nutrition Care: Continue to monitor while inpatient  Plan of Care discussed with: Patient    Goals:  Previous Goal Met: No Progress toward Goal(s)  Goals: Meet at least 75% of estimated needs,PO intake 50% or greater       Nutrition Monitoring and Evaluation:   Behavioral-Environmental Outcomes: Knowledge or Skill  Food/Nutrient Intake Outcomes: Food and Nutrient Intake  Physical Signs/Symptoms Outcomes: Biochemical Data,Weight,Skin,Fluid Status or Edema    Discharge Planning:    Continue current diet     Jes Salgado MS, JULIO C PALACIOS  Contact: [phone]

## 2022-06-30 NOTE — PROGRESS NOTES
Palliative Care Progress Note  6/30/2022 8:11 AM    Patient:  Daryn Davidson  YOB: 1955  Primary Care Physician: Art Zhang MD  Advance Directive: DNR  Admit Date: 6/14/2022       Hospital Day: 15  Portions of this note have been copied forward, however, changed to reflect the most current clinical status of this patient. CHIEF COMPLAINT/REASON FOR CONSULTATION goals of care, code status discussion, and family support. SUBJECTIVE:  Ms. Ayaan Hernandez is alert in hospital bed. Denies pain today and feels much improved with voltaren gel and lidocaine patches. She is in good spirits today. Hopeful to d/c whiting and initiate voiding trials. Review of Systems:   14 point review of systems is negative except as specifically addressed above. Objective:   VITALS:  /69   Pulse 77   Temp 96.8 °F (36 °C)   Resp 18   Ht 5' 5\" (1.651 m)   Wt (!) 348 lb 9 oz (158.1 kg)   SpO2 91%   BMI 58.00 kg/m²   24HR INTAKE/OUTPUT:      Intake/Output Summary (Last 24 hours) at 6/30/2022 0811  Last data filed at 6/30/2022 0340  Gross per 24 hour   Intake 0 ml   Output 1600 ml   Net -1600 ml     General appearance: 79 yo female, chronically ill appearing, NAD  Head: Normocephalic, without obvious abnormality, atraumatic  Eyes: conjunctivae/corneas clear. PERRL, EOM's intact.    Ears: normal external ears and nose  Neck: no JVD, supple, symmetrical, trachea midline   Lungs:  diminished in bases to auscultation bilaterally, shallow, NC in place  Heart: RRR, S1, S2 normal, no murmur  Abdomen: Obese, taut, no grimacing to palpation, normal bowel sounds, rectal tube and whiting catheter out  Extremities: BLE 1+ edema,  No erythema, no to palpation  Skin: Warm, dry/flaky, pale,   Neurologic: Alert and oriented x3, generalized weakness, no focal deficits    Medications:      dextrose      sodium chloride 25 mL (06/29/22 0325)      lidocaine  2 patch TransDERmal Daily    diclofenac sodium  2 g Topical BID    melatonin  10 mg Oral Nightly    guaiFENesin  400 mg Oral Q8H    lisinopril  20 mg Oral BID    And    hydroCHLOROthiazide  12.5 mg Oral BID    metoprolol  100 mg Oral BID    levofloxacin  750 mg IntraVENous Q24H    busPIRone  10 mg Oral TID    gabapentin  600 mg Oral 4x Daily    pravastatin  40 mg Oral Daily    insulin lispro  0-18 Units SubCUTAneous TID WC    insulin lispro  0-9 Units SubCUTAneous Nightly    insulin glargine  10 Units SubCUTAneous Nightly    sodium chloride flush  5-40 mL IntraVENous 2 times per day    ipratropium-albuterol  1 ampule Inhalation Q4H WA     prochlorperazine, sodium chloride, magnesium sulfate, potassium chloride **OR** potassium alternative oral replacement **OR** potassium chloride, labetalol, traMADol, glucose, dextrose bolus **OR** dextrose bolus, glucagon (rDNA), dextrose, sodium chloride flush, sodium chloride, ondansetron **OR** ondansetron, polyethylene glycol, acetaminophen **OR** acetaminophen, potassium chloride, magnesium sulfate  ADULT DIET; Dysphagia - Soft and Bite Sized; 4 carb choices (60 gm/meal)  ADULT ORAL NUTRITION SUPPLEMENT; AM Snack, PM Snack, HS Snack; Standard High Calorie/High Protein Oral Supplement     Lab and other Data:     Recent Labs     06/28/22 0429 06/29/22  0212 06/30/22  0506   WBC 13.2* 12.0* 10.3   HGB 11.0* 10.4* 10.9*    170 221     Recent Labs     06/28/22  0429 06/29/22  0212 06/30/22  0506    141 141   K 3.9 3.9 3.7   CL 96* 97* 97*   CO2 35* 34* 34*   BUN 20 18 14   CREATININE 0.5 0.6 0.6   GLUCOSE 104 113* 119*     Recent Labs     06/28/22  0429 06/29/22  0212 06/30/22  0506   AST 35* 35* 33*   ALT 12 11 11   BILITOT 1.1 1.1 1.0   ALKPHOS 139* 125* 127*     RAD:   CT ABDOMEN PELVIS WO CONTRAST Additional Contrast? None  Result Date: 6/15/2022  1. There is 6 mm obstructive calculus in the left upper ureter with mild proximal hydroureteronephrosis and perinephric inflammatory changes.  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 Bilateral non-obstructing renal calculi noted. 2. Mild hepatomegaly with mild hepatic steatosis. 3. Colonic diverticulosis without evidence of acute diverticulitis. 4. Small fat containing umbilical and supra umbilical hernia noted. Recommendation: Follow up as clinically indicated. All CT scans at this facility utilize dose modulation, iterative reconstruction, and/or weight based dosing when appropriate to reduce radiation dose to as low as reasonably achievable. Amended by Shae Bravo MD at 15-Shaquille-2022 02:44:19 AM Electronically Signed by Shae Bravo MD at 15-Shaquille-2022 02:29:46 AM             XR CHEST PORTABLE  Result Date: 6/15/2022  1. Questionable trace left pleural effusion with basilar atelectasis. 2. Right sided central line is in place with its tip in SVC. Recommendation: Follow up as clinically indicated. Electronically Signed by Dmitry Mejia MD at 15-Shaquille-2022 06:55:39 AM             XR CHEST PORTABLE  Result Date: 6/14/2022  1. Unremarkable radiograph of chest. Recommendation: Follow up as clinically indicated. Electronically Signed by Raf Ramirez MD at 15-Shaquille-2022 12:21:20 AM             Assessment/Plan   Principal Problem (Resolved): Obstructive uropathy  Active Problems:    Sepsis with acute renal failure (HCC)    Renal calculus, bilateral    BINH (acute kidney injury) ([address]    Hyperglycemia due to diabetes mellitus ([address]    Hypertension    Dehydration    FREDDY and COPD overlap syndrome (HCC)    Degenerative joint disease    Palliative care patient    Septicemia ([address]    Chronic pain syndrome    Acute respiratory failure with hypoxia ([address]  Resolved Problems:    Obstructive pyelonephritis    Left ureteral calculus      Visit Summary:  Chart reviewed. Ms. Johana Mojica is seen at bedside this afternoon with no acute events overnight.  Plan is for whiting removal after lunch to initiate voiding trials. SW is assisting pt/fmaily with retrieving documents for medicaid application in order to complete precert at [facility]. I called and spoke with her daughter, Reid Boston, to update on status and plan of care. She is hopeful they will hear something from [facility] tomorrow in order to get d/c planning in place before the weekend/holiday. Opportunity for questions and emotional support provided. Will follow. Recommendations:   1. Palliative Care- GOC d/c to [facility] for rehab with SC[facility] services pending medicaid approval. CM following for d/c planning and placement. Code status- DNR    2, Obstructive left pyelonephritits 2/2 proximal left urteral calculus- urology following. S/p ureteral stent placement 6/15/22 and Cystoscopy with left stent removal and ureter stone extraction 6/28/2022 per Dr. Yasmin Gutierrez. Levaquin completed today    3. Septic shock with E coli bacteremia- 2/2 above, follow repeat blood cultures, NGTD. Abx per hospitalist. Weaned from pressors    4. BINH- improved. 5. Chronic pain syndrome-  chronic back pain with Norco and gabapentin baseline. Potential overuse at home noted. Pain regimen with ultram prn at this time. Voltaren gel and lidocaine patch for knee and lower back pain with improvement    6. Afib with RVR new onset- suspect 2/2 above. NSR now and off amio gtt    7. Transaminitis-  Improving,. Unclear baseline LFTs. Mgmt per hospitalist. Continue to monitor labs    8. Acute hypoxic respiratory failure- mgmt per hospitalist, suspect multifactorial. Weaned to 2 L NC. Thank you for consulting Palliative Care and allowing us to participate in the care of this patient.    Time Spent Counseling > 50%:  YES                                   Total Time Spent with patient/family counseling, workup/treatment review, counseling and placement of orders/preparation of this note: 35 minutes    Electronically signed by MATTHEW Pate CNP on 6/30/2022 at 8:11 AM    (Please

## 2022-06-30 NOTE — PROGRESS NOTES
Urology Progress Note    SUBJECTIVE: Patient resting in bed. States she has gotten some cream for her knees and shoulder as well as a lidocaine patch for her tailbone which has greatly improved her pain. She is hopeful to be discharged soon. OBJECTIVE:   Review of Systems   Constitutional: Negative for chills and fever. Gastrointestinal: Negative for abdominal distention, abdominal pain, nausea and vomiting. Genitourinary: Negative for dysuria and hematuria. Musculoskeletal: Positive for arthralgias and gait problem. Negative for back pain. Neurological: Positive for weakness. Psychiatric/Behavioral: Negative for agitation and confusion. Physical  VITALS:  /69   Pulse 77   Temp 96.8 °F (36 °C)   Resp 18   Ht 5' 5\" (1.651 m)   Wt (!) 348 lb 9 oz (158.1 kg)   SpO2 91%   BMI 58.00 kg/m²   TEMPERATURE:  Current - Temp: 96.8 °F (36 °C);  Max - Temp  Av.2 °F (36.2 °C)  Min: 96.8 °F (36 °C)  Max: 97.7 °F (36.5 °C)   24 HR I&O     Intake/Output Summary (Last 24 hours) at 2022 1352  Last data filed at 2022 0933  Gross per 24 hour   Intake 240 ml   Output 1800 ml   Net -1560 ml     BACK: not done  ABDOMEN:  non-distended and non-tender  HEART:  normal rate  CHEST:  Normal respiratory effort  GENITAL/URINARY:  fc to bsd with clear yellow urine    Data  CBC:   Recent Labs     22  04222  0212 22  0506   WBC 13.2* 12.0* 10.3   HGB 11.0* 10.4* 10.9*   HCT 36.1* 34.1* 36.1*    170 221     BMP:    Recent Labs     22  0429 22  0212 22  0506    141 141   K 3.9 3.9 3.7   CL 96* 97* 97*   CO2 35* 34* 34*   BUN 20 18 14   CREATININE 0.5 0.6 0.6   GLUCOSE 104 113* 119*       U/A:    Lab Results   Component Value Date/Time    COLORU DARK YELLOW 06/15/2022 12:26 AM    PHUR 5.0 06/15/2022 12:26 AM    WBCUA 2-4 06/15/2022 12:26 AM    YEAST Present 06/15/2022 12:26 AM    BACTERIA 1+ 06/15/2022 12:26 AM    CLARITYU TURBID 06/15/2022 12:26 AM SPECGRAV 1.026 06/15/2022 12:26 AM    LEUKOCYTESUR SMALL 06/15/2022 12:26 AM    UROBILINOGEN 1.0 06/15/2022 12:26 AM    BILIRUBINUR MODERATE 06/15/2022 12:26 AM    BLOODU MODERATE 06/15/2022 12:26 AM    GLUCOSEU 100 06/15/2022 12:26 AM    AMORPHOUS 2+ 06/15/2022 12:26 AM     ASSESSMENT AND PLAN    Patient Active Problem List   Diagnosis    Sepsis with acute renal failure (HCC)    Renal calculus, bilateral    BINH (acute kidney injury) ([address]    Hyperglycemia due to diabetes mellitus ([address]    Hypertension    Dehydration    FREDDY and COPD overlap syndrome ([address]    Degenerative joint disease    Palliative care patient    [facility]ic[facility]    Chronic pain syndrome    Acute respiratory failure with hypoxia (HCC)       1.     Sepsis with septic shock secondary to left obstructive pyelonephritis from an obstructing left ureteral stone status post left ureteral stent placement on 6/15/2022 and definitive treatment of the stone with ureteroscopy on 6/28/2022. Last dose of Levaquin today. Krishna catheter plan to be discharged. Remains afebrile.      2.   Left obstructing proximal stones s/p definitive treatment with Dr. Uvaldo Jimenez. Voiding trial today. Patient mobility improving with PT, able to use bedpan as well as bedside commode with assistance. Check on her tomorrow morning and if she is voiding well, urology will sign off.     3. Bilateral nonobstructing stones. Please refer to previous notes. Offered patient PCNL versus staged ESWL. Patient does not wish to pursue any treatment of the stones.     JAVY HERNANDEZ, MATTHEW - CNP  6/30/2022 1:52 PM

## 2022-06-30 NOTE — PROGRESS NOTES
Facility/Department: [facility] ONCOLOGY UNIT  Daily Treatment Note  NAME: Jh Suarez  : 1955  MRN: [id number]    Date of Service: 2022    Discharge Recommendations:  Patient would benefit from continued therapy after discharge       Assessment   Assessment: Working with nursing staff to problem solve seated level toileting  Treatment Diagnosis: Obstructive uropathy, septic shock, hypoxic respiratory failure            Patient Diagnosis(es): The primary encounter diagnosis was Acute kidney injury ([address] Diagnoses of Lower abdominal pain, Acute cystitis with hematuria, Septicemia ([address] Ureteral calculus, Kidney stone, Urinary tract infection without hematuria, site unspecified, Chronic pain syndrome, Obstructive uropathy, Sepsis with acute renal failure and tubular necrosis without septic shock, due to unspecified organism ([address] FREDDY and COPD overlap syndrome ([address] and Calculus of distal left ureter were also pertinent to this visit. has a past medical history of Arthritis and HTN (hypertension). has a past surgical history that includes Cholecystectomy; Ankle fracture surgery (Right); Hysterectomy; Incontinence surgery; Bladder surgery (Left, 6/15/2022); Cystoscopy (Left, 2022); and Cystoscopy (Left, 2022). Restrictions  Restrictions/Precautions  Restrictions/Precautions: Fall Risk  Position Activity Restriction  Other position/activity restrictions: whiting, fecal tube    Subjective   General  Chart Reviewed: Yes  Patient assessed for rehabilitation services?: Yes  Family / Caregiver Present: No        Objective    ADL  Toileting: Dependent/Total (maxi move--worked on therapeutic positioning  so patient could sit on commode within the sling to use the [facility] for seated BM vs supine bed pan.   Left with nursing)           Bed mobility  Rolling to Left: Minimal assistance  Rolling to Right: Minimal assistance  Transfers  Transfer Comments: Maxi move to [facility] Plan   Plan  Times per Week: 4-8    Goals (On-Going)   Short Term Goals  Short Term Goal 1: Feed self with supervision after set up  Short Term Goal 2: Demo ability to participate in beginning level standing tasks from recliner  Short Term Goal 3: Independent with UE exercises, trunk exercises, therapeutic positioning  Long Term Goals  Long Term Goal 1: Upgrade as tolerated.        Tx Time  Minutes  [id number], OT/L  Electronically signed by Becky Franklin OT/L on 6/30/2022 at 4:28 PM.

## 2022-06-30 NOTE — PROGRESS NOTES
Social History  Social History     Socioeconomic History    Marital status: Single     Spouse name: Not on file    Number of children: Not on file    Years of education: Not on file    Highest education level: Not on file   Occupational History    Not on file   Tobacco Use    Smoking status: Former Smoker    Smokeless tobacco: Never Used    Tobacco comment: quit 10 years ago   Vaping Use    Vaping Use: Never used   Substance and Sexual Activity    Alcohol use: Never    Drug use: Never    Sexual activity: Not on file   Other Topics Concern    Not on file   Social History Narrative    Not on file     Social Determinants of Health     Financial Resource Strain:     Difficulty of Paying Living Expenses: Not on file   Food Insecurity:     Worried About [address] [employer] in the Last Year: Not on file    [address] [employer] in the Last Year: Not on file   Transportation Needs:     Lack of Transportation (Medical): Not on file    Lack of Transportation (Non-Medical):  Not on file   Physical Activity:     Days of Exercise per Week: Not on file    Minutes of Exercise per Session: Not on file   Stress:     Feeling of Stress : Not on file   Social Connections:     Frequency of Communication with Friends and Family: Not on file    Frequency of Social Gatherings with Friends and Family: Not on file    Attends [employer] Services: Not on file    Active Member of [address] [place] [address] or Organizations: Not on file    Attends Club or Organization Meetings: Not on file    Marital Status: Not on file   Intimate Partner Violence:     Fear of Current or Ex-Partner: Not on file    Emotionally Abused: Not on file    Physically Abused: Not on file    Sexually Abused: Not on file   Housing Stability:     Unable to Pay for Housing in the Last Year: Not on file    Number of [place] in the Last Year: Not on file    Unstable Housing in the Last Year: Not on file         Review of Systems:    Review of Systems She is alert and oriented to person, place, and time. Psychiatric:         Mood and Affect: Mood normal.         Labs:  BMP:   Recent Labs     06/28/22 0429 06/29/22 0212 06/30/22  0506    141 141   K 3.9 3.9 3.7   CL 96* 97* 97*   CO2 35* 34* 34*   BUN 20 18 14   CREATININE 0.5 0.6 0.6   CALCIUM 7.7* 7.5* 7.9*     CBC:   Recent Labs     06/28/22 0429 06/29/22 0212 06/30/22  0506   WBC 13.2* 12.0* 10.3   HGB 11.0* 10.4* 10.9*   HCT 36.1* 34.1* 36.1*   MCV 97.0 95.8 97.0    170 221     LIVER PROFILE:   Recent Labs     06/28/22 0429 06/29/22 0212 06/30/22  0506   AST 35* 35* 33*   ALT 12 11 11   BILITOT 1.1 1.1 1.0   ALKPHOS 139* 125* 127*     PT/INR: No results for input(s): PROTIME, INR in the last 72 hours. APTT: No results for input(s): APTT in the last 72 hours. BNP:  No results for input(s): BNP in the last 72 hours. Ionized Calcium:No results for input(s): IONCA in the last 72 hours. Magnesium:  Recent Labs     06/28/22 0429 06/29/22 0212 06/30/22  0506   MG 1.7 1.6 2.0     Phosphorus:No results for input(s): PHOS in the last 72 hours. HgbA1C: No results for input(s): LABA1C in the last 72 hours. Hepatic:   Recent Labs     06/28/22 0429 06/29/22 0212 06/30/22  0506   ALKPHOS 139* 125* 127*   ALT 12 11 11   AST 35* 35* 33*   PROT 5.4* 5.2* 5.6*   BILITOT 1.1 1.1 1.0   LABALBU 2.3* 2.2* 2.5*     Lactic Acid: No results for input(s): LACTA in the last 72 hours. Troponin: No results for input(s): CKTOTAL, CKMB, TROPONINT in the last 72 hours. ABGs: No results for input(s): PH, PCO2, PO2, HCO3, O2SAT in the last 72 hours. CRP:  No results for input(s): CRP in the last 72 hours. Sed Rate:  No results for input(s): SEDRATE in the last 72 hours. Cultures:   No results for input(s): CULTURE in the last 72 hours. No results for input(s): BC, Carson Bidding in the last 72 hours. No results for input(s): CXSURG in the last 72 hours.     Radiology reports as per the Radiologist  Radiology: CT ABDOMEN PELVIS WO CONTRAST Additional Contrast? None    Result Date: 6/15/2022  NO PRIOR REPORT AVAILABLE <Addendum Signed by Judge Cora AGUIRRE at 15-Shaquille-2022 02:44:19 AM Findings were communicated to Emili Sutton RN on 6/15/22 at 2:42a. m.,who confirms having received the report. Oskar Thompson takes responsibility for providing the report to the referring clinician Dr. Maranda Thomas No further action required by the reading radiologist. If the referring clinician has any further questions please call customer service [phone], option 1. Critical Documentation Completed Addendum End> Exam: CT OF THE ABDOMEN/PELVIS WITHOUT CONTRAST Clinical data: Left lower quadrant pain with constipation, less flatus, acute onset. No prior diverticular disease. Technique: Axial CT images were acquired through the abdomen and pelvis without contrast using soft tissue and bone algorithms. Reformatted/MPR images were performed. Radiation dose: CTDIvol =29.80 mGy, DLP =1470 mGy x cm. Limitations: Lack of intravenous contrast limits evaluation of solid viscera. Lack of oral contrast limits evaluation of the bowel loops. Prior Studies: No prior studies submitted. Findings: Lung bases: Mild subsegmental atelectasis is noted in the included bilateral lungs. Liver:Mild hepatomegaly with mild hepatic steatosis. No evidence of mass. No evidence of dilated ducts. Gallbladder Fossa: Status post cholecystectomy. Spleen: Grossly unremarkable. Pancreas/adrenal glands: Grossly unremarkable size, contour and density. Kidneys: In anatomic position. Grossly unremarkablerenal size, contour and density. No evidence of a renal mass or cyst.There is 6 mm obstructive calculus in the left upper ureter with mild proximal hydroureteronephrosis and perinephric inflammatory changes. 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  Bilateral non-obstructing renal calculi noted, largest is measuring about 17 mm in the lower pole of right kidney and 15 mm in the lower pole of  left kidney. Retroperitoneum: No enlarged retroperitoneal lymphadenopathy. The aorta and IVC appear unremarkable. Peritoneal cavity: No evidence of free air or ascites. Gastrointestinal tract: No obstruction. Colonic diverticulosis without evidence of acute diverticulitis. Appendix: Unremarkable Pelvis: The urinary bladder is suboptimally distended. Status post hysterectomy and bilateral oopherectomy. Osseous structures: No acute or destructive bony process identified. Mild degenerative changes noted in the spine and pelvis. Small fat containing umbilical and supra umbilical hernia noted. 1. There is 6 mm obstructive calculus in the left upper ureter with mild proximal hydroureteronephrosis and perinephric inflammatory changes. 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 Bilateral non-obstructing renal calculi noted. 2. Mild hepatomegaly with mild hepatic steatosis. 3. Colonic diverticulosis without evidence of acute diverticulitis. 4. Small fat containing umbilical and supra umbilical hernia noted. Recommendation: Follow up as clinically indicated. All CT scans at this facility utilize dose modulation, iterative reconstruction, and/or weight based dosing when appropriate to reduce radiation dose to as low as reasonably achievable. Amended by Mary Reed MD at 15-Shaquille-2022 02:44:19 AM Electronically Signed by Mary Reed MD at 15-Shaquille-2022 02:29:46 AM             XR CHEST PORTABLE    Result Date: 6/15/2022  NO PRIOR REPORT AVAILABLE Exam: X-RAY OF [facility] Clinical data:Central line placement. Technique:Single view of the chest. Prior studies: Radiograph of the chest dated 6/14/2022. Findings: The lungs are grossly clear; noevidence of acute infiltrate. Questionable trace left pleural effusion with basilar atelectasis.  Cardiac silhouette is within normal limits. No acute osseous abnormality is detected. Right sided central line is in  place with its tip in SVC. 1. Questionable trace left pleural effusion with basilar atelectasis. 2. Right sided central line is in place with its tip in SVC. Recommendation: Follow up as clinically indicated. Electronically Signed by Stefano Roberson MD at 15-Shaquille-2022 06:55:39 AM             XR CHEST PORTABLE    Result Date: 6/14/2022  NO PRIOR REPORT AVAILABLE Exam: X-RAY OF [facility] Clinical data:Fatigue. Technique:Single view of the chest. Prior studies: No prior studies submitted. Findings: The lungs are grossly clear; noevidence of acute infiltrate or pleural effusion. Cardiac silhouette is within normal limits. No acute osseous abnormality is detected. 1. Unremarkable radiograph of chest. Recommendation: Follow up as clinically indicated. Electronically Signed by Jose Lopez MD at 15-Shaquille-2022 12:21:20 AM                Assessment     Sepsis secondary to obstructive pyelonephritis. Clinically resolved. Status post retrograde stenting.     Acute kidney injury. Resolved.     Type 2 diabetes. Continue glycemic control.     Hypoxemic respiratory failure. Resolved.     Poor functional status. Working on skilled nursing placement.       Severa Cabal, DO

## 2022-06-30 NOTE — CARE COORDINATION
Spoke with [facility], liaison for [employer]. She states that the 3 months of bank statements provided by pt's sister were not applicable. Pt now alert and oriented. Called Heike Isbell, at pt's bedside. Pt was able to give permission for CHERRY Khanna to  last 3 month bank statements for approval of Medicaid. Updated Le. She is still awaiting pre-cert.    Electronically signed by Miranda Scott on 6/30/2022 at 10:51 AM

## 2022-07-01 VITALS
BODY MASS INDEX: 48.82 KG/M2 | DIASTOLIC BLOOD PRESSURE: 60 MMHG | SYSTOLIC BLOOD PRESSURE: 124 MMHG | RESPIRATION RATE: 18 BRPM | TEMPERATURE: 96.6 F | HEART RATE: 95 BPM | WEIGHT: 293 LBS | OXYGEN SATURATION: 95 % | HEIGHT: 65 IN

## 2022-07-01 PROBLEM — N17.9 AKI (ACUTE KIDNEY INJURY) (HCC): Status: RESOLVED | Noted: 2022-06-15 | Resolved: 2022-07-01

## 2022-07-01 PROBLEM — J96.01 ACUTE RESPIRATORY FAILURE WITH HYPOXIA (HCC): Status: RESOLVED | Noted: 2022-06-24 | Resolved: 2022-07-01

## 2022-07-01 PROBLEM — A41.9 SEPSIS WITH ACUTE RENAL FAILURE (HCC): Status: RESOLVED | Noted: 2022-06-15 | Resolved: 2022-07-01

## 2022-07-01 PROBLEM — E11.65 HYPERGLYCEMIA DUE TO DIABETES MELLITUS (HCC): Status: RESOLVED | Noted: 2022-06-15 | Resolved: 2022-07-01

## 2022-07-01 PROBLEM — N20.0 RENAL CALCULUS, BILATERAL: Status: RESOLVED | Noted: 2022-06-15 | Resolved: 2022-07-01

## 2022-07-01 PROBLEM — N17.9 SEPSIS WITH ACUTE RENAL FAILURE (HCC): Status: RESOLVED | Noted: 2022-06-15 | Resolved: 2022-07-01

## 2022-07-01 PROBLEM — R65.20 SEPSIS WITH ACUTE RENAL FAILURE (HCC): Status: RESOLVED | Noted: 2022-06-15 | Resolved: 2022-07-01

## 2022-07-01 PROBLEM — E86.0 DEHYDRATION: Status: RESOLVED | Noted: 2022-06-15 | Resolved: 2022-07-01

## 2022-07-01 LAB
ALBUMIN SERPL-MCNC: 2.4 G/DL (ref 3.5–5.2)
ALP BLD-CCNC: 118 U/L (ref 35–104)
ALT SERPL-CCNC: 10 U/L (ref 5–33)
ANION GAP SERPL CALCULATED.3IONS-SCNC: 8 MMOL/L (ref 7–19)
AST SERPL-CCNC: 32 U/L (ref 5–32)
BASOPHILS ABSOLUTE: 0.1 K/UL (ref 0–0.2)
BASOPHILS RELATIVE PERCENT: 0.6 % (ref 0–1)
BILIRUB SERPL-MCNC: 0.8 MG/DL (ref 0.2–1.2)
BUN BLDV-MCNC: 17 MG/DL (ref 8–23)
CALCIUM SERPL-MCNC: 7.9 MG/DL (ref 8.8–10.2)
CHLORIDE BLD-SCNC: 94 MMOL/L (ref 98–111)
CO2: 36 MMOL/L (ref 22–29)
CREAT SERPL-MCNC: 0.6 MG/DL (ref 0.5–0.9)
EOSINOPHILS ABSOLUTE: 0.1 K/UL (ref 0–0.6)
EOSINOPHILS RELATIVE PERCENT: 1 % (ref 0–5)
GFR AFRICAN AMERICAN: >59
GFR NON-AFRICAN AMERICAN: >60
GLUCOSE BLD-MCNC: 110 MG/DL (ref 70–99)
GLUCOSE BLD-MCNC: 115 MG/DL (ref 70–99)
GLUCOSE BLD-MCNC: 121 MG/DL (ref 74–109)
GLUCOSE BLD-MCNC: 136 MG/DL (ref 70–99)
HCT VFR BLD CALC: 34.5 % (ref 37–47)
HEMOGLOBIN: 10.4 G/DL (ref 12–16)
IMMATURE GRANULOCYTES #: 0.1 K/UL
LYMPHOCYTES ABSOLUTE: 1.8 K/UL (ref 1.1–4.5)
LYMPHOCYTES RELATIVE PERCENT: 19.7 % (ref 20–40)
MAGNESIUM: 1.8 MG/DL (ref 1.6–2.4)
MCH RBC QN AUTO: 29.8 PG (ref 27–31)
MCHC RBC AUTO-ENTMCNC: 30.1 G/DL (ref 33–37)
MCV RBC AUTO: 98.9 FL (ref 81–99)
MONOCYTES ABSOLUTE: 1.2 K/UL (ref 0–0.9)
MONOCYTES RELATIVE PERCENT: 13.1 % (ref 0–10)
NEUTROPHILS ABSOLUTE: 5.9 K/UL (ref 1.5–7.5)
NEUTROPHILS RELATIVE PERCENT: 64.8 % (ref 50–65)
PDW BLD-RTO: 15.8 % (ref 11.5–14.5)
PERFORMED ON: ABNORMAL
PLATELET # BLD: 215 K/UL (ref 130–400)
PMV BLD AUTO: 11.9 FL (ref 9.4–12.3)
POTASSIUM SERPL-SCNC: 3.9 MMOL/L (ref 3.5–5)
RBC # BLD: 3.49 M/UL (ref 4.2–5.4)
SARS-COV-2, NAAT: NOT DETECTED
SODIUM BLD-SCNC: 138 MMOL/L (ref 136–145)
TOTAL PROTEIN: 5.4 G/DL (ref 6.6–8.7)
WBC # BLD: 9 K/UL (ref 4.8–10.8)

## 2022-07-01 PROCEDURE — 87635 SARS-COV-2 COVID-19 AMP PRB: CPT

## 2022-07-01 PROCEDURE — 36415 COLL VENOUS BLD VENIPUNCTURE: CPT

## 2022-07-01 PROCEDURE — 80053 COMPREHEN METABOLIC PANEL: CPT

## 2022-07-01 PROCEDURE — 97530 THERAPEUTIC ACTIVITIES: CPT

## 2022-07-01 PROCEDURE — 6370000000 HC RX 637 (ALT 250 FOR IP): Performed by: UROLOGY

## 2022-07-01 PROCEDURE — 82947 ASSAY GLUCOSE BLOOD QUANT: CPT

## 2022-07-01 PROCEDURE — 85025 COMPLETE CBC W/AUTO DIFF WBC: CPT

## 2022-07-01 PROCEDURE — 2580000003 HC RX 258: Performed by: UROLOGY

## 2022-07-01 PROCEDURE — 83735 ASSAY OF MAGNESIUM: CPT

## 2022-07-01 PROCEDURE — 99231 SBSQ HOSP IP/OBS SF/LOW 25: CPT | Performed by: NURSE PRACTITIONER

## 2022-07-01 PROCEDURE — 94640 AIRWAY INHALATION TREATMENT: CPT

## 2022-07-01 PROCEDURE — 6370000000 HC RX 637 (ALT 250 FOR IP)

## 2022-07-01 PROCEDURE — 94761 N-INVAS EAR/PLS OXIMETRY MLT: CPT

## 2022-07-01 PROCEDURE — 99232 SBSQ HOSP IP/OBS MODERATE 35: CPT

## 2022-07-01 PROCEDURE — 2700000000 HC OXYGEN THERAPY PER DAY

## 2022-07-01 RX ORDER — HYDROCODONE BITARTRATE AND ACETAMINOPHEN 10; 325 MG/1; MG/1
2 TABLET ORAL 4 TIMES DAILY PRN
Qty: 12 TABLET | Refills: 0 | Status: SHIPPED | OUTPATIENT
Start: 2022-07-01 | End: 2022-07-01 | Stop reason: HOSPADM

## 2022-07-01 RX ORDER — TRAMADOL HYDROCHLORIDE 50 MG/1
50 TABLET ORAL EVERY 6 HOURS PRN
Qty: 12 TABLET | Refills: 0 | Status: SHIPPED | OUTPATIENT
Start: 2022-07-01 | End: 2022-07-04

## 2022-07-01 RX ORDER — GABAPENTIN 600 MG/1
600 TABLET ORAL 4 TIMES DAILY
Qty: 12 TABLET | Refills: 3 | Status: SHIPPED | OUTPATIENT
Start: 2022-07-01 | End: 2022-07-04

## 2022-07-01 RX ORDER — LORAZEPAM 0.5 MG/1
0.5 TABLET ORAL EVERY 6 HOURS PRN
Qty: 18 TABLET | Refills: 0 | Status: SHIPPED | OUTPATIENT
Start: 2022-07-01 | End: 2022-07-04

## 2022-07-01 RX ADMIN — BUSPIRONE HYDROCHLORIDE 10 MG: 10 TABLET ORAL at 14:39

## 2022-07-01 RX ADMIN — METOPROLOL TARTRATE 100 MG: 50 TABLET, FILM COATED ORAL at 08:04

## 2022-07-01 RX ADMIN — SODIUM CHLORIDE, PRESERVATIVE FREE 10 ML: 5 INJECTION INTRAVENOUS at 08:19

## 2022-07-01 RX ADMIN — HYDROCHLOROTHIAZIDE 12.5 MG: 25 TABLET ORAL at 21:44

## 2022-07-01 RX ADMIN — IPRATROPIUM BROMIDE AND ALBUTEROL SULFATE 1 AMPULE: 2.5; .5 SOLUTION RESPIRATORY (INHALATION) at 14:22

## 2022-07-01 RX ADMIN — Medication 10 MG: at 21:48

## 2022-07-01 RX ADMIN — TRAMADOL HYDROCHLORIDE 50 MG: 50 TABLET, COATED ORAL at 04:13

## 2022-07-01 RX ADMIN — TRAMADOL HYDROCHLORIDE 50 MG: 50 TABLET, COATED ORAL at 21:58

## 2022-07-01 RX ADMIN — INSULIN GLARGINE 10 UNITS: 100 INJECTION, SOLUTION SUBCUTANEOUS at 21:48

## 2022-07-01 RX ADMIN — GABAPENTIN 600 MG: 600 TABLET, FILM COATED ORAL at 14:39

## 2022-07-01 RX ADMIN — GABAPENTIN 600 MG: 600 TABLET, FILM COATED ORAL at 17:15

## 2022-07-01 RX ADMIN — DICLOFENAC 2 G: 10 GEL TOPICAL at 08:19

## 2022-07-01 RX ADMIN — TRAMADOL HYDROCHLORIDE 50 MG: 50 TABLET, COATED ORAL at 10:44

## 2022-07-01 RX ADMIN — BUSPIRONE HYDROCHLORIDE 10 MG: 10 TABLET ORAL at 21:48

## 2022-07-01 RX ADMIN — GABAPENTIN 600 MG: 600 TABLET, FILM COATED ORAL at 08:08

## 2022-07-01 RX ADMIN — IPRATROPIUM BROMIDE AND ALBUTEROL SULFATE 1 AMPULE: 2.5; .5 SOLUTION RESPIRATORY (INHALATION) at 10:51

## 2022-07-01 RX ADMIN — BUSPIRONE HYDROCHLORIDE 10 MG: 10 TABLET ORAL at 08:07

## 2022-07-01 RX ADMIN — IPRATROPIUM BROMIDE AND ALBUTEROL SULFATE 1 AMPULE: 2.5; .5 SOLUTION RESPIRATORY (INHALATION) at 18:06

## 2022-07-01 RX ADMIN — GUAIFENESIN 400 MG: 200 TABLET ORAL at 14:39

## 2022-07-01 RX ADMIN — GABAPENTIN 600 MG: 600 TABLET, FILM COATED ORAL at 21:43

## 2022-07-01 RX ADMIN — GUAIFENESIN 400 MG: 200 TABLET ORAL at 04:13

## 2022-07-01 RX ADMIN — HYDROCHLOROTHIAZIDE 12.5 MG: 25 TABLET ORAL at 08:07

## 2022-07-01 RX ADMIN — PRAVASTATIN SODIUM 40 MG: 20 TABLET ORAL at 08:07

## 2022-07-01 RX ADMIN — LISINOPRIL 20 MG: 20 TABLET ORAL at 21:47

## 2022-07-01 RX ADMIN — DICLOFENAC 2 G: 10 GEL TOPICAL at 22:03

## 2022-07-01 RX ADMIN — IPRATROPIUM BROMIDE AND ALBUTEROL SULFATE 1 AMPULE: 2.5; .5 SOLUTION RESPIRATORY (INHALATION) at 06:11

## 2022-07-01 RX ADMIN — METOPROLOL TARTRATE 100 MG: 50 TABLET, FILM COATED ORAL at 21:46

## 2022-07-01 RX ADMIN — GUAIFENESIN 400 MG: 200 TABLET ORAL at 21:47

## 2022-07-01 RX ADMIN — LISINOPRIL 20 MG: 20 TABLET ORAL at 08:07

## 2022-07-01 ASSESSMENT — PAIN DESCRIPTION - LOCATION
LOCATION: BACK;KNEE
LOCATION: SHOULDER;BACK
LOCATION: KNEE;BACK

## 2022-07-01 ASSESSMENT — PAIN SCALES - GENERAL
PAINLEVEL_OUTOF10: 7
PAINLEVEL_OUTOF10: 8
PAINLEVEL_OUTOF10: 7
PAINLEVEL_OUTOF10: 8
PAINLEVEL_OUTOF10: 7
PAINLEVEL_OUTOF10: 6
PAINLEVEL_OUTOF10: 5

## 2022-07-01 ASSESSMENT — PAIN DESCRIPTION - ORIENTATION
ORIENTATION: RIGHT;LEFT
ORIENTATION: RIGHT;LEFT
ORIENTATION: LOWER;RIGHT;LEFT

## 2022-07-01 ASSESSMENT — ENCOUNTER SYMPTOMS
BACK PAIN: 0
ABDOMINAL PAIN: 0
ABDOMINAL DISTENTION: 0
NAUSEA: 0
VOMITING: 0

## 2022-07-01 ASSESSMENT — PAIN DESCRIPTION - DESCRIPTORS
DESCRIPTORS: ACHING;DULL
DESCRIPTORS: ACHING;DULL
DESCRIPTORS: ACHING;DISCOMFORT
DESCRIPTORS: ACHING;DULL

## 2022-07-01 ASSESSMENT — PAIN - FUNCTIONAL ASSESSMENT
PAIN_FUNCTIONAL_ASSESSMENT: PREVENTS OR INTERFERES SOME ACTIVE ACTIVITIES AND ADLS
PAIN_FUNCTIONAL_ASSESSMENT: PREVENTS OR INTERFERES SOME ACTIVE ACTIVITIES AND ADLS

## 2022-07-01 ASSESSMENT — PAIN SCALES - WONG BAKER
WONGBAKER_NUMERICALRESPONSE: 4
WONGBAKER_NUMERICALRESPONSE: 4

## 2022-07-01 ASSESSMENT — PAIN DESCRIPTION - FREQUENCY
FREQUENCY: INTERMITTENT
FREQUENCY: INTERMITTENT

## 2022-07-01 ASSESSMENT — PAIN DESCRIPTION - PAIN TYPE
TYPE: CHRONIC PAIN
TYPE: CHRONIC PAIN

## 2022-07-01 NOTE — PROGRESS NOTES
Physical Therapy  Name: Damaris Encarnacion  MRN:  [id number]  Date of service:  7/1/2022 07/01/22 1055   Restrictions/Precautions   Restrictions/Precautions Fall Risk   Position Activity Restriction   Other position/activity restrictions whiting, fecal tube   Subjective   Subjective Pt agreed to therapy. Pain Assessment   Pain Assessment 0-10   Pain Level 7   Pain Location Back;Knee   Pain Orientation Right;Left   Pain Descriptors Aching;Dull   Functional Pain Assessment Prevents or interferes some active activities and ADLs   Pain Type Chronic pain   Pain Frequency Intermittent   Non-Pharmaceutical Pain Intervention(s) Ambulation/Increased Activity;Repositioned; Rest   Response to Pain Intervention Patient satisfied   Bed Mobility   Rolling Moderate assistance   Supine to Sit Moderate assistance   Sit to Supine Maximal assistance  (x2)   Scooting Minimal assistance;2 Person assistance  (bed in trendelenburg and pt able to pull on headboard)   Transfers   Sit to Stand Moderate Assistance   Stand to sit Moderate Assistance   Short Term Goals   Time Frame for Short term goals 14 days   Short term goal 1 ROLLING MOD ASSIST   Short term goal 2 SUP TO/FROM SIT MOD ASSIST   Short term goal 3 BED TO CHAIR MOD-MAX ASSIST   Conditions Requiring Skilled Therapeutic Intervention   Body Structures, Functions, Activity Limitations Requiring Skilled Therapeutic Intervention Decreased functional mobility ; Decreased ADL status; Decreased ROM; Decreased strength;Decreased cognition;Decreased balance;Decreased posture   Assessment Pt worked on standing EOB with some difficulty due to bed height, would be best to attempt standing from chair. Pt was able to stand fairly well but could not tolerate long due to knee pain. Assisted pt back to bed after second stand and positioned for comfort.     Activity Tolerance   Activity Tolerance Patient tolerated treatment well   PT Plan of Care   Friday X   Safety Devices   Type of Devices Call light within reach; Left in bed  (nurse present to give meds)         Electronically signed by Usha Penny PTA on 7/1/2022 at 11:14 AM

## 2022-07-01 NOTE — PROGRESS NOTES
Pt states had a BM last night. Report called to [facility] at [facility]. Ambulance called to transport pt.

## 2022-07-01 NOTE — DISCHARGE SUMMARY
Discharge Summary    Damaris Encarnacion  :  1955  MRN:  [id number]    Admit date:  2022  Discharge date: 2022     Admitting Physician:  Adan Miles MD    Advance Directive: DNR    Consults: urology and palliative care    Primary Care Physician:  Tom Amezquita MD    Discharge Diagnoses:  Principal Problem (Resolved): Obstructive uropathy  Active Problems:    Hypertension    FREDDY and COPD overlap syndrome (HCC)    Degenerative joint disease    Palliative care patient    Chronic pain syndrome  Resolved Problems:    Obstructive pyelonephritis    Sepsis with acute renal failure ([address]    Left ureteral calculus    Renal calculus, bilateral    BINH (acute kidney injury) ([address]    Hyperglycemia due to diabetes mellitus ([address]    Dehydration    Septicemia ([address]    Acute respiratory failure with hypoxia ([address]      Significant Diagnostic Studies:   CT ABDOMEN PELVIS WO CONTRAST Additional Contrast? None    Result Date: 6/15/2022  NO PRIOR REPORT AVAILABLE <Addendum Signed by Luis Antonio Steele MD at [facility]-2022 02:44:19 AM Findings were communicated to Anson Salamanca RN on 6/15/22 at 2:42a. m.,who confirms having received the report. Gaylyn Paget takes responsibility for providing the report to the referring clinician Dr. Donna Bond No further action required by the reading radiologist. If the referring clinician has any further questions please call [employer] service [phone], option 1. Critical Documentation Completed Addendum End> Exam: CT OF THE ABDOMEN/PELVIS WITHOUT CONTRAST Clinical data: Left lower quadrant pain with constipation, less flatus, acute onset. No prior diverticular disease. Technique: Axial CT images were acquired through the abdomen and pelvis without contrast using soft tissue and bone algorithms. Reformatted/MPR images were performed. Radiation dose: CTDIvol =29.80 mGy, DLP =1470 mGy x cm. Limitations: Lack of intravenous contrast limits evaluation of solid viscera.  Lack of oral contrast limits evaluation of the bowel loops. Prior Studies: No prior studies submitted. Findings: Lung bases: Mild subsegmental atelectasis is noted in the included bilateral lungs. Liver:Mild hepatomegaly with mild hepatic steatosis. No evidence of mass. No evidence of dilated ducts. Gallbladder Fossa: Status post cholecystectomy. Spleen: Grossly unremarkable. Pancreas/adrenal glands: Grossly unremarkable size, contour and density. Kidneys: In anatomic position. Grossly unremarkablerenal size, contour and density. No evidence of a renal mass or cyst.There is 6 mm obstructive calculus in the left upper ureter with mild proximal hydroureteronephrosis and perinephric inflammatory changes. 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 Bilateral non-obstructing renal calculi noted, largest is measuring about 17 mm in the lower pole of right kidney and 15 mm in the lower pole of  left kidney. Retroperitoneum: No enlarged retroperitoneal lymphadenopathy. The aorta and IVC appear unremarkable. Peritoneal cavity: No evidence of free air or ascites. Gastrointestinal tract: No obstruction. Colonic diverticulosis without evidence of acute diverticulitis. Appendix: Unremarkable Pelvis: The urinary bladder is suboptimally distended. Status post hysterectomy and bilateral oopherectomy. Osseous structures: No acute or destructive bony process identified. Mild degenerative changes noted in the spine and pelvis. Small fat containing umbilical and supra umbilical hernia noted. 1. There is 6 mm obstructive calculus in the left upper ureter with mild proximal hydroureteronephrosis and perinephric inflammatory changes. 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 Bilateral non-obstructing renal calculi noted. 2. Mild hepatomegaly with mild hepatic steatosis.  3. Colonic diverticulosis without evidence of acute diverticulitis. 4. Small fat containing umbilical and supra umbilical hernia noted. Recommendation: Follow up as clinically indicated. All CT scans at this facility utilize dose modulation, iterative reconstruction, and/or weight based dosing when appropriate to reduce radiation dose to as low as reasonably achievable. Amended by Duong Giles MD at 15-[place]-2022 02:44:19 AM Electronically Signed by Duong Giles MD at 15[facility]-2022 02:29:46 AM             XR CHEST PORTABLE    Result Date: 6/15/2022  NO PRIOR REPORT AVAILABLE Exam: X-RAY OF [facility] Clinical data:Central line placement. Technique:Single view of the chest. Prior studies: Radiograph of the chest dated 6/14/2022. Findings: The lungs are grossly clear; noevidence of acute infiltrate. Questionable trace left pleural effusion with basilar atelectasis. Cardiac silhouette is within normal limits. No acute osseous abnormality is detected. Right sided central line is in  place with its tip in SVC. 1. Questionable trace left pleural effusion with basilar atelectasis. 2. Right sided central line is in place with its tip in SVC. Recommendation: Follow up as clinically indicated. Electronically Signed by Stefano Roberson MD at 15[facility]-2022 06:55:39 AM             XR CHEST PORTABLE    Result Date: 6/14/2022  NO PRIOR REPORT AVAILABLE Exam: X-RAY OF [facility] Clinical data:Fatigue. Technique:Single view of the chest. Prior studies: No prior studies submitted. Findings: The lungs are grossly clear; noevidence of acute infiltrate or pleural effusion. Cardiac silhouette is within normal limits. No acute osseous abnormality is detected. 1. Unremarkable radiograph of chest. Recommendation: Follow up as clinically indicated.  Electronically Signed by Jose Lopez MD at 15[facility]-2022 12:21:20 AM               CBC:   Recent Labs     06/29/22  0212 06/30/22  0506 07/01/22  0435   WBC 12.0* 10.3 9.0   HGB 10.4* 10.9* 10.4*    221 215     BMP:    Recent Labs 06/29/22  0212 06/30/22  0506 07/01/22  0435    141 138   K 3.9 3.7 3.9   CL 97* 97* 94*   CO2 34* 34* 36*   BUN 18 14 17   CREATININE 0.6 0.6 0.6   GLUCOSE 113* 119* 121*     INR: No results for input(s): INR in the last 72 hours. Lipids: No results for input(s): CHOL, HDL in the last 72 hours. Invalid input(s): LDLCALCU  ABGs:No results for input(s): PHART, MSO0PGF, PO2ART, YVT0MVC, BEART, HGBAE, N0KMUEQD, CARBOXHGBART, 02THERAPY in the last 72 hours. HgBA1c:  No results for input(s): LABA1C in the last 72 hours. Procedures: Retrograde ureteral stent placement and subsequent stent removal  Hospital Course: Ms. Hubert Gonzalez was admitted 6/15 with obstructive pyelonephritis and sepsis. Consultation was obtained with urology and she was taken to the OR emergently for stent placement. She was monitored in the ICU post op and required fluid resuscitation. She improved clinically after several days and was transferred to med/surge floor. She required SNF placement and had to acquire Medicaid. This is what delayed her discharge.  was finally able to get placement at [facility]. She will be transferred later today. Physical Exam:  Vital Signs: /67   Pulse 87   Temp 97.3 °F (36.3 °C) (Temporal)   Resp 20   Ht 5' 5\" (1.651 m)   Wt (!) 348 lb 9 oz (158.1 kg)   SpO2 92%   BMI 58.00 kg/m²   General appearance:. Alert and Cooperative   HEENT: Normocephalic. Chest: clear to auscultation bilaterally without wheezes or rhonchi. Cardiac: Normal heart tones with regular rate and rhythm, S1, S2 normal. No murmurs, gallops, or rubs auscultated. Abdomen:soft, non-tender; normal bowel sounds, no masses, no organomegaly. Extremities: No clubbing or cyanosis. No peripheral edema. Peripheral pulses palpable. Neurologic: Grossly intact.     Discharge Medications:         Medication List      CONTINUE taking these medications    clotrimazole-betamethasone 1-0.05 % cream  Commonly known as: LOTRISONE     gabapentin 600 MG tablet  Commonly known as: NEURONTIN  Take 1 tablet by mouth 4 times daily for 3 days. HYDROcodone-acetaminophen  MG per tablet  Commonly known as: NORCO  Take 2 tablets by mouth 4 times daily as needed for Pain for up to 3 days. lisinopril-hydroCHLOROthiazide 20-12.5 MG per tablet  Commonly known as: PRINZIDE;ZESTORETIC     LORazepam 0.5 MG tablet  Commonly known as: ATIVAN  Take 1 tablet by mouth every 6 hours as needed for Anxiety for up to 3 days. metoprolol 100 MG tablet  Commonly known as: LOPRESSOR     omeprazole 20 MG delayed release capsule  Commonly known as: PRILOSEC     pravastatin 40 MG tablet  Commonly known as: PRAVACHOL        STOP taking these medications    DICLOFENAC SODIUM ER PO           Where to Get Your Medications      You can get these medications from any pharmacy    Bring a paper prescription for each of these medications  · gabapentin 600 MG tablet  · HYDROcodone-acetaminophen  MG per tablet  · LORazepam 0.5 MG tablet         Discharge Instructions: Follow up with Fransisco Brown MD in 3-5 days. Take medications as directed. Resume activity as tolerated. Diet: ADULT DIET; Dysphagia - Soft and Bite Sized; 4 carb choices (60 gm/meal); No Added Salt (3-4 gm)     Disposition: Patient is medically stable and will be discharged to [facility]. Time spent on discharge 40 minutes.     Signed:  Nilda Ervin DO

## 2022-07-01 NOTE — PLAN OF CARE
Problem: Discharge Planning  Goal: Discharge to home or other facility with appropriate resources  Outcome: Progressing     Problem: Pain  Goal: Verbalizes/displays adequate comfort level or baseline comfort level  Outcome: Progressing     Problem: Safety - Adult  Goal: Free from fall injury  Outcome: Progressing     Problem: ABCDS Injury Assessment  Goal: Absence of physical injury  Outcome: Progressing     Problem: Skin/Tissue Integrity  Goal: Absence of new skin breakdown  Description: 1. Monitor for areas of redness and/or skin breakdown  2. Every 4-6 hours minimum:  Change oxygen saturation probe site  3. Every 4-6 hours:  If on nasal continuous positive airway pressure, respiratory therapy assess nares and determine need for appliance change or resting period. Outcome: Progressing     Problem: Chronic Conditions and Co-morbidities  Goal: Patient's chronic conditions and co-morbidity symptoms are monitored and maintained or improved  Outcome: Progressing     Problem: Neurosensory - Adult  Goal: Achieves stable or improved neurological status  Outcome: Progressing     Problem: Respiratory - Adult  Goal: Achieves optimal ventilation and oxygenation  Outcome: Progressing     Problem: Cardiovascular - Adult  Goal: Absence of cardiac dysrhythmias or at baseline  Outcome: Progressing     Problem: Skin/Tissue Integrity - Adult  Goal: Skin integrity remains intact  Outcome: Progressing     Problem: Musculoskeletal - Adult  Goal: Return mobility to safest level of function  Outcome: Progressing  Goal: Maintain proper alignment of affected body part  Outcome: Progressing     Problem: Genitourinary - Adult  Goal: Urinary catheter remains patent  Outcome: Progressing     Problem: Infection - Adult  Goal: Absence of infection during hospitalization  Outcome: Progressing     Problem: Confusion  Goal: Confusion, delirium, dementia, or psychosis is improved or at baseline  Description: INTERVENTIONS:  1.  Assess for

## 2022-07-01 NOTE — PROGRESS NOTES
Occupational Therapy     07/01/22 1500   Subjective   Subjective Pt in bed and required some encouragement to participate. Pain Assessment   Pain Assessment 0-10   Pain Level 7   Pain Location Back;Knee   Pain Orientation Right;Left   Pain Descriptors Aching;Dull   Functional Pain Assessment Prevents or interferes some active activities and ADLs   Pain Type Chronic pain   Pain Frequency Intermittent   Non-Pharmaceutical Pain Intervention(s) Ambulation/Increased Activity;Repositioned; Rest   Response to Pain Intervention Pain improved but above pain goal   Orientation   Overall Orientation Status WFL   Bed Mobility Training   Bed Mobility Training Yes   Overall Level of Assistance Moderate assistance;Maximum assistance;Assist X2   Supine to Sit Moderate assistance;Maximum assistance;Assist X2   Sit to Supine Maximum assistance;Assist X2   Scooting Minimum assistance;Assist X2  (with use of mecahnical bed pt able to assist. )   Transfer Training   Transfer Training Yes   Interventions Verbal cues; Tactile cues;Manual cues   Sit to Stand Minimum assistance; Moderate assistance;Assist X2   Stand to Sit Minimum assistance; Moderate assistance;Assist X2   ADL   Feeding Independent;Setup   Grooming Independent;Setup   UE Bathing Moderate assistance   LE Bathing Maximum assistance   UE Dressing Minimal assistance   LE Dressing Maximum assistance   Toileting Dependent/Total   Assessment   Assessment Tx focused on sitting EOB and performing STS mobility at RW level EOB on 2 occasions with rest breaks in between. pt presents with very low endurance and standing tolerance. Pt required max assist x2-3 to get back in bed from bedside after standing. Activity Tolerance Patient limited by endurance; Patient tolerated treatment well   Discharge Recommendations Patient would benefit from continued therapy after discharge;24 hour supervision or assist   Plan   Times per Week 4-8   Times per Day Daily   OT Plan of Care   Friday X

## 2022-07-01 NOTE — PROGRESS NOTES
Urology Progress Note    SUBJECTIVE: Patient resting in bed. She is voiding on her own as well as having some mild incontinence. Pruritus currently in place. Urine output is adequate. OBJECTIVE:   Review of Systems   Constitutional: Negative for chills and fever. Gastrointestinal: Negative for abdominal distention, abdominal pain, nausea and vomiting. Genitourinary: Negative for dysuria and hematuria. Musculoskeletal: Positive for arthralgias and gait problem. Negative for back pain. Neurological: Positive for weakness. Psychiatric/Behavioral: Negative for agitation and confusion. Physical  VITALS:  /67   Pulse 87   Temp 97.3 °F (36.3 °C) (Temporal)   Resp 18   Ht 5' 5\" (1.651 m)   Wt (!) 348 lb 9 oz (158.1 kg)   SpO2 92%   BMI 58.00 kg/m²   TEMPERATURE:  Current - Temp: 97.3 °F (36.3 °C);  Max - Temp  Av.9 °F (36.1 °C)  Min: 96.7 °F (35.9 °C)  Max: 97.3 °F (36.3 °C)   24 HR I&O     Intake/Output Summary (Last 24 hours) at 2022 0843  Last data filed at 2022 3585  Gross per 24 hour   Intake 480 ml   Output 800 ml   Net -320 ml     BACK: not done  ABDOMEN:  non-distended and non-tender  HEART:  normal rate  CHEST:  Normal respiratory effort  GENITAL/URINARY:  Voiding dark yellow urine    Data  CBC:   Recent Labs     22  0212 22  0506 22  0435   WBC 12.0* 10.3 9.0   HGB 10.4* 10.9* 10.4*   HCT 34.1* 36.1* 34.5*    221 215     BMP:    Recent Labs     22  0212 22  0506 22  0435    141 138   K 3.9 3.7 3.9   CL 97* 97* 94*   CO2 34* 34* 36*   BUN 18 14 17   CREATININE 0.6 0.6 0.6   GLUCOSE 113* 119* 121*             U/A:    Lab Results   Component Value Date/Time    COLORU DARK YELLOW 06/15/2022 12:26 AM    PHUR 5.0 06/15/2022 12:26 AM    WBCUA 2-4 06/15/2022 12:26 AM    YEAST Present 06/15/2022 12:26 AM    BACTERIA 1+ 06/15/2022 12:26 AM    CLARITYU TURBID 06/15/2022 12:26 AM    SPECGRAV 1.026 06/15/2022 12:26 AM    LEUKOCYTESUR SMALL 06/15/2022 12:26 AM    UROBILINOGEN 1.0 06/15/2022 12:26 AM    BILIRUBINUR MODERATE 06/15/2022 12:26 AM    BLOODU MODERATE 06/15/2022 12:26 AM    GLUCOSEU 100 06/15/2022 12:26 AM    AMORPHOUS 2+ 06/15/2022 12:26 AM       ASSESSMENT AND PLAN    Patient Active Problem List   Diagnosis    Sepsis with acute renal failure (HCC)    Renal calculus, bilateral    BINH (acute kidney injury) ([address]    Hyperglycemia due to diabetes mellitus ([address]    Hypertension    Dehydration    FREDDY and COPD overlap syndrome ([address]    Degenerative joint disease    Palliative care patient    [facility]    Chronic pain syndrome    Acute respiratory failure with hypoxia (HCC)     1.       Sepsis with septic shock secondary to left obstructive pyelonephritis from obstructing left renal stone status post left ureteral stent placement on 6/15/2020. Definitive treatment of the stone with ureteroscopy on 6/28/2020. Levaquin discontinued. Krishna catheter discontinued yesterday. Remains afebrile     2.  Left obstructing proximal stone status post definitive treatment with Dr. Steve Fuentes. Mobility continues to improve. Waiting on placement for SNF. 3. Bilateral nonobstructing stones. Please refer to previous note on 6/29/2022 for description of options. Patient does not wish to pursue any treatment for stones. No follow up required with our office unless she would like to pursue stone treatment. Urology will sign off.     MATTHEW Longoria - CNP  7/1/2022 8:43 AM

## 2022-07-01 NOTE — PROGRESS NOTES
Palliative Care Progress Note  7/1/2022 8:13 AM    Patient:  Fernanda Comer  YOB: 1955  Primary Care Physician: Gilford Regan, MD  Advance Directive: DNR  Admit Date: 6/14/2022       Hospital Day: 12  Portions of this note have been copied forward, however, changed to reflect the most current clinical status of this patient. CHIEF COMPLAINT/REASON FOR CONSULTATION goals of care, code status discussion, and family support. SUBJECTIVE:  Ms. Eliseo Silverio is alert and sitting up in bed. Reports pain is tolerable. Whiting is out and voiding on her own with purewick. No new complaints. Hopeful for d/c today    Review of Systems:   14 point review of systems is negative except as specifically addressed above. Objective:   VITALS:  /67   Pulse 87   Temp 97.3 °F (36.3 °C) (Temporal)   Resp 18   Ht 5' 5\" (1.651 m)   Wt (!) 348 lb 9 oz (158.1 kg)   SpO2 92%   BMI 58.00 kg/m²   24HR INTAKE/OUTPUT:      Intake/Output Summary (Last 24 hours) at 7/1/2022 0813  Last data filed at 7/1/2022 2581  Gross per 24 hour   Intake 480 ml   Output 800 ml   Net -320 ml     General appearance: 79 yo female, chronically ill appearing, NAD  Head: Normocephalic, without obvious abnormality, atraumatic  Eyes: conjunctivae/corneas clear. PERRL, EOM's intact.    Ears: normal external ears and nose  Neck: no JVD, supple, symmetrical, trachea midline   Lungs: diminished in bases to auscultation bilaterally, shallow, NC in place  Heart: RRR, S1, S2 normal, no murmur  Abdomen: Obese, taut, no grimacing to palpation, normal bowel sounds, rectal tube and whiting catheter out  Extremities: BLE 1+ edema,  No erythema, no to palpation  Skin: Warm, dry/flaky, pale,   Neurologic: Alert and oriented x3, generalized weakness, no focal deficits    Medications:      dextrose      sodium chloride 25 mL (06/29/22 3375)      lidocaine  2 patch TransDERmal Daily    diclofenac sodium  2 g Topical BID    melatonin  10 mg Oral Nightly  guaiFENesin  400 mg Oral Q8H    lisinopril  20 mg Oral BID    And    hydroCHLOROthiazide  12.5 mg Oral BID    metoprolol  100 mg Oral BID    busPIRone  10 mg Oral TID    gabapentin  600 mg Oral 4x Daily    pravastatin  40 mg Oral Daily    insulin lispro  0-18 Units SubCUTAneous TID WC    insulin lispro  0-9 Units SubCUTAneous Nightly    insulin glargine  10 Units SubCUTAneous Nightly    sodium chloride flush  5-40 mL IntraVENous 2 times per day    ipratropium-albuterol  1 ampule Inhalation Q4H WA     prochlorperazine, sodium chloride, magnesium sulfate, potassium chloride **OR** potassium alternative oral replacement **OR** potassium chloride, labetalol, traMADol, glucose, dextrose bolus **OR** dextrose bolus, glucagon (rDNA), dextrose, sodium chloride flush, sodium chloride, ondansetron **OR** ondansetron, polyethylene glycol, acetaminophen **OR** acetaminophen, potassium chloride, magnesium sulfate  ADULT DIET; Dysphagia - Soft and Bite Sized; 4 carb choices (60 gm/meal); No Added Salt (3-4 gm)     Lab and other Data:     Recent Labs     06/29/22 0212 06/30/22  0506 07/01/22  0435   WBC 12.0* 10.3 9.0   HGB 10.4* 10.9* 10.4*    221 215     Recent Labs     06/29/22 0212 06/30/22  0506 07/01/22  0435    141 138   K 3.9 3.7 3.9   CL 97* 97* 94*   CO2 34* 34* 36*   BUN 18 14 17   CREATININE 0.6 0.6 0.6   GLUCOSE 113* 119* 121*     Recent Labs     06/29/22 0212 06/30/22  0506 07/01/22  0435   AST 35* 33* 32   ALT 11 11 10   BILITOT 1.1 1.0 0.8   ALKPHOS 125* 127* 118*     RAD:   CT ABDOMEN PELVIS WO CONTRAST Additional Contrast? None  Result Date: 6/15/2022  1. There is 6 mm obstructive calculus in the left upper ureter with mild proximal hydroureteronephrosis and perinephric inflammatory changes. 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 Bilateral non-obstructing renal calculi noted.  2. Mild hepatomegaly with mild hepatic steatosis. 3. Colonic diverticulosis without evidence of acute diverticulitis. 4. Small fat containing umbilical and supra umbilical hernia noted. Recommendation: Follow up as clinically indicated. All CT scans at this facility utilize dose modulation, iterative reconstruction, and/or weight based dosing when appropriate to reduce radiation dose to as low as reasonably achievable. Amended by Rex Lemus MD at 15-Shaquille-2022 02:44:19 AM Electronically Signed by Rex Lemus MD at 15-Shaquille-2022 02:29:46 AM             XR CHEST PORTABLE  Result Date: 6/15/2022  1. Questionable trace left pleural effusion with basilar atelectasis. 2. Right sided central line is in place with its tip in SVC. Recommendation: Follow up as clinically indicated. Electronically Signed by Alice Butler MD at 15-Shaquille-2022 06:55:39 AM             XR CHEST PORTABLE  Result Date: 6/14/2022  1. Unremarkable radiograph of chest. Recommendation: Follow up as clinically indicated. Electronically Signed by Chrystie Bence MD at 15-Shaquille-2022 12:21:20 AM             Assessment/Plan   Principal Problem (Resolved): Obstructive uropathy  Active Problems:    Sepsis with acute renal failure (HCC)    Renal calculus, bilateral    BINH (acute kidney injury) ([address]    Hyperglycemia due to diabetes mellitus ([address]    Hypertension    Dehydration    FREDDY and COPD overlap syndrome (HCC)    Degenerative joint disease    Palliative care patient    Septicemia ([address]    Chronic pain syndrome    Acute respiratory failure with hypoxia ([address]  Resolved Problems:    Obstructive pyelonephritis    Left ureteral calculus      Visit Summary:  Chart reviewed. Report obtained from RN with no acute events overnight. Pt has been accepted to [facility] with plans to d/c to SNF this afternoon. Ms. To Walker is seen at bedside this afternoon. Symptoms remain controlled and she is hopeful for continued improvement in rehab to potentially get back home.  We discussed transfer process today and requests her son be contacted with transport time and I have relayed this to RN. I spoke with her daughter, Mello Larose, over telephone to update on pts status and plan of care. I explained [facility] would be in contact with them next week to scheduled initial visit with outpatient palliative care NP at SNF. Opportunity for questions and emotional support provided. Recommendations:   1. Palliative Care- [facility] d/c to [facility] for rehab with [facility] services today pending negative COVID test.  Code status- DNR    2, Obstructive left pyelonephritits 2/2 proximal left urteral calculus- urology following. S/p ureteral stent placement 6/15/22 and Cystoscopy with left stent removal and ureter stone extraction 6/28/2022 per Dr. Ngoc Wiley. Levaquin completed 6/30/2022    3. Septic shock with E coli bacteremia- Resolved. Repeat blood cultures, NGTD. 4. BINH- improved. 5. Chronic pain syndrome-  chronic back pain with Norco and gabapentin baseline. Potential overuse at home noted. Pain regimen with ultram prn at this time. Voltaren gel and lidocaine patch for knee and lower back pain with improvement    6. Afib with RVR new onset- resolved    7. Transaminitis-  Improving. Unclear baseline LFTs. Mgmt per hospitalist. Continue to monitor labs    8. Acute hypoxic respiratory failure- mgmt per hospitalist, suspect multifactorial. Weaned to 2 L NC. Thank you for consulting Palliative Care and allowing us to participate in the care of this patient.    Time Spent Counseling > 50%:  YES                                   Total Time Spent with patient/family counseling, workup/treatment review, counseling and placement of orders/preparation of this note: 31 minutes    Electronically signed by MATTHEW Khoury CNP on 7/1/2022 at 8:13 AM    (Please note that portions of this note were completed with a voice recognition program.  Skyler Carbon made to edit the dictations but occasionally words are mis-transcribed.)

## 2022-07-01 NOTE — CARE COORDINATION
Pt can dc to [facility] 7/1 LATE afternoon pending neg COVID test.   [facility]   [address] [place] [address]  Electronically signed by Phoebe Yadav on 7/1/2022 at 11:46 AM

## 2022-07-02 LAB
CALCULI COMPOSITION: NORMAL
MASS: 118 MG
STONE DESCRIPTION: NORMAL

## 2022-07-05 ENCOUNTER — TELEPHONE (OUTPATIENT)
Dept: PALLATIVE CARE | Age: 67
End: 2022-07-05

## 2022-07-13 ENCOUNTER — OFFICE VISIT (OUTPATIENT)
Dept: PALLATIVE CARE | Age: 67
End: 2022-07-13
Payer: MEDICARE

## 2022-07-13 DIAGNOSIS — R53.1 DECREASED STRENGTH, ENDURANCE, AND MOBILITY: Primary | ICD-10-CM

## 2022-07-13 DIAGNOSIS — Z91.81 AT RISK FOR FALLS: ICD-10-CM

## 2022-07-13 DIAGNOSIS — G89.4 CHRONIC PAIN SYNDROME: ICD-10-CM

## 2022-07-13 DIAGNOSIS — G47.33 OSA AND COPD OVERLAP SYNDROME (HCC): ICD-10-CM

## 2022-07-13 DIAGNOSIS — J44.9 OSA AND COPD OVERLAP SYNDROME (HCC): ICD-10-CM

## 2022-07-13 DIAGNOSIS — Z74.09 DECREASED STRENGTH, ENDURANCE, AND MOBILITY: Primary | ICD-10-CM

## 2022-07-13 DIAGNOSIS — R68.89 DECREASED STRENGTH, ENDURANCE, AND MOBILITY: Primary | ICD-10-CM

## 2022-07-13 DIAGNOSIS — Z51.5 ENCOUNTER FOR PALLIATIVE CARE: ICD-10-CM

## 2022-07-13 PROCEDURE — 1123F ACP DISCUSS/DSCN MKR DOCD: CPT | Performed by: NURSE PRACTITIONER

## 2022-07-13 PROCEDURE — 99309 SBSQ NF CARE MODERATE MDM 30: CPT | Performed by: NURSE PRACTITIONER

## 2022-07-13 RX ORDER — DICLOFENAC SODIUM 75 MG/1
TABLET, DELAYED RELEASE ORAL
COMMUNITY
Start: 2022-06-23 | End: 2022-08-17

## 2022-07-13 NOTE — PROGRESS NOTES
Supportive Care/Community Based Palliative Care  Initial Consultation Note      Patient Name:  Dilma Richards  Medical Record Number:  [id number]  YOB: 1955    Date of Visit: 7/13/2022  Location of Visit:  [address] [place] [address] and Rehab (admission date 7/1/2022)    Referring Provider: MATTHEW East (inpatient palliative care)  Patient Care Team:  Sujatha Hughes MD as PCP - General (Family Medicine)  MATTHEW Knight - CNP as Advanced Practice Nurse (Nurse Practitioner)    Reason for Consult:   Goals of care   Symptom Management    Family Support    History obtained from:  patient, electronic medical record    [facility] ORDERS/RECOMMENDATIONS/PLAN:     Decreased strength, endurance, and mobility  At risk for falls  Continue rehab    Chronic pain syndrome  Currently controlled with gabapentin, Voltaren, lidocaine patch  She is glad to be off hydrocodone    FREDDY and COPD overlap syndrome ([facility]  Reports never having a formal sleep study    Encounter for palliative care  Current goals of care include: Continue rehab and strengthening, return home to live independently, would return to hospital if needed. Will continue ACP discussions at future visit  Will continue goals of care discussions based on clinical course  Follow up visit as needed  CHIEF COMPLAINT:     Chief Complaint   Patient presents with    Fatigue     Feels drained after having diarrhea       CLINICAL SUMMARY:          Dilma Richards is a 78 y.o. female with PMH of HTN, arthritis, FREDDY, COPD, degenerative joint disease, chronic pain syndrome who was admitted to [facility] 6/14/2022 to 7/1/2022 due to increased weakness, fatigue, abdominal pain. Upon presentation to the emergency department she was hypotensive and mildly tachycardic. She was noted to have acute renal failure with creatinine up to 3.0. White blood cell count was 17,000 lactic acid 4.1. She was treated with IV fluids and antibiotics.   CT scan abdomen showed proximal left ureteral calculus. She was taken to the OR emergently for sepsis obstructive pyelonephritis secondary to left proximal ureteral calculus and a stent was placed. She did require pressor support. Urine and blood cultures positive for E. Coli. She had cystoscopy with left stent removal and ureteral stone extraction on 6/28/2022 per Dr. Riccardo Gregg. Discharged to [facility] for rehab. HPI AND VISIT SUMMARY:     I saw Fernanda Comer in in her room at [facility] and rehab. I was screened and afebrile upon entry to the facility. Upon arrival to her room she was noted to be awake, alert, no acute distress lying in bed. We discussed her recent hospital stay and events prior to that. She reports she feels like she is been going down over the last 6 months. She is lives alone. About a month prior to the hospital she \"started going down\". She is noticing issues with her vision over the last 6 months. Her brother was also involved in the [place] [employer] and this hit her very hard emotionally. Prior to this she was living alone with some assistance from family. She was showering every week or 2 and microwaving most of her meals. She got to where she did not trust herself with a hot oven, etc.  She orders groceries and has not delivered. Familyticeana Race with [employer] service cleans her home. Atilio Girish her grandson is also helpful and Juan M another family member also helps with laundry. She quit driving in August 2313. She has a history of chronic pain. She reports taking hydrocodone for about 20 years. In the hospital she had some hallucinations and hot flashes. In hindsight she believes she was probably withdrawing from the hydrocodone. She is now off this medication. She is happy about this. She has right leg/knee pain and Voltaren helps. Lidocaine patch also helps her low back pain. Her weight is down about 45 pounds since prior to hospitalization. Overall her appetite is reduced. General nauseated today. She went 5 days without a bowel movement and then had explosive diarrhea after drinking milk. She feels worn out now due to the diarrhea. Question of sleep apnea. She reports not having a sleep study. She does feel better the next day if she sleeps with oxygen. She does have a rash in her skin folds which is being treated by facility staff. Current goals include remaining at facility for rehab. She is hoping to get stronger and be able to live independently again. She is not yet ambulating. Therapy staff have been getting her up to stand with much assistance. She is very concerned about possibility of being discharged too soon. She will talk to facility staff about her concerns. Discussed goals of care, Treatment options/plans, Answered questions, Listened to patient/family/caregiver concerns, Assessed family understanding and coping, Elicited patient's goals and values, and used these to establish or modify goals of care, and Provided emotional support    ADVANCE CARE PLANNING:                                            Surrogate Decision Maker: Advance Care Planning   Healthcare Decision Maker:    Primary Decision Maker: Nino Garcia - Child - [phone]    Primary Decision Maker: Omar Portillo - Child - [phone]     Durable Power of : no  -on file NA    Advance Directives/Living Celis: no  -on file NA    Out of hospital medical orders in place to reflect resuscitation status: no  -on file NA    FUNCTIONAL ASSESSMENT:     Palliative Performance Scale:  50% Mainly sit/lie;  Extensive disease; Can't do any work; Considerable assist; intake normal  Or reduced; LOC full/confusion    Activities of Daily Living (ADLs):    She is independent in the following: feeding  She requires assistance with the following: ambulation, bathing and hygiene, continence, grooming, toileting, and dressing    Instrumental Activities of Daily Living (IADLs):    She is independent in the following: Telephone    PSYCHOSOCIAL / SPIRITUAL SCREENING:     Any spiritual / Scientologist concerns:  No spiritual distress identified     Caregiver Burnout: No Caregiver present    Anticipatory grief assessment: Normal    HISTORY:     Past Medical History:        Diagnosis Date    Arthritis     HTN (hypertension)      Past Surgical History:    Past Surgical History:   Procedure Laterality Date    ANKLE FRACTURE SURGERY Right     BLADDER SURGERY Left 6/15/2022    CYSTOSCOPY, LEFT URETEROSCOPY, RETROGRADE PYELOGRAM, STENT INSERTION performed by Hillary Hewitt MD at [facility] Left 6/28/2022    CYSTOSCOPy, LEFT URETEROSCOPY, LEFT URETEREAL  STONE BASKET EXTRACTION performed by Hillary Hewitt MD at [facility] Left 6/28/2022    LEFT URETERAL STENT PLACEMENT performed by Hillary Hewitt MD at [address] [facility] (CERVIX STATUS UNKNOWN)      INCONTINENCE SURGERY       Family History:       Problem Relation Age of Onset    Cancer Mother     Cancer Father      Social History:    Social History     Tobacco Use    Smoking status: Former    Smokeless tobacco: Never    Tobacco comments:     quit 10 years ago   Vaping Use    Vaping Use: Never used   Substance Use Topics    Alcohol use: Never    Drug use: Never     Current Medications:      Current Outpatient Medications:     diclofenac (VOLTAREN) 75 MG EC tablet, , Disp: , Rfl:     gabapentin (NEURONTIN) 600 MG tablet, Take 1 tablet by mouth 4 times daily for 3 days. , Disp: 12 tablet, Rfl: 3    diclofenac sodium (VOLTAREN) 1 % GEL, Apply 2 g topically 2 times daily, Disp: 150 g, Rfl: 0    omeprazole (PRILOSEC) 20 MG delayed release capsule, Take 20 mg by mouth Daily, Disp: , Rfl:     pravastatin (PRAVACHOL) 40 MG tablet, Take 40 mg by mouth daily, Disp: , Rfl:     lisinopril-hydroCHLOROthiazide (PRINZIDE;ZESTORETIC) 20-12.5 MG per tablet, Take 1 tablet by mouth in the morning and at bedtime, Disp: , Rfl: metoprolol (LOPRESSOR) 100 MG tablet, Take 100 mg by mouth 2 times daily, Disp: , Rfl:     clotrimazole-betamethasone (LOTRISONE) 1-0.05 % cream, Apply topically 2 times daily Apply topically 2 times daily. , Disp: , Rfl:      Allergies:  Codeine    Review of Systems   Constitutional:  Positive for appetite change (Decreased), fatigue and unexpected weight change (Has lost about 45 pounds). Negative for chills and fever. HENT: Negative. Eyes:  Positive for visual disturbance (Vision changes over the last 6 months). Respiratory:  Positive for shortness of breath (Mainly with exertion). Negative for wheezing. Cardiovascular: Negative. Gastrointestinal:  Positive for constipation, diarrhea and nausea. Negative for vomiting. Genitourinary:  Negative for difficulty urinating. Musculoskeletal:  Positive for arthralgias, back pain and gait problem. Skin:  Positive for rash (Skin folds). Neurological:  Positive for weakness (Generalized). Psychiatric/Behavioral: Negative.        OBJECTIVE:     Vitals:    07/13/22 1115   BP: 124/70   Pulse: 78   Resp: 18   Temp: 97.1 °F (36.2 °C)   SpO2: 93%     General appearance:  alert , cooperative with exam, vital signs stable, well developed, chronically ill appearing  HEENT: normocephalic, atraumatic, sclera clear, conjunctiva pink, EOMI, external ears normal, external nose normal, lips normal, oral mucosa moist   Neck: supple, trachea midline  Lungs: equal bilateral expansion, diminished bases bilaterally  Heart: regular rate and rhythm, S1 S2 normal, no murmur  Abdomen: Obese, bowel sounds active, soft, ventral hernia  Extremities:  trace LE edema  Neurologic: no focal neurologic deficits, follows commands, speech clear, and generalized weakness    Psychiatric:  Alert and oriented, mood and affect appropriate  Skin: warm, dry    New Patient Visit Time:  45 minutes, Greater than 50% of the time in this visit was spent counseling and coordinating care of this patient. This dictation was generated by voice recognition computer software. Although all attempts are made to edit the dictation for accuracy, there may be errors in the transcription that are not intended.     Electronically signed by MATTHEW Prado CNP on 8/15/2022 at 6:45 AM

## 2022-08-15 ENCOUNTER — LAB (OUTPATIENT)
Dept: LAB | Facility: HOSPITAL | Age: 67
End: 2022-08-15

## 2022-08-15 ENCOUNTER — PATIENT ROUNDING (BHMG ONLY) (OUTPATIENT)
Dept: INTERNAL MEDICINE | Facility: CLINIC | Age: 67
End: 2022-08-15

## 2022-08-15 ENCOUNTER — OFFICE VISIT (OUTPATIENT)
Dept: INTERNAL MEDICINE | Facility: CLINIC | Age: 67
End: 2022-08-15

## 2022-08-15 VITALS
HEIGHT: 64 IN | WEIGHT: 242.5 LBS | OXYGEN SATURATION: 98 % | DIASTOLIC BLOOD PRESSURE: 82 MMHG | HEART RATE: 75 BPM | SYSTOLIC BLOOD PRESSURE: 100 MMHG | TEMPERATURE: 98 F | BODY MASS INDEX: 41.4 KG/M2 | RESPIRATION RATE: 16 BRPM

## 2022-08-15 VITALS
SYSTOLIC BLOOD PRESSURE: 124 MMHG | DIASTOLIC BLOOD PRESSURE: 70 MMHG | HEART RATE: 78 BPM | OXYGEN SATURATION: 93 % | TEMPERATURE: 97.1 F | RESPIRATION RATE: 18 BRPM

## 2022-08-15 DIAGNOSIS — E78.2 MIXED HYPERLIPIDEMIA: ICD-10-CM

## 2022-08-15 DIAGNOSIS — Z12.11 SCREENING FOR COLORECTAL CANCER: ICD-10-CM

## 2022-08-15 DIAGNOSIS — Z51.81 ENCOUNTER FOR MEDICATION MONITORING: ICD-10-CM

## 2022-08-15 DIAGNOSIS — E11.65 TYPE 2 DIABETES MELLITUS WITH HYPERGLYCEMIA, WITHOUT LONG-TERM CURRENT USE OF INSULIN: ICD-10-CM

## 2022-08-15 DIAGNOSIS — Z00.01 ANNUAL VISIT FOR GENERAL ADULT MEDICAL EXAMINATION WITH ABNORMAL FINDINGS: ICD-10-CM

## 2022-08-15 DIAGNOSIS — E66.01 CLASS 3 SEVERE OBESITY DUE TO EXCESS CALORIES WITH SERIOUS COMORBIDITY AND BODY MASS INDEX (BMI) OF 40.0 TO 44.9 IN ADULT: ICD-10-CM

## 2022-08-15 DIAGNOSIS — K21.9 GASTROESOPHAGEAL REFLUX DISEASE WITHOUT ESOPHAGITIS: ICD-10-CM

## 2022-08-15 DIAGNOSIS — M17.10 ARTHRITIS OF KNEE: ICD-10-CM

## 2022-08-15 DIAGNOSIS — E11.65 TYPE 2 DIABETES MELLITUS WITH HYPERGLYCEMIA, WITHOUT LONG-TERM CURRENT USE OF INSULIN: Primary | ICD-10-CM

## 2022-08-15 DIAGNOSIS — G57.93 NEUROPATHY INVOLVING BOTH LOWER EXTREMITIES: ICD-10-CM

## 2022-08-15 DIAGNOSIS — N13.9 OBSTRUCTIVE UROPATHY: ICD-10-CM

## 2022-08-15 DIAGNOSIS — G89.4 CHRONIC PAIN DISORDER: ICD-10-CM

## 2022-08-15 DIAGNOSIS — Z12.12 SCREENING FOR COLORECTAL CANCER: ICD-10-CM

## 2022-08-15 DIAGNOSIS — I10 ESSENTIAL HYPERTENSION: ICD-10-CM

## 2022-08-15 PROBLEM — E66.813 CLASS 3 SEVERE OBESITY DUE TO EXCESS CALORIES WITH SERIOUS COMORBIDITY AND BODY MASS INDEX (BMI) OF 40.0 TO 44.9 IN ADULT: Status: ACTIVE | Noted: 2022-07-01

## 2022-08-15 PROBLEM — G47.33 OBSTRUCTIVE SLEEP APNEA: Status: ACTIVE | Noted: 2022-07-02

## 2022-08-15 PROBLEM — E78.5 HYPERLIPIDEMIA, UNSPECIFIED: Status: ACTIVE | Noted: 2022-07-02

## 2022-08-15 PROBLEM — E66.9 OBESITY, UNSPECIFIED: Status: ACTIVE | Noted: 2022-07-01

## 2022-08-15 LAB
ALBUMIN SERPL-MCNC: 3.8 G/DL (ref 3.5–5.2)
ALBUMIN/GLOB SERPL: 0.8 G/DL
ALP SERPL-CCNC: 99 U/L (ref 39–117)
ALT SERPL W P-5'-P-CCNC: 15 U/L (ref 1–33)
AMPHET+METHAMPHET UR QL: NEGATIVE
AMPHETAMINES UR QL: NEGATIVE
ANION GAP SERPL CALCULATED.3IONS-SCNC: 11 MMOL/L (ref 5–15)
AST SERPL-CCNC: 29 U/L (ref 1–32)
BACTERIA UR QL AUTO: ABNORMAL /HPF
BARBITURATES UR QL SCN: NEGATIVE
BENZODIAZ UR QL SCN: NEGATIVE
BILIRUB SERPL-MCNC: 0.6 MG/DL (ref 0–1.2)
BILIRUB UR QL STRIP: NEGATIVE
BUN SERPL-MCNC: 17 MG/DL (ref 8–23)
BUN/CREAT SERPL: 21 (ref 7–25)
BUPRENORPHINE SERPL-MCNC: NEGATIVE NG/ML
CALCIUM SPEC-SCNC: 9.6 MG/DL (ref 8.6–10.5)
CANNABINOIDS SERPL QL: POSITIVE
CHLORIDE SERPL-SCNC: 95 MMOL/L (ref 98–107)
CHOLEST SERPL-MCNC: 181 MG/DL (ref 0–200)
CLARITY UR: ABNORMAL
CO2 SERPL-SCNC: 28 MMOL/L (ref 22–29)
COCAINE UR QL: NEGATIVE
COLOR UR: ABNORMAL
CREAT SERPL-MCNC: 0.81 MG/DL (ref 0.57–1)
DEPRECATED RDW RBC AUTO: 49.7 FL (ref 37–54)
EGFRCR SERPLBLD CKD-EPI 2021: 79.7 ML/MIN/1.73
ERYTHROCYTE [DISTWIDTH] IN BLOOD BY AUTOMATED COUNT: 14.2 % (ref 12.3–15.4)
GLOBULIN UR ELPH-MCNC: 4.7 GM/DL
GLUCOSE SERPL-MCNC: 137 MG/DL (ref 65–99)
GLUCOSE UR STRIP-MCNC: NEGATIVE MG/DL
HBA1C MFR BLD: 6 % (ref 4.8–5.6)
HCT VFR BLD AUTO: 45 % (ref 34–46.6)
HCV AB SER DONR QL: NORMAL
HDLC SERPL-MCNC: 38 MG/DL (ref 40–60)
HGB BLD-MCNC: 14 G/DL (ref 12–15.9)
HGB UR QL STRIP.AUTO: ABNORMAL
HYALINE CASTS UR QL AUTO: ABNORMAL /LPF
KETONES UR QL STRIP: ABNORMAL
LDLC SERPL CALC-MCNC: 110 MG/DL (ref 0–100)
LDLC/HDLC SERPL: 2.77 {RATIO}
LEUKOCYTE ESTERASE UR QL STRIP.AUTO: ABNORMAL
MCH RBC QN AUTO: 29.9 PG (ref 26.6–33)
MCHC RBC AUTO-ENTMCNC: 31.1 G/DL (ref 31.5–35.7)
MCV RBC AUTO: 95.9 FL (ref 79–97)
METHADONE UR QL SCN: NEGATIVE
NITRITE UR QL STRIP: POSITIVE
OPIATES UR QL: NEGATIVE
OXYCODONE UR QL SCN: NEGATIVE
PCP UR QL SCN: NEGATIVE
PH UR STRIP.AUTO: 5.5 [PH] (ref 5–8)
PLATELET # BLD AUTO: 216 10*3/MM3 (ref 140–450)
PMV BLD AUTO: 11.1 FL (ref 6–12)
POTASSIUM SERPL-SCNC: 4.2 MMOL/L (ref 3.5–5.2)
PROPOXYPH UR QL: NEGATIVE
PROT SERPL-MCNC: 8.5 G/DL (ref 6–8.5)
PROT UR QL STRIP: ABNORMAL
RBC # BLD AUTO: 4.69 10*6/MM3 (ref 3.77–5.28)
RBC # UR STRIP: ABNORMAL /HPF
REF LAB TEST METHOD: ABNORMAL
SODIUM SERPL-SCNC: 134 MMOL/L (ref 136–145)
SP GR UR STRIP: 1.02 (ref 1–1.03)
SQUAMOUS #/AREA URNS HPF: ABNORMAL /HPF
TRICYCLICS UR QL SCN: NEGATIVE
TRIGL SERPL-MCNC: 189 MG/DL (ref 0–150)
UROBILINOGEN UR QL STRIP: ABNORMAL
VLDLC SERPL-MCNC: 33 MG/DL (ref 5–40)
WBC # UR STRIP: ABNORMAL /HPF
WBC NRBC COR # BLD: 8.03 10*3/MM3 (ref 3.4–10.8)

## 2022-08-15 PROCEDURE — 80306 DRUG TEST PRSMV INSTRMNT: CPT

## 2022-08-15 PROCEDURE — 85027 COMPLETE CBC AUTOMATED: CPT

## 2022-08-15 PROCEDURE — 87077 CULTURE AEROBIC IDENTIFY: CPT

## 2022-08-15 PROCEDURE — 82043 UR ALBUMIN QUANTITATIVE: CPT

## 2022-08-15 PROCEDURE — 87186 SC STD MICRODIL/AGAR DIL: CPT

## 2022-08-15 PROCEDURE — 99204 OFFICE O/P NEW MOD 45 MIN: CPT | Performed by: INTERNAL MEDICINE

## 2022-08-15 PROCEDURE — 83036 HEMOGLOBIN GLYCOSYLATED A1C: CPT

## 2022-08-15 PROCEDURE — 87086 URINE CULTURE/COLONY COUNT: CPT

## 2022-08-15 PROCEDURE — 80053 COMPREHEN METABOLIC PANEL: CPT

## 2022-08-15 PROCEDURE — 86803 HEPATITIS C AB TEST: CPT

## 2022-08-15 PROCEDURE — 36415 COLL VENOUS BLD VENIPUNCTURE: CPT

## 2022-08-15 PROCEDURE — 81001 URINALYSIS AUTO W/SCOPE: CPT

## 2022-08-15 PROCEDURE — 82570 ASSAY OF URINE CREATININE: CPT

## 2022-08-15 PROCEDURE — 80061 LIPID PANEL: CPT

## 2022-08-15 RX ORDER — PRAVASTATIN SODIUM 40 MG
1 TABLET ORAL DAILY
COMMUNITY
Start: 2022-07-21 | End: 2022-08-15 | Stop reason: SDUPTHER

## 2022-08-15 RX ORDER — LISINOPRIL AND HYDROCHLOROTHIAZIDE 20; 12.5 MG/1; MG/1
1 TABLET ORAL 2 TIMES DAILY
Qty: 180 TABLET | Refills: 1 | Status: CANCELLED | OUTPATIENT
Start: 2022-08-15

## 2022-08-15 RX ORDER — METOPROLOL TARTRATE 50 MG/1
50 TABLET, FILM COATED ORAL 2 TIMES DAILY
Qty: 180 TABLET | Refills: 1 | Status: SHIPPED | OUTPATIENT
Start: 2022-08-15 | End: 2023-01-27 | Stop reason: SDUPTHER

## 2022-08-15 RX ORDER — TRAMADOL HYDROCHLORIDE 50 MG/1
1 TABLET ORAL DAILY
COMMUNITY
Start: 2022-08-08 | End: 2022-08-15 | Stop reason: SDUPTHER

## 2022-08-15 RX ORDER — POTASSIUM CHLORIDE 20 MEQ/1
1 TABLET, EXTENDED RELEASE ORAL DAILY
COMMUNITY
Start: 2022-08-08 | End: 2022-08-15 | Stop reason: SDUPTHER

## 2022-08-15 RX ORDER — GABAPENTIN 600 MG/1
600 TABLET ORAL 4 TIMES DAILY PRN
Qty: 240 TABLET | Refills: 1 | Status: SHIPPED | OUTPATIENT
Start: 2022-08-15 | End: 2023-01-27 | Stop reason: SDUPTHER

## 2022-08-15 RX ORDER — POTASSIUM CHLORIDE 20 MEQ/1
20 TABLET, EXTENDED RELEASE ORAL DAILY
Qty: 90 TABLET | Refills: 1 | Status: SHIPPED | OUTPATIENT
Start: 2022-08-15 | End: 2022-09-29

## 2022-08-15 RX ORDER — TRAMADOL HYDROCHLORIDE 50 MG/1
50-100 TABLET ORAL EVERY 6 HOURS PRN
Qty: 220 TABLET | Refills: 0 | Status: SHIPPED | OUTPATIENT
Start: 2022-08-15 | End: 2022-09-29 | Stop reason: SDUPTHER

## 2022-08-15 RX ORDER — LISINOPRIL AND HYDROCHLOROTHIAZIDE 20; 12.5 MG/1; MG/1
1 TABLET ORAL DAILY
Qty: 90 TABLET | Refills: 1 | Status: SHIPPED | OUTPATIENT
Start: 2022-08-15 | End: 2023-01-16

## 2022-08-15 RX ORDER — OMEPRAZOLE 40 MG/1
40 CAPSULE, DELAYED RELEASE ORAL DAILY
Qty: 90 CAPSULE | Refills: 1 | Status: SHIPPED | OUTPATIENT
Start: 2022-08-15 | End: 2023-02-21

## 2022-08-15 RX ORDER — PRAVASTATIN SODIUM 40 MG
40 TABLET ORAL DAILY
Qty: 90 TABLET | Refills: 1 | Status: SHIPPED | OUTPATIENT
Start: 2022-08-15

## 2022-08-15 RX ORDER — METOPROLOL TARTRATE 100 MG/1
1 TABLET ORAL 2 TIMES DAILY
COMMUNITY
Start: 2022-07-21 | End: 2022-08-15 | Stop reason: SDUPTHER

## 2022-08-15 RX ORDER — OMEPRAZOLE 20 MG/1
1 CAPSULE, DELAYED RELEASE ORAL DAILY
COMMUNITY
Start: 2022-07-21 | End: 2022-08-15 | Stop reason: SDUPTHER

## 2022-08-15 RX ORDER — GABAPENTIN 600 MG/1
1 TABLET ORAL 4 TIMES DAILY
COMMUNITY
Start: 2022-07-01 | End: 2022-08-15 | Stop reason: SDUPTHER

## 2022-08-15 RX ORDER — LISINOPRIL AND HYDROCHLOROTHIAZIDE 20; 12.5 MG/1; MG/1
1 TABLET ORAL 2 TIMES DAILY
COMMUNITY
Start: 2022-05-17 | End: 2022-08-15

## 2022-08-15 ASSESSMENT — ENCOUNTER SYMPTOMS
CONSTIPATION: 1
SHORTNESS OF BREATH: 1
DIARRHEA: 1
WHEEZING: 0
VOMITING: 0
NAUSEA: 1
BACK PAIN: 1

## 2022-08-15 NOTE — PROGRESS NOTES
CC: establish care for diabetes    History:  Jessica Kern is a 66 y.o. female   She presents today with her son and they note she has been doing better after recent hospitalization for SHAINA and obstructive uropathy.  She had ureteral stent placement, which was subsequently removed and she does not have routine follow-up scheduled with urology given that things had improved so dramatically.  She did go to [facility] [place] for rehab and is now home with home health.  She has lost a significant amount of weight, at least 50 pounds through this process and is learning to mobilize more effectively at home.  Notably, she took her first shower at home with occupational therapy this morning and reports good success.       ROS:  Review of Systems   Constitutional: Negative for chills and fever.   Respiratory: Negative for cough and shortness of breath.    Cardiovascular: Negative for chest pain and palpitations.   Gastrointestinal: Negative for abdominal pain and constipation.   Genitourinary: Negative for difficulty urinating and dysuria.   Musculoskeletal: Positive for arthralgias, gait problem and myalgias.   Neurological: Positive for weakness.        reports that she has quit smoking. She has never used smokeless tobacco. She reports that she does not drink alcohol and does not use drugs.      Current Outpatient Medications:   •  Diclofenac Sodium (VOLTAREN) 1 % gel gel, Apply 1 g topically to the appropriate area as directed 4 (Four) Times a Day., Disp: 100 g, Rfl: 3  •  gabapentin (NEURONTIN) 600 MG tablet, Take 1 tablet by mouth 4 (Four) Times a Day As Needed (neuropathy)., Disp: 240 tablet, Rfl: 1  •  lisinopril-hydrochlorothiazide (PRINZIDE,ZESTORETIC) 20-12.5 MG per tablet, Take 1 tablet by mouth Daily., Disp: 90 tablet, Rfl: 1  •  metoprolol tartrate (LOPRESSOR) 50 MG tablet, Take 1 tablet by mouth 2 (Two) Times a Day., Disp: 180 tablet, Rfl: 1  •  omeprazole (priLOSEC) 40 MG capsule, Take 1 capsule by mouth Daily.,  "Disp: 90 capsule, Rfl: 1  •  potassium chloride (K-DUR,KLOR-CON) 20 MEQ CR tablet, Take 1 tablet by mouth Daily., Disp: 90 tablet, Rfl: 1  •  pravastatin (PRAVACHOL) 40 MG tablet, Take 1 tablet by mouth Daily., Disp: 90 tablet, Rfl: 1  •  traMADol (ULTRAM) 50 MG tablet, Take 1-2 tablets by mouth Every 6 (Six) Hours As Needed for Moderate Pain . 2 tablets every 6 hours, Disp: 220 tablet, Rfl: 0    OBJECTIVE:  /82 (BP Location: Left arm, Patient Position: Sitting, Cuff Size: Adult)   Pulse 75   Temp 98 °F (36.7 °C)   Resp 16   Ht 162.6 cm (64\")   Wt 110 kg (242 lb 8 oz)   SpO2 98%   Breastfeeding No   BMI 41.63 kg/m²    Physical Exam  Constitutional:       General: She is not in acute distress.  Cardiovascular:      Rate and Rhythm: Normal rate and regular rhythm.      Heart sounds: Normal heart sounds. No murmur heard.  Pulmonary:      Effort: Pulmonary effort is normal.      Breath sounds: Normal breath sounds. No wheezing.   Neurological:      Mental Status: She is alert and oriented to person, place, and time.      Gait: Gait normal.   Psychiatric:         Mood and Affect: Mood normal.         Behavior: Behavior normal.         Assessment/Plan     Diagnoses and all orders for this visit:    1. Type 2 diabetes mellitus with hyperglycemia, without long-term current use of insulin ([facility]) (Primary)  -     Comprehensive Metabolic Panel; Future  -     Hemoglobin A1c; Future  -     Lipid Panel; Future  -     Microalbumin / Creatinine Urine Ratio - Urine, Clean Catch; Future  A1c was 9.1 in June during hospitalization, though she has continued to lose a significant amount of weight.  We will check labs as ordered for further monitoring and consider medication interventions based on results.    2. Class 3 severe obesity due to excess calories with serious comorbidity and body mass index (BMI) of 40.0 to 44.9 in adult (HCC)  -     Miscellaneous DME  Class 3 Severe Obesity (BMI >=40). Obesity-related health " conditions include the following: hypertension, diabetes mellitus, dyslipidemias, GERD and osteoarthritis. Obesity is Improved given recent illness.. BMI is is above average; BMI management plan is completed. We discussed portion control and increasing exercise.    3. Screening for colorectal cancer  -     Cologuard - Stool, Per Rectum; Future    4. Essential hypertension  -     metoprolol tartrate (LOPRESSOR) 50 MG tablet; Take 1 tablet by mouth 2 (Two) Times a Day.  Dispense: 180 tablet; Refill: 1  -     CBC (No Diff); Future  -     lisinopril-hydrochlorothiazide (PRINZIDE,ZESTORETIC) 20-12.5 MG per tablet; Take 1 tablet by mouth Daily.  Dispense: 90 tablet; Refill: 1  Well controlled, BP goal for age is <140/90 per JNC 8 guidelines and decrease meds per orders.     5. Mixed hyperlipidemia  -     pravastatin (PRAVACHOL) 40 MG tablet; Take 1 tablet by mouth Daily.  Dispense: 90 tablet; Refill: 1  -     Lipid Panel; Future  Stable on moderate intensity statin therapy per ACC/AHA guidelines.    6. Chronic pain disorder  -     gabapentin (NEURONTIN) 600 MG tablet; Take 1 tablet by mouth 4 (Four) Times a Day As Needed (neuropathy).  Dispense: 240 tablet; Refill: 1  -     traMADol (ULTRAM) 50 MG tablet; Take 1-2 tablets by mouth Every 6 (Six) Hours As Needed for Moderate Pain . 2 tablets every 6 hours  Dispense: 220 tablet; Refill: 0  -     Miscellaneous DME  PDMP is reviewed.  She is currently taking 2 tablets every 6 hours.  We will assume prescription of this, but will try to decrease to #220 per 30 days.  She understands the concept of this and the benefit of decreasing in the long run.  She will continue to work with home health, therapy, and understands it would be ideal to work away from this medication as tolerated.    7. Arthritis of knee  She may continue with tramadol and Voltaren gel as needed.    8. Gastroesophageal reflux disease without esophagitis  -     omeprazole (priLOSEC) 40 MG capsule; Take 1  capsule by mouth Daily.  Dispense: 90 capsule; Refill: 1  Stable without exacerbation on PPI.    9. Annual visit for general adult medical examination with abnormal findings  -     Hepatitis C Antibody; Future    10. Obstructive uropathy  -     Urinalysis With Culture If Indicated - Urine, Clean Catch; Future  UA to monitor for blood or infection at this time.    11. Neuropathy involving both lower extremities  -     gabapentin (NEURONTIN) 600 MG tablet; Take 1 tablet by mouth 4 (Four) Times a Day As Needed (neuropathy).  Dispense: 240 tablet; Refill: 1  -     Miscellaneous DME  PDMP data is reviewed and gabapentin refilled, but she should attempt to decrease to 3 times daily therapy if possible.    Other orders  -     Diclofenac Sodium (VOLTAREN) 1 % gel gel; Apply 1 g topically to the appropriate area as directed 4 (Four) Times a Day.  Dispense: 100 g; Refill: 3  -     potassium chloride (K-DUR,KLOR-CON) 20 MEQ CR tablet; Take 1 tablet by mouth Daily.  Dispense: 90 tablet; Refill: 1        An After Visit Summary was printed and given to the patient at discharge.  Return in about 6 weeks (around 9/26/2022) for Recheck with me; 30 minutes.          Aron Mcallister D.O. 8/15/2022   Electronically signed.

## 2022-08-15 NOTE — PROGRESS NOTES
August 15, 2022    Hello, may I speak with Jessica Kern?    My name is Elvia Howard     I am  with [facility]  [address], [address]  [facility] [place] [facility] [id number] [phone].    Before we get started may I verify your date of birth? 1955    I am calling to officially welcome you to our practice and ask about your recent visit. Is this a good time to talk? yes    Tell me about your visit with us. What things went well? Been a wonderful experience. Very knowledgeable and the nurse was very good also.       We're always looking for ways to make our patients' experiences even better. Do you have recommendations on ways we may improve?  no    Overall were you satisfied with your first visit to our practice? yes       I appreciate you taking the time to speak with me today. Is there anything else I can do for you? no      Thank you, and have a great day.

## 2022-08-16 LAB
ALBUMIN UR-MCNC: 7.9 MG/DL
CREAT UR-MCNC: 133 MG/DL
MICROALBUMIN/CREAT UR: 59.4 MG/G

## 2022-08-17 ENCOUNTER — OFFICE VISIT (OUTPATIENT)
Dept: PALLATIVE CARE | Age: 67
End: 2022-08-17
Payer: MEDICARE

## 2022-08-17 DIAGNOSIS — J44.9 OSA AND COPD OVERLAP SYNDROME (HCC): ICD-10-CM

## 2022-08-17 DIAGNOSIS — Z60.2 LIVES ALONE WITH HELP AVAILABLE: Primary | ICD-10-CM

## 2022-08-17 DIAGNOSIS — R68.89 DECREASED STRENGTH, ENDURANCE, AND MOBILITY: ICD-10-CM

## 2022-08-17 DIAGNOSIS — G47.33 OSA AND COPD OVERLAP SYNDROME (HCC): ICD-10-CM

## 2022-08-17 DIAGNOSIS — G89.4 CHRONIC PAIN SYNDROME: ICD-10-CM

## 2022-08-17 DIAGNOSIS — Z74.09 DECREASED STRENGTH, ENDURANCE, AND MOBILITY: ICD-10-CM

## 2022-08-17 DIAGNOSIS — R53.1 DECREASED STRENGTH, ENDURANCE, AND MOBILITY: ICD-10-CM

## 2022-08-17 DIAGNOSIS — Z51.5 ENCOUNTER FOR PALLIATIVE CARE: ICD-10-CM

## 2022-08-17 LAB — BACTERIA SPEC AEROBE CULT: ABNORMAL

## 2022-08-17 PROCEDURE — 99349 HOME/RES VST EST MOD MDM 40: CPT | Performed by: NURSE PRACTITIONER

## 2022-08-17 PROCEDURE — 1036F TOBACCO NON-USER: CPT | Performed by: NURSE PRACTITIONER

## 2022-08-17 PROCEDURE — G8417 CALC BMI ABV UP PARAM F/U: HCPCS | Performed by: NURSE PRACTITIONER

## 2022-08-17 PROCEDURE — 1123F ACP DISCUSS/DSCN MKR DOCD: CPT | Performed by: NURSE PRACTITIONER

## 2022-08-17 PROCEDURE — 1090F PRES/ABSN URINE INCON ASSESS: CPT | Performed by: NURSE PRACTITIONER

## 2022-08-17 PROCEDURE — 3017F COLORECTAL CA SCREEN DOC REV: CPT | Performed by: NURSE PRACTITIONER

## 2022-08-17 RX ORDER — POTASSIUM CHLORIDE 20 MEQ/1
TABLET, EXTENDED RELEASE ORAL
COMMUNITY
Start: 2022-08-08

## 2022-08-17 RX ORDER — OMEPRAZOLE 40 MG/1
CAPSULE, DELAYED RELEASE ORAL
COMMUNITY
Start: 2022-08-15

## 2022-08-17 RX ORDER — TRAMADOL HYDROCHLORIDE 50 MG/1
TABLET ORAL
COMMUNITY
Start: 2022-08-15 | End: 2022-09-11

## 2022-08-17 SDOH — SOCIAL STABILITY - SOCIAL INSECURITY: PROBLEMS RELATED TO LIVING ALONE: Z60.2

## 2022-08-17 NOTE — PROGRESS NOTES
Supportive Care/Community Based Palliative Care  Follow Up Note        Patient Name:  Dallas Pandya  Medical Record Number:  [id number]  YOB: 1955    Date of Visit: 8/17/2022  Location of Visit:  Home    Patient Care Team:  Marylelen Davis as PCP - MATTHEW Quinonez - CNP as Palliative Care (Nurse Practitioner)    History obtained from:  patient, electronic medical record    PALLIATIVE DIAGNOSES AND ORDERS/RECOMMENDATIONS/PLAN:   Iris was seen today for fatigue. Diagnoses and all orders for this visit:    Lives alone with help available  Has good family/social support    Decreased strength, endurance, and mobility  Improving  Continue home physical therapy and occasional therapy    Chronic pain syndrome  Controlled  Continue tramadol, Voltaren, diclofenac as per PCP    FREDDY and COPD overlap syndrome ([facility] [address]  Had acute hypoxic respiratory failure while in hospital.  Has not had a sleep study  Doing well. Encounter for palliative care  No immediate needs identified today. She is now established with Dr. Mckayla Weiss for primary care. Current goals of care include: Preserve independence/control/autonomy, Maintain/Improve function quality of life, Remain at home  Follow up home visit as needed. CHIEF COMPLAINT:     Chief Complaint   Patient presents with    Fatigue     and weakness       CLINICAL SUMMARY:          Dallas Pandya is a 78 y.o. female with PMH of HTN, arthritis, FREDDY, COPD, degenerative joint disease, chronic pain syndrome who was admitted to [facility] 6/14/2022 to 7/1/2022 due to increased weakness, fatigue, abdominal pain. Upon presentation to the emergency department she was hypotensive and mildly tachycardic. She was noted to have acute renal failure with creatinine up to 3.0. White blood cell count was 17,000 lactic acid 4.1. She was treated with IV fluids and antibiotics. CT scan abdomen showed proximal left ureteral calculus.   She was taken to the OR emergently for sepsis MG EC tablet, , Disp: , Rfl:     omeprazole (PRILOSEC) 40 MG delayed release capsule, , Disp: , Rfl:     potassium chloride (KLOR-CON M) 20 MEQ extended release tablet, , Disp: , Rfl:     gabapentin (NEURONTIN) 600 MG tablet, Take 1 tablet by mouth 4 times daily for 3 days. , Disp: 12 tablet, Rfl: 3    diclofenac sodium (VOLTAREN) 1 % GEL, Apply 2 g topically 2 times daily, Disp: 150 g, Rfl: 0    pravastatin (PRAVACHOL) 40 MG tablet, Take 40 mg by mouth daily, Disp: , Rfl:     lisinopril-hydroCHLOROthiazide (PRINZIDE;ZESTORETIC) 20-12.5 MG per tablet, Take 1 tablet by mouth in the morning and at bedtime, Disp: , Rfl:     clotrimazole-betamethasone (LOTRISONE) 1-0.05 % cream, Apply topically 2 times daily Apply topically 2 times daily. , Disp: , Rfl:      Allergies:  Codeine    Review of Systems  Review of Systems   Constitutional:  Positive for activity change (Improving), appetite change (Waxes and wanes) and fatigue. Respiratory:  Positive for shortness of breath (With exertion). Gastrointestinal: Negative. Endocrine: Negative. Genitourinary: Negative. Musculoskeletal:  Positive for arthralgias. Skin: Negative. Neurological:  Positive for weakness (Generalized, improving). Psychiatric/Behavioral: Negative.         OBJECTIVE:     Vitals:    08/17/22 1411   BP: 100/60   Pulse: 67   Resp: 18   Temp: 97.2 °F (36.2 °C)   SpO2: 93%     General appearance:  alert and cooperative with exam, vital signs stable, well nourished, well developed  HEENT: normocephalic, atraumatic, sclera clear, conjunctiva pink, EOMI, external ears normal, external nose normal, lips normal, oral mucosa moist   Neck: supple, trachea midline  Lungs: equal bilateral expansion, clear to auscultation bilaterally, no cough, no dyspnea  Heart: regular rate and rhythm, S1 S2 normal  Abdomen:  soft, bowel sounds active  Extremities:  trace LE edema  Neurologic: no focal neurologic deficits, alert and oriented   Psychiatric: alert and oriented, no recent or remote memory deficits, good judgement, mood and affect appropriate  Skin: warm, dry, no obvious lesions    Established Patient Visit Time: 40 minutes, Greater than 50% of the time in this visit was spent counseling and coordinating care of this patient. This dictation was generated by voice recognition computer software. Although all attempts are made to edit the dictation for accuracy, there may be errors in the transcription that are not intended.     Electronically signed by MATTHEW Tovar CNP on 9/11/2022 at 8:18 PM

## 2022-08-19 ENCOUNTER — TELEPHONE (OUTPATIENT)
Dept: INTERNAL MEDICINE | Facility: CLINIC | Age: 67
End: 2022-08-19

## 2022-08-19 NOTE — TELEPHONE ENCOUNTER
Caller: OSCAR    Relationship: MANUELA     Best call back number: [phone]    What medication are you requesting: WHEELCHAIR CUSHION--WHEELCHAIR WAS PURCHASED FROM [facility]      If a prescription is needed, what is your preferred pharmacy and phone number:    [facility]  [phone]

## 2022-09-09 ENCOUNTER — TELEPHONE (OUTPATIENT)
Dept: INTERNAL MEDICINE | Facility: CLINIC | Age: 67
End: 2022-09-09

## 2022-09-09 NOTE — TELEPHONE ENCOUNTER
JOSE FROM [facility] HAS DISCHARGED PATIENT FROM [facility].  SHE STATED THAT PATIENT HAS MET ALL GOALS.     INSURANCE DECLINED MORE VISITS.

## 2022-09-11 VITALS
OXYGEN SATURATION: 93 % | DIASTOLIC BLOOD PRESSURE: 60 MMHG | SYSTOLIC BLOOD PRESSURE: 100 MMHG | RESPIRATION RATE: 18 BRPM | HEART RATE: 67 BPM | TEMPERATURE: 97.2 F

## 2022-09-11 PROBLEM — E11.65 TYPE 2 DIABETES MELLITUS WITH HYPERGLYCEMIA (HCC): Status: ACTIVE | Noted: 2022-07-06

## 2022-09-11 RX ORDER — METOPROLOL TARTRATE 50 MG/1
TABLET, FILM COATED ORAL
COMMUNITY
Start: 2022-08-15

## 2022-09-11 RX ORDER — TRAMADOL HYDROCHLORIDE 50 MG/1
50-100 TABLET ORAL EVERY 6 HOURS PRN
COMMUNITY
Start: 2022-08-15

## 2022-09-11 ASSESSMENT — ENCOUNTER SYMPTOMS
GASTROINTESTINAL NEGATIVE: 1
SHORTNESS OF BREATH: 1

## 2022-09-29 ENCOUNTER — OFFICE VISIT (OUTPATIENT)
Dept: INTERNAL MEDICINE | Facility: CLINIC | Age: 67
End: 2022-09-29

## 2022-09-29 VITALS
SYSTOLIC BLOOD PRESSURE: 130 MMHG | HEIGHT: 64 IN | DIASTOLIC BLOOD PRESSURE: 80 MMHG | OXYGEN SATURATION: 97 % | RESPIRATION RATE: 16 BRPM | TEMPERATURE: 98 F | BODY MASS INDEX: 50.02 KG/M2 | HEART RATE: 64 BPM | WEIGHT: 293 LBS

## 2022-09-29 DIAGNOSIS — I10 ESSENTIAL HYPERTENSION: ICD-10-CM

## 2022-09-29 DIAGNOSIS — G47.10 HYPERSOMNIA, UNSPECIFIED: Primary | ICD-10-CM

## 2022-09-29 DIAGNOSIS — E11.65 TYPE 2 DIABETES MELLITUS WITH HYPERGLYCEMIA, WITHOUT LONG-TERM CURRENT USE OF INSULIN: ICD-10-CM

## 2022-09-29 DIAGNOSIS — R06.83 SNORING: ICD-10-CM

## 2022-09-29 DIAGNOSIS — R29.818 SUSPECTED SLEEP APNEA: ICD-10-CM

## 2022-09-29 DIAGNOSIS — E66.01 CLASS 3 SEVERE OBESITY DUE TO EXCESS CALORIES WITH SERIOUS COMORBIDITY AND BODY MASS INDEX (BMI) OF 50.0 TO 59.9 IN ADULT: ICD-10-CM

## 2022-09-29 DIAGNOSIS — Z12.31 ENCOUNTER FOR SCREENING MAMMOGRAM FOR MALIGNANT NEOPLASM OF BREAST: ICD-10-CM

## 2022-09-29 DIAGNOSIS — E87.6 HYPOKALEMIA: ICD-10-CM

## 2022-09-29 DIAGNOSIS — Z78.0 ENCOUNTER FOR OSTEOPOROSIS SCREENING IN ASYMPTOMATIC POSTMENOPAUSAL PATIENT: ICD-10-CM

## 2022-09-29 DIAGNOSIS — M17.10 ARTHRITIS OF KNEE: ICD-10-CM

## 2022-09-29 DIAGNOSIS — G89.4 CHRONIC PAIN DISORDER: ICD-10-CM

## 2022-09-29 DIAGNOSIS — M17.0 PRIMARY OSTEOARTHRITIS OF BOTH KNEES: ICD-10-CM

## 2022-09-29 DIAGNOSIS — Z23 NEED FOR VACCINATION: ICD-10-CM

## 2022-09-29 DIAGNOSIS — R51.9 MORNING HEADACHE: ICD-10-CM

## 2022-09-29 DIAGNOSIS — Z13.820 ENCOUNTER FOR OSTEOPOROSIS SCREENING IN ASYMPTOMATIC POSTMENOPAUSAL PATIENT: ICD-10-CM

## 2022-09-29 PROCEDURE — 1160F RVW MEDS BY RX/DR IN RCRD: CPT | Performed by: INTERNAL MEDICINE

## 2022-09-29 PROCEDURE — 99214 OFFICE O/P EST MOD 30 MIN: CPT | Performed by: INTERNAL MEDICINE

## 2022-09-29 PROCEDURE — 96160 PT-FOCUSED HLTH RISK ASSMT: CPT | Performed by: INTERNAL MEDICINE

## 2022-09-29 PROCEDURE — 91312 COVID-19 (PFIZER) BIVALENT BOOSTER 12+YRS: CPT | Performed by: INTERNAL MEDICINE

## 2022-09-29 PROCEDURE — 1170F FXNL STATUS ASSESSED: CPT | Performed by: INTERNAL MEDICINE

## 2022-09-29 PROCEDURE — G0438 PPPS, INITIAL VISIT: HCPCS | Performed by: INTERNAL MEDICINE

## 2022-09-29 PROCEDURE — 1159F MED LIST DOCD IN RCRD: CPT | Performed by: INTERNAL MEDICINE

## 2022-09-29 PROCEDURE — 1125F AMNT PAIN NOTED PAIN PRSNT: CPT | Performed by: INTERNAL MEDICINE

## 2022-09-29 PROCEDURE — 0124A COVID-19 (PFIZER) BIVALENT BOOSTER 12+YRS: CPT | Performed by: INTERNAL MEDICINE

## 2022-09-29 RX ORDER — TRAMADOL HYDROCHLORIDE 50 MG/1
50-100 TABLET ORAL EVERY 6 HOURS PRN
Qty: 220 TABLET | Refills: 1 | Status: SHIPPED | OUTPATIENT
Start: 2022-09-29 | End: 2022-11-07

## 2022-09-29 RX ORDER — POTASSIUM CHLORIDE 20 MEQ/1
20 TABLET, EXTENDED RELEASE ORAL DAILY
Qty: 45 TABLET | Refills: 1 | Status: SHIPPED | OUTPATIENT
Start: 2022-09-29

## 2022-09-29 RX ORDER — CLOTRIMAZOLE AND BETAMETHASONE DIPROPIONATE 10; .64 MG/G; MG/G
1 CREAM TOPICAL DAILY
COMMUNITY
Start: 2022-09-15

## 2022-09-29 NOTE — PROGRESS NOTES
CC: suspected sleep apnea    History:  Jessica Kern is a 66 y.o. female   She notes she has been doing better recently, but has fatigue through the day, morning headaches and issues with her oxygen saturations at night. She was told in the hospital that she likely has sleep apnea, but she has not had a sleep study.     [place]:  Sitting and Reading: 3  Watching TV: 3  Sitting, inactive in a public place: 1  As a passenger in a car for an hour without a break: 0  Lying down to rest in the afternoon: 3  Sitting and talking to someone: 0  Sitting quietly after a lunch without alcohol: 3  In a car, while stopped for a few minutes in traffic: 0  Total: 13       ROS:  Review of Systems   Respiratory: Negative for shortness of breath.    Cardiovascular: Negative for chest pain.   Neurological: Positive for headaches.        reports that she has quit smoking. She has never used smokeless tobacco. She reports that she does not drink alcohol and does not use drugs.      Current Outpatient Medications:   •  clotrimazole-betamethasone (LOTRISONE) 1-0.05 % cream, Apply 1 application topically to the appropriate area as directed Daily., Disp: , Rfl:   •  diclofenac (VOLTAREN) 50 MG EC tablet, Take 1 tablet by mouth 2 (Two) Times a Day As Needed (knee pain)., Disp: 60 tablet, Rfl: 1  •  Diclofenac Sodium (VOLTAREN) 1 % gel gel, Apply 1 g topically to the appropriate area as directed 4 (Four) Times a Day., Disp: 100 g, Rfl: 3  •  gabapentin (NEURONTIN) 600 MG tablet, Take 1 tablet by mouth 4 (Four) Times a Day As Needed (neuropathy)., Disp: 240 tablet, Rfl: 1  •  lisinopril-hydrochlorothiazide (PRINZIDE,ZESTORETIC) 20-12.5 MG per tablet, Take 1 tablet by mouth Daily., Disp: 90 tablet, Rfl: 1  •  metoprolol tartrate (LOPRESSOR) 50 MG tablet, Take 1 tablet by mouth 2 (Two) Times a Day., Disp: 180 tablet, Rfl: 1  •  omeprazole (priLOSEC) 40 MG capsule, Take 1 capsule by mouth Daily., Disp: 90 capsule, Rfl: 1  •  potassium chloride  "(K-DUR,KLOR-CON) 20 MEQ CR tablet, Take 1 tablet by mouth Daily., Disp: 90 tablet, Rfl: 1  •  pravastatin (PRAVACHOL) 40 MG tablet, Take 1 tablet by mouth Daily., Disp: 90 tablet, Rfl: 1  •  traMADol (ULTRAM) 50 MG tablet, Take 1-2 tablets by mouth Every 6 (Six) Hours As Needed for Moderate Pain . 2 tablets every 6 hours, Disp: 220 tablet, Rfl: 0    OBJECTIVE:  /80 (BP Location: Left arm, Patient Position: Sitting, Cuff Size: Adult)   Pulse 64   Temp 98 °F (36.7 °C)   Resp 16   Ht 162.6 cm (64\")   Wt 133 kg (293 lb 8 oz)   SpO2 97%   Breastfeeding No   BMI 50.38 kg/m²    Physical Exam  Constitutional:       General: She is not in acute distress.  Pulmonary:      Effort: Pulmonary effort is normal. No respiratory distress.   Neurological:      Mental Status: She is alert and oriented to person, place, and time.         Assessment/Plan     Diagnoses and all orders for this visit:    1. Hypersomnia, unspecified (Primary)  2. Suspected sleep apnea  3. Morning headache  4. Snoring  -     Home Sleep Study; Future  Because we have Mallory suggest high pretest probability of suspected sleep apnea.  Will order home sleep study for further evaluation and possible PAP therapy.    5. Essential hypertension  Well controlled, BP goal for age is <140/90 per JNC 8 guidelines and continue current medications    6. Type 2 diabetes mellitus with hyperglycemia, without long-term current use of insulin ([facility])  -     Ambulatory Referral for Diabetic Eye Exam-Optometry  A1c was well controlled at August appointment.  Continue current management.    7. Primary osteoarthritis of both knees  -     Ambulatory Referral to Orthopedic Surgery  Referral to orthopedics given previous success with injection, response to Voltaren gel, but was ongoing.  Pain with previous disease is roentgenograms reviewed from 2017 with moderate to advanced tricompartmental osteoarthritis.    8. Class 3 severe obesity due to excess calories with serious " comorbidity and body mass index (BMI) of 50.0 to 59.9 in adult ([facility])  Class 3 Severe Obesity (BMI >=40). Obesity-related health conditions include the following: obstructive sleep apnea, hypertension, diabetes mellitus, dyslipidemias and GERD. Obesity is unchanged. BMI is is above average; BMI management plan is completed. We discussed portion control and increasing exercise.    9. Encounter for screening mammogram for malignant neoplasm of breast  -     Mammo Screening Digital Tomosynthesis Bilateral With CAD; Future    10. Encounter for osteoporosis screening in asymptomatic postmenopausal patient  -     DEXA Bone Density Axial; Future    11. Chronic pain disorder  -     traMADol (ULTRAM) 50 MG tablet; Take 1-2 tablets by mouth Every 6 (Six) Hours As Needed for Moderate Pain. 2 tablets every 6 hours  Dispense: 220 tablet; Refill: 1  -     diclofenac (VOLTAREN) 50 MG EC tablet; Take 1 tablet by mouth 2 (Two) Times a Day As Needed (knee pain).  Dispense: 60 tablet; Refill: 1  PDMP data reviewed. Tramadol refilled. Function improving with some decreases in tramadol utilization.     12. Need for vaccination  -     COVID-19 Bivalent Booster (Pfizer) 12+yrs    13. Hypokalemia  -     Basic metabolic panel; Future  BMP after decrease to QOD of potassium supplement.     14. Arthritis of knee  -     diclofenac (VOLTAREN) 50 MG EC tablet; Take 1 tablet by mouth 2 (Two) Times a Day As Needed (knee pain).  Dispense: 60 tablet; Refill: 1    Other orders  -     potassium chloride (K-DUR,KLOR-CON) 20 MEQ CR tablet; Take 1 tablet by mouth Daily.  Dispense: 45 tablet; Refill: 1        An After Visit Summary was printed and given to the patient at discharge.  Return in about 4 months (around 1/29/2023) for Recheck.          Aron Mcallister D.O. 9/29/2022   Electronically signed.

## 2022-09-29 NOTE — PROGRESS NOTES
The ABCs of the Annual Wellness Visit  Initial Medicare Wellness Visit    No chief complaint on file.  See  note  Subjective   History of Present Illness:  Jessica Kern is a 66 y.o. female who presents for an Initial Medicare Wellness Visit.    The following portions of the patient's history were reviewed and   updated as appropriate: allergies, current medications, past family history, past medical history, past social history, past surgical history and problem list.     Compared to one year ago, the patient feels her physical   health is worse.    Compared to one year ago, the patient feels her mental   health is the same.    Recent Hospitalizations:  She was admitted within the past 365 days at [facility].       Current Medical Providers:  Patient Care Team:  Aron Mcallister,  as PCP - General (Internal Medicine)    Outpatient Medications Prior to Visit   Medication Sig Dispense Refill   • clotrimazole-betamethasone (LOTRISONE) 1-0.05 % cream Apply 1 application topically to the appropriate area as directed Daily.     • Diclofenac Sodium (VOLTAREN) 1 % gel gel Apply 1 g topically to the appropriate area as directed 4 (Four) Times a Day. 100 g 3   • gabapentin (NEURONTIN) 600 MG tablet Take 1 tablet by mouth 4 (Four) Times a Day As Needed (neuropathy). 240 tablet 1   • lisinopril-hydrochlorothiazide (PRINZIDE,ZESTORETIC) 20-12.5 MG per tablet Take 1 tablet by mouth Daily. 90 tablet 1   • metoprolol tartrate (LOPRESSOR) 50 MG tablet Take 1 tablet by mouth 2 (Two) Times a Day. 180 tablet 1   • omeprazole (priLOSEC) 40 MG capsule Take 1 capsule by mouth Daily. 90 capsule 1   • pravastatin (PRAVACHOL) 40 MG tablet Take 1 tablet by mouth Daily. 90 tablet 1   • diclofenac (VOLTAREN) 50 MG EC tablet Take 1 tablet by mouth 2 (Two) Times a Day As Needed (knee pain). 60 tablet 1   • potassium chloride (K-DUR,KLOR-CON) 20 MEQ CR tablet Take 1 tablet by mouth Daily. 90 tablet 1   • traMADol (ULTRAM) 50  "MG tablet Take 1-2 tablets by mouth Every 6 (Six) Hours As Needed for Moderate Pain . 2 tablets every 6 hours 220 tablet 0     No facility-administered medications prior to visit.       Opioid medication/s are on active medication list.  and I have evaluated her active treatment plan and pain score trends (see table).  There were no vitals filed for this visit.  I have reviewed the chart for potential of high risk medication and harmful drug interactions in the elderly.            Aspirin is not on active medication list.  Aspirin use is not indicated based on review of current medical condition/s. Risk of harm outweighs potential benefits.  .    Patient Active Problem List   Diagnosis   • Chronic pain disorder   • Mixed hyperlipidemia   • Class 3 severe obesity due to excess calories with serious comorbidity and body mass index (BMI) of 50.0 to 59.9 in adult ([facility])   • Obstructive sleep apnea   • Essential hypertension   • Type 2 diabetes mellitus with hyperglycemia ([facility])   • Gastroesophageal reflux disease without esophagitis     Advance Care Planning  Advance Directive is on file.  ACP discussion was held with the patient during this visit. Patient has an advance directive in EMR which is still valid.           Objective       Vitals:    09/29/22 0844   BP: 130/80   BP Location: Left arm   Patient Position: Sitting   Cuff Size: Adult   Pulse: 64   Resp: 16   Temp: 98 °F (36.7 °C)   SpO2: 97%   Weight: 133 kg (293 lb 8 oz)   Height: 162.6 cm (64\")     Estimated body mass index is 50.38 kg/m² as calculated from the following:    Height as of this encounter: 162.6 cm (64\").    Weight as of this encounter: 133 kg (293 lb 8 oz).    Class 3 Severe Obesity (BMI >=40). Obesity-related health conditions include the following: obstructive sleep apnea, hypertension, diabetes mellitus and dyslipidemias. Obesity is unchanged. BMI is is above average; BMI management plan is completed. We discussed portion control and increasing " exercise.      Does the patient have evidence of cognitive impairment? No    Physical Exam  Lab Results   Component Value Date    TRIG 189 (H) 08/15/2022    HDL 38 (L) 08/15/2022     (H) 08/15/2022    VLDL 33 08/15/2022    HGBA1C 6.00 (H) 08/15/2022          HEALTH RISK ASSESSMENT    Smoking Status:  Social History     Tobacco Use   Smoking Status Former Smoker   Smokeless Tobacco Never Used   Tobacco Comment    Quit 12  Years ago     Alcohol Consumption:  Social History     Substance and Sexual Activity   Alcohol Use Never     Fall Risk Screen:    STEADI Fall Risk Assessment was completed, and patient is at MODERATE risk for falls. Assessment completed on:9/29/2022    Depression Screen:   PHQ-2/PHQ-9 Depression Screening 9/29/2022   Little Interest or Pleasure in Doing Things 0-->not at all   Feeling Down, Depressed or Hopeless 1-->several days   PHQ-9: Brief Depression Severity Measure Score 1       Health Habits and Functional and Cognitive Screening:  Functional & Cognitive Status 9/29/2022   Do you have difficulty preparing food and eating? Yes   Do you have difficulty bathing yourself, getting dressed or grooming yourself? Yes   Do you have difficulty using the toilet? Yes   Do you have difficulty moving around from place to place? No   Do you have trouble with steps or getting out of a bed or a chair? Yes   Current Diet Unhealthy Diet   Dental Exam Not up to date   Eye Exam Not up to date   Exercise (times per week) 4 times per week   Current Exercises Include Other   Do you need help using the phone?  No   Are you deaf or do you have serious difficulty hearing?  No   Do you need help with transportation? Yes   Do you need help shopping? Yes   Do you need help preparing meals?  Yes   Do you need help with housework?  Yes   Do you need help with laundry? Yes   Do you need help taking your medications? No   Do you need help managing money? No   Do you ever drive or ride in a car without wearing a seat  belt? No   Have you felt unusual stress, anger or loneliness in the last month? No   Who do you live with? Alone   If you need help, do you have trouble finding someone available to you? No   Have you been bothered in the last four weeks by sexual problems? No   Do you have difficulty concentrating, remembering or making decisions? No       Age-appropriate Screening Schedule:  Refer to the list below for future screening recommendations based on patient's age, sex and/or medical conditions. Orders for these recommended tests are listed in the plan section. The patient has been provided with a written plan.    Health Maintenance   Topic Date Due   • MAMMOGRAM  Never done   • DXA SCAN  Never done   • TDAP/TD VACCINES (1 - Tdap) Never done   • ZOSTER VACCINE (1 of 2) Never done   • INFLUENZA VACCINE  Never done   • DIABETIC FOOT EXAM  Never done   • DIABETIC EYE EXAM  Never done   • HEMOGLOBIN A1C  02/15/2023   • LIPID PANEL  08/15/2023   • URINE MICROALBUMIN  08/15/2023            Assessment & Plan   CMS Preventative Services Quick Reference  Risk Factors Identified During Encounter  Cardiovascular Disease  Fall Risk-High or Moderate  Immunizations Discussed/Encouraged (specific Immunizations; Tdap, Influenza, Prevnar 20 (Pneumococcal 20-valent conjugate), Shingrix and COVID19  Inactivity/Sedentary  Obesity/Overweight   The above risks/problems have been discussed with the patient.  Follow up actions/plans if indicated are seen below in the Assessment/Plan Section.  Pertinent information has been shared with the patient in the After Visit Summary.    Diagnoses and all orders for this visit:    1. Hypersomnia, unspecified (Primary)  -     Home Sleep Study; Future    2. Suspected sleep apnea  -     Home Sleep Study; Future    3. Morning headache  -     Home Sleep Study; Future    4. Snoring  -     Home Sleep Study; Future    5. Essential hypertension    6. Type 2 diabetes mellitus with hyperglycemia, without long-term  current use of insulin ([facility])  -     Ambulatory Referral for Diabetic Eye Exam-Optometry    7. Primary osteoarthritis of both knees  -     Ambulatory Referral to Orthopedic Surgery    8. Class 3 severe obesity due to excess calories with serious comorbidity and body mass index (BMI) of 50.0 to 59.9 in adult ([facility])    9. Encounter for screening mammogram for malignant neoplasm of breast  -     Mammo Screening Digital Tomosynthesis Bilateral With CAD; Future    10. Encounter for osteoporosis screening in asymptomatic postmenopausal patient  -     DEXA Bone Density Axial; Future    11. Chronic pain disorder  -     traMADol (ULTRAM) 50 MG tablet; Take 1-2 tablets by mouth Every 6 (Six) Hours As Needed for Moderate Pain. 2 tablets every 6 hours  Dispense: 220 tablet; Refill: 1  -     diclofenac (VOLTAREN) 50 MG EC tablet; Take 1 tablet by mouth 2 (Two) Times a Day As Needed (knee pain).  Dispense: 60 tablet; Refill: 1    12. Need for vaccination  -     COVID-19 Bivalent Booster (Pfizer) 12+yrs    13. Hypokalemia  -     Basic metabolic panel; Future    14. Arthritis of knee  -     diclofenac (VOLTAREN) 50 MG EC tablet; Take 1 tablet by mouth 2 (Two) Times a Day As Needed (knee pain).  Dispense: 60 tablet; Refill: 1    Other orders  -     potassium chloride (K-DUR,KLOR-CON) 20 MEQ CR tablet; Take 1 tablet by mouth Daily.  Dispense: 45 tablet; Refill: 1        Follow Up:  Return in about 4 months (around 1/29/2023) for Recheck.     An After Visit Summary and PPPS were made available to the patient.

## 2022-09-29 NOTE — PATIENT INSTRUCTIONS
Medicare Wellness  Personal Prevention Plan of Service     Date of Office Visit:    Encounter Provider:  Aron Mcallister DO  Place of Service:  [facility] INTERNAL MEDICINE  Patient Name: Jessica Kern  :  1955    As part of the Medicare Wellness portion of your visit today, we are providing you with this personalized preventive plan of services (PPPS). This plan is based upon recommendations of the [place] Preventive Services Task Force (USPSTF) and the Advisory Committee on Immunization Practices (ACIP).    This lists the preventive care services that should be considered, and provides dates of when you are due. Items listed as completed are up-to-date and do not require any further intervention.    Health Maintenance   Topic Date Due    MAMMOGRAM  Never done    DXA SCAN  Never done    COLORECTAL CANCER SCREENING  Never done    Pneumococcal Vaccine 65+ (1 - PCV) Never done    TDAP/TD VACCINES (1 - Tdap) Never done    ZOSTER VACCINE (1 of 2) Never done    COVID-19 Vaccine (3 - Booster for Moderna series) 2021    INFLUENZA VACCINE  Never done    ANNUAL WELLNESS VISIT  Never done    DIABETIC FOOT EXAM  Never done    DIABETIC EYE EXAM  Never done    HEMOGLOBIN A1C  02/15/2023    LIPID PANEL  08/15/2023    URINE MICROALBUMIN  08/15/2023    HEPATITIS C SCREENING  Completed       Orders Placed This Encounter   Procedures    Mammo Screening Digital Tomosynthesis Bilateral With CAD     Standing Status:   Future     Standing Expiration Date:   2023     Order Specific Question:   Reason for Exam:     Answer:   screening    DEXA Bone Density Axial     Standing Status:   Future     Standing Expiration Date:   2023     Order Specific Question:   Reason for Exam:     Answer:   screening    COVID-19 Bivalent Booster (Pfizer) 12+yrs    Ambulatory Referral to Orthopedic Surgery     Referral Priority:   Routine     Referral Type:   Consultation     Referral Reason:   Specialty  Services Required     Requested Specialty:   Orthopedic Surgery     Number of Visits Requested:   1    Home Sleep Study     Standing Status:   Future     Standing Expiration Date:   9/29/2023     Order Specific Question:   May take own meds     Answer:   Yes     Order Specific Question:   If any contraindications for HST are checked, patient may be recommended for in-lab testing. HST is indicated for patients in whom FRANCES is  suspected diagnosis.     Answer:   Does not apply       Return in about 4 months (around 1/29/2023) for Recheck.

## 2022-10-04 DIAGNOSIS — R29.818 SUSPECTED SLEEP APNEA: Primary | ICD-10-CM

## 2022-10-12 ENCOUNTER — TELEPHONE (OUTPATIENT)
Dept: PALLATIVE CARE | Age: 67
End: 2022-10-12

## 2022-10-12 NOTE — TELEPHONE ENCOUNTER
I spoke to the patient and she said she was \"doing really well\". She declined a visit for now but will call if she needs to be seen in the future.

## 2022-11-05 DIAGNOSIS — G89.4 CHRONIC PAIN DISORDER: ICD-10-CM

## 2022-11-07 RX ORDER — TRAMADOL HYDROCHLORIDE 50 MG/1
TABLET ORAL
Qty: 215 TABLET | Refills: 1 | Status: SHIPPED | OUTPATIENT
Start: 2022-11-07 | End: 2023-01-27 | Stop reason: SDUPTHER

## 2022-11-28 ENCOUNTER — HOSPITAL ENCOUNTER (OUTPATIENT)
Dept: BONE DENSITY | Facility: HOSPITAL | Age: 67
Discharge: HOME | End: 2022-11-28

## 2022-11-28 ENCOUNTER — HOSPITAL ENCOUNTER (OUTPATIENT)
Dept: MAMMOGRAPHY | Facility: HOSPITAL | Age: 67
Discharge: HOME | End: 2022-11-28

## 2022-11-28 DIAGNOSIS — Z13.820 ENCOUNTER FOR OSTEOPOROSIS SCREENING IN ASYMPTOMATIC POSTMENOPAUSAL PATIENT: ICD-10-CM

## 2022-11-28 DIAGNOSIS — Z12.31 ENCOUNTER FOR SCREENING MAMMOGRAM FOR MALIGNANT NEOPLASM OF BREAST: ICD-10-CM

## 2022-11-28 DIAGNOSIS — Z78.0 ENCOUNTER FOR OSTEOPOROSIS SCREENING IN ASYMPTOMATIC POSTMENOPAUSAL PATIENT: ICD-10-CM

## 2022-11-28 PROCEDURE — 77080 DXA BONE DENSITY AXIAL: CPT

## 2022-11-28 PROCEDURE — 77067 SCR MAMMO BI INCL CAD: CPT

## 2022-11-28 PROCEDURE — 77063 BREAST TOMOSYNTHESIS BI: CPT

## 2022-12-12 DIAGNOSIS — M17.10 ARTHRITIS OF KNEE: ICD-10-CM

## 2022-12-12 DIAGNOSIS — G89.4 CHRONIC PAIN DISORDER: ICD-10-CM

## 2022-12-12 RX ORDER — TRAMADOL HYDROCHLORIDE 50 MG/1
50 TABLET ORAL EVERY 6 HOURS PRN
Qty: 215 TABLET | Refills: 1 | OUTPATIENT
Start: 2022-12-12

## 2022-12-30 ENCOUNTER — TELEPHONE (OUTPATIENT)
Dept: NEUROLOGY | Facility: CLINIC | Age: 67
End: 2022-12-30

## 2022-12-30 NOTE — TELEPHONE ENCOUNTER
Provider: LEEANNE  Caller: IRIS  Relationship to Patient: SELF  Pharmacy: N/A  Phone Number: [phone]  Reason for Call:PT CALLED STATING THERE APPT ON 01/27/2023 WAS CANCELLED WITH OUT HER KNOWING AND PT STATES THAT IS THE ONLY DAY SHE CAN COME DUE TO TRANSPORTATION. IS THERE ANOTHER PROVIDER THAT CAN SEE PT THAT DAY?    PLEASE REVIEW AND ADVISE.  THANK YOU

## 2022-12-30 NOTE — TELEPHONE ENCOUNTER
MAKENNA CALLED PATIENT AND WE ARE UNABLE TO ACCOMMODATE DATE AND TIME RIGHT NOW FOR PATIENT AS TO WHEN SHE IS ABLE TO COME. PATIENT SAID SHE WAS GOING TO CALL BACK WHEN SHE WANTS TO BE RESCHEDULED

## 2023-01-16 DIAGNOSIS — I10 ESSENTIAL HYPERTENSION: ICD-10-CM

## 2023-01-16 RX ORDER — LISINOPRIL AND HYDROCHLOROTHIAZIDE 20; 12.5 MG/1; MG/1
1 TABLET ORAL DAILY
Qty: 90 TABLET | Refills: 1 | Status: SHIPPED | OUTPATIENT
Start: 2023-01-16

## 2023-01-27 ENCOUNTER — LAB (OUTPATIENT)
Dept: LAB | Facility: HOSPITAL | Age: 68
End: 2023-01-27
Payer: MEDICARE

## 2023-01-27 ENCOUNTER — OFFICE VISIT (OUTPATIENT)
Dept: INTERNAL MEDICINE | Facility: CLINIC | Age: 68
End: 2023-01-27
Payer: MEDICARE

## 2023-01-27 VITALS
OXYGEN SATURATION: 97 % | WEIGHT: 293 LBS | DIASTOLIC BLOOD PRESSURE: 100 MMHG | HEIGHT: 64 IN | HEART RATE: 72 BPM | SYSTOLIC BLOOD PRESSURE: 150 MMHG | BODY MASS INDEX: 50.02 KG/M2 | RESPIRATION RATE: 16 BRPM

## 2023-01-27 DIAGNOSIS — I10 ESSENTIAL HYPERTENSION: ICD-10-CM

## 2023-01-27 DIAGNOSIS — I10 ESSENTIAL HYPERTENSION: Primary | ICD-10-CM

## 2023-01-27 DIAGNOSIS — E11.65 TYPE 2 DIABETES MELLITUS WITH HYPERGLYCEMIA, WITHOUT LONG-TERM CURRENT USE OF INSULIN: ICD-10-CM

## 2023-01-27 DIAGNOSIS — M17.10 ARTHRITIS OF KNEE: ICD-10-CM

## 2023-01-27 DIAGNOSIS — E87.6 HYPOKALEMIA: ICD-10-CM

## 2023-01-27 DIAGNOSIS — G47.33 OBSTRUCTIVE SLEEP APNEA: ICD-10-CM

## 2023-01-27 DIAGNOSIS — L65.9 HAIR LOSS: ICD-10-CM

## 2023-01-27 DIAGNOSIS — G89.4 CHRONIC PAIN DISORDER: ICD-10-CM

## 2023-01-27 DIAGNOSIS — G57.93 NEUROPATHY INVOLVING BOTH LOWER EXTREMITIES: ICD-10-CM

## 2023-01-27 DIAGNOSIS — E66.01 CLASS 3 SEVERE OBESITY DUE TO EXCESS CALORIES WITH SERIOUS COMORBIDITY AND BODY MASS INDEX (BMI) OF 50.0 TO 59.9 IN ADULT: ICD-10-CM

## 2023-01-27 LAB
ANION GAP SERPL CALCULATED.3IONS-SCNC: 8 MMOL/L (ref 5–15)
BUN SERPL-MCNC: 20 MG/DL (ref 8–23)
BUN/CREAT SERPL: 27 (ref 7–25)
CALCIUM SPEC-SCNC: 9.2 MG/DL (ref 8.6–10.5)
CHLORIDE SERPL-SCNC: 97 MMOL/L (ref 98–107)
CO2 SERPL-SCNC: 29 MMOL/L (ref 22–29)
CREAT SERPL-MCNC: 0.74 MG/DL (ref 0.57–1)
EGFRCR SERPLBLD CKD-EPI 2021: 88.3 ML/MIN/1.73
GLUCOSE SERPL-MCNC: 230 MG/DL (ref 65–99)
HBA1C MFR BLD: 9.2 % (ref 4.8–5.6)
POTASSIUM SERPL-SCNC: 4.5 MMOL/L (ref 3.5–5.2)
SODIUM SERPL-SCNC: 134 MMOL/L (ref 136–145)
TSH SERPL DL<=0.05 MIU/L-ACNC: 3.44 UIU/ML (ref 0.27–4.2)

## 2023-01-27 PROCEDURE — 90677 PCV20 VACCINE IM: CPT | Performed by: NURSE PRACTITIONER

## 2023-01-27 PROCEDURE — G0009 ADMIN PNEUMOCOCCAL VACCINE: HCPCS | Performed by: NURSE PRACTITIONER

## 2023-01-27 PROCEDURE — 80048 BASIC METABOLIC PNL TOTAL CA: CPT

## 2023-01-27 PROCEDURE — 3046F HEMOGLOBIN A1C LEVEL >9.0%: CPT | Performed by: NURSE PRACTITIONER

## 2023-01-27 PROCEDURE — 83036 HEMOGLOBIN GLYCOSYLATED A1C: CPT

## 2023-01-27 PROCEDURE — 36415 COLL VENOUS BLD VENIPUNCTURE: CPT

## 2023-01-27 PROCEDURE — 99214 OFFICE O/P EST MOD 30 MIN: CPT | Performed by: NURSE PRACTITIONER

## 2023-01-27 PROCEDURE — G0008 ADMIN INFLUENZA VIRUS VAC: HCPCS | Performed by: NURSE PRACTITIONER

## 2023-01-27 PROCEDURE — 90662 IIV NO PRSV INCREASED AG IM: CPT | Performed by: NURSE PRACTITIONER

## 2023-01-27 PROCEDURE — 84443 ASSAY THYROID STIM HORMONE: CPT

## 2023-01-27 RX ORDER — LISINOPRIL 40 MG/1
40 TABLET ORAL DAILY
Qty: 90 TABLET | Refills: 1 | Status: SHIPPED | OUTPATIENT
Start: 2023-01-27

## 2023-01-27 RX ORDER — GABAPENTIN 600 MG/1
600 TABLET ORAL 4 TIMES DAILY PRN
Qty: 240 TABLET | Refills: 1 | Status: SHIPPED | OUTPATIENT
Start: 2023-01-27

## 2023-01-27 RX ORDER — TRAMADOL HYDROCHLORIDE 50 MG/1
50 TABLET ORAL EVERY 6 HOURS PRN
Qty: 215 TABLET | Refills: 1 | Status: SHIPPED | OUTPATIENT
Start: 2023-01-27 | End: 2023-03-31

## 2023-01-27 RX ORDER — METOPROLOL TARTRATE 50 MG/1
50 TABLET, FILM COATED ORAL 2 TIMES DAILY
Qty: 180 TABLET | Refills: 1 | Status: SHIPPED | OUTPATIENT
Start: 2023-01-27

## 2023-01-27 RX ORDER — HYDROCHLOROTHIAZIDE 12.5 MG/1
12.5 TABLET ORAL DAILY
Qty: 90 TABLET | Refills: 1 | Status: SHIPPED | OUTPATIENT
Start: 2023-01-27

## 2023-01-27 NOTE — PROGRESS NOTES
Chief Complaint   Patient presents with   • Follow-up     Pt asked if she can have a 90 day supply of diclofenac tablets.     • Hypertension     Patient has been taking two of the lisinopril-hctz tablets because bp has been increased. Pt requests change to two tablets daily          HPI     Jessica Kern is a 67 y.o. female presents for the above problems. She continues to have bilateral knee pain due to arthritis that limits her physical activity. She continues diclofenac with some relief. Referral was made to orthopedics, though she says she was not contacted to schedule an appointment.     She continues to have concerns for sleep apnea. She wakes up with headache nearly daily. [place] score has not changed since last visit. She describes excessive daytime sleepiness with frequent naps though the day. She was not aware of scheduled Sleep Medicine appointment and missed this. She would like to have sleep study done.     Blood pressure has been increased over the last month or so. Because of this, she increased lisinipril-HCTZ to 2 tablets daily and ran out a week ago with the increase of dose. She continues metoprolol 50mg BID. Her blood pressure is elevated today.     Sleep study   Headache in the morning   [place]  Sitting and reading:3   Watching TV: 3  Sitting inactive in a public place: 1   As a passenger in a car without a break: 0   Lyding down to rest in the afternoon: 3   Sitting and talking to someone: 0   Sitting quietly after lunch without alcohol: 3   In a car while stopped for a few minutes in traffic: 0   Total: 13            ROS:  Review of Systems   Constitutional: Negative for fever.   HENT: Negative.    Respiratory: Negative.    Cardiovascular: Negative.    Genitourinary: Negative.    Musculoskeletal: Positive for arthralgias.   Psychiatric/Behavioral: Positive for sleep disturbance. Negative for dysphoric mood. The patient is not nervous/anxious.           reports that she has quit smoking. She  "has never used smokeless tobacco. She reports that she does not drink alcohol and does not use drugs.    Current Outpatient Medications:   •  clotrimazole-betamethasone (LOTRISONE) 1-0.05 % cream, Apply 1 application topically to the appropriate area as directed Daily., Disp: , Rfl:   •  diclofenac (VOLTAREN) 50 MG EC tablet, Take 1 tablet by mouth 2 (Two) Times a Day., Disp: 180 tablet, Rfl: 1  •  Diclofenac Sodium (VOLTAREN) 1 % gel gel, Apply  topically to the appropriate area as directed 4 (Four) Times a Day., Disp: 150 g, Rfl: 3  •  gabapentin (NEURONTIN) 600 MG tablet, Take 1 tablet by mouth 4 (Four) Times a Day As Needed (neuropathy)., Disp: 240 tablet, Rfl: 1  •  lisinopril-hydrochlorothiazide (PRINZIDE,ZESTORETIC) 20-12.5 MG per tablet, Take 1 tablet by mouth Daily., Disp: 90 tablet, Rfl: 1  •  metoprolol tartrate (LOPRESSOR) 50 MG tablet, Take 1 tablet by mouth 2 (Two) Times a Day., Disp: 180 tablet, Rfl: 1  •  omeprazole (priLOSEC) 40 MG capsule, Take 1 capsule by mouth Daily., Disp: 90 capsule, Rfl: 1  •  potassium chloride (K-DUR,KLOR-CON) 20 MEQ CR tablet, Take 1 tablet by mouth Daily., Disp: 45 tablet, Rfl: 1  •  pravastatin (PRAVACHOL) 40 MG tablet, Take 1 tablet by mouth Daily., Disp: 90 tablet, Rfl: 1  •  traMADol (ULTRAM) 50 MG tablet, Take 1 tablet by mouth Every 6 (Six) Hours As Needed for Moderate Pain., Disp: 215 tablet, Rfl: 1  •  hydroCHLOROthiazide (HYDRODIURIL) 12.5 MG tablet, Take 1 tablet by mouth Daily., Disp: 90 tablet, Rfl: 1  •  lisinopril (PRINIVIL,ZESTRIL) 40 MG tablet, Take 1 tablet by mouth Daily., Disp: 90 tablet, Rfl: 1    OBJECTIVE:  /100 (BP Location: Right arm, Patient Position: Sitting, Cuff Size: Large Adult) Comment (Cuff Size): right forearm  Pulse 72   Resp 16   Ht 162.6 cm (64\")   Wt (!) 138 kg (305 lb)   SpO2 97%   BMI 52.35 kg/m²    Physical Exam  Constitutional:       General: She is not in acute distress.  HENT:      Head: Normocephalic. "   Cardiovascular:      Rate and Rhythm: Normal rate and regular rhythm.      Heart sounds: Normal heart sounds.   Pulmonary:      Breath sounds: Normal breath sounds.         ASSESSMENT/PLAN:     Diagnoses and all orders for this visit:    1. Essential hypertension (Primary)  -     Basic metabolic panel; Future  -     metoprolol tartrate (LOPRESSOR) 50 MG tablet; Take 1 tablet by mouth 2 (Two) Times a Day.  Dispense: 180 tablet; Refill: 1  -     lisinopril (PRINIVIL,ZESTRIL) 40 MG tablet; Take 1 tablet by mouth Daily.  Dispense: 90 tablet; Refill: 1  -     hydroCHLOROthiazide (HYDRODIURIL) 12.5 MG tablet; Take 1 tablet by mouth Daily.  Dispense: 90 tablet; Refill: 1  Will increase lisinopril to 40mg and continue HCTZ at current dose given history of hypokalemia. She has difficulty getting to appointments due to transportation so she will plan to monitor blood pressure at home. Should blood pressure remain above 140/90, she will notify us so that further adjustments can be made. Recheck BMP today to monitor electrolytes.     2. Class 3 severe obesity due to excess calories with serious comorbidity and body mass index (BMI) of 50.0 to 59.9 in adult ([facility])  Class 3 Severe Obesity (BMI >=40). Obesity-related health conditions include the following: obstructive sleep apnea, hypertension, coronary heart disease, diabetes mellitus, dyslipidemias and osteoarthritis. Obesity is worsening. BMI is is above average; BMI management plan is completed. We discussed low calorie, low carb based diet program, portion control and increasing exercise.     3. Arthritis of knee  -     Ambulatory Referral to Orthopedic Surgery  -     diclofenac (VOLTAREN) 50 MG EC tablet; Take 1 tablet by mouth 2 (Two) Times a Day.  Dispense: 180 tablet; Refill: 1    4. Chronic pain disorder  -     diclofenac (VOLTAREN) 50 MG EC tablet; Take 1 tablet by mouth 2 (Two) Times a Day.  Dispense: 180 tablet; Refill: 1  -     traMADol (ULTRAM) 50 MG tablet; Take  1 tablet by mouth Every 6 (Six) Hours As Needed for Moderate Pain.  Dispense: 215 tablet; Refill: 1  -     gabapentin (NEURONTIN) 600 MG tablet; Take 1 tablet by mouth 4 (Four) Times a Day As Needed (neuropathy).  Dispense: 240 tablet; Refill: 1    5. Type 2 diabetes mellitus with hyperglycemia, without long-term current use of insulin ([facility])  -     Hemoglobin A1c; Future     6. Hair loss  -     TSH; Future  She describes recent hair loss which she feels may be due to stress and health issues over the last year. Discussed using Biotin supplement and will check thyroid function.     7. Neuropathy involving both lower extremities  -     gabapentin (NEURONTIN) 600 MG tablet; Take 1 tablet by mouth 4 (Four) Times a Day As Needed (neuropathy).  Dispense: 240 tablet; Refill: 1    8. Obstructive Sleep Apnea         - Home Sleep Study     Other orders  -     Fluzone High-Dose 65+yrs ([id number])  -     Pneumococcal Conjugate Vaccine 20-Valent (PCV20)    An After Visit Summary was printed and given to the patient at discharge.  Return in about 4 months (around 5/27/2023) for Recheck.          WOODY Mendez 1/27/2023   Electronically signed.

## 2023-01-30 RX ORDER — METFORMIN HYDROCHLORIDE 750 MG/1
750 TABLET, EXTENDED RELEASE ORAL
Qty: 30 TABLET | Refills: 5 | Status: SHIPPED | OUTPATIENT
Start: 2023-01-30

## 2023-01-31 ENCOUNTER — TELEPHONE (OUTPATIENT)
Dept: INTERNAL MEDICINE | Facility: CLINIC | Age: 68
End: 2023-01-31

## 2023-01-31 NOTE — TELEPHONE ENCOUNTER
Patient has been contacted. She asked if she can still take up to two Tramadol tablets every 6 hours PRN, which were the instructions from her previous script.  She was informed she may still take 1-2 tablets every 6 hours as needed, per Delicia.  Patient verbalized understanding.

## 2023-01-31 NOTE — TELEPHONE ENCOUNTER
Caller: Jessica Kern    Relationship: Self    Best call back number: [phone]    What is the best time to reach you: ANY     Who are you requesting to speak with (clinical staff, provider,  specific staff member): CLINICAL     What was the call regarding: PATIENT WAS CALLING TO SPEAK WITH KEDAR. PATIENT SAYS THAT SHE HAD BEEN SPEAKING WITH HER PREVIOUSLY.     Do you require a callback: YES

## 2023-02-21 DIAGNOSIS — K21.9 GASTROESOPHAGEAL REFLUX DISEASE WITHOUT ESOPHAGITIS: ICD-10-CM

## 2023-02-21 RX ORDER — OMEPRAZOLE 40 MG/1
40 CAPSULE, DELAYED RELEASE ORAL DAILY
Qty: 90 CAPSULE | Refills: 1 | Status: SHIPPED | OUTPATIENT
Start: 2023-02-21

## 2023-02-27 ENCOUNTER — HOSPITAL ENCOUNTER (OUTPATIENT)
Dept: SLEEP MEDICINE | Facility: HOSPITAL | Age: 68
Discharge: HOME | End: 2023-02-27
Admitting: NURSE PRACTITIONER
Payer: MEDICARE

## 2023-02-27 DIAGNOSIS — G47.33 OBSTRUCTIVE SLEEP APNEA: ICD-10-CM

## 2023-02-27 PROCEDURE — 95806 SLEEP STUDY UNATT&RESP EFFT: CPT

## 2023-02-27 PROCEDURE — 95806 SLEEP STUDY UNATT&RESP EFFT: CPT | Performed by: INTERNAL MEDICINE

## 2023-03-03 ENCOUNTER — TELEPHONE (OUTPATIENT)
Dept: INTERNAL MEDICINE | Facility: CLINIC | Age: 68
End: 2023-03-03
Payer: MEDICARE

## 2023-03-03 NOTE — TELEPHONE ENCOUNTER
Patient asked what the difference is between the CPAP and oxygen.  She has had just oxygen nightly while in a facility previously and she felt so much better when she did.  She asked if it is possible to just have oxygen nightly instead of the CPAP device.

## 2023-03-03 NOTE — TELEPHONE ENCOUNTER
Her sleep study showed sleep apnea. CPAP provides pressure to keep airway from collapsing while sleeping and prevent episodes of apnea when she was found to stop breathing. Patients with sleep apnea often feel better with regular CPAP use. I would recommend giving this a try over the next 4-6 weeks and see if she notices improvement.

## 2023-03-31 DIAGNOSIS — G89.4 CHRONIC PAIN DISORDER: ICD-10-CM

## 2023-03-31 RX ORDER — TRAMADOL HYDROCHLORIDE 50 MG/1
50 TABLET ORAL EVERY 6 HOURS PRN
Qty: 215 TABLET | Refills: 1 | Status: SHIPPED | OUTPATIENT
Start: 2023-03-31

## 2023-04-21 DIAGNOSIS — I10 ESSENTIAL HYPERTENSION: ICD-10-CM

## 2023-04-21 DIAGNOSIS — G57.93 NEUROPATHY INVOLVING BOTH LOWER EXTREMITIES: ICD-10-CM

## 2023-04-21 DIAGNOSIS — G89.4 CHRONIC PAIN DISORDER: ICD-10-CM

## 2023-04-21 DIAGNOSIS — E78.2 MIXED HYPERLIPIDEMIA: ICD-10-CM

## 2023-04-21 RX ORDER — LISINOPRIL 40 MG/1
40 TABLET ORAL DAILY
Qty: 90 TABLET | Refills: 1 | OUTPATIENT
Start: 2023-04-21

## 2023-04-21 RX ORDER — PRAVASTATIN SODIUM 40 MG
40 TABLET ORAL DAILY
Qty: 90 TABLET | Refills: 3 | Status: SHIPPED | OUTPATIENT
Start: 2023-04-21

## 2023-04-21 RX ORDER — HYDROCHLOROTHIAZIDE 12.5 MG/1
12.5 TABLET ORAL DAILY
Qty: 90 TABLET | Refills: 1 | OUTPATIENT
Start: 2023-04-21

## 2023-04-21 RX ORDER — METOPROLOL TARTRATE 50 MG/1
50 TABLET, FILM COATED ORAL 2 TIMES DAILY
Qty: 180 TABLET | Refills: 1 | OUTPATIENT
Start: 2023-04-21

## 2023-04-21 RX ORDER — GABAPENTIN 600 MG/1
600 TABLET ORAL 4 TIMES DAILY
Qty: 360 TABLET | Refills: 1 | Status: SHIPPED | OUTPATIENT
Start: 2023-04-21

## 2023-04-28 ENCOUNTER — OFFICE VISIT (OUTPATIENT)
Dept: INTERNAL MEDICINE | Facility: CLINIC | Age: 68
End: 2023-04-28
Payer: MEDICARE

## 2023-04-28 ENCOUNTER — LAB (OUTPATIENT)
Dept: LAB | Facility: HOSPITAL | Age: 68
End: 2023-04-28
Payer: MEDICARE

## 2023-04-28 VITALS
HEART RATE: 68 BPM | BODY MASS INDEX: 50.02 KG/M2 | SYSTOLIC BLOOD PRESSURE: 134 MMHG | RESPIRATION RATE: 16 BRPM | WEIGHT: 293 LBS | TEMPERATURE: 98.3 F | DIASTOLIC BLOOD PRESSURE: 66 MMHG | OXYGEN SATURATION: 97 % | HEIGHT: 64 IN

## 2023-04-28 DIAGNOSIS — R35.0 URINARY FREQUENCY: ICD-10-CM

## 2023-04-28 DIAGNOSIS — B35.1 ONYCHOMYCOSIS: ICD-10-CM

## 2023-04-28 DIAGNOSIS — E66.01 CLASS 3 SEVERE OBESITY DUE TO EXCESS CALORIES WITH SERIOUS COMORBIDITY AND BODY MASS INDEX (BMI) OF 50.0 TO 59.9 IN ADULT: ICD-10-CM

## 2023-04-28 DIAGNOSIS — G47.33 OSA ON CPAP: ICD-10-CM

## 2023-04-28 DIAGNOSIS — I10 ESSENTIAL HYPERTENSION: ICD-10-CM

## 2023-04-28 DIAGNOSIS — K21.9 GASTROESOPHAGEAL REFLUX DISEASE WITHOUT ESOPHAGITIS: ICD-10-CM

## 2023-04-28 DIAGNOSIS — Z99.89 OSA ON CPAP: ICD-10-CM

## 2023-04-28 DIAGNOSIS — R30.0 DYSURIA: ICD-10-CM

## 2023-04-28 DIAGNOSIS — E11.65 TYPE 2 DIABETES MELLITUS WITH HYPERGLYCEMIA, WITHOUT LONG-TERM CURRENT USE OF INSULIN: Primary | ICD-10-CM

## 2023-04-28 DIAGNOSIS — G89.4 CHRONIC PAIN DISORDER: ICD-10-CM

## 2023-04-28 LAB
ANION GAP SERPL CALCULATED.3IONS-SCNC: 10 MMOL/L (ref 5–15)
BACTERIA UR QL AUTO: ABNORMAL /HPF
BILIRUB UR QL STRIP: NEGATIVE
BUN SERPL-MCNC: 33 MG/DL (ref 8–23)
BUN/CREAT SERPL: 35.9 (ref 7–25)
CALCIUM SPEC-SCNC: 9.5 MG/DL (ref 8.6–10.5)
CHLORIDE SERPL-SCNC: 97 MMOL/L (ref 98–107)
CLARITY UR: CLEAR
CO2 SERPL-SCNC: 30 MMOL/L (ref 22–29)
COLOR UR: YELLOW
CREAT SERPL-MCNC: 0.92 MG/DL (ref 0.57–1)
EGFRCR SERPLBLD CKD-EPI 2021: 68 ML/MIN/1.73
EXPIRATION DATE: NORMAL
GLUCOSE SERPL-MCNC: 177 MG/DL (ref 65–99)
GLUCOSE UR STRIP-MCNC: NEGATIVE MG/DL
HBA1C MFR BLD: 7.7 %
HGB UR QL STRIP.AUTO: ABNORMAL
HYALINE CASTS UR QL AUTO: ABNORMAL /LPF
KETONES UR QL STRIP: NEGATIVE
LEUKOCYTE ESTERASE UR QL STRIP.AUTO: ABNORMAL
Lab: NORMAL
NITRITE UR QL STRIP: NEGATIVE
PH UR STRIP.AUTO: 5.5 [PH] (ref 5–8)
POTASSIUM SERPL-SCNC: 4.9 MMOL/L (ref 3.5–5.2)
PROT UR QL STRIP: ABNORMAL
RBC # UR STRIP: ABNORMAL /HPF
REF LAB TEST METHOD: ABNORMAL
SODIUM SERPL-SCNC: 137 MMOL/L (ref 136–145)
SP GR UR STRIP: 1.02 (ref 1–1.03)
SQUAMOUS #/AREA URNS HPF: ABNORMAL /HPF
UROBILINOGEN UR QL STRIP: ABNORMAL
WBC # UR STRIP: ABNORMAL /HPF
YEAST URNS QL MICRO: ABNORMAL /HPF

## 2023-04-28 PROCEDURE — 87186 SC STD MICRODIL/AGAR DIL: CPT

## 2023-04-28 PROCEDURE — 87086 URINE CULTURE/COLONY COUNT: CPT

## 2023-04-28 PROCEDURE — 81001 URINALYSIS AUTO W/SCOPE: CPT

## 2023-04-28 PROCEDURE — 80048 BASIC METABOLIC PNL TOTAL CA: CPT

## 2023-04-28 PROCEDURE — 87077 CULTURE AEROBIC IDENTIFY: CPT

## 2023-04-28 PROCEDURE — 36415 COLL VENOUS BLD VENIPUNCTURE: CPT

## 2023-04-28 RX ORDER — METOPROLOL TARTRATE 100 MG/1
100 TABLET ORAL 2 TIMES DAILY
Qty: 60 TABLET | Refills: 5 | Status: SHIPPED | OUTPATIENT
Start: 2023-04-28

## 2023-04-28 RX ORDER — METFORMIN HYDROCHLORIDE 750 MG/1
750 TABLET, EXTENDED RELEASE ORAL
Qty: 30 TABLET | Refills: 5 | Status: SHIPPED | OUTPATIENT
Start: 2023-04-28

## 2023-04-28 RX ORDER — DIPHENHYDRAMINE HYDROCHLORIDE 25 MG/1
1 CAPSULE, LIQUID FILLED ORAL AS NEEDED
Qty: 1 EACH | Refills: 0 | Status: SHIPPED | OUTPATIENT
Start: 2023-04-28

## 2023-04-28 RX ORDER — NITROFURANTOIN 25; 75 MG/1; MG/1
100 CAPSULE ORAL 2 TIMES DAILY
Qty: 10 CAPSULE | Refills: 0 | Status: SHIPPED | OUTPATIENT
Start: 2023-04-28 | End: 2023-05-03

## 2023-04-28 RX ORDER — OMEPRAZOLE 40 MG/1
40 CAPSULE, DELAYED RELEASE ORAL DAILY
Qty: 90 CAPSULE | Refills: 1 | Status: SHIPPED | OUTPATIENT
Start: 2023-04-28

## 2023-04-28 RX ORDER — TRAMADOL HYDROCHLORIDE 50 MG/1
TABLET ORAL
Qty: 220 TABLET | Refills: 1 | Status: SHIPPED | OUTPATIENT
Start: 2023-04-28

## 2023-04-28 RX ORDER — SEMAGLUTIDE 1.34 MG/ML
0.25 INJECTION, SOLUTION SUBCUTANEOUS WEEKLY
Qty: 1.5 ML | Refills: 0 | Status: SHIPPED | OUTPATIENT
Start: 2023-04-28

## 2023-04-28 NOTE — PROGRESS NOTES
Chief Complaint   Patient presents with   • Sore     Pressure sore   • Urinary Frequency   • Dysuria     Burning sensation during urination        HPI     Jessica Kern is a 67 y.o. female presents for follow up. In the last few weeks, she has noticed urinary frequency with burning at the end of urinating. She has started drinking cranberry juice with some improvement. The increased frequency and movement has led to sores to her sacrum. She feels the friction from standing up and down has caused this. She is using barrier cream with improvement.     She continues metformin for diabetes. She also mentions she is getting meals on wheels and trying to monitor portion control as well as limit foods high in carbs. She has lost about 11lbs since her last visit. Activity is still very limited due to joint pain to bilateral knees and arthritis. Her grandson recently got his license and has been taking her out once per week which she enjoys though this causes worsening joint pain to knees. She has tried cutting tramadol back to 1 tablet as needed but on those days she does require 2. She was seen by orthopedics for knee pain who recommended weight loss. She is unable to get knee replacement until this improves.    She has CPAP at home though she does not tolerate it very well. She says it is uncomfortable and is only able to wear it a few hours per night. She also mentions being unable to clean it weekly as recommended due to mobility issues.  She feels this causes sinus issues for her. She is requesting oxygen only at night as this alone did help her feel better.     She has difficulty performing nail care and nails are thick making this difficult. She has cut her 5th toenail off completey by accident and would like referral to Podiatry.     She feels blood pressure is better controlled with metoprolol 100 mg BID and this is how she has been taking it. She would like to return to previous dose of HCTZ as her swelling has  worsened.          ROS:  Review of Systems   Constitutional: Positive for fatigue.   Respiratory: Negative.    Cardiovascular: Negative for chest pain.   Endocrine: Positive for polyuria. Negative for polydipsia and polyphagia.   Genitourinary: Positive for dysuria and frequency.   Musculoskeletal: Positive for arthralgias.   Skin: Negative for pallor.        Skin sore   Neurological: Negative for dizziness, tremors, seizures, speech difficulty, weakness and headaches.   Psychiatric/Behavioral: Negative for confusion. The patient is not nervous/anxious.           reports that she has quit smoking. She has never used smokeless tobacco. She reports that she does not drink alcohol and does not use drugs.    Current Outpatient Medications:   •  clotrimazole-betamethasone (LOTRISONE) 1-0.05 % cream, Apply 1 application topically to the appropriate area as directed Daily., Disp: , Rfl:   •  diclofenac (VOLTAREN) 50 MG EC tablet, Take 1 tablet by mouth 2 (Two) Times a Day., Disp: 180 tablet, Rfl: 1  •  Diclofenac Sodium (VOLTAREN) 1 % gel gel, Apply  topically to the appropriate area as directed 4 (Four) Times a Day., Disp: 100 g, Rfl: 3  •  gabapentin (NEURONTIN) 600 MG tablet, Take 1 tablet by mouth 4 (Four) Times a Day., Disp: 360 tablet, Rfl: 1  •  hydroCHLOROthiazide (HYDRODIURIL) 12.5 MG tablet, Take 1 tablet by mouth Daily., Disp: 90 tablet, Rfl: 1  •  lisinopril (PRINIVIL,ZESTRIL) 40 MG tablet, Take 1 tablet by mouth Daily., Disp: 90 tablet, Rfl: 1  •  metFORMIN ER (GLUCOPHAGE-XR) 750 MG 24 hr tablet, Take 1 tablet by mouth Daily With Breakfast., Disp: 30 tablet, Rfl: 5  •  metoprolol tartrate (LOPRESSOR) 100 MG tablet, Take 1 tablet by mouth 2 (Two) Times a Day., Disp: 60 tablet, Rfl: 5  •  omeprazole (priLOSEC) 40 MG capsule, Take 1 capsule by mouth Daily., Disp: 90 capsule, Rfl: 1  •  potassium chloride (K-DUR,KLOR-CON) 20 MEQ CR tablet, Take 1 tablet by mouth Daily., Disp: 45 tablet, Rfl: 1  •  pravastatin  "(PRAVACHOL) 40 MG tablet, Take 1 tablet by mouth Daily., Disp: 90 tablet, Rfl: 3  •  traMADol (ULTRAM) 50 MG tablet, Take 1-2 tablets as needed every 6 hours as needed for pain., Disp: 220 tablet, Rfl: 1  •  Blood Glucose Monitoring Suppl (Blood Glucose Monitor System) w/Device kit, 1 each As Needed (glucose monitoring)., Disp: 1 each, Rfl: 0  •  Semaglutide,0.25 or 0.5MG/DOS, (Ozempic, 0.25 or 0.5 MG/DOSE,) 2 MG/1.5ML solution pen-injector, Inject 0.25 mg under the skin into the appropriate area as directed 1 (One) Time Per Week., Disp: 1.5 mL, Rfl: 0    OBJECTIVE:  /66 (BP Location: Left arm, Patient Position: Sitting, Cuff Size: Other (Comment)) Comment (Cuff Size): automated  Pulse 68   Temp 98.3 °F (36.8 °C) (Oral)   Resp 16   Ht 162.6 cm (64\")   Wt 133 kg (294 lb)   SpO2 97%   BMI 50.46 kg/m²    Physical Exam  Constitutional:       Appearance: She is obese.   Cardiovascular:      Rate and Rhythm: Normal rate and regular rhythm.      Heart sounds: Normal heart sounds.   Pulmonary:      Effort: Pulmonary effort is normal.      Breath sounds: Normal breath sounds.   Musculoskeletal:         General: No swelling.      Right lower leg: Edema present.      Left lower leg: Edema present.   Skin:            Comments: Erythema to sacrum    Psychiatric:         Mood and Affect: Mood normal.         Behavior: Behavior normal.         Thought Content: Thought content normal.         Judgment: Judgment normal.         ASSESSMENT/PLAN:     Diagnoses and all orders for this visit:    1. Type 2 diabetes mellitus with hyperglycemia, without long-term current use of insulin (Primary)  -     metFORMIN ER (GLUCOPHAGE-XR) 750 MG 24 hr tablet; Take 1 tablet by mouth Daily With Breakfast.  Dispense: 30 tablet; Refill: 5  -     POC Glycosylated Hemoglobin (Hb A1C)  -     Blood Glucose Monitoring Suppl (Blood Glucose Monitor System) w/Device kit; 1 each As Needed (glucose monitoring).  Dispense: 1 each; Refill: 0  -     " Semaglutide,0.25 or 0.5MG/DOS, (Ozempic, 0.25 or 0.5 MG/DOSE,) 2 MG/1.5ML solution pen-injector; Inject 0.25 mg under the skin into the appropriate area as directed 1 (One) Time Per Week.  Dispense: 1.5 mL; Refill: 0  -     Ambulatory Referral to Podiatry  A1C improved to 7.7%, will try to add GLP-1 for both diabetes and weight loss benefits if insurance will allow.     2. Essential hypertension  -     metoprolol tartrate (LOPRESSOR) 100 MG tablet; Take 1 tablet by mouth 2 (Two) Times a Day.  Dispense: 60 tablet; Refill: 5  -     Basic metabolic panel; Future  Well controlled, BP goal for age is <140/90 per JNC 8 guidelines and continue current medications  Increase HCTZ to 25mg daily and recheck electrolytes next week.     3. Gastroesophageal reflux disease without esophagitis  -     omeprazole (priLOSEC) 40 MG capsule; Take 1 capsule by mouth Daily.  Dispense: 90 capsule; Refill: 1    4. Dysuria  -     Urinalysis With Culture If Indicated - Urine, Clean Catch; Future    5. Urinary frequency  -     Urinalysis With Culture If Indicated - Urine, Clean Catch; Future    6. Chronic pain disorder  -     traMADol (ULTRAM) 50 MG tablet; Take 1-2 tablets as needed every 6 hours as needed for pain.  Dispense: 220 tablet; Refill: 1  Discussed the importance of focusing on weight loss to improve joint pain.     7. Onychomycosis  -     Ambulatory Referral to Podiatry    8. FRANCES on CPAP  She is advised to ask about CPAP sterilizer to allow easier cleaning process. Discussed CPAP is ideal for treating FRANCES over oxygen though if unable to tolerate may consider this as our only option for now.     9. Class 3 Obesity due to excess calories with serious comorbidity  Class 3 Severe Obesity (BMI >=40). Obesity-related health conditions include the following: obstructive sleep apnea, hypertension, coronary heart disease, diabetes mellitus, dyslipidemias and osteoarthritis. Obesity is improving with treatment. BMI is is above average; BMI  management plan is completed. We discussed low calorie, low carb based diet program, portion control and increasing exercise.     Other orders  -     Diclofenac Sodium (VOLTAREN) 1 % gel gel; Apply  topically to the appropriate area as directed 4 (Four) Times a Day.  Dispense: 100 g; Refill: 3    Recommend mepilex as needed to sacrum, no significant break down. Barrier cream as needed.     An After Visit Summary was printed and given to the patient at discharge.  Return in about 3 months (around 7/28/2023) for Recheck with Dr. Mcallister .          WOODY Mendez 4/28/2023   Electronically signed.

## 2023-04-30 LAB — BACTERIA SPEC AEROBE CULT: ABNORMAL

## 2023-05-18 NOTE — PROGRESS NOTES
[facility] - PODIATRY    Today's Date: 05/30/2023     Patient Name: Jessica Kern  MRN: [id number]  CSN: [id number]  PCP: Aron Mcallister DO  Referring Provider: Delicia Prajapati, *    SUBJECTIVE     Chief Complaint   Patient presents with    Establish Care     Aron Mcallister, 04/28/2023-NP- Onychomycosis/diabetic-pt states she is here today for toe nail fungus and diabetic nail care- pt states she has neuropathy that causes pain     Diabetes     137 mg/dl BG     HPI: Jessica Kern, a 67 y.o.female, comes to clinic as a(n) new patient presenting for diabetic foot exam and complaining of thickened, irregular toenails of both feet . Patient has h/o arthritis, asthma, CKD, DM, gout, HTN, IGTN, neuropathy . Patient is NIDDM with last stated BG level of 137mg/dl. Patient presents with complaints of long, thickened, and irregular toenails of both feet. States that she is unable and uncomfortable caring for them herself at home. Has issues with gait and mobility and uses walker in the home and wheelchair for long distances. Notes significant numbness in both feet. Denies pain. Denies previous treatment. Denies any constitutional symptoms. No other pedal complaints at this time.    Past Medical History:   Diagnosis Date    Arthritis     Asthma 2022    Sleep apnea    Chronic kidney disease 2022    Kidnew stones that incurred spetic shock    Diabetes mellitus June 2022    Possibly/probably as much as two years earlier untreated but according to labs    Difficulty walking     Arthritis for yearss in back and kneews.  But poor balance after septic shock 2022    Gout     Random last two years    High arches     Is this the same as high instep?    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 Two years in fingers pointet and thumb both hands     Past Surgical History:    Procedure Laterality Date    ANKLE OPEN REDUCTION INTERNAL FIXATION      Right ankle    BACK SURGERY      2001    BLADDER REPAIR      Lift    CHOLECYSTECTOMY      HYSTERECTOMY      KIDNEY STONE SURGERY      TUBAL ABDOMINAL LIGATION       Family History   Problem Relation Age of Onset    Lung cancer Mother     Cancer Mother         Lung tumor     Hypertension Mother     Thyroid disease Mother     Brain cancer Father     Cancer Father         Brain tumor [id number]    Thyroid disease Father     Breast cancer Maternal Aunt     Cancer Maternal Aunt         Breast cancer    Diabetes Maternal Aunt     Hypertension Maternal Aunt     Psoriasis Maternal Aunt     Rashes / Skin problems Maternal Aunt     Severe sprains Maternal Aunt         Ankle    Thyroid disease Maternal Aunt      Social History     Socioeconomic History    Marital status:    Tobacco Use    Smoking status: Former     Packs/day: 2.00     Years: 40.00     Pack years: 80.00     Types: Cigarettes     Start date: 1970     Quit date: 2/10/2010     Years since quittin.3     Passive exposure: Never    Smokeless tobacco: Never    Tobacco comments:     Quit 12  Years ago   Vaping Use    Vaping Use: Never used   Substance and Sexual Activity    Alcohol use: Not Currently    Drug use: Never    Sexual activity: Not Currently     Partners: Male     Birth control/protection: Condom, Pill, I.U.D., Spermicide, Post-menopausal, Tubal ligation, Hysterectomy, Same-sex partner     Allergies   Allergen Reactions    Codeine Other (See Comments)     Various reactions      Current Outpatient Medications   Medication Sig Dispense Refill    Blood Glucose Monitoring Suppl (Blood Glucose Monitor System) w/Device kit 1 each As Needed (glucose monitoring). 1 each 0    clotrimazole-betamethasone (LOTRISONE) 1-0.05 % cream Apply 1 application topically to the appropriate area as directed Daily.      diclofenac (VOLTAREN) 50 MG EC tablet Take 1 tablet by mouth  2 (Two) Times a Day. 180 tablet 1    Diclofenac Sodium (VOLTAREN) 1 % gel gel Apply  topically to the appropriate area as directed 4 (Four) Times a Day. 100 g 3    gabapentin (NEURONTIN) 600 MG tablet Take 1 tablet by mouth 4 (Four) Times a Day. 360 tablet 1    hydroCHLOROthiazide (HYDRODIURIL) 12.5 MG tablet Take 1 tablet by mouth Daily. 90 tablet 1    lisinopril (PRINIVIL,ZESTRIL) 40 MG tablet Take 1 tablet by mouth Daily. 90 tablet 1    metFORMIN ER (GLUCOPHAGE-XR) 750 MG 24 hr tablet Take 1 tablet by mouth Daily With Breakfast. 30 tablet 5    metoprolol tartrate (LOPRESSOR) 100 MG tablet Take 1 tablet by mouth 2 (Two) Times a Day. 60 tablet 5    omeprazole (priLOSEC) 40 MG capsule Take 1 capsule by mouth Daily. 90 capsule 1    potassium chloride (K-DUR,KLOR-CON) 20 MEQ CR tablet Take 1 tablet by mouth Daily. 45 tablet 1    pravastatin (PRAVACHOL) 40 MG tablet Take 1 tablet by mouth Daily. 90 tablet 3    Semaglutide,0.25 or 0.5MG/DOS, (Ozempic, 0.25 or 0.5 MG/DOSE,) 2 MG/1.5ML solution pen-injector Inject 0.25 mg under the skin into the appropriate area as directed 1 (One) Time Per Week. 1.5 mL 0    traMADol (ULTRAM) 50 MG tablet Take 1-2 tablets as needed every 6 hours as needed for pain. 220 tablet 1     No current facility-administered medications for this visit.     Review of Systems   Constitutional:  Negative for chills and fever.   HENT:  Negative for congestion.    Respiratory:  Negative for shortness of breath.    Cardiovascular:  Positive for leg swelling. Negative for chest pain.   Gastrointestinal:  Negative for constipation, diarrhea, nausea and vomiting.   Musculoskeletal:  Positive for gait problem.        Foot pain   Skin:  Negative for wound.   Neurological:  Positive for numbness.     OBJECTIVE     Vitals:    05/30/23 1528   BP: 130/80   Pulse: 86   SpO2: 94%       PHYSICAL EXAM  GEN:   Accompanied by grandson.     Foot/Ankle Exam    GENERAL  Diabetic foot exam performed    Appearance:   obese  Orientation:  AAOx3  Affect:  appropriate  Assistance:  wheelchair  Right shoe gear: sandal  Left shoe gear: sandal    VASCULAR     Right Foot Vascularity   Dorsalis pedis:  2+  Posterior tibial:  2+  Skin temperature:  warm  Edema grading:  Non-pitting and 1+  CFT:  3  Pedal hair growth:  Present  Varicosities:  none     Left Foot Vascularity   Dorsalis pedis:  2+  Posterior tibial:  2+  Skin temperature:  warm  Edema gradin+ and non-pitting  CFT:  3  Pedal hair growth:  Present  Varicosities:  none     NEUROLOGIC     Right Foot Neurologic   Light touch sensation: diminished  Vibratory sensation: diminished  Hot/Cold sensation: diminished  Protective Sensation using [place]uelo Monofilament:   Sites intact: 2  Sites tested: 10     Left Foot Neurologic   Light touch sensation: diminished  Vibratory sensation: diminished  Hot/Cold sensation:  diminished  Protective Sensation using [place]-Consuelo Monofilament:   Sites intact: 1  Sites tested: 10    MUSCULOSKELETAL     Right Foot Musculoskeletal   Tenderness:  none    Arch:  Pes planus (mild)  Hallux valgus: No       Left Foot Musculoskeletal   Tenderness:  none  Arch:  Pes planus (mild)  Hallux valgus: No      MUSCLE STRENGTH     Right Foot Muscle Strength   Foot dorsiflexion:  4+  Foot plantar flexion:  4+  Foot inversion:  4+  Foot eversion:  4+     Left Foot Muscle Strength   Foot dorsiflexion:  4+  Foot plantar flexion:  4+  Foot inversion:  4+  Foot eversion:  4+    RANGE OF MOTION     Right Foot Range of Motion   Foot and ankle ROM within normal limits       Left Foot Range of Motion   Foot and ankle ROM within normal limits      DERMATOLOGIC      Right Foot Dermatologic   Skin  Right foot skin is intact.   Nails  1.  Positive for elongated, abnormal thickness and dystrophic nail.  2.  Positive for elongated, abnormal thickness and dystrophic nail.  3.  Positive for elongated, abnormal thickness and dystrophic nail.  4.  Positive for  elongated, abnormal thickness and dystrophic nail.  5.  Positive for elongated, abnormal thickness and dystrophic nail.     Left Foot Dermatologic   Skin  Left foot skin is intact.   Nails  1.  Positive for elongated, abnormal thickness and dystrophic nail.  2.  Positive for elongated, abnormal thickness and dystrophic nail.  3.  Positive for elongated, abnormal thickness and dystrophic nail.  4.  Positive for elongated, abnormally thick and dystrophic nail.  5.  Absent.    RADIOLOGY/NUCLEAR:  No results found.    LABORATORY/CULTURE RESULTS:      PATHOLOGY RESULTS:       ASSESSMENT/PLAN     Diagnoses and all orders for this visit:    1. Thickened nails (Primary)    2. Controlled type 2 diabetes mellitus with diabetic polyneuropathy, without long-term current use of insulin    3. Class 3 severe obesity due to excess calories with serious comorbidity and body mass index (BMI) of 50.0 to 59.9 in adult    4. Gait abnormality    5. Encounter for diabetic foot exam      Comprehensive lower extremity examination and evaluation was performed.  Discussed findings and treatment plan including risks, benefits, and treatment options with patient in detail. Patient agreed with treatment plan.  Diabetic foot exam performed.  After verbal consent obtained, nail(s) x9 debrided of length and thickness with nail nipper without incidence  Patient may maintain nails and calluses at home utilizing emery board or pumice stone between visits as needed  Reviewed at home diabetic foot care including daily foot checks   Continue diabetic monitoring and control under direction of PCP.   Class 3 Severe Obesity (BMI >=40). Obesity-related health conditions include the following: diabetes mellitus. Obesity is unchanged. BMI is is above average; BMI management plan is completed. We discussed portion control and increasing exercise.  An After Visit Summary was printed and given to the patient at discharge, including (if requested) any available  informative/educational handouts regarding diagnosis, treatment, or medications. All questions were answered to patient/family satisfaction. Should symptoms fail to improve or worsen they agree to call or return to clinic or to go to the Emergency Department. Discussed the importance of following up with any needed screening tests/labs/specialist appointments and any requested follow-up recommended by me today. Importance of maintaining follow-up discussed and patient accepts that missed appointments can delay diagnosis and potentially lead to worsening of conditions.  Return in about 3 months (around 8/30/2023) for Follow-up with Podiatry Provider., or sooner if acute issues arise.    Lab Frequency Next Occurrence   Home Sleep Study Once 10/04/2022       This document has been electronically signed by Beto Pelaez DPM on May 30, 2023 15:55 CDT

## 2023-05-26 ENCOUNTER — TELEPHONE (OUTPATIENT)
Dept: PODIATRY | Facility: CLINIC | Age: 68
End: 2023-05-26
Payer: MEDICARE

## 2023-05-26 NOTE — TELEPHONE ENCOUNTER
Called patient to confirmed appointment for 05/30/2023 with Dr Pelaez @ [phone]. Patient will be here for appointment.

## 2023-05-30 ENCOUNTER — PATIENT ROUNDING (BHMG ONLY) (OUTPATIENT)
Dept: PODIATRY | Facility: CLINIC | Age: 68
End: 2023-05-30

## 2023-05-30 ENCOUNTER — OFFICE VISIT (OUTPATIENT)
Dept: PODIATRY | Facility: CLINIC | Age: 68
End: 2023-05-30

## 2023-05-30 VITALS
HEART RATE: 86 BPM | WEIGHT: 293 LBS | SYSTOLIC BLOOD PRESSURE: 130 MMHG | HEIGHT: 64 IN | DIASTOLIC BLOOD PRESSURE: 80 MMHG | BODY MASS INDEX: 50.02 KG/M2 | OXYGEN SATURATION: 94 %

## 2023-05-30 DIAGNOSIS — E66.01 CLASS 3 SEVERE OBESITY DUE TO EXCESS CALORIES WITH SERIOUS COMORBIDITY AND BODY MASS INDEX (BMI) OF 50.0 TO 59.9 IN ADULT: ICD-10-CM

## 2023-05-30 DIAGNOSIS — E11.42 CONTROLLED TYPE 2 DIABETES MELLITUS WITH DIABETIC POLYNEUROPATHY, WITHOUT LONG-TERM CURRENT USE OF INSULIN: ICD-10-CM

## 2023-05-30 DIAGNOSIS — R26.9 GAIT ABNORMALITY: ICD-10-CM

## 2023-05-30 DIAGNOSIS — E11.9 ENCOUNTER FOR DIABETIC FOOT EXAM: ICD-10-CM

## 2023-05-30 DIAGNOSIS — L60.2 THICKENED NAILS: Primary | ICD-10-CM

## 2023-05-30 NOTE — PROGRESS NOTES
May 30, 2023    Hello, may I speak with Jessica Kern?    My name is Kylee.      I am  with [facility] [place]  [address], [address]  [place] [address] [phone].    Before we get started may I verify your date of birth? 1955    I am calling to officially welcome you to our practice and ask about your recent visit. Is this a good time to talk? yes    Tell me about your visit with us. What things went well?  Everyone was so friendly.       We're always looking for ways to make our patients' experiences even better. Do you have recommendations on ways we may improve?  no    Overall were you satisfied with your first visit to our practice? Yes, the provider was so nice.  I didn't know what to expect since it was my first time but everyone was so friendly, it went great.       I appreciate you taking the time to speak with me today. Is there anything else I can do for you? no      Thank you, and have a great day.

## 2023-06-14 DIAGNOSIS — I10 PRIMARY HYPERTENSION: Primary | ICD-10-CM

## 2023-06-14 DIAGNOSIS — G89.4 CHRONIC PAIN DISORDER: ICD-10-CM

## 2023-06-14 DIAGNOSIS — E11.65 TYPE 2 DIABETES MELLITUS WITH HYPERGLYCEMIA, WITHOUT LONG-TERM CURRENT USE OF INSULIN: ICD-10-CM

## 2023-06-14 DIAGNOSIS — I10 ESSENTIAL HYPERTENSION: ICD-10-CM

## 2023-06-14 DIAGNOSIS — M17.10 ARTHRITIS OF KNEE: ICD-10-CM

## 2023-06-14 RX ORDER — SEMAGLUTIDE 1.34 MG/ML
0.25 INJECTION, SOLUTION SUBCUTANEOUS WEEKLY
Qty: 1.5 ML | Refills: 0 | Status: CANCELLED | OUTPATIENT
Start: 2023-06-14

## 2023-06-14 NOTE — TELEPHONE ENCOUNTER
Caller: Jessica Kern    Relationship: Self    Best call back number: [phone]    Requested Prescriptions:   Requested Prescriptions     Pending Prescriptions Disp Refills    Semaglutide,0.25 or 0.5MG/DOS, (Ozempic, 0.25 or 0.5 MG/DOSE,) 2 MG/1.5ML solution pen-injector 1.5 mL 0     Sig: Inject 0.25 mg under the skin into the appropriate area as directed 1 (One) Time Per Week.    hydroCHLOROthiazide (HYDRODIURIL) 12.5 MG tablet 90 tablet 1     Sig: Take 1 tablet by mouth Daily.        Pharmacy where request should be sent: [facility] [address] - [phone] [facility] [phone] FX     Last office visit with prescribing clinician: 9/29/2022   Last telemedicine visit with prescribing clinician: Visit date not found   Next office visit with prescribing clinician: 7/28/2023     Additional details provided by patient: PATIENT HAS NONE LEFT, ALSO STATES THAT SHE CAN NO LONGER TAKE METFORMIN, WOULD LIKE TO LOOK INTO ALTERNATIVES.    Does the patient have less than a 3 day supply:  [x] Yes  [] No    Would you like a call back once the refill request has been completed: [x] Yes [] No    If the office needs to give you a call back, can they leave a voicemail: [x] Yes [] No    Korina Gaspar Rep   06/14/23 13:30 CDT

## 2023-06-15 RX ORDER — SEMAGLUTIDE 0.68 MG/ML
0.5 INJECTION, SOLUTION SUBCUTANEOUS WEEKLY
Qty: 9 ML | Refills: 0 | Status: SHIPPED | OUTPATIENT
Start: 2023-06-15

## 2023-06-15 RX ORDER — HYDROCHLOROTHIAZIDE 12.5 MG/1
12.5 TABLET ORAL DAILY
Qty: 90 TABLET | Refills: 1 | Status: SHIPPED | OUTPATIENT
Start: 2023-06-15

## 2023-08-18 ENCOUNTER — OFFICE VISIT (OUTPATIENT)
Dept: INTERNAL MEDICINE | Facility: CLINIC | Age: 68
End: 2023-08-18
Payer: MEDICARE

## 2023-08-18 VITALS
RESPIRATION RATE: 16 BRPM | HEART RATE: 78 BPM | OXYGEN SATURATION: 98 % | WEIGHT: 287.5 LBS | SYSTOLIC BLOOD PRESSURE: 128 MMHG | HEIGHT: 64 IN | DIASTOLIC BLOOD PRESSURE: 80 MMHG | BODY MASS INDEX: 49.08 KG/M2

## 2023-08-18 DIAGNOSIS — Z87.891 PERSONAL HISTORY OF TOBACCO USE, PRESENTING HAZARDS TO HEALTH: ICD-10-CM

## 2023-08-18 DIAGNOSIS — G89.4 CHRONIC PAIN DISORDER: ICD-10-CM

## 2023-08-18 DIAGNOSIS — G57.93 NEUROPATHY INVOLVING BOTH LOWER EXTREMITIES: ICD-10-CM

## 2023-08-18 DIAGNOSIS — R26.9 GAIT DISTURBANCE: ICD-10-CM

## 2023-08-18 DIAGNOSIS — M17.0 BILATERAL PRIMARY OSTEOARTHRITIS OF KNEE: ICD-10-CM

## 2023-08-18 DIAGNOSIS — I10 ESSENTIAL HYPERTENSION: ICD-10-CM

## 2023-08-18 DIAGNOSIS — B37.2 YEAST DERMATITIS: Primary | ICD-10-CM

## 2023-08-18 DIAGNOSIS — Z87.891 HISTORY OF NICOTINE DEPENDENCE: ICD-10-CM

## 2023-08-18 DIAGNOSIS — E11.65 TYPE 2 DIABETES MELLITUS WITH HYPERGLYCEMIA, WITHOUT LONG-TERM CURRENT USE OF INSULIN: ICD-10-CM

## 2023-08-18 DIAGNOSIS — F41.1 GENERALIZED ANXIETY DISORDER: ICD-10-CM

## 2023-08-18 DIAGNOSIS — E78.2 MIXED HYPERLIPIDEMIA: ICD-10-CM

## 2023-08-18 DIAGNOSIS — R26.89 DECREASED MOBILITY: ICD-10-CM

## 2023-08-18 PROCEDURE — 3051F HG A1C>EQUAL 7.0%<8.0%: CPT | Performed by: INTERNAL MEDICINE

## 2023-08-18 PROCEDURE — 1160F RVW MEDS BY RX/DR IN RCRD: CPT | Performed by: INTERNAL MEDICINE

## 2023-08-18 PROCEDURE — 3074F SYST BP LT 130 MM HG: CPT | Performed by: INTERNAL MEDICINE

## 2023-08-18 PROCEDURE — 3079F DIAST BP 80-89 MM HG: CPT | Performed by: INTERNAL MEDICINE

## 2023-08-18 PROCEDURE — 1159F MED LIST DOCD IN RCRD: CPT | Performed by: INTERNAL MEDICINE

## 2023-08-18 PROCEDURE — G0296 VISIT TO DETERM LDCT ELIG: HCPCS | Performed by: INTERNAL MEDICINE

## 2023-08-18 PROCEDURE — 99214 OFFICE O/P EST MOD 30 MIN: CPT | Performed by: INTERNAL MEDICINE

## 2023-08-18 RX ORDER — GABAPENTIN 600 MG/1
600 TABLET ORAL 4 TIMES DAILY
Qty: 360 TABLET | Refills: 1 | Status: SHIPPED | OUTPATIENT
Start: 2023-08-18

## 2023-08-18 RX ORDER — SEMAGLUTIDE 1.34 MG/ML
1 INJECTION, SOLUTION SUBCUTANEOUS WEEKLY
Qty: 9 ML | Refills: 1 | Status: SHIPPED | OUTPATIENT
Start: 2023-08-18

## 2023-08-18 RX ORDER — SEMAGLUTIDE 0.68 MG/ML
0.5 INJECTION, SOLUTION SUBCUTANEOUS WEEKLY
Qty: 9 ML | Refills: 0 | Status: CANCELLED | OUTPATIENT
Start: 2023-08-18

## 2023-08-18 RX ORDER — LORAZEPAM 0.5 MG/1
0.5 TABLET ORAL 2 TIMES DAILY
COMMUNITY
End: 2023-08-18

## 2023-08-18 RX ORDER — NYSTATIN 100000 [USP'U]/G
POWDER TOPICAL 3 TIMES DAILY
Qty: 60 G | Refills: 3 | Status: SHIPPED | OUTPATIENT
Start: 2023-08-18

## 2023-08-18 RX ORDER — METOPROLOL TARTRATE 100 MG/1
100 TABLET ORAL 2 TIMES DAILY
Qty: 180 TABLET | Refills: 1 | Status: SHIPPED | OUTPATIENT
Start: 2023-08-18

## 2023-08-18 RX ORDER — HYDROXYZINE HYDROCHLORIDE 25 MG/1
25 TABLET, FILM COATED ORAL NIGHTLY PRN
Qty: 90 TABLET | Refills: 0 | Status: SHIPPED | OUTPATIENT
Start: 2023-08-18

## 2023-08-18 RX ORDER — ESCITALOPRAM OXALATE 10 MG/1
TABLET ORAL
Qty: 87 TABLET | Refills: 0 | Status: SHIPPED | OUTPATIENT
Start: 2023-08-18 | End: 2023-11-16

## 2023-08-18 RX ORDER — CLOTRIMAZOLE AND BETAMETHASONE DIPROPIONATE 10; .64 MG/G; MG/G
1 CREAM TOPICAL DAILY
Qty: 45 G | Refills: 2 | Status: SHIPPED | OUTPATIENT
Start: 2023-08-18

## 2023-08-18 NOTE — PROGRESS NOTES
"CC: Rash in skin folds, Anxiety    History:  Jessica Kern is a 67 y.o. female who presents today for evaluation of the above problems. The patient states that the rash has been present for \"years\" in different parts of the body, localized to underneath different skin folds. The rash is currently in her antecubital fossa, axilla, popliteal, and abdominal adipose tissue skin folds. The pt states the abdomen rashes have some ulceration, are painful, and have purulent discharge on the right side of the abdomen. The pt states that LOTRISONE cream helps some, but she is cautious with overuse due to the AE of steroids. Showering also seems to help the patient but she has trouble washing herself thoroughly. The pt states that though the rash occurs year-round, the summer-time makes it worse. The patient rates the rash pain a 3/10. The patient states that her anxiety has been \"worse\" this summer due to family, life, health stressors. She states she took some old lorazepam from a previous prescription which improved her anxiety.    ROS:  Review of Systems   Constitutional:  Positive for fatigue.   Cardiovascular:  Negative for chest pain and palpitations.   Gastrointestinal:  Positive for constipation (most of the time) and diarrhea (with milk). Negative for nausea and vomiting.   Endocrine: Positive for polyphagia and polyuria. Negative for polydipsia.   Skin:  Negative for pallor.   Neurological:  Positive for weakness and headaches. Negative for dizziness, tremors, seizures and speech difficulty.   Psychiatric/Behavioral:  Negative for confusion. The patient is nervous/anxious.               reports that she quit smoking about 13 years ago. Her smoking use included cigarettes. She started smoking about 52 years ago. She has a 80.00 pack-year smoking history. She has never been exposed to tobacco smoke. She has never used smokeless tobacco. She reports that she does not currently use alcohol. She reports that she does not " use drugs.      Current Outpatient Medications:     Blood Glucose Monitoring Suppl (Blood Glucose Monitor System) w/Device kit, 1 each As Needed (glucose monitoring)., Disp: 1 each, Rfl: 0    clotrimazole-betamethasone (LOTRISONE) 1-0.05 % cream, Apply 1 application  topically to the appropriate area as directed Daily., Disp: 45 g, Rfl: 2    diclofenac (VOLTAREN) 50 MG EC tablet, Take 1 tablet by mouth 2 (Two) Times a Day., Disp: 180 tablet, Rfl: 1    Diclofenac Sodium (VOLTAREN) 1 % gel gel, Apply  topically to the appropriate area as directed 4 (Four) Times a Day., Disp: 100 g, Rfl: 3    gabapentin (NEURONTIN) 600 MG tablet, Take 1 tablet by mouth 4 (Four) Times a Day., Disp: 360 tablet, Rfl: 1    hydroCHLOROthiazide (HYDRODIURIL) 12.5 MG tablet, Take 1 tablet by mouth Daily., Disp: 90 tablet, Rfl: 1    lisinopril (PRINIVIL,ZESTRIL) 40 MG tablet, Take 1 tablet by mouth Daily., Disp: 90 tablet, Rfl: 1    metoprolol tartrate (LOPRESSOR) 100 MG tablet, Take 1 tablet by mouth 2 (Two) Times a Day., Disp: 180 tablet, Rfl: 1    omeprazole (priLOSEC) 40 MG capsule, Take 1 capsule by mouth Daily., Disp: 90 capsule, Rfl: 1    potassium chloride (K-DUR,KLOR-CON) 20 MEQ CR tablet, Take 1 tablet by mouth Daily., Disp: 45 tablet, Rfl: 1    pravastatin (PRAVACHOL) 40 MG tablet, Take 1 tablet by mouth Daily., Disp: 90 tablet, Rfl: 3    traMADol (ULTRAM) 50 MG tablet, Take 1-2 tablets by mouth Every 6 (Six) Hours As Needed for Severe Pain. #215 to last 30 days., Disp: 215 tablet, Rfl: 1    escitalopram (Lexapro) 10 MG tablet, Take 0.5 tablets by mouth Daily for 6 days, THEN 1 tablet Daily for 84 days., Disp: 87 tablet, Rfl: 0    hydrOXYzine (ATARAX) 25 MG tablet, Take 1 tablet by mouth At Night As Needed for Anxiety., Disp: 90 tablet, Rfl: 0    nystatin (MYCOSTATIN) 362184 UNIT/GM powder, Apply  topically to the appropriate area as directed 3 (Three) Times a Day., Disp: 60 g, Rfl: 3    Semaglutide, 0.5 MG/DOSE, 2 MG/3ML solution  "pen-injector, Inject 1 mg under the skin into the appropriate area as directed 1 (One) Time Per Week., Disp: 9 mL, Rfl: 1    OBJECTIVE:  /80 (BP Location: Left arm, Patient Position: Sitting, Cuff Size: Adult)   Pulse 78   Resp 16   Ht 162.6 cm (64\")   Wt 130 kg (287 lb 8 oz)   SpO2 98%   BMI 49.35 kg/mý    Physical Exam  Constitutional:       General: She is not in acute distress.     Appearance: She is obese. She is not ill-appearing.   Eyes:      Extraocular Movements: Extraocular movements intact.      Conjunctiva/sclera: Conjunctivae normal.   Cardiovascular:      Rate and Rhythm: Normal rate and regular rhythm.      Heart sounds: Normal heart sounds. No murmur heard.    No friction rub. No gallop.   Pulmonary:      Effort: Pulmonary effort is normal. No respiratory distress.      Breath sounds: Normal breath sounds. No wheezing.   Musculoskeletal:         General: Normal range of motion.      Cervical back: Normal range of motion.   Skin:         Neurological:      Mental Status: She is alert.       Assessment/Plan    Diagnoses and all orders for this visit:    1. Yeast dermatitis (Primary)  -     nystatin (MYCOSTATIN) 017207 UNIT/GM powder; Apply  topically to the appropriate area as directed 3 (Three) Times a Day.  Dispense: 60 g; Refill: 3  -     clotrimazole-betamethasone (LOTRISONE) 1-0.05 % cream; Apply 1 application  topically to the appropriate area as directed Daily.  Dispense: 45 g; Refill: 2  Nystatin to provide better coverage. Lotrisone to more irritated areas.     2. Essential hypertension  -     CBC (No Diff); Future  -     metoprolol tartrate (LOPRESSOR) 100 MG tablet; Take 1 tablet by mouth 2 (Two) Times a Day.  Dispense: 180 tablet; Refill: 1  Well controlled, BP goal for age is <140/90 per JNC 8 guidelines, and continue current medications    3. Type 2 diabetes mellitus with hyperglycemia, without long-term current use of insulin  -     Comprehensive Metabolic Panel; Future  -    "  Hemoglobin A1c; Future  -     Semaglutide, 1 MG/DOSE, (Ozempic, 1 MG/DOSE,) 4 MG/3ML solution pen-injector; Inject 1 mg under the skin into the appropriate area as directed 1 (One) Time Per Week.  Dispense: 9 mL; Refill: 1  Check labs and increase semaglutide for improved glycemic and weight management.     4. Chronic pain disorder  5. Neuropathy involving both lower extremities  -     gabapentin (NEURONTIN) 600 MG tablet; Take 1 tablet by mouth 4 (Four) Times a Day.  Dispense: 360 tablet; Refill: 1  PDMP data reviewed. Gabapentin refilled.     6. Mixed hyperlipidemia  -     Lipid Panel; Future  Stable on moderate intensity statin therapy per ACC/AHA guidelines.    7. Generalized anxiety disorder  -     hydrOXYzine (ATARAX) 25 MG tablet; Take 1 tablet by mouth At Night As Needed for Anxiety.  Dispense: 90 tablet; Refill: 0  -     escitalopram (Lexapro) 10 MG tablet; Take 0.5 tablets by mouth Daily for 6 days, THEN 1 tablet Daily for 84 days.  Dispense: 87 tablet; Refill: 0  Initiate SSRI and use hydroxyzine to assist with symptoms while it takes full effect.     8. Decreased mobility  9. Gait disturbance  10. Bilateral primary osteoarthritis of knee  -     Ambulatory Referral to Home Health  Aim to use home health to improve her mobility and confidence around the house and with ADLs in order to promote improved independence and quality of life.     11. Personal history of tobacco use, presenting hazards to health  12. History of nicotine dependence  -     CT chest low dose wo; Future        An After Visit Summary was printed and given to the patient at discharge.  Return in about 4 months (around 12/18/2023) for Medicare Wellness.       Some portions of this note including history and physical were obtained by a medical student. However, the full history, ROS, physical exam and plan were discussed and reviewed with the student and patient. Physical exam is my own. All elements of this note are reviewed and  represent solely my assessment and plan.    Aron Mcallister D.O. 8/18/2023   Electronically signed.    Answers submitted by the patient for this visit:  Primary Reason for Visit (Submitted on 8/17/2023)  What is the primary reason for your visit?: Diabetes  Diabetes Questionnaire (Submitted on 8/17/2023)  Chief Complaint: Diabetes problem  Diabetes type: type 2  MedicAlert ID: No  blurred vision: Yes  foot paresthesias: Yes  foot ulcerations: No  visual change: Yes  weight loss: No  Symptom course: stable  hunger: Yes  mood changes: No  sleepiness: Yes  sweats: Yes  blackouts: No  hospitalization: No  nocturnal hypoglycemia: No  required assistance: No  required glucagon: No  CVA: No  heart disease: No  impotence: No  nephropathy: Yes  peripheral neuropathy: Yes  PVD: Yes  retinopathy: Yes  CAD risks: hypertension, obesity, post-menopausal, sedentary lifestyle  Current treatments: diet, oral agent (monotherapy)  Treatment compliance: most of the time  Home blood tests: 1-2 x per day  Home urines: <1 x per month  Monitoring compliance: good  Blood glucose trend: decreasing steadily  breakfast time: 7-8 am  breakfast glucose level: 110-130  lunch time: 12-1 pm  dinner time: 5-6 pm  High score: 110-130  Overall: 110-130  Weight trend: fluctuating minimally  Current diet: diabetic, generally healthy, low salt  Meal planning: avoidance of concentrated sweets, carbohydrate counting  Exercise: rarely  Dietitian visit: No  Eye exam current: Yes  Sees podiatrist: Yes

## 2023-08-21 ENCOUNTER — HOME HEALTH ADMISSION (OUTPATIENT)
Dept: HOME HEALTH SERVICES | Facility: HOME HEALTHCARE | Age: 68
End: 2023-08-21
Payer: MEDICARE

## 2023-08-23 ENCOUNTER — HOME CARE VISIT (OUTPATIENT)
Dept: HOME HEALTH SERVICES | Facility: CLINIC | Age: 68
End: 2023-08-23
Payer: MEDICARE

## 2023-08-23 PROCEDURE — G0151 HHCP-SERV OF PT,EA 15 MIN: HCPCS

## 2023-08-23 NOTE — Clinical Note
"Kirk 8/18/23 B knee corticosteroids      SOC Note:68 yo female with H/O B knee OA referrred to PT and OT for decreased mobility, knee OA and gait disturbances.    Home Health ordered for: PT and OT    Reason for Hosp/Primary Dx/Co-morbidities: B knee OA, morbid obesity, skin fold breakdown, decreased mobility    Focus of Care: B knee OA, decreased mobility    Patient's goal(s): \"get to the mailbox\", \" take a real bath\", \"need better balance\"    Current Functional status/mobility/DME: see DME    HB status/Living Arrangements: Lives alone curentlyb/c unamble to take care of her G-daughter. Son lives very near.    Skin Integrity/wound status: skin folds breakdown, SN refrral for skin/wound care.    Code Status: FULL CODE    Fall Risk/Safety concerns: high falls risk    Medication issues/Concerns: SN referral for medication education d/t new medications not received yet.    Additional Problems/Concerns: None    SDOH Barriers (i.e. caregiver concerns, social isolation, transportation, food insecurity, environment, income etc.)/Need for MSW: None    Plan for next visit: B knee OA, gait, training, strengthening w/HEP, balance w/HEP"

## 2023-08-23 NOTE — PROGRESS NOTES
[facility] - PODIATRY    Today's Date: 09/01/2023     Patient Name: Jessica Kern  MRN: [id number]  CSN: [id number]  PCP: Aron Mcallister DO  Referring Provider: No ref. provider found    SUBJECTIVE     Chief Complaint   Patient presents with    Follow-up     Aron Mcallister, 04/28/2023 3 month fu dibetic nail care- pt states left great toe has had the scab on it since last visit, never healed all the way. Other than that feet dong ok- pt denies pain     Diabetes     127mg/dl BG this am     HPI: Jessica Kern, a 67 y.o.female, comes to clinic as a(n) established patient presenting for diabetic foot exam and complaining of thickened, irregular toenails of both feet . Patient has h/o arthritis, asthma, CKD, DM, gout, HTN, IGTN, neuropathy . Patient is NIDDM with last stated BG level of 127mg/dl. Patient presents with complaints of long, thickened, and irregular toenails of both feet. States that she is unable and uncomfortable caring for them herself at home. Has issues with gait and mobility and uses walker in the home and wheelchair for long distances. Notes significant numbness in both feet. Denies pain. Relates previous treatment(s) including foot care by podiatry . Denies any constitutional symptoms. No other pedal complaints at this time.    Past Medical History:   Diagnosis Date    Arthritis     Asthma 2022    Sleep apnea    Chronic kidney disease 2022    Kidnew stones that incurred spetic shock    Diabetes mellitus June 2022    Possibly/probably as much as two years earlier untreated but according to labs    Difficulty walking     Arthritis for yearss in back and kneews.  But poor balance after septic shock 2022    Gout     Random last two years    High arches     Is this the same as high instep?    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 Two years in  fingers pointet and thumb both hands     Past Surgical History:   Procedure Laterality Date    ANKLE OPEN REDUCTION INTERNAL FIXATION  1992    Right ankle    BACK SURGERY      2001    BLADDER REPAIR      Lift    CHOLECYSTECTOMY      HYSTERECTOMY      KIDNEY STONE SURGERY      TUBAL ABDOMINAL LIGATION       Family History   Problem Relation Age of Onset    Lung cancer Mother     Cancer Mother         Lung tumor     Hypertension Mother     Thyroid disease Mother     Brain cancer Father     Cancer Father         Brain tumor [id number]    Thyroid disease Father     Breast cancer Maternal Aunt     Cancer Maternal Aunt         Breast cancer    Diabetes Maternal Aunt     Hypertension Maternal Aunt     Psoriasis Maternal Aunt     Rashes / Skin problems Maternal Aunt     Severe sprains Maternal Aunt         Ankle    Thyroid disease Maternal Aunt      Social History     Socioeconomic History    Marital status:    Tobacco Use    Smoking status: Former     Packs/day: 2.00     Years: 40.00     Pack years: 80.00     Types: Cigarettes     Start date: 1970     Quit date: 2/10/2010     Years since quittin.5     Passive exposure: Never    Smokeless tobacco: Never    Tobacco comments:     Quit 12  Years ago   Vaping Use    Vaping Use: Never used   Substance and Sexual Activity    Alcohol use: Not Currently    Drug use: Never    Sexual activity: Not Currently     Partners: Male     Birth control/protection: Condom, Pill, I.U.D., Spermicide, Post-menopausal, Tubal ligation, Hysterectomy, Same-sex partner     Allergies   Allergen Reactions    Codeine Other (See Comments)     Various reactions      Current Outpatient Medications   Medication Sig Dispense Refill    Blood Glucose Monitoring Suppl (Blood Glucose Monitor System) w/Device kit 1 each As Needed (glucose monitoring). 1 each 0    clotrimazole-betamethasone (LOTRISONE) 1-0.05 % cream Apply 1 application  topically to the appropriate area as directed Daily.  45 g 2    diclofenac (VOLTAREN) 50 MG EC tablet Take 1 tablet by mouth 2 (Two) Times a Day. 180 tablet 1    Diclofenac Sodium (VOLTAREN) 1 % gel gel Apply  topically to the appropriate area as directed 4 (Four) Times a Day. 100 g 3    docusate sodium (Colace) 100 MG capsule Take 100 mg by mouth 3 (Three) Times a Day As Needed for Constipation. Indications: Constipation      escitalopram (Lexapro) 10 MG tablet Take 0.5 tablets by mouth Daily for 6 days, THEN 1 tablet Daily for 84 days. 87 tablet 0    gabapentin (NEURONTIN) 600 MG tablet Take 1 tablet by mouth 4 (Four) Times a Day. (Patient taking differently: Take 1 tablet by mouth 4 (Four) Times a Day As Needed (NEUROPATHY). Indications: Diabetes with Nerve Disease) 360 tablet 1    hydroCHLOROthiazide (HYDRODIURIL) 12.5 MG tablet Take 1 tablet by mouth Daily. 90 tablet 1    hydrOXYzine (ATARAX) 25 MG tablet Take 1 tablet by mouth At Night As Needed for Anxiety. 90 tablet 0    lisinopril (PRINIVIL,ZESTRIL) 40 MG tablet Take 1 tablet by mouth Daily. 90 tablet 1    metoprolol tartrate (LOPRESSOR) 100 MG tablet Take 1 tablet by mouth 2 (Two) Times a Day. 180 tablet 1    nystatin (MYCOSTATIN) 652218 UNIT/GM powder Apply  topically to the appropriate area as directed 3 (Three) Times a Day. 60 g 3    omeprazole (priLOSEC) 40 MG capsule Take 1 capsule by mouth Daily. 90 capsule 1    potassium chloride (K-DUR,KLOR-CON) 20 MEQ CR tablet Take 1 tablet by mouth Daily. 45 tablet 1    pravastatin (PRAVACHOL) 40 MG tablet Take 1 tablet by mouth Daily. 90 tablet 3    Semaglutide, 1 MG/DOSE, (Ozempic, 1 MG/DOSE,) 4 MG/3ML solution pen-injector Inject 1 mg under the skin into the appropriate area as directed 1 (One) Time Per Week. 9 mL 1    traMADol (ULTRAM) 50 MG tablet Take 1 tablet by mouth Every 6 (Six) Hours As Needed for Moderate Pain. #210 to last 30 days. (Patient taking differently: Take 1 tablet by mouth Every 6 (Six) Hours As Needed for Moderate Pain. #120 to last 30  days.) 210 tablet 1     No current facility-administered medications for this visit.     Review of Systems   Constitutional:  Negative for chills and fever.   HENT:  Negative for congestion.    Respiratory:  Negative for shortness of breath.    Cardiovascular:  Positive for leg swelling. Negative for chest pain.   Gastrointestinal:  Negative for constipation, diarrhea, nausea and vomiting.   Musculoskeletal:  Positive for gait problem.        Foot pain   Skin:  Negative for wound.   Neurological:  Positive for numbness.     OBJECTIVE     Vitals:    23 1513   BP: 124/78   Pulse: 102   SpO2: 96%       PHYSICAL EXAM  GEN:   Accompanied by grandson.     Foot/Ankle Exam    GENERAL  Diabetic foot exam performed    Appearance:  appears stated age and obese  Orientation:  AAOx3  Affect:  appropriate  Gait:  (unsteady)  Assistance:  wheelchair  Right shoe gear: sandal  Left shoe gear: sandal    VASCULAR     Right Foot Vascularity   Dorsalis pedis:  2+  Posterior tibial:  2+  Skin temperature:  warm  Edema grading:  Non-pitting and 1+  CFT:  3  Pedal hair growth:  Present  Varicosities:  none     Left Foot Vascularity   Dorsalis pedis:  2+  Posterior tibial:  2+  Skin temperature:  warm  Edema gradin+ and non-pitting  CFT:  3  Pedal hair growth:  Present  Varicosities:  none     NEUROLOGIC     Right Foot Neurologic   Light touch sensation: diminished  Vibratory sensation: diminished  Hot/Cold sensation: diminished  Protective Sensation using [place] Monofilament:   Sites intact: 2  Sites tested: 10     Left Foot Neurologic   Light touch sensation: diminished  Vibratory sensation: diminished  Hot/Cold sensation:  diminished  Protective Sensation using [place] Monofilament:   Sites intact: 1  Sites tested: 10    MUSCULOSKELETAL     Right Foot Musculoskeletal   Tenderness:  none    Arch:  Pes planus (mild)  Hallux valgus: No       Left Foot Musculoskeletal   Tenderness:  none  Arch:  Pes planus  (mild)  Hallux valgus: No      MUSCLE STRENGTH     Right Foot Muscle Strength   Foot dorsiflexion:  4+  Foot plantar flexion:  4+  Foot inversion:  4+  Foot eversion:  4+     Left Foot Muscle Strength   Foot dorsiflexion:  4+  Foot plantar flexion:  4+  Foot inversion:  4+  Foot eversion:  4+    RANGE OF MOTION     Right Foot Range of Motion   Foot and ankle ROM within normal limits       Left Foot Range of Motion   Foot and ankle ROM within normal limits      DERMATOLOGIC      Right Foot Dermatologic   Skin  Right foot skin is intact.   Nails  1.  Positive for elongated, abnormal thickness and dystrophic nail.  2.  Positive for elongated, abnormal thickness and dystrophic nail.  3.  Positive for elongated, abnormal thickness and dystrophic nail.  4.  Positive for elongated, abnormal thickness and dystrophic nail.  5.  Positive for elongated, abnormal thickness and dystrophic nail.     Left Foot Dermatologic   Skin  Left foot skin is intact.   Nails  1.  Positive for elongated, abnormal thickness and dystrophic nail.  2.  Positive for elongated, abnormal thickness and dystrophic nail.  3.  Positive for elongated, abnormal thickness and dystrophic nail.  4.  Positive for elongated, abnormally thick and dystrophic nail.  5.  Positive for abnormally thick and dystrophic nail.    RADIOLOGY/NUCLEAR:  No results found.    LABORATORY/CULTURE RESULTS:      PATHOLOGY RESULTS:       ASSESSMENT/PLAN     Diagnoses and all orders for this visit:    1. Thickened nails (Primary)    2. Controlled type 2 diabetes mellitus with diabetic polyneuropathy, without long-term current use of insulin    3. BMI 45.0-49.9, adult    4. Gait abnormality      Comprehensive lower extremity examination and evaluation was performed.  Discussed findings and treatment plan including risks, benefits, and treatment options with patient in detail. Patient agreed with treatment plan.  After verbal consent obtained, nail(s) x10 debrided of length and  thickness with nail nipper without incidence  Patient may maintain nails and calluses at home utilizing emery board or pumice stone between visits as needed  Reviewed at home diabetic foot care including daily foot checks   Continue diabetic monitoring and control under direction of PCP.   Class 3 Severe Obesity (BMI >=40). Obesity-related health conditions include the following: diabetes mellitus. Obesity is unchanged. BMI is is above average; BMI management plan is completed. We discussed portion control and increasing exercise.  An After Visit Summary was printed and given to the patient at discharge, including (if requested) any available informative/educational handouts regarding diagnosis, treatment, or medications. All questions were answered to patient/family satisfaction. Should symptoms fail to improve or worsen they agree to call or return to clinic or to go to the Emergency Department. Discussed the importance of following up with any needed screening tests/labs/specialist appointments and any requested follow-up recommended by me today. Importance of maintaining follow-up discussed and patient accepts that missed appointments can delay diagnosis and potentially lead to worsening of conditions.  Return in about 3 months (around 12/1/2023) for Schedule [facility], Follow-up with Podiatry Provider., or sooner if acute issues arise.    Lab Frequency Next Occurrence   Home Sleep Study Once 10/04/2022       This document has been electronically signed by Beto Pelaez DPM on September 1, 2023 15:54 CDT

## 2023-08-24 ENCOUNTER — HOME CARE VISIT (OUTPATIENT)
Dept: HOME HEALTH SERVICES | Facility: CLINIC | Age: 68
End: 2023-08-24
Payer: MEDICARE

## 2023-08-24 VITALS
WEIGHT: 287 LBS | TEMPERATURE: 97.4 F | HEART RATE: 82 BPM | HEIGHT: 64 IN | BODY MASS INDEX: 49 KG/M2 | RESPIRATION RATE: 18 BRPM | OXYGEN SATURATION: 94 % | SYSTOLIC BLOOD PRESSURE: 120 MMHG | DIASTOLIC BLOOD PRESSURE: 64 MMHG

## 2023-08-24 VITALS
HEART RATE: 76 BPM | RESPIRATION RATE: 18 BRPM | SYSTOLIC BLOOD PRESSURE: 110 MMHG | DIASTOLIC BLOOD PRESSURE: 70 MMHG | OXYGEN SATURATION: 95 % | TEMPERATURE: 95.8 F

## 2023-08-24 DIAGNOSIS — G89.4 CHRONIC PAIN DISORDER: ICD-10-CM

## 2023-08-24 PROCEDURE — G0152 HHCP-SERV OF OT,EA 15 MIN: HCPCS

## 2023-08-24 RX ORDER — GABAPENTIN 600 MG/1
600 TABLET ORAL 4 TIMES DAILY PRN
Qty: 360 TABLET | Refills: 1 | Status: CANCELLED | OUTPATIENT
Start: 2023-08-24

## 2023-08-24 RX ORDER — TRAMADOL HYDROCHLORIDE 50 MG/1
50 TABLET ORAL EVERY 6 HOURS PRN
Qty: 210 TABLET | Refills: 1 | Status: SHIPPED | OUTPATIENT
Start: 2023-08-24

## 2023-08-24 NOTE — HOME HEALTH
SUBJECTIVE: reports hx of fall in tub 2.5 years ago with fear of falling since then.  Reports having neuropathy in feet and very limited standing tolerance due to back pain  Gets meals on wheels for lunch and makes simple meals for breakfast; has groceries delivered.   DX: OA of knees  PMH: chronic pain disorder, class 3 severe obesity obstructive sleep apnea, HTN, type 2 DM   SURGICAL PROCEDURE:no recent surgery  PRECAUTIONS:fall precautions  OXYGEN USE: no  EQUIPMENT: rollator, w/c to use when going to MD appts, tub transfer bench, cane, hand held shower,   PRIOR LEVEL OF FUNCTION: lives alone, was managing ADLs but hx of falls  MEDICAL NECESSITY: skilled OT recommended to increase safety and independence with aDLs, ADL transfers, energy conservation education  PATIENT GOALS: to be able to shower, apply lotion, care for skin rash, be able to go to mailbox  COMMUNICATION / CARE COORDINATION:  with Brielle Rolon PT  CURRENT INSURANCE: [employer]  DATE OF NEXT APPOINTMENT WITH DOCTOR: Sept 1st 3:30 Dr. Pelaez; Sept 15th chest CAT scan;  ANTICIPATED DISCHARGE PLAN: to self care when goals met  PATIENT/CAREGIVER AGREE WITH DISCHARGE PLAN: yes  SPECIFIC INTERVENTIONS AND GOALS TO ADDRESS ON NEXT VISIT: AE for ADLs  FREQUENCY AND DURATION: 1 wk 2, 2 wk 2  ANY OTHER FOLLOW UP NEEDED: with office regarding insurance approval of OT visits       PATIENT HAS RECEIVED EDUCATION ON COVID-19, PREVENTION, HANDWASHING, SOCIAL DISTANCING PER CDC

## 2023-08-24 NOTE — HOME HEALTH
"Kirk 8/18/23 B knee corticosteroids      SOC Note:66 yo female with H/O B knee OA referrred to PT and OT for decreased mobility, knee OA and gait disturbances.    Home Health ordered for: PT and OT    Reason for Hosp/Primary Dx/Co-morbidities: B knee OA, morbid obesity, skin fold breakdown, decreased mobility    Focus of Care: B knee OA, decreased mobility    Patient's goal(s): \"get to the mailbox\", \" take a real bath\", \"need better balance\"    Current Functional status/mobility/DME: see DME    HB status/Living Arrangements: Lives alone curentlyb/c unamble to take care of her G-daughter. Son lives very near.    Skin Integrity/wound status: skin folds breakdown, SN refrral for skin/wound care.    Code Status: FULL CODE    Fall Risk/Safety concerns: high falls risk    Medication issues/Concerns: SN referral for medication education d/t new medications not received yet.    Additional Problems/Concerns: None    SDOH Barriers (i.e. caregiver concerns, social isolation, transportation, food insecurity, environment, income etc.)/Need for MSW: None    Plan for next visit: B knee OA, gait, training, strengthening w/HEP, balance w/HEP"

## 2023-08-28 ENCOUNTER — HOME CARE VISIT (OUTPATIENT)
Dept: HOME HEALTH SERVICES | Facility: CLINIC | Age: 68
End: 2023-08-28
Payer: MEDICARE

## 2023-08-28 PROCEDURE — G0157 HHC PT ASSISTANT EA 15: HCPCS

## 2023-08-29 ENCOUNTER — HOME CARE VISIT (OUTPATIENT)
Dept: HOME HEALTH SERVICES | Facility: CLINIC | Age: 68
End: 2023-08-29
Payer: MEDICARE

## 2023-08-29 VITALS
OXYGEN SATURATION: 97 % | RESPIRATION RATE: 18 BRPM | SYSTOLIC BLOOD PRESSURE: 108 MMHG | DIASTOLIC BLOOD PRESSURE: 64 MMHG | TEMPERATURE: 96.4 F | HEART RATE: 74 BPM

## 2023-08-29 PROCEDURE — G0158 HHC OT ASSISTANT EA 15: HCPCS

## 2023-08-30 ENCOUNTER — HOME CARE VISIT (OUTPATIENT)
Dept: HOME HEALTH SERVICES | Facility: CLINIC | Age: 68
End: 2023-08-30
Payer: MEDICARE

## 2023-08-30 VITALS
TEMPERATURE: 97.3 F | HEART RATE: 81 BPM | SYSTOLIC BLOOD PRESSURE: 126 MMHG | OXYGEN SATURATION: 94 % | DIASTOLIC BLOOD PRESSURE: 80 MMHG | RESPIRATION RATE: 18 BRPM

## 2023-08-30 VITALS
HEART RATE: 72 BPM | OXYGEN SATURATION: 95 % | SYSTOLIC BLOOD PRESSURE: 112 MMHG | RESPIRATION RATE: 18 BRPM | DIASTOLIC BLOOD PRESSURE: 80 MMHG | TEMPERATURE: 97.2 F

## 2023-08-30 PROCEDURE — G0157 HHC PT ASSISTANT EA 15: HCPCS

## 2023-08-30 NOTE — HOME HEALTH
SUBJECTIVE: Patient states she has been doing her exercises as instructed and is feeling much looser in her neck and shoulders since she started doing them. She denies signficant pain today but she says her stomach is a little upset.    MEDICATION CHANGES: none    FALLS SINCE LAST VISIT: none    CHANGES TO INSURANCE: none    MENTAL STATUS: alert and oriented    PAIN: no    EDEMA: none observed    PLAN: Continue with focus on gait mechanics and strength. Tissue Cultured Epidermal Autograft Text: The defect edges were debeveled with a #15 scalpel blade.  Given the location of the defect, shape of the defect and the proximity to free margins a tissue cultured epidermal autograft was deemed most appropriate.  The graft was then trimmed to fit the size of the defect.  The graft was then placed in the primary defect and oriented appropriately.

## 2023-08-30 NOTE — HOME HEALTH
COVID: no signs or symptoms      SUBJECTIVE:Patient reports feeling fine as long as she is sitting down      FOCUS or SKILLED NEED:ADl training with use of AE and compensatory techniques      PHYSICIANS APPOINTMENTS: appointments pending        PLAN for NEXT VISIT:shower with use of shower transfer bench and dressing with compensatory tech

## 2023-08-30 NOTE — HOME HEALTH
SUBJECTIVE: Patient states she is pain free at rest but knees and low back pain increase when she is up. Patient notes she would like to work on balance.     MEDICATION CHANGES: none    FALLS SINCE LAST VISIT: none    CHANGES TO INSURANCE: none    MENTAL STATUS: alert and oriented    PAIN: 0/10 at rest, 8-9/10 with prolonged standing or walking.    EDEMA: min. B LE's    SUPPLEMENTAL OXYGEN: no    PLAN: Continue with focus on strength, patient education and balance.

## 2023-08-31 ENCOUNTER — TELEPHONE (OUTPATIENT)
Dept: INTERNAL MEDICINE | Facility: CLINIC | Age: 68
End: 2023-08-31
Payer: MEDICARE

## 2023-08-31 ENCOUNTER — HOME CARE VISIT (OUTPATIENT)
Dept: HOME HEALTH SERVICES | Facility: CLINIC | Age: 68
End: 2023-08-31
Payer: MEDICARE

## 2023-08-31 ENCOUNTER — TELEPHONE (OUTPATIENT)
Dept: PODIATRY | Facility: CLINIC | Age: 68
End: 2023-08-31
Payer: MEDICARE

## 2023-08-31 VITALS
DIASTOLIC BLOOD PRESSURE: 78 MMHG | TEMPERATURE: 97.9 F | HEART RATE: 80 BPM | SYSTOLIC BLOOD PRESSURE: 120 MMHG | RESPIRATION RATE: 18 BRPM | OXYGEN SATURATION: 95 %

## 2023-08-31 DIAGNOSIS — B37.2 YEAST DERMATITIS: ICD-10-CM

## 2023-08-31 DIAGNOSIS — S31.109A OPEN WOUND OF INGUINAL REGION, INITIAL ENCOUNTER: Primary | ICD-10-CM

## 2023-08-31 DIAGNOSIS — E11.65 TYPE 2 DIABETES MELLITUS WITH HYPERGLYCEMIA, WITHOUT LONG-TERM CURRENT USE OF INSULIN: ICD-10-CM

## 2023-08-31 DIAGNOSIS — M17.0 BILATERAL PRIMARY OSTEOARTHRITIS OF KNEE: ICD-10-CM

## 2023-08-31 DIAGNOSIS — G89.4 CHRONIC PAIN DISORDER: ICD-10-CM

## 2023-08-31 DIAGNOSIS — R26.9 GAIT DISTURBANCE: ICD-10-CM

## 2023-08-31 DIAGNOSIS — R26.89 DECREASED MOBILITY: Primary | ICD-10-CM

## 2023-08-31 PROCEDURE — G0299 HHS/HOSPICE OF RN EA 15 MIN: HCPCS

## 2023-08-31 NOTE — TELEPHONE ENCOUNTER
Where is this wound located exactly? It does not obviously look infected, but would recommend wound care assessment if not improving. Once we know where this is, we can send a referral.

## 2023-08-31 NOTE — TELEPHONE ENCOUNTER
ABDULAZIZ  NURSE WITH [facility].  SHE STATED THAT PATIENT HAS SEVERAL AREAS THAT ARE RED AND IRRITATED.  PATIENT USES KENALOG CREAM AND IT IS NOT HELPING TO CLEAR IT UP.  ABDULAZIZ STATED THAT SHE WOULD LIKE FOR PATIENT TO HAVE DOXYCICLINE  TO HELP WITH THIS.    ONE AREA LOOKS TO BE INFECTED.    THE SKIN IS BROKEN OPEN.  ABDULAZIZ WITH [facility] HAS TAKEN PICTURES AND THEY WILL BE IN HER CHART.      [phone]

## 2023-08-31 NOTE — HOME HEALTH
FOCUS OF CARE/SKILLED NEED: wound care evaluation    TEACHING/INTERVENTIONS:wound care evaluation and teaching    PROGRESS TOWARD GOALS: ongoing and progressing    PHYSICIAN CONTACT:  yes    INSURANCE CHANGES?  no    FALLS SINCE LAST VISIT?  no    MEDICATION CHANGES? no    PLAN FOR NEXT VISIT: wound care

## 2023-09-01 ENCOUNTER — TELEPHONE (OUTPATIENT)
Dept: INTERNAL MEDICINE | Facility: CLINIC | Age: 68
End: 2023-09-01

## 2023-09-01 ENCOUNTER — OFFICE VISIT (OUTPATIENT)
Dept: PODIATRY | Facility: CLINIC | Age: 68
End: 2023-09-01
Payer: MEDICARE

## 2023-09-01 ENCOUNTER — LAB (OUTPATIENT)
Dept: LAB | Facility: HOSPITAL | Age: 68
End: 2023-09-01
Payer: MEDICARE

## 2023-09-01 VITALS
SYSTOLIC BLOOD PRESSURE: 124 MMHG | WEIGHT: 287 LBS | BODY MASS INDEX: 49 KG/M2 | HEART RATE: 102 BPM | HEIGHT: 64 IN | OXYGEN SATURATION: 96 % | DIASTOLIC BLOOD PRESSURE: 78 MMHG

## 2023-09-01 DIAGNOSIS — E11.65 TYPE 2 DIABETES MELLITUS WITH HYPERGLYCEMIA, WITHOUT LONG-TERM CURRENT USE OF INSULIN: ICD-10-CM

## 2023-09-01 DIAGNOSIS — E78.2 MIXED HYPERLIPIDEMIA: ICD-10-CM

## 2023-09-01 DIAGNOSIS — E11.42 CONTROLLED TYPE 2 DIABETES MELLITUS WITH DIABETIC POLYNEUROPATHY, WITHOUT LONG-TERM CURRENT USE OF INSULIN: ICD-10-CM

## 2023-09-01 DIAGNOSIS — I10 ESSENTIAL HYPERTENSION: ICD-10-CM

## 2023-09-01 DIAGNOSIS — L60.2 THICKENED NAILS: Primary | ICD-10-CM

## 2023-09-01 DIAGNOSIS — R26.9 GAIT ABNORMALITY: ICD-10-CM

## 2023-09-01 LAB
ALBUMIN SERPL-MCNC: 3.9 G/DL (ref 3.5–5.2)
ALBUMIN/GLOB SERPL: 1.2 G/DL
ALP SERPL-CCNC: 122 U/L (ref 39–117)
ALT SERPL W P-5'-P-CCNC: 14 U/L (ref 1–33)
ANION GAP SERPL CALCULATED.3IONS-SCNC: 11 MMOL/L (ref 5–15)
AST SERPL-CCNC: 15 U/L (ref 1–32)
BILIRUB SERPL-MCNC: 0.3 MG/DL (ref 0–1.2)
BUN SERPL-MCNC: 33 MG/DL (ref 8–23)
BUN/CREAT SERPL: 26.4 (ref 7–25)
CALCIUM SPEC-SCNC: 9.8 MG/DL (ref 8.6–10.5)
CHLORIDE SERPL-SCNC: 98 MMOL/L (ref 98–107)
CHOLEST SERPL-MCNC: 182 MG/DL (ref 0–200)
CO2 SERPL-SCNC: 27 MMOL/L (ref 22–29)
CREAT SERPL-MCNC: 1.25 MG/DL (ref 0.57–1)
DEPRECATED RDW RBC AUTO: 45.8 FL (ref 37–54)
EGFRCR SERPLBLD CKD-EPI 2021: 47 ML/MIN/1.73
ERYTHROCYTE [DISTWIDTH] IN BLOOD BY AUTOMATED COUNT: 13.6 % (ref 12.3–15.4)
GLOBULIN UR ELPH-MCNC: 3.3 GM/DL
GLUCOSE SERPL-MCNC: 143 MG/DL (ref 65–99)
HBA1C MFR BLD: 6.6 % (ref 4.8–5.6)
HCT VFR BLD AUTO: 45.8 % (ref 34–46.6)
HDLC SERPL-MCNC: 43 MG/DL (ref 40–60)
HGB BLD-MCNC: 14.2 G/DL (ref 12–15.9)
LDLC SERPL CALC-MCNC: 105 MG/DL (ref 0–100)
LDLC/HDLC SERPL: 2.32 {RATIO}
MCH RBC QN AUTO: 28.3 PG (ref 26.6–33)
MCHC RBC AUTO-ENTMCNC: 31 G/DL (ref 31.5–35.7)
MCV RBC AUTO: 91.4 FL (ref 79–97)
PLATELET # BLD AUTO: 260 10*3/MM3 (ref 140–450)
PMV BLD AUTO: 10.9 FL (ref 6–12)
POTASSIUM SERPL-SCNC: 4.5 MMOL/L (ref 3.5–5.2)
PROT SERPL-MCNC: 7.2 G/DL (ref 6–8.5)
RBC # BLD AUTO: 5.01 10*6/MM3 (ref 3.77–5.28)
SODIUM SERPL-SCNC: 136 MMOL/L (ref 136–145)
TRIGL SERPL-MCNC: 196 MG/DL (ref 0–150)
VLDLC SERPL-MCNC: 34 MG/DL (ref 5–40)
WBC NRBC COR # BLD: 10.89 10*3/MM3 (ref 3.4–10.8)

## 2023-09-01 PROCEDURE — 80053 COMPREHEN METABOLIC PANEL: CPT

## 2023-09-01 PROCEDURE — 85027 COMPLETE CBC AUTOMATED: CPT

## 2023-09-01 PROCEDURE — 83036 HEMOGLOBIN GLYCOSYLATED A1C: CPT

## 2023-09-01 PROCEDURE — 36415 COLL VENOUS BLD VENIPUNCTURE: CPT

## 2023-09-01 PROCEDURE — 80061 LIPID PANEL: CPT

## 2023-09-01 NOTE — TELEPHONE ENCOUNTER
ABDULAZIZ, NURSE WITH [facility] HAS BEEN CALLED, SHE STATED THAT THE WOUND IS ON HER RIGHT TROCANTER REGION (PELVIC AREA).    SHE STATED THAT WOUND CARE IS A GREAT IDEA.

## 2023-09-01 NOTE — TELEPHONE ENCOUNTER
Hm.. the trochanter is on the lateral aspect of the leg (the outside side). Is this spot closer to the groin or gluteal area?

## 2023-09-01 NOTE — TELEPHONE ENCOUNTER
Caller: Jessica Kern    Relationship: Self    Best call back number: [phone]     What is the best time to reach you: ANY    Who are you requesting to speak with (clinical staff, provider,  specific staff member): CLINICAL    What was the call regarding:     PATIENT IS WONDERING IF SHE IS DUE FOR A FLU SHOT OR ANOTHER COVID-19 VACCINE?     Is it okay if the provider responds through [employer]: YES

## 2023-09-01 NOTE — TELEPHONE ENCOUNTER
[employer] message sent to patient notifying that a referral was placed to wound care.

## 2023-09-05 ENCOUNTER — HOME CARE VISIT (OUTPATIENT)
Dept: HOME HEALTH SERVICES | Facility: CLINIC | Age: 68
End: 2023-09-05
Payer: MEDICARE

## 2023-09-05 VITALS
SYSTOLIC BLOOD PRESSURE: 124 MMHG | TEMPERATURE: 97.3 F | HEART RATE: 69 BPM | RESPIRATION RATE: 18 BRPM | OXYGEN SATURATION: 95 % | DIASTOLIC BLOOD PRESSURE: 66 MMHG

## 2023-09-05 PROCEDURE — G0158 HHC OT ASSISTANT EA 15: HCPCS

## 2023-09-06 ENCOUNTER — TELEPHONE (OUTPATIENT)
Dept: INTERNAL MEDICINE | Facility: CLINIC | Age: 68
End: 2023-09-06
Payer: MEDICARE

## 2023-09-06 ENCOUNTER — LAB REQUISITION (OUTPATIENT)
Dept: LAB | Facility: HOSPITAL | Age: 68
End: 2023-09-06
Payer: MEDICARE

## 2023-09-06 ENCOUNTER — HOME CARE VISIT (OUTPATIENT)
Dept: HOME HEALTH SERVICES | Facility: CLINIC | Age: 68
End: 2023-09-06
Payer: MEDICARE

## 2023-09-06 VITALS
SYSTOLIC BLOOD PRESSURE: 142 MMHG | OXYGEN SATURATION: 98 % | TEMPERATURE: 98 F | DIASTOLIC BLOOD PRESSURE: 78 MMHG | RESPIRATION RATE: 18 BRPM | HEART RATE: 87 BPM

## 2023-09-06 VITALS
OXYGEN SATURATION: 95 % | HEART RATE: 72 BPM | TEMPERATURE: 97.2 F | DIASTOLIC BLOOD PRESSURE: 70 MMHG | SYSTOLIC BLOOD PRESSURE: 122 MMHG | RESPIRATION RATE: 18 BRPM

## 2023-09-06 DIAGNOSIS — N30.01 ACUTE CYSTITIS WITH HEMATURIA: Primary | ICD-10-CM

## 2023-09-06 LAB
BACTERIA UR QL AUTO: ABNORMAL /HPF
BILIRUB UR QL STRIP: NEGATIVE
CLARITY UR: ABNORMAL
COLOR UR: YELLOW
GLUCOSE UR STRIP-MCNC: NEGATIVE MG/DL
HGB UR QL STRIP.AUTO: ABNORMAL
HYALINE CASTS UR QL AUTO: ABNORMAL /LPF
KETONES UR QL STRIP: NEGATIVE
LEUKOCYTE ESTERASE UR QL STRIP.AUTO: ABNORMAL
NITRITE UR QL STRIP: NEGATIVE
PH UR STRIP.AUTO: 5.5 [PH] (ref 5–8)
PROT UR QL STRIP: ABNORMAL
RBC # UR STRIP: ABNORMAL /HPF
REF LAB TEST METHOD: ABNORMAL
SP GR UR STRIP: 1.02 (ref 1–1.03)
SQUAMOUS #/AREA URNS HPF: ABNORMAL /HPF
UROBILINOGEN UR QL STRIP: ABNORMAL
WBC # UR STRIP: ABNORMAL /HPF

## 2023-09-06 PROCEDURE — 87186 SC STD MICRODIL/AGAR DIL: CPT | Performed by: INTERNAL MEDICINE

## 2023-09-06 PROCEDURE — G0300 HHS/HOSPICE OF LPN EA 15 MIN: HCPCS

## 2023-09-06 PROCEDURE — G0157 HHC PT ASSISTANT EA 15: HCPCS

## 2023-09-06 PROCEDURE — 87077 CULTURE AEROBIC IDENTIFY: CPT | Performed by: INTERNAL MEDICINE

## 2023-09-06 PROCEDURE — 87086 URINE CULTURE/COLONY COUNT: CPT | Performed by: INTERNAL MEDICINE

## 2023-09-06 PROCEDURE — 81001 URINALYSIS AUTO W/SCOPE: CPT | Performed by: INTERNAL MEDICINE

## 2023-09-06 RX ORDER — NITROFURANTOIN 25; 75 MG/1; MG/1
100 CAPSULE ORAL 2 TIMES DAILY
Qty: 10 CAPSULE | Refills: 0 | Status: SHIPPED | OUTPATIENT
Start: 2023-09-06 | End: 2023-09-11

## 2023-09-06 NOTE — HOME HEALTH
"COVID: no signs or symptoms      SUBJECTIVE:\"I went to the  on Friday.\"      FOCUS of TREATMENT:ADL training      PHYSICIAN CONTACT: appointment on 9/15 CT scan and wound care at 1:15 and 3:00PM      PLAN for NEXT VISIT:ADLs/shower with AE and compensatory techniques"

## 2023-09-06 NOTE — HOME HEALTH
COVID SCREENING: DENIES S/SX OF COVID.  -N0 COVID S/S OR EXPOSURE     FOCUS OF CARE/SKILLED NEED: CPA/EDUCATION - WOUND CARE ORDERS AND HEELING PROCESS REVIEWED WOUND CARE PERFORMED PT FARAZ WELL, PT STATES SHE HAS BEEN HAVING SOME URINARY S/S CALL PLACED TO MD, ORDER OBTAINED TO COLLECT A UA WITH C&S, ALSO PRN ORDER ENTERED FOR A LAB DRAW ON FRIDAY      TEACHING/INTERVENTIONS: PT DENIES CHEST PAIN, FALLS OR MEDICATION CHANGES.      PATIENT/CG RESPONSE: PT/CG CAN TEACH BACK ALL EDUCATION     PROGRESS TOWARD GOALS: PROGRESSING AS EXPECTED     PHYSICIAN CONTACT: MD CALLED AND UPDATED ON URINARY S/S AND UA RESULTS     FALLS SINCE LAST VISIT? DENIES     MEDICATION CHANGES SINCE LAST VISIT? DENIES     PLAN FOR NEXT VISIT: CPA/EDUCATION - FOLLOW UP ON WOUND CARE ORDERS - UA COLLECTED ON WEDNESDAY 9-6-2023- PRN VISIT FOR LAB DRAW ORDERED FOR FRIDAY

## 2023-09-06 NOTE — HOME HEALTH
SUBJECTIVE: Patient reports she is doing well but has been having some burning when she pees and reports the nurse took a specimen this morning and will be back Friday to get some blood work. She reports buckling of right knee x2 and has some soreness in right knee. She says her exercises are going well and is is enjoying doing the upper body exercises.     MEDICATION CHANGES: none    FALLS SINCE LAST VISIT: none    CHANGES TO INSURANCE: none    MENTAL STATUS: alert and oriented    PAIN: 2/10 AT REST    PLAN: Continue with focus on gait mechanics, posturing, endurance and strength.

## 2023-09-06 NOTE — TELEPHONE ENCOUNTER
----- Message from WOODY Mendez sent at 9/6/2023  5:00 PM CDT -----  Regarding: UTI  We do see bacteria in the urine, we are awaiting final culture results but I have sent an antibiotic macrobid to the pharmacy to treat this.

## 2023-09-07 ENCOUNTER — HOME CARE VISIT (OUTPATIENT)
Dept: HOME HEALTH SERVICES | Facility: CLINIC | Age: 68
End: 2023-09-07
Payer: MEDICARE

## 2023-09-07 VITALS
HEART RATE: 72 BPM | RESPIRATION RATE: 18 BRPM | OXYGEN SATURATION: 94 % | SYSTOLIC BLOOD PRESSURE: 130 MMHG | TEMPERATURE: 96.7 F | DIASTOLIC BLOOD PRESSURE: 74 MMHG

## 2023-09-07 PROCEDURE — G0158 HHC OT ASSISTANT EA 15: HCPCS

## 2023-09-07 NOTE — HOME HEALTH
COVID: no signs or symptoms      SUBJECTIVE: Patient reports pain when feeling the urge to urinate and physician has ordered specimen and blood tests. Pt reports feeling dizzy and light headed with new medication.      FOCUS of TREATMENT:ADL training with compensatory technique      PHYSICIAN CONTACT: appointment next Friday 9/15       PLAN for NEXT VISIT: ADL training with AE and compensatory tech

## 2023-09-08 ENCOUNTER — HOME CARE VISIT (OUTPATIENT)
Dept: HOME HEALTH SERVICES | Facility: CLINIC | Age: 68
End: 2023-09-08
Payer: MEDICARE

## 2023-09-08 ENCOUNTER — LAB REQUISITION (OUTPATIENT)
Dept: LAB | Facility: HOSPITAL | Age: 68
End: 2023-09-08
Payer: MEDICARE

## 2023-09-08 LAB
ANION GAP SERPL CALCULATED.3IONS-SCNC: 12 MMOL/L (ref 5–15)
BACTERIA SPEC AEROBE CULT: ABNORMAL
BUN SERPL-MCNC: 19 MG/DL (ref 8–23)
BUN/CREAT SERPL: 23.5 (ref 7–25)
CALCIUM SPEC-SCNC: 9.7 MG/DL (ref 8.6–10.5)
CHLORIDE SERPL-SCNC: 96 MMOL/L (ref 98–107)
CO2 SERPL-SCNC: 30 MMOL/L (ref 22–29)
CREAT SERPL-MCNC: 0.81 MG/DL (ref 0.57–1)
EGFRCR SERPLBLD CKD-EPI 2021: 79.2 ML/MIN/1.73
GLUCOSE SERPL-MCNC: 94 MG/DL (ref 65–99)
POTASSIUM SERPL-SCNC: 4.1 MMOL/L (ref 3.5–5.2)
SODIUM SERPL-SCNC: 138 MMOL/L (ref 136–145)

## 2023-09-08 PROCEDURE — 80048 BASIC METABOLIC PNL TOTAL CA: CPT | Performed by: INTERNAL MEDICINE

## 2023-09-08 PROCEDURE — G0299 HHS/HOSPICE OF RN EA 15 MIN: HCPCS

## 2023-09-09 VITALS
HEART RATE: 77 BPM | OXYGEN SATURATION: 98 % | RESPIRATION RATE: 20 BRPM | DIASTOLIC BLOOD PRESSURE: 70 MMHG | TEMPERATURE: 98 F | SYSTOLIC BLOOD PRESSURE: 126 MMHG

## 2023-09-10 NOTE — HOME HEALTH
Routine Visit Note:Pt alert and pleasant. Sitting in recliner with feet dependent    Skill/education provided: Wd assessment and care to folds and wd in abd. Cleaned with saline and patted dry. Numerous folds with skin touching. Instructed to lay flat when poss with skin open to air and when sitting up instructed to place a clean cloth between skin folds to prevent skin from touching. Instructed to keep area clean and dry. in right ac with a 23g butterfly for a BMP. Site ptrepped with alcohol before . Bandaide applied. Labs to [facility]. Pt is currently being treated for a uti. Pt denies pain and states urine is clear. Pt began her Nitrofurantoin yesterday.    Patient/caregiver response: Cooperative    Plan for next visit: Wd assessment and care    Other pertinent info: No s/s of covid

## 2023-09-11 ENCOUNTER — HOME CARE VISIT (OUTPATIENT)
Dept: HOME HEALTH SERVICES | Facility: CLINIC | Age: 68
End: 2023-09-11
Payer: MEDICARE

## 2023-09-11 VITALS
TEMPERATURE: 97.5 F | DIASTOLIC BLOOD PRESSURE: 62 MMHG | RESPIRATION RATE: 18 BRPM | SYSTOLIC BLOOD PRESSURE: 104 MMHG | OXYGEN SATURATION: 97 % | HEART RATE: 86 BPM

## 2023-09-11 PROCEDURE — G0151 HHCP-SERV OF PT,EA 15 MIN: HCPCS

## 2023-09-13 ENCOUNTER — HOME CARE VISIT (OUTPATIENT)
Dept: HOME HEALTH SERVICES | Facility: CLINIC | Age: 68
End: 2023-09-13
Payer: MEDICARE

## 2023-09-13 ENCOUNTER — TELEPHONE (OUTPATIENT)
Dept: INTERNAL MEDICINE | Facility: CLINIC | Age: 68
End: 2023-09-13

## 2023-09-13 ENCOUNTER — HOME CARE VISIT (OUTPATIENT)
Dept: HOME HEALTH SERVICES | Facility: HOME HEALTHCARE | Age: 68
End: 2023-09-13

## 2023-09-13 VITALS
HEART RATE: 80 BPM | DIASTOLIC BLOOD PRESSURE: 60 MMHG | RESPIRATION RATE: 20 BRPM | SYSTOLIC BLOOD PRESSURE: 108 MMHG | TEMPERATURE: 97 F | OXYGEN SATURATION: 96 %

## 2023-09-13 DIAGNOSIS — E11.65 TYPE 2 DIABETES MELLITUS WITH HYPERGLYCEMIA, WITHOUT LONG-TERM CURRENT USE OF INSULIN: Primary | ICD-10-CM

## 2023-09-13 PROCEDURE — G0299 HHS/HOSPICE OF RN EA 15 MIN: HCPCS

## 2023-09-13 PROCEDURE — G0151 HHCP-SERV OF PT,EA 15 MIN: HCPCS

## 2023-09-13 PROCEDURE — G0158 HHC OT ASSISTANT EA 15: HCPCS

## 2023-09-13 RX ORDER — PROCHLORPERAZINE 25 MG/1
1 SUPPOSITORY RECTAL
Qty: 1 EACH | Refills: 3 | Status: SHIPPED | OUTPATIENT
Start: 2023-09-13

## 2023-09-13 RX ORDER — PROCHLORPERAZINE 25 MG/1
SUPPOSITORY RECTAL
Qty: 3 EACH | Refills: 12 | Status: SHIPPED | OUTPATIENT
Start: 2023-09-13

## 2023-09-13 RX ORDER — PROCHLORPERAZINE 25 MG/1
1 SUPPOSITORY RECTAL ONCE
Qty: 1 EACH | Refills: 0 | Status: SHIPPED | OUTPATIENT
Start: 2023-09-13 | End: 2023-09-13

## 2023-09-13 NOTE — TELEPHONE ENCOUNTER
VICENTE FROM  [facility]  IS REQUESTING AN ORDER FOR A DEXCOM FOR PATIENT TO MONITOR HER BLOOD SUGAR.   IT CAN BE SENT TO [facility] IN [place].

## 2023-09-13 NOTE — TELEPHONE ENCOUNTER
Patient informed.  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  She would like the device to be ordered.  She verbalized understanding that it may not be covered.

## 2023-09-13 NOTE — TELEPHONE ENCOUNTER
Patient informed  sent an order to her pharmacy for the device and she will need to check with them to see if it is covered.

## 2023-09-14 ENCOUNTER — HOME CARE VISIT (OUTPATIENT)
Dept: HOME HEALTH SERVICES | Facility: HOME HEALTHCARE | Age: 68
End: 2023-09-14
Payer: MEDICARE

## 2023-09-14 ENCOUNTER — HOME CARE VISIT (OUTPATIENT)
Dept: HOME HEALTH SERVICES | Facility: CLINIC | Age: 68
End: 2023-09-14
Payer: MEDICARE

## 2023-09-14 VITALS
HEART RATE: 82 BPM | DIASTOLIC BLOOD PRESSURE: 60 MMHG | OXYGEN SATURATION: 97 % | SYSTOLIC BLOOD PRESSURE: 110 MMHG | TEMPERATURE: 97.7 F | RESPIRATION RATE: 18 BRPM

## 2023-09-14 VITALS
HEART RATE: 80 BPM | SYSTOLIC BLOOD PRESSURE: 100 MMHG | TEMPERATURE: 97.5 F | DIASTOLIC BLOOD PRESSURE: 60 MMHG | RESPIRATION RATE: 18 BRPM | OXYGEN SATURATION: 96 %

## 2023-09-14 VITALS
OXYGEN SATURATION: 96 % | SYSTOLIC BLOOD PRESSURE: 108 MMHG | RESPIRATION RATE: 18 BRPM | DIASTOLIC BLOOD PRESSURE: 60 MMHG | TEMPERATURE: 97.6 F | HEART RATE: 80 BPM

## 2023-09-14 PROCEDURE — G0152 HHCP-SERV OF OT,EA 15 MIN: HCPCS

## 2023-09-14 NOTE — HOME HEALTH
COVID SCREENING: no signs/symptoms of COVID    SUBJECTIVE:  Patient feels she lost ground with therapy when she had illness and was not able to fully address goals due to weakness/dizziness and fear of falling.  Would like to increase balance and activity tolerance to be able to go to get her mail. Does not feel comfortable showering independently yet and feels like if she could shower regularly then her wounds in skin folds would heal better.      FOCUS OF CARE/SKILLED NEED:  ADL training, education on energy conservation      PHYSICIAN CONTACT:   this afternoon       PLAN FOR NEXT VISIT: continue to work on increasing independence and safety with ADLs/shower/dressing and energy conservation training

## 2023-09-14 NOTE — HOME HEALTH
"COVID: no signs or symptoms      SUBJECTIVE:\"My knees haven't been bad today\"      FOCUS of TREATMENT:ADL/IADL training      PHYSICIAN CONTACT: appointment for wound care on Friday       PLAN for NEXT VISIT:OT reassessment with possible d/c"

## 2023-09-14 NOTE — HOME HEALTH
Routine Visit Note:Alert and pleasant. Cheerful. Sitting in recliner.     Skill/education provided: cp, wd assesment and care. Creases in amb have mod amt of caked powder like. Area cleaned with soap and water. Patted dry. Pt placed a soft clean rag between skin folds. Pt to go to wound care on Friday    Patient/caregiver response: cooperative    Plan for next visit: wd care and assessment    Other pertinent info: No s/s of Covid 19

## 2023-09-15 ENCOUNTER — OFFICE VISIT (OUTPATIENT)
Dept: WOUND CARE | Facility: HOSPITAL | Age: 68
End: 2023-09-15
Payer: MEDICARE

## 2023-09-15 ENCOUNTER — LAB REQUISITION (OUTPATIENT)
Dept: LAB | Facility: HOSPITAL | Age: 68
End: 2023-09-15
Payer: MEDICARE

## 2023-09-15 ENCOUNTER — HOSPITAL ENCOUNTER (OUTPATIENT)
Dept: CT IMAGING | Facility: HOSPITAL | Age: 68
Discharge: HOME | End: 2023-09-15
Payer: MEDICARE

## 2023-09-15 PROCEDURE — 87205 SMEAR GRAM STAIN: CPT | Performed by: NURSE PRACTITIONER

## 2023-09-15 PROCEDURE — 87070 CULTURE OTHR SPECIMN AEROBIC: CPT | Performed by: NURSE PRACTITIONER

## 2023-09-15 PROCEDURE — 87176 TISSUE HOMOGENIZATION CULTR: CPT | Performed by: NURSE PRACTITIONER

## 2023-09-15 PROCEDURE — G0463 HOSPITAL OUTPT CLINIC VISIT: HCPCS

## 2023-09-15 PROCEDURE — 87075 CULTR BACTERIA EXCEPT BLOOD: CPT | Performed by: NURSE PRACTITIONER

## 2023-09-18 ENCOUNTER — HOME CARE VISIT (OUTPATIENT)
Dept: HOME HEALTH SERVICES | Facility: CLINIC | Age: 68
End: 2023-09-18
Payer: MEDICARE

## 2023-09-18 LAB
BACTERIA SPEC AEROBE CULT: NORMAL
GRAM STN SPEC: NORMAL

## 2023-09-19 ENCOUNTER — HOME CARE VISIT (OUTPATIENT)
Dept: HOME HEALTH SERVICES | Facility: CLINIC | Age: 68
End: 2023-09-19
Payer: MEDICARE

## 2023-09-19 VITALS
OXYGEN SATURATION: 96 % | HEART RATE: 82 BPM | RESPIRATION RATE: 18 BRPM | SYSTOLIC BLOOD PRESSURE: 104 MMHG | TEMPERATURE: 97.3 F | DIASTOLIC BLOOD PRESSURE: 60 MMHG

## 2023-09-19 PROCEDURE — G0158 HHC OT ASSISTANT EA 15: HCPCS

## 2023-09-19 PROCEDURE — G0300 HHS/HOSPICE OF LPN EA 15 MIN: HCPCS

## 2023-09-20 ENCOUNTER — HOME CARE VISIT (OUTPATIENT)
Dept: HOME HEALTH SERVICES | Facility: CLINIC | Age: 68
End: 2023-09-20
Payer: MEDICARE

## 2023-09-20 VITALS
SYSTOLIC BLOOD PRESSURE: 97 MMHG | HEART RATE: 80 BPM | RESPIRATION RATE: 18 BRPM | OXYGEN SATURATION: 97 % | DIASTOLIC BLOOD PRESSURE: 59 MMHG | TEMPERATURE: 97.3 F

## 2023-09-20 LAB — BACTERIA SPEC ANAEROBE CULT: NORMAL

## 2023-09-20 PROCEDURE — G0158 HHC OT ASSISTANT EA 15: HCPCS

## 2023-09-20 NOTE — HOME HEALTH
COVID: no signs or symptoms      SUBJECTIVE:Pt  reports feeling nauceaous earlier today      FOCUS of TREATMENT:shower and dressing with compensatory and energy conservation techniques      PHYSICIAN CONTACT Dr appointment for wound care 9/21/23 in the afternoon.:      PLAN for NEXT VISIT:ADL training

## 2023-09-20 NOTE — HOME HEALTH
"COVID: no signs or symptoms      SUBJECTIVE:\"I've been trying to cut back on my tramidol\"      FOCUS of TREATMENT:ADL/IADL training with compensatory tech and work simplication      PHYSICIAN CONTACT:Pt reports going to dr on Friday       PLAN for NEXT VISIT: ADL training/shower with compensatory tech"

## 2023-09-21 ENCOUNTER — HOME CARE VISIT (OUTPATIENT)
Dept: HOME HEALTH SERVICES | Facility: CLINIC | Age: 68
End: 2023-09-21
Payer: MEDICARE

## 2023-09-22 ENCOUNTER — OFFICE VISIT (OUTPATIENT)
Dept: WOUND CARE | Facility: HOSPITAL | Age: 68
End: 2023-09-22

## 2023-09-22 DIAGNOSIS — E11.628 TYPE 2 DIABETES MELLITUS WITH OTHER SKIN COMPLICATIONS: ICD-10-CM

## 2023-09-22 DIAGNOSIS — L73.2 HIDRADENITIS SUPPURATIVA: Primary | ICD-10-CM

## 2023-09-22 DIAGNOSIS — S31.103D UNSPECIFIED OPEN WOUND OF ABDOMINAL WALL, RIGHT LOWER QUADRANT WITHOUT PENETRATION INTO PERITONEAL CAVITY, SUBSEQUENT ENCOUNTER: ICD-10-CM

## 2023-09-22 DIAGNOSIS — G89.4 CHRONIC PAIN DISORDER: ICD-10-CM

## 2023-09-22 DIAGNOSIS — L08.9 LOCAL INFECTION OF THE SKIN AND SUBCUTANEOUS TISSUE, UNSPECIFIED: ICD-10-CM

## 2023-09-22 RX ORDER — TRAMADOL HYDROCHLORIDE 50 MG/1
TABLET ORAL
Qty: 210 TABLET | Refills: 1 | OUTPATIENT
Start: 2023-09-22

## 2023-09-22 NOTE — CASE COMMUNICATION
Changed 10/21/21 appt to [facility], per staff message:    Melinda Hart, RN  [address]  Patient had her last growth [address] on 09/20/2021 and Dr Sari Gilford recommended 4 week growth follow-up  Patient missed a Physical Therapy visit from [facility] on 9/18/23.     Reason: no insurance authorization.       For your records only.   As per home health protocol, MD must be notified of missed/cancelled visits; therefore the prescribed frequency was not met.

## 2023-09-22 NOTE — TELEPHONE ENCOUNTER
Rx Refill Note  Requested Prescriptions     Pending Prescriptions Disp Refills    traMADol (ULTRAM) 50 MG tablet [Pharmacy Med Name: TRAMADOL HYDROCHLORIDE 50MG TABLET] 210 tablet 1     Sig: TAKE ONE (1) TABLET BY MOUTH EVERY SIX (6) (SIX) HOURS AS NEEDED FOR MODERATE PAIN. #210 TO LAST 30 DAYS.      Last office visit with prescribing clinician: 8/18/2023   Last telemedicine visit with prescribing clinician: Visit date not found   Next office visit with prescribing clinician: 1/5/2024                         Would you like a call back once the refill request has been completed: [] Yes [] No    If the office needs to give you a call back, can they leave a voicemail: [] Yes [] No    Kris Garnica MA  09/22/23, 13:50 CDT

## 2023-09-22 NOTE — CASE COMMUNICATION
Patient missed a Physical Therapy visit from [facility] on 9/21/23.     Reason: no insurance authorization.       For your records only.   As per home health protocol, MD must be notified of missed/cancelled visits; therefore the prescribed frequency was not met.

## 2023-09-25 ENCOUNTER — HOME CARE VISIT (OUTPATIENT)
Dept: HOME HEALTH SERVICES | Facility: CLINIC | Age: 68
End: 2023-09-25

## 2023-09-25 VITALS
TEMPERATURE: 97.6 F | DIASTOLIC BLOOD PRESSURE: 66 MMHG | RESPIRATION RATE: 18 BRPM | SYSTOLIC BLOOD PRESSURE: 102 MMHG | HEART RATE: 104 BPM | OXYGEN SATURATION: 95 %

## 2023-09-25 PROCEDURE — G0157 HHC PT ASSISTANT EA 15: HCPCS

## 2023-09-25 NOTE — HOME HEALTH
Covid 19 pre-screen performed.Pt states no falls or changes to insurance or medicaiton since last visit. Next visit to address gt training, ed on HEP, pain mgmt, and bal.

## 2023-09-26 ENCOUNTER — HOME CARE VISIT (OUTPATIENT)
Dept: HOME HEALTH SERVICES | Facility: CLINIC | Age: 68
End: 2023-09-26
Payer: MEDICARE

## 2023-09-26 VITALS
SYSTOLIC BLOOD PRESSURE: 102 MMHG | DIASTOLIC BLOOD PRESSURE: 76 MMHG | HEART RATE: 104 BPM | TEMPERATURE: 97.5 F | OXYGEN SATURATION: 95 % | RESPIRATION RATE: 20 BRPM

## 2023-09-26 PROCEDURE — G0158 HHC OT ASSISTANT EA 15: HCPCS

## 2023-09-27 ENCOUNTER — HOME CARE VISIT (OUTPATIENT)
Dept: HOME HEALTH SERVICES | Facility: CLINIC | Age: 68
End: 2023-09-27
Payer: MEDICARE

## 2023-09-27 VITALS
DIASTOLIC BLOOD PRESSURE: 70 MMHG | HEART RATE: 74 BPM | RESPIRATION RATE: 20 BRPM | OXYGEN SATURATION: 99 % | TEMPERATURE: 98 F | SYSTOLIC BLOOD PRESSURE: 106 MMHG

## 2023-09-27 PROCEDURE — G0299 HHS/HOSPICE OF RN EA 15 MIN: HCPCS

## 2023-09-27 NOTE — HOME HEALTH
COVID: no signs or symptoms      SUBJECTIVE: Pt reports still having trouble recovering from going to the doctor on Friday.      FOCUS of TREATMENT: ADL/IADL training with compensatory tech and work simplification      PHYSICIAN CONTACT:  appointments pending      PLAN for NEXT VISIT: shower with training for safety and compensatory technique

## 2023-09-28 ENCOUNTER — HOME CARE VISIT (OUTPATIENT)
Dept: HOME HEALTH SERVICES | Facility: CLINIC | Age: 68
End: 2023-09-28
Payer: MEDICARE

## 2023-09-28 VITALS
DIASTOLIC BLOOD PRESSURE: 79 MMHG | SYSTOLIC BLOOD PRESSURE: 122 MMHG | HEART RATE: 97 BPM | TEMPERATURE: 97.4 F | RESPIRATION RATE: 18 BRPM | OXYGEN SATURATION: 96 %

## 2023-09-28 PROCEDURE — G0157 HHC PT ASSISTANT EA 15: HCPCS

## 2023-09-28 PROCEDURE — G0158 HHC OT ASSISTANT EA 15: HCPCS

## 2023-09-28 NOTE — HOME HEALTH
Routine Visit Note:Alert and pleasant. Sitting in recliner with feet elevated.     Skill/education provided: Wd assessment and care to skin folds on abd. No s/s of infection. Wd less red.     Patient/caregiver response: Cooperative    Plan for next visit: Wd assessment and care    Other pertinent info: No s/s of covid 19

## 2023-09-28 NOTE — HOME HEALTH
Covid 19 pre-screen performed.Pt states no changes to insurance or medicaiton since last visit. Next visit to address gt training on outdoor surfaces, bal, and knowledge of HEP.

## 2023-09-29 VITALS
DIASTOLIC BLOOD PRESSURE: 79 MMHG | TEMPERATURE: 97.4 F | RESPIRATION RATE: 18 BRPM | OXYGEN SATURATION: 96 % | HEART RATE: 97 BPM | SYSTOLIC BLOOD PRESSURE: 122 MMHG

## 2023-09-29 NOTE — HOME HEALTH
COVID: no signs or symptoms      SUBJECTIVE: Pt report too fatigued to take shower today      FOCUS of TREATMENT:ADL training with ECT and compensatory technique      PHYSICIAN CONTACT:Wound care appointment on Friday 10/6/23      PLAN for NEXT VISIT:ADL training/shower

## 2023-10-03 ENCOUNTER — TELEPHONE (OUTPATIENT)
Dept: INTERNAL MEDICINE | Facility: CLINIC | Age: 68
End: 2023-10-03

## 2023-10-03 ENCOUNTER — LAB REQUISITION (OUTPATIENT)
Dept: LAB | Facility: HOSPITAL | Age: 68
End: 2023-10-03
Payer: MEDICARE

## 2023-10-03 ENCOUNTER — HOME CARE VISIT (OUTPATIENT)
Dept: HOME HEALTH SERVICES | Facility: CLINIC | Age: 68
End: 2023-10-03
Payer: MEDICARE

## 2023-10-03 VITALS
SYSTOLIC BLOOD PRESSURE: 124 MMHG | OXYGEN SATURATION: 96 % | DIASTOLIC BLOOD PRESSURE: 70 MMHG | HEART RATE: 72 BPM | TEMPERATURE: 97 F

## 2023-10-03 LAB
BACTERIA UR QL AUTO: ABNORMAL /HPF
BILIRUB UR QL STRIP: NEGATIVE
CLARITY UR: ABNORMAL
COLOR UR: YELLOW
GLUCOSE UR STRIP-MCNC: NEGATIVE MG/DL
HGB UR QL STRIP.AUTO: ABNORMAL
HYALINE CASTS UR QL AUTO: ABNORMAL /LPF
KETONES UR QL STRIP: NEGATIVE
LEUKOCYTE ESTERASE UR QL STRIP.AUTO: ABNORMAL
NITRITE UR QL STRIP: POSITIVE
PH UR STRIP.AUTO: 6 [PH] (ref 5–8)
PROT UR QL STRIP: ABNORMAL
RBC # UR STRIP: ABNORMAL /HPF
REF LAB TEST METHOD: ABNORMAL
SP GR UR STRIP: 1.02 (ref 1–1.03)
SQUAMOUS #/AREA URNS HPF: ABNORMAL /HPF
UROBILINOGEN UR QL STRIP: ABNORMAL
WBC # UR STRIP: ABNORMAL /HPF
YEAST URNS QL MICRO: ABNORMAL /HPF

## 2023-10-03 PROCEDURE — 87186 SC STD MICRODIL/AGAR DIL: CPT | Performed by: INTERNAL MEDICINE

## 2023-10-03 PROCEDURE — G0157 HHC PT ASSISTANT EA 15: HCPCS

## 2023-10-03 PROCEDURE — 87077 CULTURE AEROBIC IDENTIFY: CPT | Performed by: INTERNAL MEDICINE

## 2023-10-03 PROCEDURE — 81001 URINALYSIS AUTO W/SCOPE: CPT | Performed by: INTERNAL MEDICINE

## 2023-10-03 PROCEDURE — G0299 HHS/HOSPICE OF RN EA 15 MIN: HCPCS

## 2023-10-03 PROCEDURE — 87086 URINE CULTURE/COLONY COUNT: CPT | Performed by: INTERNAL MEDICINE

## 2023-10-03 NOTE — TELEPHONE ENCOUNTER
GABBY WITH [facility], SHE STATED THAT PATIENT IS HAVING UTI SYMPTOMS AND IS ASKING IF ITS OK TO DO A UA ON PATIENT?    VERBAL HAS BEEN GIVEN FOR UA TO BE DONE.

## 2023-10-03 NOTE — HOME HEALTH
Covid 19 pre-screen performed.Pt states no changes to insurance or medicaiton since last visit. Pt states she has been sick since Sunday w/vomiting and nausea.She states the nurse came this a.m. & perofrmed a urinalysis and checked her vitals. Pt w/decreased activity tolerance. Discussed d/c planning. PT to assess next visit for d/c.

## 2023-10-03 NOTE — CASE COMMUNICATION
Pt is prepared for d/c. Pt is sick today w/decreased activity tolerance. Pt provided w/HEP handouts and v/u.

## 2023-10-03 NOTE — TELEPHONE ENCOUNTER
PATIENT IS HAVING BURNING WITH URINATION, URINARY ODOR, FREQUENCY AND STATED SHE JUST FELT TERRIBLE.

## 2023-10-03 NOTE — TELEPHONE ENCOUNTER
"Can we please find out what \"UTI symptoms\" means? I'd appreciate if the nurse could provide some more medical context on those symptoms.   "

## 2023-10-04 ENCOUNTER — HOME CARE VISIT (OUTPATIENT)
Dept: HOME HEALTH SERVICES | Facility: HOME HEALTHCARE | Age: 68
End: 2023-10-04
Payer: MEDICARE

## 2023-10-04 ENCOUNTER — HOME CARE VISIT (OUTPATIENT)
Dept: HOME HEALTH SERVICES | Facility: CLINIC | Age: 68
End: 2023-10-04
Payer: MEDICARE

## 2023-10-04 VITALS
HEART RATE: 94 BPM | OXYGEN SATURATION: 91 % | DIASTOLIC BLOOD PRESSURE: 76 MMHG | RESPIRATION RATE: 20 BRPM | SYSTOLIC BLOOD PRESSURE: 145 MMHG | TEMPERATURE: 97.3 F

## 2023-10-04 PROCEDURE — G0151 HHCP-SERV OF PT,EA 15 MIN: HCPCS

## 2023-10-04 NOTE — HOME HEALTH
Routine Visit Note:Alert and pleasant. Hasnt been feeling well. C/O freq and burning with urination. Call to MD. Order received for UA.     Skill/education provided: Instructed pt regarding obtaining a clean catch UA. UA obtained and taken to .    Patient/caregiver response: cooperative    Plan for next visit: Followup fo poss uti. Pt may have to be dc due to insurance is no longer in network Pt is aware and is in the process of obtaining a new MDF    Other pertinent info: No s/s of covid

## 2023-10-05 ENCOUNTER — HOME CARE VISIT (OUTPATIENT)
Dept: HOME HEALTH SERVICES | Facility: CLINIC | Age: 68
End: 2023-10-05
Payer: MEDICARE

## 2023-10-05 DIAGNOSIS — N39.0 RECURRENT UTI: ICD-10-CM

## 2023-10-05 DIAGNOSIS — N30.00 ACUTE CYSTITIS WITHOUT HEMATURIA: Primary | ICD-10-CM

## 2023-10-05 DIAGNOSIS — Z98.890 HISTORY OF UROLOGIC SURGERY: ICD-10-CM

## 2023-10-05 LAB — BACTERIA SPEC AEROBE CULT: ABNORMAL

## 2023-10-05 PROCEDURE — G0158 HHC OT ASSISTANT EA 15: HCPCS

## 2023-10-05 RX ORDER — SULFAMETHOXAZOLE AND TRIMETHOPRIM 800; 160 MG/1; MG/1
1 TABLET ORAL 2 TIMES DAILY
Qty: 10 TABLET | Refills: 0 | Status: SHIPPED | OUTPATIENT
Start: 2023-10-05 | End: 2023-10-10

## 2023-10-06 VITALS
DIASTOLIC BLOOD PRESSURE: 67 MMHG | SYSTOLIC BLOOD PRESSURE: 104 MMHG | OXYGEN SATURATION: 97 % | RESPIRATION RATE: 18 BRPM | HEART RATE: 89 BPM | TEMPERATURE: 97.4 F

## 2023-10-06 NOTE — HOME HEALTH
"COVID: no signs or symptoms        SUBJECTIVE: \"Everything I do just zapps me, I can't hardly move without losing all my energy, but I'm better than yesterday.\" Patient has concerns about insurance covering woundcare physician.         PURPOSE for VISIT:  ADL training with AE and Compensatory technique        PHYSICIAN CONTACT:   appointment for wound care 10/6/23        PLAN for NEXT VISIT:OT reassessment"

## 2023-10-09 ENCOUNTER — HOME CARE VISIT (OUTPATIENT)
Dept: HOME HEALTH SERVICES | Facility: CLINIC | Age: 68
End: 2023-10-09
Payer: MEDICARE

## 2023-10-09 ENCOUNTER — HOME CARE VISIT (OUTPATIENT)
Dept: HOME HEALTH SERVICES | Facility: HOME HEALTHCARE | Age: 68
End: 2023-10-09
Payer: MEDICARE

## 2023-10-09 VITALS
TEMPERATURE: 97.3 F | DIASTOLIC BLOOD PRESSURE: 70 MMHG | RESPIRATION RATE: 18 BRPM | HEART RATE: 83 BPM | OXYGEN SATURATION: 94 % | SYSTOLIC BLOOD PRESSURE: 125 MMHG

## 2023-10-09 PROCEDURE — G0157 HHC PT ASSISTANT EA 15: HCPCS

## 2023-10-09 PROCEDURE — G0152 HHCP-SERV OF OT,EA 15 MIN: HCPCS

## 2023-10-09 NOTE — HOME HEALTH
COVID SCREENING: no signs/symptoms of COVID    SUBJECTIVE: patient  reports she is better today, still having burning during urination but no longer vomiting and not as weak.  Patient feels like she has not progressd as she wanted to with OT due to feeling so bad due to UTI not able to fully address goals. Patient reports she is still fearful of getting in/out of tub and fatigues easily with ADLs. Has only been able to address shower with OT 2x due to illness.     Assessment: Due to pateint illness since last reassessment have been unable to fully address safety with shower. Patient continues to require assist with tub transfers using tub bench and is very fearful of falling. Would benefit from extension of OT visits to increase safety and independence with showering.    FOCUS OF CARE/SKILLED NEED:  ADL training on safety with home managment tasks, energy conservation during set up for ADLs  including sorting winter clothes from storage.      PHYSICIAN CONTACT: Friday wound care, Oct 20th Dr. Farley       PLAN FOR NEXT VISIT: ADL training on shower

## 2023-10-11 ENCOUNTER — HOME CARE VISIT (OUTPATIENT)
Dept: HOME HEALTH SERVICES | Facility: CLINIC | Age: 68
End: 2023-10-11
Payer: MEDICARE

## 2023-10-11 VITALS
DIASTOLIC BLOOD PRESSURE: 90 MMHG | HEART RATE: 83 BPM | RESPIRATION RATE: 18 BRPM | SYSTOLIC BLOOD PRESSURE: 124 MMHG | RESPIRATION RATE: 18 BRPM | TEMPERATURE: 97.3 F | HEART RATE: 83 BPM | DIASTOLIC BLOOD PRESSURE: 82 MMHG | OXYGEN SATURATION: 95 % | SYSTOLIC BLOOD PRESSURE: 122 MMHG | OXYGEN SATURATION: 94 % | TEMPERATURE: 96.4 F

## 2023-10-11 PROCEDURE — G0157 HHC PT ASSISTANT EA 15: HCPCS

## 2023-10-11 NOTE — HOME HEALTH
SUBJECTIVE: Patient states she is starting to feel better but her energy is still low.  She notes wound is improving but she had to miss her woundcare appt. last week due to mix up with insurance. She reports she has been sick with UTI and sick stomach due to anti-biotics so she has only been doing her exercises only as she could tolerate.    MEDICATION CHANGES: none    FALLS SINCE LAST VISIT: none    CHANGES TO INSURANCE: none    MENTAL STATUS: alert and oriented    PAIN: knees, 2/10    EDEMA:B LE's     PLAN: Continue with focus on strength and gait outdoors/on ramp.

## 2023-10-16 ENCOUNTER — HOME CARE VISIT (OUTPATIENT)
Dept: HOME HEALTH SERVICES | Facility: CLINIC | Age: 68
End: 2023-10-16
Payer: MEDICARE

## 2023-10-16 VITALS
OXYGEN SATURATION: 95 % | SYSTOLIC BLOOD PRESSURE: 123 MMHG | DIASTOLIC BLOOD PRESSURE: 82 MMHG | HEART RATE: 79 BPM | RESPIRATION RATE: 18 BRPM | TEMPERATURE: 96.3 F

## 2023-10-16 PROCEDURE — G0157 HHC PT ASSISTANT EA 15: HCPCS

## 2023-10-16 NOTE — HOME HEALTH
SUBJECTIVE: Patient says she is feeling good but says her knees are sore today.     FALLS: NO    CHANGES TO MEDICATION: NO    CHANGES TO INSURANCE: NO    SIGNS AND SYMPTOMS OF COVID: NO    EDEMA:  1+ B LE's    PLAN: Discipline DC next visit.

## 2023-10-17 ENCOUNTER — HOME CARE VISIT (OUTPATIENT)
Dept: HOME HEALTH SERVICES | Facility: CLINIC | Age: 68
End: 2023-10-17
Payer: MEDICARE

## 2023-10-17 VITALS
DIASTOLIC BLOOD PRESSURE: 72 MMHG | OXYGEN SATURATION: 95 % | HEART RATE: 82 BPM | RESPIRATION RATE: 18 BRPM | TEMPERATURE: 97.6 F | SYSTOLIC BLOOD PRESSURE: 137 MMHG

## 2023-10-17 PROCEDURE — G0152 HHCP-SERV OF OT,EA 15 MIN: HCPCS

## 2023-10-17 PROCEDURE — G0158 HHC OT ASSISTANT EA 15: HCPCS

## 2023-10-18 ENCOUNTER — HOME CARE VISIT (OUTPATIENT)
Dept: HOME HEALTH SERVICES | Facility: CLINIC | Age: 68
End: 2023-10-18
Payer: MEDICARE

## 2023-10-18 VITALS
SYSTOLIC BLOOD PRESSURE: 114 MMHG | HEART RATE: 77 BPM | RESPIRATION RATE: 18 BRPM | TEMPERATURE: 98.4 F | OXYGEN SATURATION: 94 % | DIASTOLIC BLOOD PRESSURE: 66 MMHG

## 2023-10-18 VITALS
HEART RATE: 77 BPM | SYSTOLIC BLOOD PRESSURE: 114 MMHG | DIASTOLIC BLOOD PRESSURE: 66 MMHG | RESPIRATION RATE: 18 BRPM | OXYGEN SATURATION: 94 % | TEMPERATURE: 96.4 F

## 2023-10-18 PROCEDURE — G0158 HHC OT ASSISTANT EA 15: HCPCS

## 2023-10-18 PROCEDURE — G0151 HHCP-SERV OF PT,EA 15 MIN: HCPCS

## 2023-10-18 NOTE — HOME HEALTH
COVID: no signs or symptoms      SUBJECTIVE: Patient reports knees are sore from walking to the mailbox yesterday      OBJECTIVE/REASON for VISIT: ADL training with compensatory techniques and BUE ther ex/HEP for increased strength and activity tolerance for ADL performance.      PHYSICIAN CONTACT: appointment with urologist on Friday 10/20 at 10:45      PLAN for NEXT VISIT: ADL training with compensatory technique and AE/shower and HEP review

## 2023-10-19 ENCOUNTER — HOME CARE VISIT (OUTPATIENT)
Dept: HOME HEALTH SERVICES | Facility: CLINIC | Age: 68
End: 2023-10-19
Payer: MEDICARE

## 2023-10-19 VITALS
DIASTOLIC BLOOD PRESSURE: 70 MMHG | RESPIRATION RATE: 18 BRPM | HEART RATE: 73 BPM | OXYGEN SATURATION: 97 % | SYSTOLIC BLOOD PRESSURE: 110 MMHG | TEMPERATURE: 97 F

## 2023-10-19 DIAGNOSIS — G89.4 CHRONIC PAIN DISORDER: ICD-10-CM

## 2023-10-19 PROCEDURE — G0152 HHCP-SERV OF OT,EA 15 MIN: HCPCS

## 2023-10-19 RX ORDER — TRAMADOL HYDROCHLORIDE 50 MG/1
50 TABLET ORAL EVERY 6 HOURS PRN
Qty: 210 TABLET | Refills: 1 | Status: SHIPPED | OUTPATIENT
Start: 2023-10-22

## 2023-10-19 NOTE — HOME HEALTH
COVID SCREENING: no signs/symptoms of COVID    SUBJECTIVE:   patient verbalized understanding of all d/c education. Reports she is doing better this week, and is able to complete ADLs without assist. Reports soreness from walking to mailbox with PT on last visit      FOCUS OF CARE/SKILLED NEED:  completed education on safety, AE use, energy conservation for ADLs      PHYSICIAN CONTACT:   tomorrow with urology

## 2023-10-19 NOTE — HOME HEALTH
COVID: no signs or symptoms      SUBJECTIVE: I'm still feeling good today      OBJECTIVE/REASON for VISIT: ADL training with compensatory techniques and BUE ther ex/HEP Review  for increased strength and activity tolerance for ADL performance.      PHYSICIAN CONTACT:Dr el on Friday 10/20      PLAN for NEXT VISIT:OT assessment with possible D/C

## 2023-10-19 NOTE — TELEPHONE ENCOUNTER
Rx Refill Note  Requested Prescriptions     Pending Prescriptions Disp Refills    traMADol (ULTRAM) 50 MG tablet [Pharmacy Med Name: TRAMADOL HYDROCHLORIDE 50MG TABLET] 210 tablet 1     Sig: TAKE ONE (1) TABLET BY MOUTH EVERY SIX (6) (SIX) HOURS AS NEEDED FOR MODERATE PAIN. #210 TO LAST 30 DAYS.      Last office visit with prescribing clinician: 8/18/2023   Last telemedicine visit with prescribing clinician: Visit date not found   Next office visit with prescribing clinician: 1/5/2024                         Would you like a call back once the refill request has been completed: [] Yes [] No    If the office needs to give you a call back, can they leave a voicemail: [] Yes [] No    Kris Garnica MA  10/19/23, 10:35 CDT

## 2023-10-20 ENCOUNTER — OFFICE VISIT (OUTPATIENT)
Dept: UROLOGY | Age: 68
End: 2023-10-20
Payer: MEDICARE

## 2023-10-20 VITALS — WEIGHT: 292.4 LBS | TEMPERATURE: 98.2 F | BODY MASS INDEX: 49.92 KG/M2 | HEIGHT: 64 IN

## 2023-10-20 DIAGNOSIS — N20.0 RIGHT RENAL STONE: ICD-10-CM

## 2023-10-20 DIAGNOSIS — N20.0 LEFT RENAL STONE: ICD-10-CM

## 2023-10-20 DIAGNOSIS — N32.81 OAB (OVERACTIVE BLADDER): ICD-10-CM

## 2023-10-20 DIAGNOSIS — N39.0 RECURRENT UTI: Primary | ICD-10-CM

## 2023-10-20 LAB
BACTERIA URINE, POC: ABNORMAL
BILIRUBIN URINE: 0 MG/DL
BLOOD, URINE: POSITIVE
CASTS URINE, POC: 0
CLARITY: ABNORMAL
COLOR: YELLOW
CRYSTALS URINE, POC: 0
EPI CELLS URINE, POC: ABNORMAL
GLUCOSE URINE: ABNORMAL
KETONES, URINE: NEGATIVE
LEUKOCYTE EST, POC: ABNORMAL
NITRITE, URINE: POSITIVE
PH UA: 7 (ref 4.5–8)
PROTEIN UA: POSITIVE
RBC URINE, POC: 3
SPECIFIC GRAVITY UA: 1.02 (ref 1–1.03)
UROBILINOGEN, URINE: NORMAL
WBC URINE, POC: 15
YEAST URINE, POC: ABNORMAL

## 2023-10-20 PROCEDURE — 81001 URINALYSIS AUTO W/SCOPE: CPT | Performed by: NURSE PRACTITIONER

## 2023-10-20 PROCEDURE — 1090F PRES/ABSN URINE INCON ASSESS: CPT | Performed by: NURSE PRACTITIONER

## 2023-10-20 PROCEDURE — G8427 DOCREV CUR MEDS BY ELIG CLIN: HCPCS | Performed by: NURSE PRACTITIONER

## 2023-10-20 PROCEDURE — 99214 OFFICE O/P EST MOD 30 MIN: CPT | Performed by: NURSE PRACTITIONER

## 2023-10-20 PROCEDURE — 1123F ACP DISCUSS/DSCN MKR DOCD: CPT | Performed by: NURSE PRACTITIONER

## 2023-10-20 PROCEDURE — G8399 PT W/DXA RESULTS DOCUMENT: HCPCS | Performed by: NURSE PRACTITIONER

## 2023-10-20 PROCEDURE — G8417 CALC BMI ABV UP PARAM F/U: HCPCS | Performed by: NURSE PRACTITIONER

## 2023-10-20 PROCEDURE — 1036F TOBACCO NON-USER: CPT | Performed by: NURSE PRACTITIONER

## 2023-10-20 PROCEDURE — G8484 FLU IMMUNIZE NO ADMIN: HCPCS | Performed by: NURSE PRACTITIONER

## 2023-10-20 PROCEDURE — 3017F COLORECTAL CA SCREEN DOC REV: CPT | Performed by: NURSE PRACTITIONER

## 2023-10-20 RX ORDER — HYDROCHLOROTHIAZIDE 12.5 MG/1
12.5 TABLET ORAL DAILY
COMMUNITY
Start: 2023-06-15

## 2023-10-20 RX ORDER — DOXYCYCLINE HYCLATE 100 MG/1
100 CAPSULE ORAL 2 TIMES DAILY
COMMUNITY
Start: 2023-09-15

## 2023-10-20 RX ORDER — PSEUDOEPHEDRINE HCL 30 MG
100 TABLET ORAL 3 TIMES DAILY PRN
COMMUNITY
Start: 2023-08-18

## 2023-10-20 RX ORDER — SEMAGLUTIDE 1.34 MG/ML
1 INJECTION, SOLUTION SUBCUTANEOUS WEEKLY
COMMUNITY
Start: 2023-08-18

## 2023-10-20 RX ORDER — METOPROLOL TARTRATE 100 MG/1
TABLET ORAL
COMMUNITY
Start: 2023-10-19

## 2023-10-20 RX ORDER — CLINDAMYCIN PHOSPHATE 11.9 MG/ML
1 SOLUTION TOPICAL 2 TIMES DAILY
COMMUNITY
Start: 2023-09-15 | End: 2023-10-20 | Stop reason: CLARIF

## 2023-10-20 RX ORDER — CLINDAMYCIN PHOSPHATE 11.9 MG/ML
SOLUTION TOPICAL
COMMUNITY
Start: 2023-10-19

## 2023-10-20 RX ORDER — LISINOPRIL 40 MG/1
40 TABLET ORAL DAILY
COMMUNITY
Start: 2023-07-19

## 2023-10-20 RX ORDER — ESCITALOPRAM OXALATE 10 MG/1
TABLET ORAL
COMMUNITY
Start: 2023-08-18 | End: 2023-11-17

## 2023-10-20 RX ORDER — HYDROXYZINE HYDROCHLORIDE 25 MG/1
25 TABLET, FILM COATED ORAL NIGHTLY PRN
COMMUNITY
Start: 2023-08-18

## 2023-10-20 ASSESSMENT — ENCOUNTER SYMPTOMS
BACK PAIN: 0
ABDOMINAL PAIN: 0
NAUSEA: 0
VOMITING: 0
ABDOMINAL DISTENTION: 0

## 2023-10-20 NOTE — PROGRESS NOTES
Karie Tyler is a 75 y.o., female, Established patient who presents today   Chief Complaint   Patient presents with    Follow-up     I am here today to follow up. I have not noticed any blood. I was told I was one level up from kidney failure. Jessenia been on several abx in the last few weeks. Johanna Berry been on some mercedes since august, but finished them all at this time. Patient presents for evaluation of recurrent urinary tract infections. She is accompanied by her grandson who assist her with mobility and gathering information. She most recently had a culture positive for Proteus species by her primary care provider. I have reviewed several pages of outside records from them this morning. She reports over the last few months she has had some issues with her kidney function fluctuating and worsening at times and intermittent episodes of symptomatic urinary tract infections including symptoms such as painful urination, especially when finishing urination. She denies typically having any fevers, chills, nausea, vomiting, but reports earlier this month, she did have some of those symptoms. .    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 CT at that time revealed a proximal left ureteral stone without significant hydronephrosis as well as bilateral nonobstructing renal stones that are quite large. She was ultimately treated with staged procedures including a left ureteroscopy and definitive stone treatment on 6/28/2022. Again, she was noted to have bilateral nonobstructing stones at that time and given her stone burden, we did explain to her that PCNL would be the preferred method of stone treatment, but we would be able to perform multiple staged procedures with ESWL if the patient did not wish to be transferred. At the time, she did not wish to pursue any further treatment.   I am uncertain if her

## 2023-12-01 ENCOUNTER — HOSPITAL ENCOUNTER (OUTPATIENT)
Dept: CT IMAGING | Age: 68
Discharge: HOME | End: 2023-12-01
Payer: MEDICARE

## 2023-12-01 ENCOUNTER — OFFICE VISIT (OUTPATIENT)
Dept: UROLOGY | Age: 68
End: 2023-12-01

## 2023-12-01 VITALS — BODY MASS INDEX: 49.51 KG/M2 | WEIGHT: 290 LBS | TEMPERATURE: 97.2 F | HEIGHT: 64 IN

## 2023-12-01 DIAGNOSIS — N20.0 LEFT RENAL STONE: ICD-10-CM

## 2023-12-01 DIAGNOSIS — N39.0 RECURRENT UTI: ICD-10-CM

## 2023-12-01 DIAGNOSIS — N20.0 RIGHT RENAL STONE: ICD-10-CM

## 2023-12-01 DIAGNOSIS — N15.1 RENAL ABSCESS, LEFT: Primary | ICD-10-CM

## 2023-12-01 DIAGNOSIS — N32.81 OAB (OVERACTIVE BLADDER): ICD-10-CM

## 2023-12-01 LAB
BACTERIA URINE, POC: ABNORMAL
BILIRUBIN URINE: 0 MG/DL
BLOOD, URINE: POSITIVE
CASTS URINE, POC: 0
CLARITY: ABNORMAL
COLOR: YELLOW
CRYSTALS URINE, POC: 0
EPI CELLS URINE, POC: ABNORMAL
GLUCOSE URINE: ABNORMAL
KETONES, URINE: NEGATIVE
LEUKOCYTE EST, POC: ABNORMAL
NITRITE, URINE: NEGATIVE
PH UA: 6.5 (ref 4.5–8)
POST VOID RESIDUAL (PVR): 0 ML
PROTEIN UA: POSITIVE
RBC URINE, POC: 3
SPECIFIC GRAVITY UA: 1.01 (ref 1–1.03)
UROBILINOGEN, URINE: NORMAL
WBC URINE, POC: 60
YEAST URINE, POC: 0

## 2023-12-01 PROCEDURE — 6360000004 HC RX CONTRAST MEDICATION: Performed by: NURSE PRACTITIONER

## 2023-12-01 PROCEDURE — 74178 CT ABD&PLV WO CNTR FLWD CNTR: CPT | Performed by: NURSE PRACTITIONER

## 2023-12-01 RX ADMIN — IOPAMIDOL 75 ML: 755 INJECTION, SOLUTION INTRAVENOUS at 09:15

## 2023-12-01 ASSESSMENT — ENCOUNTER SYMPTOMS
ABDOMINAL DISTENTION: 0
NAUSEA: 0
VOMITING: 0
BACK PAIN: 0
ABDOMINAL PAIN: 0

## 2023-12-01 NOTE — PROGRESS NOTES
exophytic encapsulated cystic lesion of the left kidney mid pole, suspicious for an abscess. 3.  Small bubble of air in nondependent aspect of the bladder, probably secondary to recent instrumentation. If no instrumentation, then consider infection with a gas-forming organism or the less likely possibility of a fistula. 4.  Colonic diverticulosis. No evidence of diverticulitis. 5.  Fatty infiltration of the liver, improved. 6.  Mild nonspecific bronchiolitis of both lung bases. 7.  4 mm pulmonary nodule in the right lower lobe. Follow-up CT chest in one year is recommended. 8.  1.1 cm nodule of the left breast subareolar region laterally, stable. If the patient has not had recent mammograms, then mammograms would now be recommended. All CT scans are performed using dose optimization techniques as appropriate to the performed exam and include   at least one of the following: Automated exposure control, adjustment of the mA and/or kV according to size, and the use of iterative reconstruction technique. ______________________________________   Electronically signed by: Geetha Mesa M.D. Date:     12/01/2023  Time:    11:18     I have reviewed her CT imaging. I agree with radiologist that there appears to be interval development of a left renal abscess from her previous imaging in June 2022. Her stone burden has also increased bilaterally since that imaging, but she has no ureteral stones. I did call to discuss the findings with Dr. Carina Li who originally read the imaging and also Dr. Eneida Alva in our facility who is an interventional radiologist to see if he may be available to perhaps drain the abscess. Unfortunately, he will not be available for intervention for more than a week. ASSESSMENT/PLAN  1. Renal abscess, left  Explained these findings to the patient and her grandson.   Explained that because we do not have interventional radiology at our facility next week, we will need to have her

## 2023-12-05 ENCOUNTER — TELEPHONE (OUTPATIENT)
Dept: UROLOGY | Age: 68
End: 2023-12-05

## 2023-12-05 ENCOUNTER — TELEPHONE (OUTPATIENT)
Dept: OTHER | Age: 68
End: 2023-12-05

## 2023-12-05 DIAGNOSIS — N15.1 RENAL ABSCESS, LEFT: Primary | ICD-10-CM

## 2023-12-05 NOTE — TELEPHONE ENCOUNTER
Pt seen on 12/1/2023. Urogram revealed pulmonary nodule. Radiologist recommended chest CT scan in 1 year. Report faxed to Roselyn Reece MD.  Letter mailed. Pt made aware and has appt with PCP in January.

## 2023-12-07 DIAGNOSIS — N15.1 RENAL ABSCESS, LEFT: Primary | ICD-10-CM

## 2023-12-14 ENCOUNTER — TELEPHONE (OUTPATIENT)
Dept: UROLOGY | Age: 68
End: 2023-12-14

## 2023-12-14 DIAGNOSIS — N15.1 RENAL ABSCESS, LEFT: ICD-10-CM

## 2023-12-14 DIAGNOSIS — S31.000A WOUND OF SACRAL REGION, INITIAL ENCOUNTER: Primary | ICD-10-CM

## 2023-12-14 DIAGNOSIS — N15.1 RENAL ABSCESS, LEFT: Primary | ICD-10-CM

## 2023-12-14 NOTE — TELEPHONE ENCOUNTER
I called the home health nurse and informed her that wound care would have to be the ones to handle this. I verbalized it looks like wound care saw her in the hospital for this, so they will have to be the ones that care going forward. Dr. Ginny Ramires is who we have referred her to, and we will call the patient with more details of how to follow up with us.

## 2023-12-14 NOTE — TELEPHONE ENCOUNTER
Kavya Olivas with Home Care left VM with office about getting some sacral patches for her bottom.  Call back for Home Health is [phone]

## 2023-12-14 NOTE — TELEPHONE ENCOUNTER
I SPOKE WITH SELIN WHO STATED THAT OUR PROVIDERS WERE MADE AWARE OF HER SACRAL BREAKDOWN WHEN SHE  S [address] SHE IS REQUESTING US TO ORDER HER SOME SACRAL PADS TO HELP CUSHION THE WOUND BREAKDOWN.  I VERBALIZED THIS IS NOT TYPICALLY SOMETHING THAT WE DO, ESPECIALLY WITH OUR SPECIALTY, SO I WOULD HAVE TO SEE IF WE ARE EVEN ABLE TO.

## 2023-12-15 ENCOUNTER — HOSPITAL ENCOUNTER (OUTPATIENT)
Dept: CT IMAGING | Age: 68
Discharge: HOME | End: 2023-12-15
Payer: MEDICARE

## 2023-12-15 ENCOUNTER — HOSPITAL ENCOUNTER (OUTPATIENT)
Dept: WOUND CARE | Age: 68
Discharge: HOME | End: 2023-12-15
Payer: MEDICARE

## 2023-12-15 VITALS
BODY MASS INDEX: 49.51 KG/M2 | RESPIRATION RATE: 20 BRPM | SYSTOLIC BLOOD PRESSURE: 116 MMHG | HEART RATE: 93 BPM | WEIGHT: 290 LBS | DIASTOLIC BLOOD PRESSURE: 84 MMHG | HEIGHT: 64 IN | TEMPERATURE: 97.6 F

## 2023-12-15 DIAGNOSIS — B37.2 YEAST DERMATITIS: Primary | ICD-10-CM

## 2023-12-15 DIAGNOSIS — N15.1 RENAL ABSCESS, LEFT: ICD-10-CM

## 2023-12-15 PROCEDURE — 99214 OFFICE O/P EST MOD 30 MIN: CPT

## 2023-12-15 PROCEDURE — 99213 OFFICE O/P EST LOW 20 MIN: CPT | Performed by: NURSE PRACTITIONER

## 2023-12-15 PROCEDURE — 74178 CT ABD&PLV WO CNTR FLWD CNTR: CPT

## 2023-12-15 PROCEDURE — 6360000004 HC RX CONTRAST MEDICATION: Performed by: NURSE PRACTITIONER

## 2023-12-15 RX ORDER — TRIAMCINOLONE ACETONIDE 1 MG/G
OINTMENT TOPICAL ONCE
OUTPATIENT
Start: 2023-12-15 | End: 2023-12-15

## 2023-12-15 RX ORDER — GINSENG 100 MG
CAPSULE ORAL ONCE
OUTPATIENT
Start: 2023-12-15 | End: 2023-12-15

## 2023-12-15 RX ORDER — LIDOCAINE HYDROCHLORIDE 20 MG/ML
JELLY TOPICAL ONCE
OUTPATIENT
Start: 2023-12-15 | End: 2023-12-15

## 2023-12-15 RX ORDER — GENTAMICIN SULFATE 1 MG/G
OINTMENT TOPICAL ONCE
OUTPATIENT
Start: 2023-12-15 | End: 2023-12-15

## 2023-12-15 RX ORDER — LIDOCAINE 40 MG/G
CREAM TOPICAL ONCE
OUTPATIENT
Start: 2023-12-15 | End: 2023-12-15

## 2023-12-15 RX ORDER — BACITRACIN ZINC AND POLYMYXIN B SULFATE 500; 1000 [USP'U]/G; [USP'U]/G
OINTMENT TOPICAL ONCE
OUTPATIENT
Start: 2023-12-15 | End: 2023-12-15

## 2023-12-15 RX ORDER — BETAMETHASONE DIPROPIONATE 0.5 MG/G
CREAM TOPICAL ONCE
OUTPATIENT
Start: 2023-12-15 | End: 2023-12-15

## 2023-12-15 RX ORDER — FLUCONAZOLE 150 MG/1
150 TABLET ORAL DAILY
Qty: 7 TABLET | Refills: 2 | Status: SHIPPED | OUTPATIENT
Start: 2023-12-15 | End: 2023-12-22

## 2023-12-15 RX ORDER — CLOBETASOL PROPIONATE 0.5 MG/G
OINTMENT TOPICAL ONCE
OUTPATIENT
Start: 2023-12-15 | End: 2023-12-15

## 2023-12-15 RX ORDER — LIDOCAINE 50 MG/G
OINTMENT TOPICAL ONCE
OUTPATIENT
Start: 2023-12-15 | End: 2023-12-15

## 2023-12-15 RX ORDER — IBUPROFEN 200 MG
TABLET ORAL ONCE
OUTPATIENT
Start: 2023-12-15 | End: 2023-12-15

## 2023-12-15 RX ORDER — LIDOCAINE HYDROCHLORIDE 40 MG/ML
SOLUTION TOPICAL ONCE
OUTPATIENT
Start: 2023-12-15 | End: 2023-12-15

## 2023-12-15 RX ORDER — SODIUM CHLOR/HYPOCHLOROUS ACID 0.033 %
SOLUTION, IRRIGATION IRRIGATION ONCE
OUTPATIENT
Start: 2023-12-15 | End: 2023-12-15

## 2023-12-15 RX ADMIN — IOPAMIDOL 75 ML: 755 INJECTION, SOLUTION INTRAVENOUS at 09:56

## 2023-12-15 ASSESSMENT — PAIN DESCRIPTION - DESCRIPTORS: DESCRIPTORS: ACHING;DISCOMFORT

## 2023-12-15 ASSESSMENT — PAIN - FUNCTIONAL ASSESSMENT: PAIN_FUNCTIONAL_ASSESSMENT: PREVENTS OR INTERFERES SOME ACTIVE ACTIVITIES AND ADLS

## 2023-12-15 ASSESSMENT — PAIN DESCRIPTION - FREQUENCY: FREQUENCY: INTERMITTENT

## 2023-12-15 ASSESSMENT — PAIN DESCRIPTION - ONSET: ONSET: ON-GOING

## 2023-12-15 ASSESSMENT — PAIN DESCRIPTION - PAIN TYPE: TYPE: CHRONIC PAIN

## 2023-12-15 ASSESSMENT — PAIN DESCRIPTION - ORIENTATION: ORIENTATION: RIGHT;LEFT

## 2023-12-15 ASSESSMENT — PAIN SCALES - GENERAL: PAINLEVEL_OUTOF10: 3

## 2023-12-15 ASSESSMENT — PAIN DESCRIPTION - LOCATION: LOCATION: KNEE;BACK

## 2023-12-15 NOTE — PROGRESS NOTES
Patient Care Team:  Trice Jiang as PCP - General  MATTHEW Smyth CNP as Palliative Care (Nurse Practitioner)    TODAY'S DATE:  12/15/2023     HISTORY of PRESENTILLNESS HPI   Tamika Light is a 70 y.o. female who presents today for wound evaluation. She reports she developed a wound on buttock. This started 2 week(s) ago. She believes this is not healing. She has been applying A/D which worsened the area and cetaphil helps. She has not had  fever orchills. She has a history of diabetes mellitus (HGBA1c 6.9).   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 female with the following history reviewed and recorded in [facility]:    Current Outpatient Medications   Medication Sig Dispense Refill    fluconazole (DIFLUCAN) 150 MG tablet Take 1 tablet by mouth daily for 7 days 7 tablet 2    Wound Dressings (MEPILEX BORDER FLEX) PADS Apply 1 each topically in the morning and at bedtime (Patient not taking: Reported on 12/15/2023) 10 each 2    hydrOXYzine HCl (ATARAX) 25 MG tablet Take 1 tablet by mouth nightly as needed      Semaglutide, 1 MG/DOSE, (OZEMPIC, 1 MG/DOSE,) 4 MG/3ML SOPN Inject 1 mg into the skin once a week      hydroCHLOROthiazide (HYDRODIURIL) 12.5 MG tablet Take 1 tablet by mouth daily      docusate (COLACE, DULCOLAX) 100 MG CAPS Take 100 mg by mouth 3 times daily as needed      lisinopril (PRINIVIL;ZESTRIL) 40 MG tablet Take 1 tablet by mouth daily      metoprolol (LOPRESSOR) 100 MG tablet       clindamycin (CLEOCIN T) 1 % external solution  (Patient not taking: Reported on 12/15/2023)      vibegron (GEMTESA) 75 MG TABS tablet

## 2023-12-15 NOTE — PATIENT INSTRUCTIONS
[address] and [facility] Therapy   Physician Orders and Discharge Instructions  [address],[address] [place] [address] Blvd, [address]  Telephone: [phone]    NAME:  Jackson Colon  YOB: 1955  MEDICAL RECORD NUMBER:  [id number]  DATE:  12/15/2023    Discharge condition: Stable    Discharge to: Home    Left via:Private automobile    Accompanied by:  eddie    ECF/HHA: [id number] as directed    Dressing Orders:Buttocks folds  Wash with soap and water. Apply barrier cream twice daily and as needed if soiled. Treatment Orders:  Antifungal cream to buttocks folds twice daily    PRESSURE RELIEF INSTRUCTONS    Avoid Pressure to wound site. Turn frequently (turn at least every two hours when in bed)  While in chair reposition every 30 minutes  Patient advised to sit up no more than 30 minutes at a time for meals ONLY. Please do not elevate the head of the bed higher than 30 degrees. Off-load heels by applying heel-lift boots or \"float\" heels by placing pillows under calves so that heels do not touch mattress. Continue Protein rich diet (unless restricted by your physician)  Take Multivitamin daily    Please use memory foam cushion in chair        [facility] follow up visit __________1 week___________________  (Please note your next appointment above and if you are unable to keep, kindly give a 24 hour notice. Thank you.)          If you experience any of the following, please call the [employer] during business hours:    * Increase in Pain  * Temperature over 101  * Increase in drainage from your wound  * Drainage with a foul odor  * Bleeding  * Increase in swelling  * Need for compression bandage changes due to slippage, breakthrough drainage. If you need medical attention outside of the business hours of the [facility] please contact your PCP or go to the nearest emergency room.

## 2023-12-15 NOTE — PLAN OF CARE
Problem: Chronic Conditions and Co-morbidities  Goal: Patient's chronic conditions and co-morbidity symptoms are monitored and maintained or improved  Outcome: Progressing     Problem: Pain  Goal: Verbalizes/displays adequate comfort level or baseline comfort level  Outcome: Progressing     Problem: Wound:  Goal: Will show signs of wound healing; wound closure and no evidence of infection  Description: Will show signs of wound healing; wound closure and no evidence of infection  Outcome: Progressing     Problem: Pressure Ulcer:  Goal: Signs of wound healing will improve  Description: Signs of wound healing will improve  Outcome: Progressing  Goal: Absence of new pressure ulcer  Description: Absence of new pressure ulcer  Outcome: Progressing  Goal: Will show no infection signs and symptoms  Description: Will show no infection signs and symptoms  Outcome: Progressing     Problem: Weight control:  Goal: Ability to maintain an optimal weight for height and age will be supported  Description: Ability to maintain an optimal weight for height and age will be supported  Outcome: Progressing     Problem: Falls - Risk of:  Goal: Will remain free from falls  Description: Will remain free from falls  Outcome: Progressing     Problem: Blood Glucose:  Goal: Ability to maintain appropriate glucose levels will improve  Description: Ability to maintain appropriate glucose levels will improve  Outcome: Progressing

## 2023-12-21 DIAGNOSIS — G89.4 CHRONIC PAIN DISORDER: ICD-10-CM

## 2023-12-21 RX ORDER — TRAMADOL HYDROCHLORIDE 50 MG/1
50 TABLET ORAL EVERY 6 HOURS PRN
Qty: 210 TABLET | Refills: 1 | Status: SHIPPED | OUTPATIENT
Start: 2023-12-21

## 2023-12-21 NOTE — TELEPHONE ENCOUNTER
Rx Refill Note  Requested Prescriptions     Pending Prescriptions Disp Refills    traMADol (ULTRAM) 50 MG tablet [Pharmacy Med Name: TRAMADOL HYDROCHLORIDE 50MG TABLET] 210 tablet 1     Sig: TAKE ONE (1) TABLET BY MOUTH EVERY SIX (6) HOURS AS NEEDED FOR MODERATE PAIN. #210 TO LAST 30 DAYS      Last office visit with prescribing clinician: 8/18/2023   Last telemedicine visit with prescribing clinician: Visit date not found   Next office visit with prescribing clinician: 1/5/2024                         Would you like a call back once the refill request has been completed: [] Yes [] No    If the office needs to give you a call back, can they leave a voicemail: [] Yes [] No    Kris Garnica MA  12/21/23, 10:19 CST

## 2023-12-28 ENCOUNTER — TELEPHONE (OUTPATIENT)
Dept: UROLOGY | Age: 68
End: 2023-12-28

## 2023-12-28 NOTE — TELEPHONE ENCOUNTER
Candice Garcia with [facility] called to let office know that patient has completed home health and has been discharged as of today.

## 2023-12-29 ENCOUNTER — HOSPITAL ENCOUNTER (OUTPATIENT)
Dept: CT IMAGING | Age: 68
Discharge: HOME | End: 2023-12-29
Payer: MEDICARE

## 2023-12-29 ENCOUNTER — OFFICE VISIT (OUTPATIENT)
Dept: UROLOGY | Age: 68
End: 2023-12-29
Payer: MEDICARE

## 2023-12-29 DIAGNOSIS — N20.0 LEFT RENAL STONE: ICD-10-CM

## 2023-12-29 DIAGNOSIS — N15.1 RENAL ABSCESS, LEFT: Primary | ICD-10-CM

## 2023-12-29 DIAGNOSIS — N30.90 CYSTITIS: ICD-10-CM

## 2023-12-29 DIAGNOSIS — N20.0 RIGHT RENAL STONE: ICD-10-CM

## 2023-12-29 DIAGNOSIS — N15.1 RENAL ABSCESS, LEFT: ICD-10-CM

## 2023-12-29 DIAGNOSIS — N20.0 BILATERAL RENAL STONES: ICD-10-CM

## 2023-12-29 LAB
ANION GAP SERPL CALCULATED.3IONS-SCNC: 11 MMOL/L (ref 7–19)
BACTERIA URINE, POC: ABNORMAL
BASOPHILS # BLD: 0.1 K/UL (ref 0–0.2)
BASOPHILS NFR BLD: 1 % (ref 0–1)
BILIRUBIN URINE: 0 MG/DL
BLOOD, URINE: POSITIVE
BUN SERPL-MCNC: 28 MG/DL (ref 8–23)
CALCIUM SERPL-MCNC: 10 MG/DL (ref 8.8–10.2)
CASTS URINE, POC: ABNORMAL
CHLORIDE SERPL-SCNC: 97 MMOL/L (ref 98–111)
CLARITY: ABNORMAL
CO2 SERPL-SCNC: 29 MMOL/L (ref 22–29)
COLOR: YELLOW
CREAT SERPL-MCNC: 1.2 MG/DL (ref 0.5–0.9)
CRYSTALS URINE, POC: ABNORMAL
EOSINOPHIL # BLD: 0.2 K/UL (ref 0–0.6)
EOSINOPHIL NFR BLD: 2.4 % (ref 0–5)
EPI CELLS URINE, POC: ABNORMAL
ERYTHROCYTE [DISTWIDTH] IN BLOOD BY AUTOMATED COUNT: 13.4 % (ref 11.5–14.5)
GLUCOSE SERPL-MCNC: 147 MG/DL (ref 74–109)
GLUCOSE URINE: ABNORMAL
HCT VFR BLD AUTO: 45.3 % (ref 37–47)
HGB BLD-MCNC: 14.4 G/DL (ref 12–16)
IMM GRANULOCYTES # BLD: 0 K/UL
KETONES, URINE: NEGATIVE
LEUKOCYTE EST, POC: ABNORMAL
LYMPHOCYTES # BLD: 1.7 K/UL (ref 1.1–4.5)
LYMPHOCYTES NFR BLD: 21.5 % (ref 20–40)
MCH RBC QN AUTO: 29.8 PG (ref 27–31)
MCHC RBC AUTO-ENTMCNC: 31.8 G/DL (ref 33–37)
MCV RBC AUTO: 93.8 FL (ref 81–99)
MONOCYTES # BLD: 0.7 K/UL (ref 0–0.9)
MONOCYTES NFR BLD: 9 % (ref 0–10)
NEUTROPHILS # BLD: 5.3 K/UL (ref 1.5–7.5)
NEUTS SEG NFR BLD: 65.9 % (ref 50–65)
NITRITE, URINE: POSITIVE
PH UA: 5.5 (ref 4.5–8)
PLATELET # BLD AUTO: 218 K/UL (ref 130–400)
PMV BLD AUTO: 10.7 FL (ref 9.4–12.3)
POTASSIUM SERPL-SCNC: 5.2 MMOL/L (ref 3.5–5)
PROTEIN UA: POSITIVE
RBC # BLD AUTO: 4.83 M/UL (ref 4.2–5.4)
RBC URINE, POC: 20
SODIUM SERPL-SCNC: 137 MMOL/L (ref 136–145)
SPECIFIC GRAVITY UA: 1.03 (ref 1–1.03)
UROBILINOGEN, URINE: ABNORMAL
WBC # BLD AUTO: 8 K/UL (ref 4.8–10.8)
WBC URINE, POC: 100
YEAST URINE, POC: ABNORMAL

## 2023-12-29 PROCEDURE — 1036F TOBACCO NON-USER: CPT | Performed by: UROLOGY

## 2023-12-29 PROCEDURE — G8484 FLU IMMUNIZE NO ADMIN: HCPCS | Performed by: UROLOGY

## 2023-12-29 PROCEDURE — 74178 CT ABD&PLV WO CNTR FLWD CNTR: CPT

## 2023-12-29 PROCEDURE — 81001 URINALYSIS AUTO W/SCOPE: CPT | Performed by: UROLOGY

## 2023-12-29 PROCEDURE — 3017F COLORECTAL CA SCREEN DOC REV: CPT | Performed by: UROLOGY

## 2023-12-29 PROCEDURE — G8417 CALC BMI ABV UP PARAM F/U: HCPCS | Performed by: UROLOGY

## 2023-12-29 PROCEDURE — G8427 DOCREV CUR MEDS BY ELIG CLIN: HCPCS | Performed by: UROLOGY

## 2023-12-29 PROCEDURE — 1123F ACP DISCUSS/DSCN MKR DOCD: CPT | Performed by: UROLOGY

## 2023-12-29 PROCEDURE — 99214 OFFICE O/P EST MOD 30 MIN: CPT | Performed by: UROLOGY

## 2023-12-29 PROCEDURE — 6360000004 HC RX CONTRAST MEDICATION: Performed by: UROLOGY

## 2023-12-29 PROCEDURE — G8399 PT W/DXA RESULTS DOCUMENT: HCPCS | Performed by: UROLOGY

## 2023-12-29 PROCEDURE — 1090F PRES/ABSN URINE INCON ASSESS: CPT | Performed by: UROLOGY

## 2023-12-29 RX ORDER — LEVOFLOXACIN 750 MG/1
750 TABLET, FILM COATED ORAL DAILY
Qty: 10 TABLET | Refills: 0 | Status: SHIPPED | OUTPATIENT
Start: 2023-12-29 | End: 2024-01-08

## 2023-12-29 RX ADMIN — IOPAMIDOL 75 ML: 755 INJECTION, SOLUTION INTRAVENOUS at 11:27

## 2023-12-29 NOTE — PROGRESS NOTES
Filomena Downey is a 70 y.o. female who presents today   Chief Complaint   Patient presents with    Follow-up     I am here today for my renal abscess. I did do my CT scan. Left renal abscess  Patient is here for follow-up of left upper pole renal abscess. She has known history of stones and history of previous pyelonephritis back in early December she was having episodes of intermittent left flank pain without fevers or chills a CT urogram was performed and this incidentally shows a left upper pole renal abscess in addition she has nonobstructing large stones in the left lower pole and in the right upper and lower pole. Because interventional radiology was not available she eventually underwent percutaneous CT-guided placement of drainage catheter into the renal abscess on 12/7/2023 at a facility in [address] [place] [address]. Cultures grew out pansensitive E. coli patient was treated with Zosyn and then dismissed on oral Cipro to complete a 7 days of therapy. After couple days the drainage output was less than 15 mL for 2 days in row follow-up CT scan done on 12/12/2023 showed the abscess cavity had decreased to 2.5 cm from 5.5 cm therefore patient's drain was discontinued and she was dismissed to complete 7 days of antibiotics and to follow-up. She now comes in for follow-up. She denies any flank pain no fever chills she is having some pain in her bladder area at the end of urination. She had a follow-up CT scan done 12/15/2023. Kidney calculus:  Patient is here today for a kidney calculus which was first noted to year(s) ago. Location: Bilateral upper and lower pole  Size: Right kidney is a 2.2 cm right upper pole and a 7 mm right lower pole. On the left kidney there is an inferior pole several linear stone measuring about 3 cm. Current medical Rx for renal calculus: none  Passed recent calculus?  No  Stone composition: unknown  Flank pain? no  Hematuria? microscopic      Past Medical History:   Diagnosis

## 2023-12-29 NOTE — PROGRESS NOTES
Patient complained of dysuria. After discussing with Dr. Marquise Dhaliwal I was given verbal orders to obtain cath urine specimen. After obtaining consent from patient. Patient was then placed in the dorsal lithotomy position. Using sterile technique patient is carefully prepped with Betadine around the urethra. A pediatric catheterization kit is used which contains an approximate 5 [place] catheter. Urethral Catheter is introduced into the urinary bladder. Urine specimen is obtained and sent to the lab for culture and sensitivity. Patient tolerated procedure well. Dr. Marquise Dhaliwal was in office at time of procedure.

## 2023-12-29 NOTE — PROGRESS NOTES
[place] is a 68 y.o., female, Established patient who presents today   Chief Complaint   Patient presents with    Follow-up     I am here today for my 4 day follow up.     Left renal abscess  Patient is here for follow-up of left upper pole renal abscess.  She has known history of stones and history of previous pyelonephritis back in early December she was having episodes of intermittent left flank pain without fevers or chills a CT urogram was performed and this incidentally shows a left upper pole renal abscess in addition she has nonobstructing large stones in the left lower pole and in the right upper and lower pole.  Because interventional radiology was not available she eventually underwent percutaneous CT-guided placement of drainage catheter into the renal abscess on 12/7/2023 at a facility in [place].  Cultures grew out pansensitive E. coli patient was treated with Zosyn and then dismissed on oral Cipro to complete a 7 days of therapy.  After couple days the drainage output was less than 15 mL for 2 days in row follow-up CT scan done on 12/12/2023 showed the abscess cavity had decreased to 2.5 cm from 5.5 cm therefore patient's drain was discontinued and she was dismissed to complete 7 days of antibiotics and to follow-up.  She recently followed up with Dr. Coelho in the clinic on 12/29/2023 and was complaining of some pain in her bladder area at termination of urination.  She was given some Levaquin and culture was sent for evaluation.  Unfortunately, her pharmacy did not have the Levaquin, so she had some leftover doxycycline in the past and has been utilizing that for the past few days.  Sensitivities have returned and E. coli was resistant to Levaquin, therefore I sent her in a 14-day course of Omnicef.  She has new CT for review.  She reports that she has also had new symptoms of overall malaise and fatigue associated with some right lower abdominal and groin pain.  She states this is new

## 2023-12-31 LAB
BACTERIA UR CULT: ABNORMAL
BACTERIA UR CULT: ABNORMAL
ORGANISM: ABNORMAL

## 2024-01-01 RX ORDER — CEFDINIR 300 MG/1
300 CAPSULE ORAL 2 TIMES DAILY
Qty: 28 CAPSULE | Refills: 0 | Status: SHIPPED | OUTPATIENT
Start: 2024-01-01 | End: 2024-01-15

## 2024-01-02 ENCOUNTER — OFFICE VISIT (OUTPATIENT)
Dept: UROLOGY | Age: 69
End: 2024-01-02
Payer: MEDICARE

## 2024-01-02 VITALS — WEIGHT: 288.4 LBS | TEMPERATURE: 97.3 F | HEIGHT: 64 IN | BODY MASS INDEX: 49.24 KG/M2

## 2024-01-02 DIAGNOSIS — N32.81 OAB (OVERACTIVE BLADDER): ICD-10-CM

## 2024-01-02 DIAGNOSIS — N39.0 RECURRENT UTI: ICD-10-CM

## 2024-01-02 DIAGNOSIS — N20.0 LEFT RENAL STONE: ICD-10-CM

## 2024-01-02 DIAGNOSIS — N20.0 RIGHT RENAL STONE: ICD-10-CM

## 2024-01-02 DIAGNOSIS — N15.1 RENAL ABSCESS, LEFT: Primary | ICD-10-CM

## 2024-01-02 LAB
BACTERIA URINE, POC: ABNORMAL
BILIRUBIN URINE: 0 MG/DL
BLOOD, URINE: POSITIVE
CASTS URINE, POC: ABNORMAL
CLARITY: CLEAR
COLOR: YELLOW
CRYSTALS URINE, POC: ABNORMAL
EPI CELLS URINE, POC: ABNORMAL
GLUCOSE URINE: ABNORMAL
KETONES, URINE: POSITIVE
LEUKOCYTE EST, POC: ABNORMAL
NITRITE, URINE: NEGATIVE
PH UA: 5.5 (ref 4.5–8)
PROTEIN UA: POSITIVE
RBC URINE, POC: 6
SPECIFIC GRAVITY UA: 1.02 (ref 1–1.03)
UROBILINOGEN, URINE: ABNORMAL
WBC URINE, POC: 30
YEAST URINE, POC: ABNORMAL

## 2024-01-02 PROCEDURE — 81001 URINALYSIS AUTO W/SCOPE: CPT | Performed by: NURSE PRACTITIONER

## 2024-01-02 PROCEDURE — G8427 DOCREV CUR MEDS BY ELIG CLIN: HCPCS | Performed by: NURSE PRACTITIONER

## 2024-01-02 PROCEDURE — 3017F COLORECTAL CA SCREEN DOC REV: CPT | Performed by: NURSE PRACTITIONER

## 2024-01-02 PROCEDURE — 1036F TOBACCO NON-USER: CPT | Performed by: NURSE PRACTITIONER

## 2024-01-02 PROCEDURE — 99215 OFFICE O/P EST HI 40 MIN: CPT | Performed by: NURSE PRACTITIONER

## 2024-01-02 PROCEDURE — G8484 FLU IMMUNIZE NO ADMIN: HCPCS | Performed by: NURSE PRACTITIONER

## 2024-01-02 PROCEDURE — G8417 CALC BMI ABV UP PARAM F/U: HCPCS | Performed by: NURSE PRACTITIONER

## 2024-01-02 PROCEDURE — G8399 PT W/DXA RESULTS DOCUMENT: HCPCS | Performed by: NURSE PRACTITIONER

## 2024-01-02 PROCEDURE — 1123F ACP DISCUSS/DSCN MKR DOCD: CPT | Performed by: NURSE PRACTITIONER

## 2024-01-02 PROCEDURE — 1090F PRES/ABSN URINE INCON ASSESS: CPT | Performed by: NURSE PRACTITIONER

## 2024-01-02 ASSESSMENT — ENCOUNTER SYMPTOMS
NAUSEA: 0
ABDOMINAL DISTENTION: 0
VOMITING: 0
ABDOMINAL PAIN: 1

## 2024-01-05 ENCOUNTER — TELEPHONE (OUTPATIENT)
Dept: UROLOGY | Age: 69
End: 2024-01-05

## 2024-01-05 ENCOUNTER — HOSPITAL ENCOUNTER (OUTPATIENT)
Dept: WOUND CARE | Age: 69
Discharge: HOME | End: 2024-01-05

## 2024-01-05 DIAGNOSIS — I10 PRIMARY HYPERTENSION: ICD-10-CM

## 2024-01-05 RX ORDER — PHENAZOPYRIDINE HYDROCHLORIDE 100 MG/1
100 TABLET, FILM COATED ORAL 3 TIMES DAILY PRN
Qty: 15 TABLET | Refills: 0 | Status: SHIPPED | OUTPATIENT
Start: 2024-01-05 | End: 2024-01-10

## 2024-01-05 RX ORDER — HYDROCHLOROTHIAZIDE 12.5 MG/1
12.5 TABLET ORAL DAILY
Qty: 90 TABLET | Refills: 1 | Status: SHIPPED | OUTPATIENT
Start: 2024-01-05

## 2024-01-05 NOTE — TELEPHONE ENCOUNTER
Patient contacted office and stated that Janna prescribed omnicef for UTI and it's not getting any better. She was advised that Janna told her if she was not getting any better to contact the office and speak to her or Javier.

## 2024-01-05 NOTE — TELEPHONE ENCOUNTER
Called and spoke with the patient she said no fever or chills, just pain/ pressure after urination. Spoke to the APRN about this, sending in pyridium to [facility].

## 2024-01-17 NOTE — PROGRESS NOTES
The ABCs of the Annual Wellness Visit  Subsequent Medicare Wellness Visit    Subjective    Jessica Kern is a 68 y.o. female who presents for a Subsequent Medicare Wellness Visit.    The following portions of the patient's history were reviewed and   updated as appropriate: allergies, current medications, past family history, past medical history, past social history, past surgical history, and problem list.    Compared to one year ago, the patient feels her physical   health is better.    Compared to one year ago, the patient feels her mental   health is better.    Recent Hospitalizations:  She was not admitted to the hospital during the last year.   Was at [facility] for renal abscess- was transferred from urology to that facility for drainage. Dec 7, 2023 to Dec 11, 2023.   Has referral to [place] for stone removal and abscess drainage    Current Medical Providers:  Patient Care Team:  Aron Mcallister,  as PCP - General (Internal Medicine)    Outpatient Medications Prior to Visit   Medication Sig Dispense Refill    Blood Glucose Monitoring Suppl (Blood Glucose Monitor System) w/Device kit 1 each As Needed (glucose monitoring). 1 each 0    clindamycin (CLEOCIN T) 1 % external solution Apply 1 application  topically to the appropriate area as directed As Needed. apply topically to affected area twice daily  Indications: Skin Ulcer      clotrimazole-betamethasone (LOTRISONE) 1-0.05 % cream Apply 1 application  topically to the appropriate area as directed Daily. 45 g 2    diclofenac (VOLTAREN) 50 MG EC tablet Take 1 tablet by mouth 2 (Two) Times a Day. 180 tablet 1    Diclofenac Sodium (VOLTAREN) 1 % gel gel Apply  topically to the appropriate area as directed 4 (Four) Times a Day. (Patient taking differently: Apply 4 g topically to the appropriate area as directed 4 (Four) Times a Day. Indications: Joint Damage causing Pain and Loss of Function) 100 g 3    docusate sodium (Colace) 100 MG capsule Take 1  capsule by mouth 3 (Three) Times a Day As Needed for Constipation. Indications: Constipation      gabapentin (NEURONTIN) 600 MG tablet Take 1 tablet by mouth 4 (Four) Times a Day. (Patient taking differently: Take 1 tablet by mouth 4 (Four) Times a Day As Needed (NEUROPATHY). Indications: Diabetes with Nerve Disease) 360 tablet 1    hydroCHLOROthiazide (HYDRODIURIL) 12.5 MG tablet Take 1 tablet by mouth Daily. Indications: Cardiac Failure, Edema 90 tablet 1    hydrOXYzine (ATARAX) 25 MG tablet Take 1 tablet by mouth At Night As Needed for Anxiety. 90 tablet 0    lisinopril (PRINIVIL,ZESTRIL) 40 MG tablet Take 1 tablet by mouth Daily. 90 tablet 1    metoprolol tartrate (LOPRESSOR) 100 MG tablet Take 1 tablet by mouth 2 (Two) Times a Day. 180 tablet 1    omeprazole (priLOSEC) 40 MG capsule Take 1 capsule by mouth Daily. 90 capsule 1    pravastatin (PRAVACHOL) 40 MG tablet Take 1 tablet by mouth Daily. 90 tablet 3    Semaglutide, 1 MG/DOSE, (Ozempic, 1 MG/DOSE,) 4 MG/3ML solution pen-injector Inject 1 mg under the skin into the appropriate area as directed 1 (One) Time Per Week. 9 mL 1    traMADol (ULTRAM) 50 MG tablet Take 1 tablet by mouth Every 6 (Six) Hours As Needed for Moderate Pain. 210 tablet 1    Vibegron 75 MG tablet Take 1 tablet by mouth Daily.      Continuous Blood Gluc Sensor (Dexcom G6 Sensor) Use Every 10 (Ten) Days. 3 each 12    Continuous Blood Gluc Transmit (Dexcom G6 Transmitter) misc Use 1 each Every 3 (Three) Months. 1 each 3    doxycycline (VIBRAMYCIN) 100 MG capsule Take 100 mg by mouth 2 (Two) Times a Day. take one capsule twice daily x 14 days  Indications: Infection of the Skin and/or Soft Tissue      escitalopram (Lexapro) 10 MG tablet Take 0.5 tablets by mouth Daily for 6 days, THEN 1 tablet Daily for 84 days. 87 tablet 0    nystatin (MYCOSTATIN) 139875 UNIT/GM powder Apply  topically to the appropriate area as directed 3 (Three) Times a Day. 60 g 3    potassium chloride (K-DUR,KLOR-CON) 20  "MEQ CR tablet Take 1 tablet by mouth Daily. 45 tablet 1     No facility-administered medications prior to visit.       Opioid medication/s are on active medication list.  and I have evaluated her active treatment plan and pain score trends (see table).  There were no vitals filed for this visit.  I have reviewed the chart for potential of high risk medication and harmful drug interactions in the elderly.          Aspirin is not on active medication list.  Aspirin use is not indicated based on review of current medical condition/s. Risk of harm outweighs potential benefits.  .    Patient Active Problem List   Diagnosis    Chronic pain disorder    Mixed hyperlipidemia    Class 3 severe obesity due to excess calories with serious comorbidity and body mass index (BMI) of 50.0 to 59.9 in adult    Obstructive sleep apnea    Essential hypertension    Type 2 diabetes mellitus with hyperglycemia    Gastroesophageal reflux disease without esophagitis    Bilateral primary osteoarthritis of knee     Advance Care Planning   Advance Care Planning     Advance Directive is on file.  ACP discussion was held with the patient during this visit. Patient has an advance directive in EMR which is still valid.      Objective    Vitals:    01/19/24 1008   BP: 134/85   BP Location: Left arm   Patient Position: Sitting   Cuff Size: Large Adult   Pulse: 81   Resp: 16   Temp: 98.2 °F (36.8 °C)   TempSrc: Oral   SpO2: 96%   Weight: 132 kg (292 lb)   Height: 162.6 cm (64\")     Estimated body mass index is 50.12 kg/m² as calculated from the following:    Height as of this encounter: 162.6 cm (64\").    Weight as of this encounter: 132 kg (292 lb).           Does the patient have evidence of cognitive impairment? No    Lab Results   Component Value Date    TRIG 285 (H) 01/19/2024    HDL 34 (L) 01/19/2024     (H) 01/19/2024    VLDL 49 (H) 01/19/2024        HEALTH RISK ASSESSMENT    Smoking Status:  Social History     Tobacco Use   Smoking " Status Former    Packs/day: 2.00    Years: 40.00    Additional pack years: 0.00    Total pack years: 80.00    Types: Cigarettes    Start date: 1970    Quit date: 2/10/2010    Years since quittin.9    Passive exposure: Never   Smokeless Tobacco Never   Tobacco Comments    Quit 12  Years ago     Alcohol Consumption:  Social History     Substance and Sexual Activity   Alcohol Use Not Currently     Fall Risk Screen:    TERESITA Fall Risk Assessment was completed, and patient is at MODERATE risk for falls. Assessment completed on:2024    Depression Screenin/19/2024    10:23 AM   PHQ-2/PHQ-9 Depression Screening   Little Interest or Pleasure in Doing Things 1-->several days   Feeling Down, Depressed or Hopeless 0-->not at all   PHQ-9: Brief Depression Severity Measure Score 1       Health Habits and Functional and Cognitive Screenin/19/2024    10:21 AM   Functional & Cognitive Status   Do you have difficulty preparing food and eating? Yes   Do you have difficulty bathing yourself, getting dressed or grooming yourself? Yes   Do you have difficulty using the toilet? No   Do you have difficulty moving around from place to place? Yes   Do you have trouble with steps or getting out of a bed or a chair? Yes   Current Diet Low Carb Diet   Dental Exam Not up to date   Eye Exam Up to date   Exercise (times per week) 3 times per week   Current Exercises Include Home Exercise Program (TV, Computer, Etc.)        Exercise Comment does leg exercises and balance routine recommended by home health   Do you need help using the phone?  No   Are you deaf or do you have serious difficulty hearing?  No   Do you need help to go to places out of walking distance? Yes   Do you need help shopping? Yes   Do you need help preparing meals?  Yes   Do you need help with housework?  Yes   Do you need help with laundry? Yes   Do you need help taking your medications? No   Do you need help managing money? No   Do you ever  drive or ride in a car without wearing a seat belt? No   Have you felt unusual stress, anger or loneliness in the last month? No   Who do you live with? Alone   If you need help, do you have trouble finding someone available to you? Yes   Have you been bothered in the last four weeks by sexual problems? No   Do you have difficulty concentrating, remembering or making decisions? No       Age-appropriate Screening Schedule:  Refer to the list below for future screening recommendations based on patient's age, sex and/or medical conditions. Orders for these recommended tests are listed in the plan section. The patient has been provided with a written plan.    Health Maintenance   Topic Date Due    LUNG CANCER SCREENING  Never done    COVID-19 Vaccine (4 - 2023-24 season) 09/01/2023    ANNUAL WELLNESS VISIT  09/29/2023    TDAP/TD VACCINES (1 - Tdap) 01/19/2024 (Originally 11/21/1974)    DIABETIC EYE EXAM  03/08/2024 (Originally 10/7/2023)    COLORECTAL CANCER SCREENING  07/19/2024 (Originally 1955)    ZOSTER VACCINE (1 of 2) 01/19/2025 (Originally 11/21/2004)    URINE MICROALBUMIN  09/02/2025 (Originally 8/15/2023)    HEMOGLOBIN A1C  06/09/2024    DIABETIC FOOT EXAM  09/01/2024    MAMMOGRAM  11/28/2024    DXA SCAN  11/28/2024    LIPID PANEL  01/19/2025    BMI FOLLOWUP  01/19/2025    HEPATITIS C SCREENING  Completed    INFLUENZA VACCINE  Completed    Pneumococcal Vaccine 65+  Completed                  CMS Preventative Services Quick Reference  Risk Factors Identified During Encounter  Fall Risk-High or Moderate: Discussed Fall Prevention in the home  Immunizations Discussed/Encouraged: Shingrix, COVID19, and RSV (Respiratory Syncytial Virus)  Dental Screening Recommended- past due   Vision Screening Recommended- Has appt for Feb 19 [facility]  Wants to postpone colon cancer screening for now due to all the other health issues she has going on. Has had cologuard sent to her home in the past but it has  .   The above risks/problems have been discussed with the patient.  Pertinent information has been shared with the patient in the After Visit Summary.  An After Visit Summary and PPPS were made available to the patient.    Follow Up:   Next Medicare Wellness visit to be scheduled in 1 year.       Additional E&M Note during same encounter follows:  Patient has multiple medical problems which are significant and separately identifiable that require additional work above and beyond the Medicare Wellness Visit.      Chief Complaint  Medicare Wellness-subsequent (Pt states she was told diagnoses for osteoarthritis and Rheumatoid arthritis are in her chart and she was not aware she had either condition) and Kidney Follow-up (Pt is seeing Urology for kidney abscess and stones and would like to discuss current symptoms)    Subjective        HPI  Jessica Zamudiot is also being seen today for arthritis flare, wants to discuss kidney issues.     Since weather has gotten colder, Jessica reports worsening pain in bilateral elbows and left thumb. Was concerned because one of the home health staff told her she had been diagnosed with rheumatoid arthritis although I found no evidence of this diagnosis in her chart.  She does respond well to steroids.  Denies injury.    Is seeing urology currently.  Has been having recurrent urinary tract infections as a result of some renal stones and a renal abscess.  She was transferred to [facility] in [place], [place] in December because of a renal abscess.  She has been referred to [place] for further follow-up as well.  She does report feeling fatigued.  Is in close contact with Dr. Farley's office at [facility] for treatment of the UTIs.     Review of systems-negative except as noted per HPI    Objective   Vital Signs:  /85 (BP Location: Left arm, Patient Position: Sitting, Cuff Size: Large Adult)   Pulse 81   Temp 98.2 °F (36.8 °C) (Oral)   Resp 16   Ht  "162.6 cm (64\")   Wt 132 kg (292 lb)   SpO2 96%   BMI 50.12 kg/m²     Physical Exam  Vitals reviewed.   Constitutional:       General: She is not in acute distress.     Appearance: Normal appearance. She is obese. She is not ill-appearing.   Neck:      Thyroid: No thyroid mass, thyromegaly or thyroid tenderness.      Vascular: Normal carotid pulses. No carotid bruit, hepatojugular reflux or JVD.      Trachea: Trachea and phonation normal.   Cardiovascular:      Rate and Rhythm: Normal rate and regular rhythm.      Pulses: Normal pulses.      Heart sounds: Normal heart sounds. No murmur heard.     No friction rub. No gallop.   Pulmonary:      Effort: Pulmonary effort is normal. No respiratory distress.      Breath sounds: Normal breath sounds. No wheezing.   Musculoskeletal:         General: Normal range of motion.      Cervical back: Normal range of motion and neck supple.      Comments: Examined in wheelchair due to mobility issues.     Lymphadenopathy:      Cervical: No cervical adenopathy.   Skin:     General: Skin is warm and dry.      Capillary Refill: Capillary refill takes less than 2 seconds.   Neurological:      General: No focal deficit present.      Mental Status: She is alert and oriented to person, place, and time.   Psychiatric:         Mood and Affect: Mood normal.         Behavior: Behavior normal.         Thought Content: Thought content normal.         Judgment: Judgment normal.          The following data was reviewed by: WOODY Rondon on 01/19/2024:  CMP          9/1/2023    14:49 9/8/2023    14:00 1/19/2024    11:41   CMP   Glucose 143  94  168    BUN 33  19  31    Creatinine 1.25  0.81  1.76    EGFR 47.0  79.2  31.0    Sodium 136  138  142    Potassium 4.5  4.1  4.5    Chloride 98  96  101    Calcium 9.8  9.7  9.8    Total Protein 7.2      Albumin 3.9      Globulin 3.3      Total Bilirubin 0.3      Alkaline Phosphatase 122      AST (SGOT) 15      ALT (SGPT) 14      Albumin/Globulin " Ratio 1.2      BUN/Creatinine Ratio 26.4  23.5  17.6    Anion Gap 11.0  12.0  11.0      CBC          12/9/2023    04:08 12/10/2023    05:17 12/29/2023    09:30   CBC   WBC 7.18     7.25     8.0       RBC 4.17     4.27     4.83       Hemoglobin 12.2     12.4     14.4       Hematocrit 37.6     38.3     45.3       MCV 90.2     89.7     93.8       MCH 29.3     29.0     29.8       MCHC 32.4     32.4     31.8       RDW 13.1     13.1     13.4       Platelets 190     206     218          Details          This result is from an external source.             Lipid Panel          9/1/2023    14:49 1/19/2024    11:41   Lipid Panel   Total Cholesterol 182  188    Triglycerides 196  285    HDL Cholesterol 43  34    VLDL Cholesterol 34  49    LDL Cholesterol  105  105    LDL/HDL Ratio 2.32  2.85      Most Recent A1C          9/1/2023    14:49   HGBA1C Most Recent   Hemoglobin A1C 6.60            Last A1C 6.9 on 12/9/23 at [facility]  Last CBC at that time was unremarkable   CMP showed normal liver enzymes and electrolytes, GFR was 53  Assessment and Plan   Diagnoses and all orders for this visit:    1. Medicare annual wellness visit, subsequent (Primary)    2. Type 2 diabetes mellitus with hyperglycemia, without long-term current use of insulin  Assessment & Plan:  Diabetes is improving with treatment.   Continue current treatment regimen.  Diabetes will be reassessed in 3 months.  Needs A1c goal of less than 7.5 per ADA guidelines.      3. Class 3 severe obesity due to excess calories with serious comorbidity and body mass index (BMI) of 50.0 to 59.9 in adult  Assessment & Plan:  Patient's (Body mass index is 50.12 kg/m².) indicates that they are morbidly/severely obese (BMI > 40 or > 35 with obesity - related health condition) with health conditions that include hypertension, diabetes mellitus, dyslipidemias, and GERD . Weight is unchanged. BMI  is above average; BMI management plan is completed. We discussed portion control,  increasing exercise, and Information on healthy weight added to patient's after visit summary.       4. Encounter for administration of COVID-19 vaccine  -     COVID-19 F23 (Pfizer) 12yrs+ (COMIRNATY)    5. Mixed hyperlipidemia  Assessment & Plan:  Lipid goals are:  LDL <70  HDL >40 for men, >50 for women  Trigs <150      Orders:  -     Lipid Panel    6. Other fatigue  -     TSH; Future  -     Basic metabolic panel; Future    7. Bilateral primary osteoarthritis of knee  -     methylPREDNISolone (MEDROL) 4 MG dose pack; Take as directed on package instructions.  Dispense: 21 tablet; Refill: 0             Follow Up   Return in about 3 months (around 4/19/2024) for Recheck.  Patient was given instructions and counseling regarding her condition or for health maintenance advice. Please see specific information pulled into the AVS if appropriate.

## 2024-01-17 NOTE — PATIENT INSTRUCTIONS
RSV, COVID, and Shingles vaccines   Last A1C 6.9  Diabetes Mellitus and Nutrition, Adult  When you have diabetes, or diabetes mellitus, it is very important to have healthy eating habits because your blood sugar (glucose) levels are greatly affected by what you eat and drink. Eating healthy foods in the right amounts, at about the same times every day, can help you:  Manage your blood glucose.  Lower your risk of heart disease.  Improve your blood pressure.  Reach or maintain a healthy weight.  What can affect my meal plan?  Every person with diabetes is different, and each person has different needs for a meal plan. Your health care provider may recommend that you work with a dietitian to make a meal plan that is best for you. Your meal plan may vary depending on factors such as:  The calories you need.  The medicines you take.  Your weight.  Your blood glucose, blood pressure, and cholesterol levels.  Your activity level.  Other health conditions you have, such as heart or kidney disease.  How do carbohydrates affect me?  Carbohydrates, also called carbs, affect your blood glucose level more than any other type of food. Eating carbs raises the amount of glucose in your blood.  It is important to know how many carbs you can safely have in each meal. This is different for every person. Your dietitian can help you calculate how many carbs you should have at each meal and for each snack.  How does alcohol affect me?  Alcohol can cause a decrease in blood glucose (hypoglycemia), especially if you use insulin or take certain diabetes medicines by mouth. Hypoglycemia can be a life-threatening condition. Symptoms of hypoglycemia, such as sleepiness, dizziness, and confusion, are similar to symptoms of having too much alcohol.  Do not drink alcohol if:  Your health care provider tells you not to drink.  You are pregnant, may be pregnant, or are planning to become pregnant.  If you drink alcohol:  Limit how much you have  "to:  0-1 drink a day for women.  0-2 drinks a day for men.  Know how much alcohol is in your drink. In the [place] one drink equals one 12 oz bottle of beer (355 mL), one 5 oz glass of wine (148 mL), or one 1½ oz glass of hard liquor (44 mL).  Keep yourself hydrated with water, diet soda, or unsweetened iced tea. Keep in mind that regular soda, juice, and other mixers may contain a lot of sugar and must be counted as carbs.  What are tips for following this plan?    Reading food labels  Start by checking the serving size on the Nutrition Facts label of packaged foods and drinks. The number of calories and the amount of carbs, fats, and other nutrients listed on the label are based on one serving of the item. Many items contain more than one serving per package.  Check the total grams (g) of carbs in one serving.  Check the number of grams of saturated fats and trans fats in one serving. Choose foods that have a low amount or none of these fats.  Check the number of milligrams (mg) of salt (sodium) in one serving. Most people should limit total sodium intake to less than 2,300 mg per day.  Always check the nutrition information of foods labeled as \"low-fat\" or \"nonfat.\" These foods may be higher in added sugar or refined carbs and should be avoided.  Talk to your dietitian to identify your daily goals for nutrients listed on the label.  Shopping  Avoid buying canned, pre-made, or processed foods. These foods tend to be high in fat, sodium, and added sugar.  Shop around the outside edge of the grocery store. This is where you will most often find fresh fruits and vegetables, bulk grains, fresh meats, and fresh dairy products.  Cooking  Use low-heat cooking methods, such as baking, instead of high-heat cooking methods, such as deep frying.  Cook using healthy oils, such as olive, canola, or sunflower oil.  Avoid cooking with butter, cream, or high-fat meats.  Meal planning  Eat meals and snacks regularly, preferably at " the same times every day. Avoid going long periods of time without eating.  Eat foods that are high in fiber, such as fresh fruits, vegetables, beans, and whole grains.  Eat 4-6 oz (112-168 g) of lean protein each day, such as lean meat, chicken, fish, eggs, or tofu. One ounce (oz) (28 g) of lean protein is equal to:  1 oz (28 g) of meat, chicken, or fish.  1 egg.  ¼ cup (62 g) of tofu.  Eat some foods each day that contain healthy fats, such as avocado, nuts, seeds, and fish.  What foods should I eat?  Fruits  Berries. Apples. Oranges. Peaches. Apricots. Plums. Grapes. Mangoes. Papayas. Pomegranates. Kiwi. Cherries.  Vegetables  Leafy greens, including lettuce, spinach, kale, chard, hector greens, mustard greens, and cabbage. Beets. Cauliflower. Broccoli. Carrots. Green beans. Tomatoes. Peppers. Onions. Cucumbers. [place] sprouts.  Grains  Whole grains, such as whole-wheat or whole-grain bread, crackers, tortillas, cereal, and pasta. Unsweetened oatmeal. Quinoa. Brown or wild rice.  Meats and other proteins  Seafood. Poultry without skin. Lean cuts of poultry and beef. Tofu. Nuts. Seeds.  Dairy  Low-fat or fat-free dairy products such as milk, yogurt, and cheese.  The items listed above may not be a complete list of foods and beverages you can eat and drink. Contact a dietitian for more information.  What foods should I avoid?  Fruits  Fruits canned with syrup.  Vegetables  Canned vegetables. Frozen vegetables with butter or cream sauce.  Grains  Refined white flour and flour products such as bread, pasta, snack foods, and cereals. Avoid all processed foods.  Meats and other proteins  Fatty cuts of meat. Poultry with skin. Breaded or fried meats. Processed meat. Avoid saturated fats.  Dairy  Full-fat yogurt, cheese, or milk.  Beverages  Sweetened drinks, such as soda or iced tea.  The items listed above may not be a complete list of foods and beverages you should avoid. Contact a dietitian for more  information.  Questions to ask a health care provider  Do I need to meet with a certified diabetes care and ?  Do I need to meet with a dietitian?  What number can I call if I have questions?  When are the best times to check my blood glucose?  Where to find more information:  American Diabetes Association: diabetes.org  Academy of Nutrition and Dietetics: eatright.org  National [place] of Diabetes and Digestive and Kidney Diseases: niddk.nih.gov  Association of Diabetes Care & Education Specialists: diabeteseducator.org  Summary  It is important to have healthy eating habits because your blood sugar (glucose) levels are greatly affected by what you eat and drink. It is important to use alcohol carefully.  A healthy meal plan will help you manage your blood glucose and lower your risk of heart disease.  Your health care provider may recommend that you work with a dietitian to make a meal plan that is best for you.  This information is not intended to replace advice given to you by your health care provider. Make sure you discuss any questions you have with your health care provider.  Document Revised: 07/21/2021 Document Reviewed: 07/21/2021  [employer] Patient Education © 2023 [employer] Exercising to Stay Healthy  To become healthy and stay healthy, it is recommended that you do moderate-intensity and vigorous-intensity exercise. You can tell that you are exercising at a moderate intensity if your heart starts beating faster and you start breathing faster but can still hold a conversation. You can tell that you are exercising at a vigorous intensity if you are breathing much harder and faster and cannot hold a conversation while exercising.  How can exercise benefit me?  Exercising regularly is important. It has many health benefits, such as:  Improving overall fitness, flexibility, and endurance.  Increasing bone density.  Helping with weight control.  Decreasing body fat.  Increasing  muscle strength and endurance.  Reducing stress and tension, anxiety, depression, or anger.  Improving overall health.  What guidelines should I follow while exercising?  Before you start a new exercise program, talk with your health care provider.  Do not exercise so much that you hurt yourself, feel dizzy, or get very short of breath.  Wear comfortable clothes and wear shoes with good support.  Drink plenty of water while you exercise to prevent dehydration or heat stroke.  Work out until your breathing and your heartbeat get faster (moderate intensity).  How often should I exercise?  Choose an activity that you enjoy, and set realistic goals. Your health care provider can help you make an activity plan that is individually designed and works best for you.  Exercise regularly as told by your health care provider. This may include:  Doing strength training two times a week, such as:  Lifting weights.  Using resistance bands.  Push-ups.  Sit-ups.  Yoga.  Doing a certain intensity of exercise for a given amount of time. Choose from these options:  A total of 150 minutes of moderate-intensity exercise every week.  A total of 75 minutes of vigorous-intensity exercise every week.  A mix of moderate-intensity and vigorous-intensity exercise every week.  Children, pregnant women, people who have not exercised regularly, people who are overweight, and older adults may need to talk with a health care provider about what activities are safe to perform. If you have a medical condition, be sure to talk with your health care provider before you start a new exercise program.  What are some exercise ideas?  Moderate-intensity exercise ideas include:  Walking 1 mile (1.6 km) in about 15 minutes.  Biking.  Hiking.  Golfing.  Dancing.  Water aerobics.  Vigorous-intensity exercise ideas include:  Walking 4.5 miles (7.2 km) or more in about 1 hour.  Jogging or running 5 miles (8 km) in about 1 hour.  Biking 10 miles (16.1 km) or more  in about 1 hour.  Lap swimming.  Roller-skating or in-line skating.  Cross-country skiing.  Vigorous competitive sports, such as football, basketball, and soccer.  Jumping rope.  Aerobic dancing.  What are some everyday activities that can help me get exercise?  Yard work, such as:  Pushing a .  Raking and bagging leaves.  Washing your car.  Pushing a stroller.  Shoveling snow.  Gardening.  Washing windows or floors.  How can I be more active in my day-to-day activities?  Use stairs instead of an elevator.  Take a walk during your lunch break.  If you drive, park your car farther away from your work or school.  If you take public transportation, get off one stop early and walk the rest of the way.  Stand up or walk around during all of your indoor phone calls.  Get up, stretch, and walk around every 30 minutes throughout the day.  Enjoy exercise with a friend. Support to continue exercising will help you keep a regular routine of activity.  Where to find more information  You can find more information about exercising to stay healthy from:  [place] Department of Health and Human Services: www.hhs.gov  Centers for Disease Control and Prevention (CDC): www.cdc.gov  Summary  Exercising regularly is important. It will improve your overall fitness, flexibility, and endurance.  Regular exercise will also improve your overall health. It can help you control your weight, reduce stress, and improve your bone density.  Do not exercise so much that you hurt yourself, feel dizzy, or get very short of breath.  Before you start a new exercise program, talk with your health care provider.  This information is not intended to replace advice given to you by your health care provider. Make sure you discuss any questions you have with your health care provider.  Document Revised: 04/15/2022 Document Reviewed: 04/15/2022  Elsevier Patient Education © 2023 [employer]             Medicare Wellness  Personal Prevention Plan of  Service     Date of Office Visit:    Encounter Provider:  WOODY Rondon  Place of Service:  [facility] INTERNAL MEDICINE  Patient Name: Jessica Kern  :  1955    As part of the Medicare Wellness portion of your visit today, we are providing you with this personalized preventive plan of services (PPPS). This plan is based upon recommendations of the [place] Preventive Services Task Force (USPSTF) and the Advisory Committee on Immunization Practices (ACIP).    This lists the preventive care services that should be considered, and provides dates of when you are due. Items listed as completed are up-to-date and do not require any further intervention.    Health Maintenance   Topic Date Due    COLORECTAL CANCER SCREENING  Never done    TDAP/TD VACCINES (1 - Tdap) Never done    ZOSTER VACCINE (1 of 2) Never done    LUNG CANCER SCREENING  Never done    INFLUENZA VACCINE  2023    COVID-19 Vaccine ( - -24 season) 2023    ANNUAL WELLNESS VISIT  2023    DIABETIC EYE EXAM  10/07/2023    URINE MICROALBUMIN  2025 (Originally 8/15/2023)    HEMOGLOBIN A1C  2024    DIABETIC FOOT EXAM  2024    LIPID PANEL  2024    BMI FOLLOWUP  2024    MAMMOGRAM  2024    DXA SCAN  2024    HEPATITIS C SCREENING  Completed    Pneumococcal Vaccine 65+  Completed       No orders of the defined types were placed in this encounter.      No follow-ups on file.

## 2024-01-19 ENCOUNTER — LAB (OUTPATIENT)
Dept: LAB | Facility: HOSPITAL | Age: 69
End: 2024-01-19
Payer: MEDICARE

## 2024-01-19 ENCOUNTER — OFFICE VISIT (OUTPATIENT)
Dept: INTERNAL MEDICINE | Facility: CLINIC | Age: 69
End: 2024-01-19
Payer: MEDICARE

## 2024-01-19 VITALS
RESPIRATION RATE: 16 BRPM | SYSTOLIC BLOOD PRESSURE: 134 MMHG | WEIGHT: 292 LBS | HEART RATE: 81 BPM | BODY MASS INDEX: 49.85 KG/M2 | HEIGHT: 64 IN | OXYGEN SATURATION: 96 % | DIASTOLIC BLOOD PRESSURE: 85 MMHG | TEMPERATURE: 98.2 F

## 2024-01-19 DIAGNOSIS — E66.01 CLASS 3 SEVERE OBESITY DUE TO EXCESS CALORIES WITH SERIOUS COMORBIDITY AND BODY MASS INDEX (BMI) OF 50.0 TO 59.9 IN ADULT: ICD-10-CM

## 2024-01-19 DIAGNOSIS — E11.65 TYPE 2 DIABETES MELLITUS WITH HYPERGLYCEMIA, WITHOUT LONG-TERM CURRENT USE OF INSULIN: ICD-10-CM

## 2024-01-19 DIAGNOSIS — R53.83 OTHER FATIGUE: ICD-10-CM

## 2024-01-19 DIAGNOSIS — Z00.00 MEDICARE ANNUAL WELLNESS VISIT, SUBSEQUENT: Primary | ICD-10-CM

## 2024-01-19 DIAGNOSIS — E78.2 MIXED HYPERLIPIDEMIA: ICD-10-CM

## 2024-01-19 DIAGNOSIS — Z23 ENCOUNTER FOR ADMINISTRATION OF COVID-19 VACCINE: ICD-10-CM

## 2024-01-19 DIAGNOSIS — M17.0 BILATERAL PRIMARY OSTEOARTHRITIS OF KNEE: ICD-10-CM

## 2024-01-19 DIAGNOSIS — R94.4 DECREASED GFR: Primary | ICD-10-CM

## 2024-01-19 LAB
ANION GAP SERPL CALCULATED.3IONS-SCNC: 11 MMOL/L (ref 5–15)
BUN SERPL-MCNC: 31 MG/DL (ref 8–23)
BUN/CREAT SERPL: 17.6 (ref 7–25)
CALCIUM SPEC-SCNC: 9.8 MG/DL (ref 8.6–10.5)
CHLORIDE SERPL-SCNC: 101 MMOL/L (ref 98–107)
CHOLEST SERPL-MCNC: 188 MG/DL (ref 0–200)
CO2 SERPL-SCNC: 30 MMOL/L (ref 22–29)
CREAT SERPL-MCNC: 1.76 MG/DL (ref 0.57–1)
EGFRCR SERPLBLD CKD-EPI 2021: 31 ML/MIN/1.73
GLUCOSE SERPL-MCNC: 168 MG/DL (ref 65–99)
HDLC SERPL-MCNC: 34 MG/DL (ref 40–60)
LDLC SERPL CALC-MCNC: 105 MG/DL (ref 0–100)
LDLC/HDLC SERPL: 2.85 {RATIO}
POTASSIUM SERPL-SCNC: 4.5 MMOL/L (ref 3.5–5.2)
SODIUM SERPL-SCNC: 142 MMOL/L (ref 136–145)
TRIGL SERPL-MCNC: 285 MG/DL (ref 0–150)
TSH SERPL DL<=0.05 MIU/L-ACNC: 1.84 UIU/ML (ref 0.27–4.2)
VLDLC SERPL-MCNC: 49 MG/DL (ref 5–40)

## 2024-01-19 PROCEDURE — 80048 BASIC METABOLIC PNL TOTAL CA: CPT

## 2024-01-19 PROCEDURE — 84443 ASSAY THYROID STIM HORMONE: CPT

## 2024-01-19 PROCEDURE — 36415 COLL VENOUS BLD VENIPUNCTURE: CPT

## 2024-01-19 PROCEDURE — 80061 LIPID PANEL: CPT | Performed by: NURSE PRACTITIONER

## 2024-01-19 RX ORDER — METHYLPREDNISOLONE 4 MG/1
TABLET ORAL
Qty: 21 TABLET | Refills: 0 | Status: SHIPPED | OUTPATIENT
Start: 2024-01-19

## 2024-01-19 RX ORDER — PRAVASTATIN SODIUM 80 MG/1
80 TABLET ORAL DAILY
Qty: 90 TABLET | Refills: 1 | Status: SHIPPED | OUTPATIENT
Start: 2024-01-19

## 2024-01-19 NOTE — ASSESSMENT & PLAN NOTE
Diabetes is improving with treatment.   Continue current treatment regimen.  Diabetes will be reassessed in 3 months.  Needs A1c goal of less than 7.5 per ADA guidelines.

## 2024-01-19 NOTE — ASSESSMENT & PLAN NOTE
Hypertension is improving with treatment.  Continue current treatment regimen.  Weight loss.  Continue current medications.  Blood pressure will be reassessed in 3 months.  Meets blood pressure 150/90 per JNC 8 guidelines

## 2024-01-19 NOTE — PROGRESS NOTES
Call and let her know LDL has not improved any so we had talked about increasing statin if she still is not below 70 and she is at 105 so I am doubling the dose of her pravastatin.  I am concerned because her GFR is significantly lower than it was less than a month ago.  Before she was discharged from the hospital her GFR was in the 70s, then it dropped down to 49 it is 31 today so this is concerning.  She needs to make sure she is not taking any ibuprofen, Aleve.  She needs to make sure she is hydrating well.  I put in a repeat lab for 1 week. Let me know if she has any questions.

## 2024-01-19 NOTE — ASSESSMENT & PLAN NOTE
Patient's (Body mass index is 50.12 kg/m².) indicates that they are morbidly/severely obese (BMI > 40 or > 35 with obesity - related health condition) with health conditions that include hypertension, diabetes mellitus, dyslipidemias, and GERD . Weight is unchanged. BMI  is above average; BMI management plan is completed. We discussed portion control, increasing exercise, and Information on healthy weight added to patient's after visit summary.

## 2024-01-23 ENCOUNTER — TELEPHONE (OUTPATIENT)
Dept: UROLOGY | Age: 69
End: 2024-01-23

## 2024-01-23 NOTE — TELEPHONE ENCOUNTER
Patient daughter contacted office with questions for provider -    She stated that PCP - Dr. Regan was supposed to send over recent blood work that she had completed. I advised that I don't show record of us receiving this blood work. She stated that the lab results show GFR is 31. Patient daughter would like to know if this concerning for Janna and if this is something to be concerned about?    They also stated that patient is still experiencing pain when she urinates and that the frequency/urge is very strong. She was given AZO and has taken that for 8 days. How long can she take this?

## 2024-01-23 NOTE — TELEPHONE ENCOUNTER
Called and spoke with the patients daughter about what MATTHEW Saldivar sent to.       Her creatinine appears to be a little elevated from her baseline when she last saw Dr. Coelho.  Unfortunately,  if her kidney function has declined secondary to one of the stones causing obstruction, we would only be able to intervene with stent placement given her stone burden.  She is scheduled to see [place] on 2/6/2023.  The only thing I could offer is additional imaging to see if there is any obstruction on her kidney from the stones.  Again, even then I do not believe I would be able to offer intervention.    Her GFR does appear to have dropped since her appointment with Dr. Coelho (49 at that time), but this could be due to several different factors.    With her kidney function being low, I would back off the Azo if at all possible.  As long as she is not having flank pain, fevers, chills, I do not believe I would recommend any additional treatment for her lower urinary tract symptoms for now.

## 2024-01-23 NOTE — PROGRESS NOTES
[facility] - PODIATRY    Today's Date: 02/02/2024     Patient Name: Jessica Kern  MRN: [id number]  CSN: [id number]  PCP: Aron Mcallister DO  Referring Provider: No ref. provider found    SUBJECTIVE     Chief Complaint   Patient presents with   • Follow-up     Aron Mcallister,  3 M[facility] FU DIABETIC. Patient states she has no complaints or concerns at this time.   • Diabetes     129mg/dL BG     HPI: Jessica Kern, a 68 y.o.female, comes to clinic as a(n) established patient presenting for diabetic foot exam and complaining of thickened, irregular toenails of both feet . Patient has h/o arthritis, asthma, CKD, DM, gout, HTN, IGTN, neuropathy . Patient is NIDDM with last stated BG level of 129mg/dl. Patient presents with complaints of long, thickened, and irregular toenails of both feet. States that she is unable and uncomfortable caring for them herself at home. Has issues with gait and mobility and uses walker in the home and wheelchair for long distances. Notes significant numbness in both feet. Denies pain. Relates previous treatment(s) including foot care by podiatry . Denies any constitutional symptoms. No other pedal complaints at this time.    Past Medical History:   Diagnosis Date   • Arthritis    • Asthma 2022    Sleep apnea   • Chronic kidney disease 2022    Kidnew stones that incurred spetic shock   • Diabetes mellitus June 2022    Possibly/probably as much as two years earlier untreated but according to labs   • Difficulty walking     Arthritis for yearss in back and kneews.  But poor balance after septic shock 2022   • Gout     Random last two years   • High arches     Is this the same as high instep?   • Hypertension 1976   • Ingrown toenail     Several times over several years   • Neuropathy in diabetes June 2023    20 years in feet due to nerve damage in lower back. Two years in fingers pointet and thumb both hands     Past Surgical History:   Procedure Laterality Date   •  ANKLE OPEN REDUCTION INTERNAL FIXATION      Right ankle   • BACK SURGERY         • BLADDER REPAIR      Lift   • CHOLECYSTECTOMY     • HYSTERECTOMY     • KIDNEY STONE SURGERY     • KIDNEY SURGERY Left 2023    Abscess drained   • TUBAL ABDOMINAL LIGATION       Family History   Problem Relation Age of Onset   • Lung cancer Mother    • Cancer Mother         Lung tumor    • Hypertension Mother    • Thyroid disease Mother    • Brain cancer Father    • Cancer Father         Brain tumor [id number]   • Thyroid disease Father    • Breast cancer Maternal Aunt    • Cancer Maternal Aunt         Breast cancer   • Diabetes Maternal Aunt    • Hypertension Maternal Aunt    • Psoriasis Maternal Aunt    • Rashes / Skin problems Maternal Aunt    • Severe sprains Maternal Aunt         Ankle   • Thyroid disease Maternal Aunt      Social History     Socioeconomic History   • Marital status:    Tobacco Use   • Smoking status: Former     Packs/day: 2.00     Years: 40.00     Additional pack years: 0.00     Total pack years: 80.00     Types: Cigarettes     Start date: 1970     Quit date: 2/10/2010     Years since quittin.9     Passive exposure: Never   • Smokeless tobacco: Never   • Tobacco comments:     Quit 12  Years ago   Vaping Use   • Vaping Use: Never used   Substance and Sexual Activity   • Alcohol use: Not Currently   • Drug use: Never   • Sexual activity: Not Currently     Partners: Male     Birth control/protection: Condom, Pill, I.U.D., Spermicide, Post-menopausal, Tubal ligation, Hysterectomy, Same-sex partner     Allergies   Allergen Reactions   • Codeine Other (See Comments)     Various reactions      Current Outpatient Medications   Medication Sig Dispense Refill   • Blood Glucose Monitoring Suppl (Blood Glucose Monitor System) w/Device kit 1 each As Needed (glucose monitoring). 1 each 0   • clindamycin (CLEOCIN T) 1 % external solution Apply 1 application  topically to the appropriate  area as directed As Needed. apply topically to affected area twice daily  Indications: Skin Ulcer     • clotrimazole-betamethasone (LOTRISONE) 1-0.05 % cream Apply 1 application  topically to the appropriate area as directed Daily. 45 g 2   • diclofenac (VOLTAREN) 50 MG EC tablet Take 1 tablet by mouth 2 (Two) Times a Day. 180 tablet 1   • Diclofenac Sodium (VOLTAREN) 1 % gel gel Apply  topically to the appropriate area as directed 4 (Four) Times a Day. (Patient taking differently: Apply 4 g topically to the appropriate area as directed 4 (Four) Times a Day. Indications: Joint Damage causing Pain and Loss of Function) 100 g 3   • docusate sodium (Colace) 100 MG capsule Take 1 capsule by mouth 3 (Three) Times a Day As Needed for Constipation. Indications: Constipation     • gabapentin (NEURONTIN) 600 MG tablet TAKE ONE (1) TABLET BY MOUTH 4 (FOUR) TIMES A  tablet 1   • hydroCHLOROthiazide (HYDRODIURIL) 12.5 MG tablet Take 1 tablet by mouth Daily. Indications: Cardiac Failure, Edema 90 tablet 1   • hydrOXYzine (ATARAX) 25 MG tablet Take 1 tablet by mouth At Night As Needed for Anxiety. 90 tablet 0   • lisinopril (PRINIVIL,ZESTRIL) 40 MG tablet Take 1 tablet by mouth Daily. 90 tablet 1   • methylPREDNISolone (MEDROL) 4 MG dose pack Take as directed on package instructions. 21 tablet 0   • metoprolol tartrate (LOPRESSOR) 100 MG tablet Take 1 tablet by mouth 2 (Two) Times a Day. 180 tablet 1   • omeprazole (priLOSEC) 40 MG capsule Take 1 capsule by mouth Daily. 90 capsule 1   • pravastatin (PRAVACHOL) 80 MG tablet Take 1 tablet by mouth Daily. Indications: High Amount of Fats in the Blood 90 tablet 1   • Semaglutide, 1 MG/DOSE, (Ozempic, 1 MG/DOSE,) 4 MG/3ML solution pen-injector Inject 1 mg under the skin into the appropriate area as directed 1 (One) Time Per Week. 9 mL 1   • traMADol (ULTRAM) 50 MG tablet Take 1 tablet by mouth Every 6 (Six) Hours As Needed for Moderate Pain. 210 tablet 1   • Vibegron 75 MG  tablet Take 1 tablet by mouth Daily.       No current facility-administered medications for this visit.     Review of Systems   Constitutional:  Negative for chills and fever.   HENT:  Negative for congestion.    Respiratory:  Negative for shortness of breath.    Cardiovascular:  Positive for leg swelling. Negative for chest pain.   Gastrointestinal:  Negative for constipation, diarrhea, nausea and vomiting.   Musculoskeletal:  Positive for gait problem.   Skin:  Negative for wound.   Neurological:  Positive for numbness.       OBJECTIVE     Vitals:    24 1133   Pulse: 70   SpO2: 96%         PHYSICAL EXAM  GEN:   Accompanied by grandson.     Foot/Ankle Exam    GENERAL  Diabetic foot exam performed    Appearance:  appears stated age and obese  Orientation:  AAOx3  Affect:  appropriate  Gait:  (unsteady)  Assistance:  wheelchair  Right shoe gear: sandal  Left shoe gear: sandal    VASCULAR     Right Foot Vascularity   Dorsalis pedis:  2+  Posterior tibial:  2+  Skin temperature:  warm  Edema grading:  Non-pitting and 1+  CFT:  3  Pedal hair growth:  Present  Varicosities:  none     Left Foot Vascularity   Dorsalis pedis:  2+  Posterior tibial:  2+  Skin temperature:  warm  Edema gradin+ and non-pitting  CFT:  3  Pedal hair growth:  Present  Varicosities:  none     NEUROLOGIC     Right Foot Neurologic   Light touch sensation: diminished  Vibratory sensation: diminished  Hot/Cold sensation: diminished  Protective Sensation using Angwin-Consuelo Monofilament:   Sites intact: 2  Sites tested: 10     Left Foot Neurologic   Light touch sensation: diminished  Vibratory sensation: diminished  Hot/Cold sensation:  diminished  Protective Sensation using Angwin-Consuelo Monofilament:   Sites intact: 1  Sites tested: 10    MUSCULOSKELETAL     Right Foot Musculoskeletal   Tenderness:  none    Arch:  Pes planus (mild)  Hallux valgus: No       Left Foot Musculoskeletal   Tenderness:  none  Arch:  Pes planus  (mild)  Hallux valgus: No      MUSCLE STRENGTH     Right Foot Muscle Strength   Foot dorsiflexion:  4+  Foot plantar flexion:  4+  Foot inversion:  4+  Foot eversion:  4+     Left Foot Muscle Strength   Foot dorsiflexion:  4+  Foot plantar flexion:  4+  Foot inversion:  4+  Foot eversion:  4+    RANGE OF MOTION     Right Foot Range of Motion   Foot and ankle ROM within normal limits       Left Foot Range of Motion   Foot and ankle ROM within normal limits      DERMATOLOGIC      Right Foot Dermatologic   Skin  Right foot skin is intact.   Nails  1.  Positive for elongated, abnormal thickness and dystrophic nail.  2.  Positive for elongated, abnormal thickness and dystrophic nail.  3.  Positive for elongated, abnormal thickness and dystrophic nail.  4.  Positive for elongated, abnormal thickness and dystrophic nail.  5.  Positive for elongated, abnormal thickness and dystrophic nail.     Left Foot Dermatologic   Skin  Left foot skin is intact.   Nails  1.  Positive for elongated, abnormal thickness and dystrophic nail.  2.  Positive for elongated, abnormal thickness and dystrophic nail.  3.  Positive for elongated, abnormal thickness and dystrophic nail.  4.  Positive for elongated, abnormally thick and dystrophic nail.  5.  Positive for abnormally thick and dystrophic nail.      RADIOLOGY/NUCLEAR:  No results found.    LABORATORY/CULTURE RESULTS:      PATHOLOGY RESULTS:       ASSESSMENT/PLAN     Diagnoses and all orders for this visit:    1. Thickened nails (Primary)    2. Controlled type 2 diabetes mellitus with diabetic polyneuropathy, without long-term current use of insulin    3. Gait abnormality    4. Encounter for diabetic foot exam        Comprehensive lower extremity examination and evaluation was performed.  Discussed findings and treatment plan including risks, benefits, and treatment options with patient in detail. Patient agreed with treatment plan.  Diabetic foot exam performed.  After verbal consent  obtained, nail(s) x10 debrided of length and thickness with nail nipper without incidence  Patient may maintain nails and calluses at home utilizing emery board or pumice stone between visits as needed  Reviewed at home diabetic foot care including daily foot checks   Continue diabetic monitoring and control under direction of PCP.   Continue use of walker and wheelchair for ambulation assistance to prevent falls.  An After Visit Summary was printed and given to the patient at discharge, including (if requested) any available informative/educational handouts regarding diagnosis, treatment, or medications. All questions were answered to patient/family satisfaction. Should symptoms fail to improve or worsen they agree to call or return to clinic or to go to the Emergency Department. Discussed the importance of following up with any needed screening tests/labs/specialist appointments and any requested follow-up recommended by me today. Importance of maintaining follow-up discussed and patient accepts that missed appointments can delay diagnosis and potentially lead to worsening of conditions.  Return in about 3 months (around 5/2/2024) for Schedule [facility], Follow-up with Podiatry APRN., or sooner if acute issues arise.    Lab Frequency Next Occurrence   Home Sleep Study Once 10/04/2022       This document has been electronically signed by WOODY Woods on February 2, 2024 12:21 CST

## 2024-02-01 ENCOUNTER — TELEPHONE (OUTPATIENT)
Dept: PODIATRY | Facility: CLINIC | Age: 69
End: 2024-02-01
Payer: MEDICARE

## 2024-02-01 NOTE — TELEPHONE ENCOUNTER
Called patient to confirmed appointment for 02/02 @ 6788 with Mary MCKAY. Patient will be here for appointment.

## 2024-02-02 ENCOUNTER — OFFICE VISIT (OUTPATIENT)
Dept: PODIATRY | Facility: CLINIC | Age: 69
End: 2024-02-02
Payer: MEDICARE

## 2024-02-02 VITALS — HEIGHT: 64 IN | OXYGEN SATURATION: 96 % | BODY MASS INDEX: 49.85 KG/M2 | WEIGHT: 292 LBS | HEART RATE: 70 BPM

## 2024-02-02 DIAGNOSIS — G57.93 NEUROPATHY INVOLVING BOTH LOWER EXTREMITIES: ICD-10-CM

## 2024-02-02 DIAGNOSIS — E11.9 ENCOUNTER FOR DIABETIC FOOT EXAM: ICD-10-CM

## 2024-02-02 DIAGNOSIS — E11.42 CONTROLLED TYPE 2 DIABETES MELLITUS WITH DIABETIC POLYNEUROPATHY, WITHOUT LONG-TERM CURRENT USE OF INSULIN: ICD-10-CM

## 2024-02-02 DIAGNOSIS — R26.9 GAIT ABNORMALITY: ICD-10-CM

## 2024-02-02 DIAGNOSIS — L60.2 THICKENED NAILS: Primary | ICD-10-CM

## 2024-02-02 DIAGNOSIS — G89.4 CHRONIC PAIN DISORDER: ICD-10-CM

## 2024-02-02 RX ORDER — GABAPENTIN 600 MG/1
TABLET ORAL
Qty: 360 TABLET | Refills: 1 | Status: SHIPPED | OUTPATIENT
Start: 2024-02-02

## 2024-02-02 NOTE — TELEPHONE ENCOUNTER
Rx Refill Note  Requested Prescriptions     Pending Prescriptions Disp Refills    gabapentin (NEURONTIN) 600 MG tablet [Pharmacy Med Name: GABAPENTIN 600MG TABLET] 360 tablet 1     Sig: TAKE ONE (1) TABLET BY MOUTH 4 (FOUR) TIMES A DAY      Last office visit with prescribing clinician: 8/18/2023   Last telemedicine visit with prescribing clinician: Visit date not found   Next office visit with prescribing clinician: 4/19/2024                         Would you like a call back once the refill request has been completed: [] Yes [] No    If the office needs to give you a call back, can they leave a voicemail: [] Yes [] No    Kris Garnica MA  02/02/24, 11:14 CST

## 2024-02-08 DIAGNOSIS — B37.2 YEAST DERMATITIS: ICD-10-CM

## 2024-02-08 DIAGNOSIS — M17.10 ARTHRITIS OF KNEE: ICD-10-CM

## 2024-02-08 DIAGNOSIS — I10 ESSENTIAL HYPERTENSION: ICD-10-CM

## 2024-02-08 DIAGNOSIS — K21.9 GASTROESOPHAGEAL REFLUX DISEASE WITHOUT ESOPHAGITIS: ICD-10-CM

## 2024-02-08 DIAGNOSIS — G89.4 CHRONIC PAIN DISORDER: ICD-10-CM

## 2024-02-08 RX ORDER — CLOTRIMAZOLE AND BETAMETHASONE DIPROPIONATE 10; .64 MG/G; MG/G
CREAM TOPICAL 2 TIMES DAILY
Qty: 60 EACH | Refills: 3 | Status: SHIPPED | OUTPATIENT
Start: 2024-02-08

## 2024-02-08 RX ORDER — OMEPRAZOLE 40 MG/1
40 CAPSULE, DELAYED RELEASE ORAL DAILY
Qty: 90 CAPSULE | Refills: 1 | Status: SHIPPED | OUTPATIENT
Start: 2024-02-08

## 2024-02-08 RX ORDER — LISINOPRIL 40 MG/1
40 TABLET ORAL DAILY
Qty: 90 TABLET | Refills: 1 | Status: SHIPPED | OUTPATIENT
Start: 2024-02-08

## 2024-02-09 ENCOUNTER — TELEPHONE (OUTPATIENT)
Dept: UROLOGY | Age: 69
End: 2024-02-09

## 2024-02-09 NOTE — TELEPHONE ENCOUNTER
2023       RE: Samara Oropeza  [address]  [place] [place] [id number]     Dear Colleague,    Thank you for referring your patient, Samara Oropeza, to the [facility] RHEUMATOLOGY CLINIC [place] at [facility]. Please see a copy of my visit note below.    Office Visit Details      Rheumatology Clinic Return Visit Patient     Samara Oropeza MRN# [id number]   YOB: 1995 Age: 28 year old     Date of Visit: 2023   Last seen: 2023  Primary care provider: Ayde [facility]          Assessment & Plan    Assessment & Plan   Samara is a 27 year old  female (ADRIAN 21) with Behçet's disease (pathergy, oral/genital ulcers, polyarthralgia) who presents today for RECHECK (Follow up with RA)  .    # Behçet's disease (pathergy, arthralgias, recurrent oral / genital ulcers)  # s/p delivery on 2023 with high-risk pregnancy, complicated by pre-eclampsia,  birth but healthy baby    10/2022:  Mrs. Oropeza arrives for follow-up of her Behçet's disease that is more active at present with recent decrease of her Otezla (60mg --> 30mg) recommended by [facility]. Previous discussion with her OB providers had been to decrease to the lowest effective dose, clearly 30mg is not effective as she describes worsened oral ulcers, genital ulcers, arthralgias, abdominal pain, and thrombophlebitis. Although there is not a great deal of evidence for Otezla safety in pregnancy, its mechanism of action would suggest it. Counterbalancing these concerns are what we understand of Behçet's in pregnancy which more often improves but in some cases (~29%) becomes worse, and can flare more often in the 1st trimester and post-lorena period.  She also describes some vision changes, which in the context of Behçet's is conerning. She has not seen Ophthalmology in many years; I will place a referral today.  Pt will be getting a procedure done at [facility] on 02/23/24.  Pt didn't know when she needed to follow up with Janna.  Please review         6/21/2023: Stable today, on otezla 30 mg bid, breastfeeding, MFM is ok with breastfeeding, discussed ACR reproductive guideline, it does not recommend otezla during pregnancy and breastfeeding given lack of data; however Samara remained on it during pregnancy and now breastfeeding as stopping it leads to severe flare of her behcet and benefits of staying on it outweighs risks. Her MFM provider is aware of staying on otezla and is ok with it during breastfeeding.    Today 9/27/23: Doing well, remains on otezla. Has had some interruptions in doses. Today has back and hand pain, and ankle swelling. Will start celebrex, ok with breastfeeding.     Plan:    No labs today    Start Celebrex     Try celebrex 1 tab of 100 mg twice a day as needed for pain    Return video visit in about 4 months    I saw and examined the patient with Dr. Sosa. I acted as scribe for Dr. Sosa.    Amy Olmstead, Medical Student on 9/27/2023 at 3:52 PM      Attending Note: I saw and examined the patient with medical student Amy. This note was written by her, who acted as scribe for me. I agree with findings and recommendations written in this note. The note reflects decisions made by me.    Dominga Sosa MD               Medical History   History of Present Illness   Samara Oropeza is a 27 year old female who presents for follow-up of her Behçet's in the context of pregnancy.      Seen Dr. Marilyn Moran Rheumatology in [facility] 10/14:    Original presentation 2014:     Ms Oropeza is a 19 y.o. female who presents with one year of presentation of painful oral ulcers (monthly) once that have worsened with two episodes in the last two months that presented with painful vulvar or vaginal ulcers (2 episodes so far every month) [not sure if they leave scar] as well that timing coincides [place] [place]   ORAL ULCERS: last two episodes were severe, painful, white base with erythematous halo, that appear and disappear in the matter  of 1-2 weeks without, does not bleed and doesn't respond to any treatment used (magic mouthwash), 10-15 in number with the biggest one being dime size.      VAGINAL ULCERS: 2-3 in number between labia majora and minora that occur simultaneously with oral ulcers, painful, non bleeding ulcers that are erythematous, no pus.      FOLLICULITIS: patient reports having spots in legs specially around ankle and knee, but arms, and neck are affected as well. Small lesions that resemble ingrown hair, most are red erythematous elevated lesions, well demarcated, some with liquid that patient refers as pimple like.      SKIN RASHES: welts and red macules with well defined borders that are pruritic and non-ulcerative on chest, arms and back. Benadryl relieves.      ARTHRALGIAS: In descending order of severity: hips, knees, wrists, shoulders, neck, lumbar, fingers.   C/o morning stiffness in hips, back and neck lasting for about until noon.      Ms. Oropeza reports they present in severe flares and never fully resolve, but do get better. She has seen some swelling and warmth on the affected joints but has not noticed and redness. Has tried Tylenol, ibuprofen, and hydrocodone with not much benefit.      FEVERS: Reports 3-4 sporadic episodes per month of self limited fevers of 38 C that occur during the evenings and associate facial flushing.      HEADACHES: occur daily and are bitemporal and irradiate occipitally, pulsatile in nature, intensity varies from intense to mild, are worsened with movement. On treatment with ibuprofen and somatriptan without any positive results.      VISION CHANGES: Patient reports that suddenly she would only see a gray blotch in her right eyes and would persist like this for a day and a half. Also reports blurriness in her left eye. Presence of pain and burning sensation of eyeball with associated redness. Has changed prescription glasses in the matter of 2 years 3 times. She has had opthalmology exam  and was not suggestive of any evidence of uveitis.   She was also evaluated in ED for a dizzy spell.      ABD PAIN W/ INTERMITTENT DIARRHEA AND CONSTIPATION: Patient reports abdominal pain that is intermittent, periods of 7 days without bowel movement and feeling bloated with change of pattern to diarrhea (liquidy without blood nor mucus, non foul smelling). Has taken Bentyl, Zegrid, and rinetidine with mild clinical improvement.  She also reports small red nodules after each needle stick for blood draw.   Neurology saw her back then and was not impressed with her presentation as physical examination was normal. Also MRI brain normal: Findings: No definite hemorrhage, mass affect, midline shift, or ventriculomegaly is noted.There are a few scattered non specific white matter T2 hyperintensities. Axial diffusion weighted images are unremarkable.     The major vascular structures appear patent. There is opacification and severe mucosal thickening in the right maxillary sinus. The remaining paranasal sinuses, and mastoid air cells are clear. Orbits are unremarkable  She has + HSV-1 IGG. Seen ID. Negative for Herpes simplex virus culture. HSV IGM I/II COMBINATION SENT TO [facility]  RESULTS = <0.91  REF RANGE: <0.91 = NEGATIVE  ID did not think HSV could explain her mouth and genital sores.      CRP within normal limits. HIV negative. CBC within normal limits; UA negative. Creatinine within normal limits   CYNDIE neg.  Antigliadin neg.   ANti TTG neg.   [facility] 2014: Colonoscopy and endoscopy neg; result not available. Not sure if biopsies were done.   Raynaud's phenomenon:  Onset: about 2013  Digits: all fingers  Digital ulcers: no  Color changes: white ---> purple and red  Duration of an attack: About couple of hours per mom  Pain during attack: not clear pain but bruise like feeling  Frequency of attacks: few times a month  Family history of Raynauds: no  GERD: very long time     No hemoptysis.   No miscarriages/ no  thrombosis in past.   No family or personal history of psoriasis, ulcerative colitis or chron's disease.   No buttock pain or low back pain or stiffness in AM     Interim history:  Dec 2014- Multiple mouth ulcers and did not eat for 5 days. This coincided with periods. Colchicine 0.6mg PO BID started in Nov 2014.   Prednisone taper in Dec 20mg to 0 in 4 weeks. She also used magic mouthwash. Kenalog cream. After going to the ER, 3 days later her ulcers were better. She thinks it improved after the menstruation stopped.   LMP 3 weeks ago.   COLCHICINE HAS HELPED A LOT REDUCING THE INTENSITY OF MENSTRUATION ASSOCIATED ORAL AND VULVAR ULCERS.      Interim history: 6/15     Since last visit only two episodes of mouth sores- small sized 6   No genital ulcers since last visit.   Colchicine 1.2 mg in AM and 0.6mg in PM keeps her symptoms under control.      Admitted in 5/15:  Admission Diagnoses:  - LUE weakness  - spell  - hx of migraines  - hx of Tourette syndrome  - hx of Behcet's disease     Discharge Diagnoses:   - spell likely 2/2 anxiety  - hx of migraines  - hx of Tourette syndrome  - hx of Behcet's disease     CT and MRI brain was normal.      Seeing Dr. Lilliana Miramontes soon as outpatient.      Dr. Garcia did not find any intraocular inflammation.       Interm 10/30/2015  ED for migraine. Continues to see neuro - MRI brain without lesions noted. Saw GI - upper barium normal. May be considering repeat colo. Worsening lesions in mouth and genitals. Has had continuous lesion in mouth x 2 months. 2 genital ulcers. Had fracture of right sesamoid in foot. Continues to have low grade fevers. New folliculitis on chest, legs and arms.      Interim hx: 1/11/2016    Pt states that since last visit she continues to have oral ulcers and has noted more folliculitis-like lesions on her lower extremities.  She states that on her right lower leg she had a red macular spot that has since resolved.  She denies uveitis.  She states  "that the colchicine initially helped but then stopped working.  She takes 0.6 mg TID.  She stopped taking her prednisone last week as she feels like this hasn't helped.  She hasn't had any episodes of vaginal ulcers.      She saw GI who stated she has gastroparesis.  She is seeing Cardiology later this week for possible syncopal episodes.       Interim history 5/16  Mouth ulcers X 1 last month  Genital ulcers - none since last visit.      Bilateral MCPs pain, knee pain and low back pain +ve increased since last visit  Morning stiffness X 2 hours (increasing)   5-6/10     \"overall myalgia\" has gotten worse    C/o pseudofolliculitis lesion on anterior shins bilaterally worse     Interim history: (7/18/2016)  Patient has just started Humira for 2 doses on 7/1 and 7/15 and reported minimal improvement of her hip pain but otherwise, did not notice anything different.      She reported painful mouth ulcer once a month since last visit but noticed that it has been getting deeper.   Denied any genital ulcers.     Still has ongoing bilateral MCPs pain, knee pain but this has been intermittent and she did not have any much pain today. She reported 2-3 hours morning stiffness of both hands and pain score of 6/10 at her knees and 8/10 of her hands but currently takes no pain medication except prn ativan which she takes when her muscle is really tight.      The only concern is that she gained weight even though she has not been eating much (eat once a day) and do exercises every day for 1 hour (with video of yoga, pilates). Her mother wonders if she needs to have some labs work up include sex hormone, thyroid, cortisol.     Interval history 9/14/16  Patient was started Humira at the last visit, patient reports worsening of skin ulcers since starting Humira and stopped taking Humura for the past 2 weeks. Patient reports oral and genital ulcers has been stable even before starting Humira and has not had any interval oral/genital " ulcers. However she reports recurrent skin ulcers occurring on a daily basis that affects her chest and anterior legs. Patient also has stopped taking prednisone, she reports that she doesn't feel the prednisone helps, her last dose of prednisone was 6+ months ago. Patient also report worsening low back pain that sometimes causes her to limp.     In the interval patient also visited ED on 8/31/16 for left hand pain and what the patient describes as phlebitis, no medication or procedure was done and the patient was discharged with a splint. Patient also reported 1 episode of chest pressure that was associated with dyspnea and numbness of the left arm, she was shopping in a [employer] at the time of onset, and the pressure resolved after she took a nap.     Patient denies any infectious symptoms in the interval, no fever, chills, night sweat, cough, dysuria, denies eye pain or visual changes.     November 4, 2016  Oct - had one genital ulcer  Oral ulcers X 5- around menstruation time.   Knee pain- chronic on the left side  Dizziness spells - on and off; been to the ER for that in the past.   On and off migraines  July 2013 - s/p MPFL reconstruction plus LRL 7/2013  Morning stiffness in knee (left) X 1 hour     Severe jaw pain and chest pain- patient wondering if this is Tourette's or esophageal spasms. Cardiac workup negative.   Fever      January 13, 2017  Yesterday in ER [facility] with orthostatic syncope from dehydration.   Chest pain on and off still continues. Painful with deep breaths.   Oral ulcers - few minor ones lasting for one week;   One major one in roof of mouth lasting for about one week.   Knee pain +ve - reviewed the recent MRI with her orthopedic surgeon.   On and off migraines  otezla is approved. Still waiting to receive the meds.      March 31, 2017  Otezla current dose 15mg PO BID.   She is tolerating okay at this dose and is willing to increase the dose further up to 30mg BID.   Her joint pains are  better on otezla. Knee pain is also better.   Back and neck pain +ve 8/10 when it is worse. Intramuscular botox injections were given on Monday in PMR.   2 minor genital ulcers in the interim- resolved.   Few oral ulcers in the interim- resolved.   Nasal ulcer - resolved.   Migraines on and off.   Nausea with otezla but improving.   Dizziness and blackouts on and off. She fell once and hurt her ankle. Wearing boot in left leg.   Complete ROS negative except for above     May 17, 2017  Tolerating otezla better. Not throwing up anymore. Current dose 20mg in AM and 30mg in PM (2nd week).   Patient thinks that her oral ulcers frequency and severity are improving with otezla. Also no genital ulcers in the interim.   Currently on doxycycline for bronchitis/?pneunomia. 4 more days left.      Joint pain history  2 weeks ago/ dx with pneumonia 1 week ago/  Involved joints: all over  Pain scale: 6.5/10   Wakes the patient from sleep : Yes  Morning stiffness: Yes for 120 minutes  Meds used:otezla,colchicine     Interim history  Since last visit:  1. Infections - Yes/ pneumonia  2. New symptoms/medical problem - Yes/ thinks she may have had a mini-seizure about 10 days ago ; did not seek medical attention; did not reoccur after that. Patient seeing PCP today.  3. Any side effects from Rheum medications -vomiting a lot (resolved)  3. ER visits/Hospitalizations/surgeries - Yes  4. Last PCP visit: has an apt. today     Patient still getting double vision. Floaters present.      Therapist recommended psychiatry evaluation. Patient does not want to see psychaitry. Patient will see PCP today.      August 22, 2017  Have you ever seen a rheumatologist Yes Who You When 5/17/17     NO genital ulcers in the interim.   Mouth ulcers- three in the back of the throat and roof of the mouth last month.      Joint pain history  Onset: doing alittle worse / cut her otezla back to 30mg  Daily due to GI problems  Involved joints: all over her body.  Been having more black out episodes, been having more chest pains.also has raised bumps on both legs from the knees down that itch and are painful   Pain scale:  5.5/10     Wakes the patient from sleep : Yes  Morning stiffness:Yes for 120 minutes  Meds used:otezla, colchicine (three pills daily)     Interim history  Since last visit:  1. Infections - Yes stomach issue  2. New symptoms/medical problem - No  3. Any side effects from Rheum medications -nausea from otezla  3. ER visits/Hospitalizations/surgeries - Yes, ER for foot, ankle injury from passing out and fell down the steps. Hit her head another time from passing out. Alcohol poisoning in July 4. Last PCP visit: 6/23/17 September 19, 2017  Have you ever seen a rheumatologist Yes Who You When 8/22/17  Joint pain history  Onset: pt states that she hurts everywhere for 6 days  Involved joints: all joints  Pain scale:  7/10     Wakes the patient from sleep : Yes/ not sleeping at all  Morning stiffness:Yes for 180 minutes  Meds used:colchicine, otezla, magic mouth wash, lidocaine gel  Last one week: increased joint pain; and genital ulcer  Genital lesion (dimed size) in the last one week  After botox a week ago, she had fever 101 for three days; near syncopes. Back pain; floaters  Chest pain , mouth sores, hand pain, morning pain were all better with otezla 30mg twice daily.     Interim history  Since last visit:  1. Infections - No  2. New symptoms/medical problem - No  3. Any side effects from Rheum medications -nausea from the otezla  3. ER visits/Hospitalizations/surgeries - No  4. Last PCP visit: may 2017      October 18, 2017     Have you ever seen a rheumatologist Yes Who You When 9/19/17  Joint pain history  NO mouth or genital sores in the last 4 weeks.   Medrol dosepak helped with visual symptoms but not joint pains.      Onset: pt states that she is doing better than last time with her behcet's. Today has severe stomach pains, states that she is  constipated. Pt had a seizure 2 days ago. Does have a metallic taste in her mouth  Involved joints: hands , neck, shoulders  Pain scale:  9/10 from stomach pain;      Wakes the patient from sleep : Yes  Morning stiffness:Yes for 120 minutes  Meds used:cochicine , otezla 30mg twice daily     Interim history  Since last visit:  1. Infections - No  2. New symptoms/medical problem - Yes/ the cartilage in her chest is inflamed  3. Any side effects from Rheum medications -nausea from the otezla  3. ER visits/Hospitalizations/surgeries - No  4. Last PCP visit: yesterday     January 16, 2018  Have you ever seen a rheumatologist Yes Who You When 10/18/17  Joint pain history  Onset: pt states that she was in the hospital for 5 days before maribell, they told her she had lesions in her brain in the white matter. Had blood work drawn in the ER and they told her her inflammatory markers were elevated. Was seen in the ER last week for vomiting and diarrhea, not eating. Saw Gastro. On 1/10/18. 1.5 weeks ago she had an endoscopy and colonoscopy. She has been having elbow  And hands and knee pain, loosing use of her hands. Been dropping things. Also states that she has been getting lesions up her nose  Involved joints: see above  Pain scale:  8/10     Wakes the patient from sleep : Yes  Morning stiffness:Yes for 120 minutes  Meds used:colchisine, otezla, magic mouth wash, kenolog cream, lidocaine gel     Interim history  Since last visit:  1. Infections - Yes/ had an infection in her jaw. Having sinus issues  2. New symptoms/medical problem - Yes/ states that she is having problems walking, states that she was bouncing when she was walking  3. Any side effects from Rheum medications -otezla, nausea  3. ER visits/Hospitalizations/surgeries - Yes/ see chart  4. Last PCP visit: November 2017 April 17, 2018  Have you ever seen a rheumatologist Yes Who You When 1/16/18  Joint pain history  Onset: pt states that she is not doing  well. She is having increased stomach issues, only eats 2 times in 4 days unable to keep the otezla down due to vomiting . Has sleep study done in 1 week; no genital ulcers since last appointment. 6 small mouth sores since last appointment.   Involved joints: see above   Pain scale:  7/10     Wakes the patient from sleep : Yes  Morning stiffness:Yes for 60 minutes  Meds used:otezla , colchicine, diclofenac gel, magic mouthwash, lidocaine gel      Interim history  Since last visit:  1. Infections - Yes/ had a sinus infection, thinks she is getting sick now  2. New symptoms/medical problem - Yes/ neurology sent pt to cardiology. Has a heart condition but not sure what . She is seeing a ortho. Due to knee pain   3. Any side effects from Rheum medications -nausea/vomiting from the otezla   3. ER visits/Hospitalizations/surgeries - Yes/ seen in ER   4. Last PCP visit: yesterday     July 17, 2018  Have you ever seen a rheumatologist Yes Who You When 4/17/18  Joint pain history  Onset: pt Is here for a follow-up states that she started to get lesions on her legs , face and arm. Hurts all over, lower back is very painful. Right hand and wrist area are numb  Involved joints: see above  Pain scale:  7/10   worse in the morning  Wakes the patient from sleep : Yes/ does not go to sleep till late  Morning stiffness:Yes for 4-5 hours minutes  Meds used:colchicine, otezla has  Not started otezla yet due to extreme nausea      Interim history  Since last visit:  1. Infections - Yes/ had a bacterial infection in her pelvic area was hospitalized  2. New symptoms/medical problem - Yes/ hands are swelling up. Having orthopedic issues. Was having problem with her veins sticking up, and very painful. Has happened multiply times   3. Any side effects from Rheum medications -see above  3. ER visits/Hospitalizations/surgeries - Yes/ hospital and ER for bacterial infection  4. Last PCP visit: 4/24/18 January 9, 2019  Have you ever seen a  rheumatologist yes Who you When 7/17/18  Joint pain history  Onset: Patient is here for a follow up on Behcet's disease, and fibromyalgia. ER said may have blood clot in calf, but when did MRI, stated body may have broken it up on it's own. Elevated D-dimer  Involved joints: Knees, neck, hands  Pain scale:  6.5/10     Wakes the patient from sleep : Yes  Morning stiffness:Yes for 180 minutes  Meds used:hyoscyamine, not taking otezla. Last dose Sep. Patient did not want to take otezla with the antibiotics.      Last major mouth ulcer- aug or Sep  No genital ulcers in the last 6 months.      Interim history  Since last visit:  1. Infections - Yes, UTI's twice a month since last visit  2. New symptoms/medical problem - No  3. Any side effects from Rheum medications -otzela causes nausea  3. ER visits/Hospitalizations/surgeries - Yes, 1/2/19 repaired some ligaments and mass taken out, also joint build up removed from a previous injury  4. Last PCP visit: 12/5/19 June 12, 2019  Have you ever seen a rheumatologist yes Who you When 1/9/19  Joint pain history  Onset: Patient is here for a follow up. A lot of infections and in and out of the hospital, who wanted her to follow up with Rheumatology again.  Involved joints: knees, legs, back, neck, shoulders, ankles  Pain scale:  6.5/10     Wakes the patient from sleep : Yes  Morning stiffness:Yes for 120 minutes  Meds used:magic mouthwash, colchicine     Interim history  Since last visit:  1. Infections - Yes, staph  2. New symptoms/medical problem - kidney failure  3. Any side effects from Rheum medications -none  3. ER visits/Hospitalizations/surgeries - Yes, 3 hospitalizations, and surgeries,  4. Last PCP visit: 6/11/19        Today 9/30/2020: New to me, establishing care. Flaring off otezla.     816   Reports worsening oral ulcers and joint pain and skin rash.     No vaginal ulcers currently. Last ones were 5 months ago.     Gets oral ulcers monthly, not today, had them  last few days ago. They come up as flare up around period time. They self-resolve.     Thinks colcrys is helping but not enough, lost some benefit. No longer has diarrhea from it. the dose is 0.6 mg bid.     Came off otezla last year when she had severe staff infection, there was drug drug interaction with vancomycin. Wants to go back on it.     Skin lesions over chest are clearing up. Has skin lesions over her legs.     She is off of otezla >1 yr. She had daily vomiting and abdominal cramps initially which later resolved after 2 months.      Today, is her last day liz her health insurnaace  .     Reports pain and swelling liz hands. Drops things, lost strenghth. Veins look inflamed. Hands are swollen in AM and before bedtime. AM stiffness is 2 hours. can t tolerate ibuprofen.     Prednnisonne does not help much.     Wants to try eleve.     Had 2 blood clots over L arm, finished xraalto 4 months ago.      Was told to have severe dry eyes, hs not had eyes checked> 1 yr as was in the hospital with staff infection. systanee nd gentle eyedrops help some, sometimes gets sharp pain and and blurry vision in her eyes from dryness. No h/o uveitis.     has dry mouth, drinks water.     Gets T  F sometimes. It is sporadic.     Lost hair.     Gets intermittent CP. Had several hospital visits and admissions in the [facility] for it. lorazepam helped witch chest spasms, she does not like pain meds.     She was prescribed 0.5 mgq d prn, 10 tbs/monthss.     She gets dry cough, just started using albuterol inhaler, it helps.     No SOB.     Has gastroparesis, IBS  nd h/o severas admission for constiption, colon blockage with impaction and gets bloating. has lost follow up with GI.     She is working with  to get medical assistance to get insurance back by early next year. She filed it again.     Gets botox inj every 3 months nd they help, has to cancel 10/20 botox inj s will not have insurance. they came back yesterday.      last seizure waas last year. still gets black out spells, salts ne was last week.    derm eye gi     FHx: Both parents have RA.  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 sexually active, not on oral contraceptives. She thinks she had a misccraaaiaage last wk, had n period x2 months then mueller bleeding a lot, dark red and was different from period. Felt something came out that she feels she miscarried, no pos pregnancy test. No smoking. rarely drinks ETOH.  [facility]   thumbs between MCP tender   otezla helped with skin lesions, oral ulcers, joint pain.  colcrys  sjogre aleve biotene voltaren 858        8/11/2022: Samara presents for urgent visit to discuss m/o Behcet's flare during pregnancy, she is   She is pregnant 10 wk 3 days with ADRIAN 3/6/2023.  In 2020, had 1st trimester miscarriage.  Has LBP, ankle pain/swelling.   When she found out to be pregnant, stopped otezla and colcrys but started to flare with Behcet's. Discussed with MFM, back on low dose otezla since last week, only 1 tab a day.    Interval History  10/28/2022  Mrs. Oropeza was last seen 08/11/22. At that time she was advised to continue otezla (1 tablet BID) and remain off colcrys. Since that time, she has instead taken otezla 1 tablet daily. She notes more joint pain in hands, wrists, cervical spine, knees with 4 hours of monring stiffness. Previously <1hr with full strength.     Had a few genital ulcers recently which only lasted a few days. Oral ulcers have been more active of late and are currently healing.     Abdominal pain on L side, pain is always worse after eating. Out of the phase of her pregnancy with morning sickness, at 22w on Monday.     She describes a few fevers at the end of September with hot flashes and cold sweats with Tmax 100. Called OB-GYN who instructed she should go in if persistent, though it resolved within a few days.     More easy bruising - thighs, she is  currently on lovenox and baby aspirin. Previous history of clots especially with interventions. She recently had an issue with superficial thrombophlebitis following a blood draw.     Seeing more floaters in her vision. Sometimes tiny and clear, but can also be larger and 'gray', up to 1/4 of her vision. Has not seen Ophthalmology in many years.     More frequent migraines - did a nerve block recently but this has worn off, previous botox injections.     ADRIAN 03/06/23.    1/25/23:    Baby is growing small. Had high BP yesterday, they would monitor it. DBP was 90.    Increasing otezla to bid helped. It helped with ulcers. Has skin breakdown since Christmas, never had it like this over her chest, but still it feels related to Behcet.    Hands and ankles are painful and swoleln, ankles are new but had hand pain with behcet before,. This started fater entering [address] [place] [address]. Her ADRIAN might be earlier based on baby's growth, induction at 37 wk, progress at 39. nex twk will have another check.    Still has the neck pain. Still gets eye floaters.    6/21/2023:    She delivered 1 month early due to pre-eclampsia. Had Mg infusion, had bleeding issues, spent a week. Was on BP med till a month ago, now stable. Was induced, had NVD. No blood transfusions needed.    Her baby girl Lashanda was born 2/12/2023, small, slow growth but healthy with no fetal deformities. Had vaginal sores after giving birth. Still gets mouth ulcers, one oral ulcer now, no vaginal ulcers now. Plans to breast feed x 7 months. Does breastfeeding. Saw OB/[facility]N in 4/2023 [facility]. They are aware she is on otezla.    She was on ASA 81 mg every day during pregnancy.    Today 9/27/23: Doing well, less behcet flares since giving birth. Most recent flare on her chest, now healing. No new oral or vaginal ulcers.     She has been having back pain that shoots down her right leg since June and has been utilizing PT without improvement.     She also has had  swelling and pain in both hands and right wrist, as well as both ankles L>R.     She notes missing some doses of otezla due to running out of refills and avoiding taking it without food. She has been eating less because she has been more tired since having her daughter.        Review of Systems    A comprehensive ROS was done. Positives are per HPI.    Past Medical History   Past Medical History:   Diagnosis Date    Anemia     Anxiety     Arthritis     Behcet's disease (H)     Cervical adenitis May 2010    Chronic abdominal pain     Constipation, chronic 1994    Fibromyalgia     Gastro-oesophageal reflux disease     Gastroparesis     Irregular heart beat     tachycardia, has had workup    Migraines     Neuromuscular disorder (H)     fibramyalgia    Palpitations     PONV (postoperative nausea and vomiting)     Seizure (H)     Seizures (H)     unknown etiology    Syncope     Tourette's       Past Surgical History:   Procedure Laterality Date    ARTHROSCOPY ANKLE, OPEN REPAIR LIGAMENT, COMBINED Left 9/25/2019    Procedure: LEFT ANKLE ARTHROSCOPY WITH LIGAMENT REPAIR;  Surgeon: Andres Johnson DPM;  Location:  OR    ARTHROSCOPY ANKLE, REPAIR LIGAMENT Left 1/2/2019    Procedure: Ankle arthroscopy and sinus tarsi evacuation, ligament repair, left lower extremity;  Surgeon: Andres Johnson DPM;  Location:  OR    ARTHROSCOPY KNEE WITH PATELLAR REALIGNMENT  7/25/2013    Procedure: ARTHROSCOPY KNEE WITH PATELLAR REALIGNMENT;  Left Knee Arthroscopy, Medial Patellofemoral Ligament Reconstruction with Allograft  ;  Surgeon: Jennifer Acevedo MD;  Location: [facility]    COLONOSCOPY  2015    DENTAL SURGERY  1996    Teeth removal    ENDOSCOPY UPPER, COLONOSCOPY, COMBINED  2005    HC ESOPH/GAS REFLUX TEST W NASAL IMPED >1 HR N/A 2/15/2017    Procedure: ESOPHAGEAL IMPEDENCE FUNCTION TEST WITH 24 HOUR PH GREATER THAN 1 HOUR;  Surgeon: Timothy Matta MD;  Location: [facility]    IR PICC PLACEMENT > 5 YRS OF AGE   3/13/2019    IR UPPER EXTREMITY VENOGRAM LEFT  2/25/2020    IRRIGATION AND DEBRIDEMENT FOOT, COMBINED Left 3/12/2019    Procedure: COMBINED IRRIGATION AND DEBRIDEMENT LEFT ANKLE;  Surgeon: Micha Glover MD;  Location: [facility]    IRRIGATION AND DEBRIDEMENT LOWER EXTREMITY, COMBINED Left 5/7/2019    Procedure: 1.  Excision of wound down to and including deep fascia, less than 20 cm2.  2.  Irrigation and debridement, left ankle.;  Surgeon: Andres Johnson DPM;  Location: [facility] OR    PICC INSERTION Left 05/05/2019    4Fr - 45cm (5cm external), Basilic vein, SVC RA junction      Patient Active Problem List    Diagnosis Date Noted    Preeclampsia 02/10/2023     Priority: Medium    Infection of joint of ankle (H) 05/06/2019     Priority: Medium    Cellulitis 03/09/2019     Priority: Medium    Displacement of lumbar intervertebral disc without myelopathy 11/13/2018     Priority: Medium     Overview:   Created by Conversion      Iron deficiency associated with nonfamilial restless legs syndrome 11/13/2018     Priority: Medium    Enthesopathy of hip region 11/13/2018     Priority: Medium     Overview:   Created by Conversion      Tourette's syndrome 11/13/2018     Priority: Medium     Overview:   Created by Conversion      Somatic symptom disorder 06/01/2018     Priority: Medium    Pelvic floor weakness 04/25/2018     Priority: Medium    Spells of decreased attentiveness 12/19/2017     Priority: Medium    Mobile cecum 11/09/2017     Priority: Medium     Cecum noted in Right lower quadrant on 4/17 CT scan, and in Left upper Quadrant on CT on 11/2017.      Vitamin D deficiency 10/11/2017     Priority: Medium     How low, unknown/not found. On D when tested at 27. Starting cholecalciferol October 2017. Needs recheck.      Convulsions, unspecified convulsion type (H) 10/03/2017     Priority: Medium    Transient alteration of awareness 10/03/2017     Priority: Medium    Chronic pain syndrome 07/27/2017     Priority:  Medium    Major depressive disorder, recurrent episode, moderate (H) 06/27/2017     Priority: Medium    Cervical pain 05/02/2017     Priority: Medium    Acute left ankle pain 03/31/2017     Priority: Medium    Cervical dystonia 03/28/2017     Priority: Medium    PTSD (post-traumatic stress disorder) 01/17/2017     Priority: Medium    Patellofemoral instability 10/20/2016     Priority: Medium    Fibromyalgia 08/04/2016     Priority: Medium    Rheumatoid arthritis of multiple sites without rheumatoid factor (H) 08/04/2016     Priority: Medium    Raynaud's disease without gangrene 08/04/2016     Priority: Medium    Chronic abdominal pain 08/04/2016     Priority: Medium    Palpitations 01/12/2016     Priority: Medium    On colchicine therapy 10/30/2015     Priority: Medium    Spell of shaking 05/06/2015     Priority: Medium    Migraine 02/04/2015     Priority: Medium    Behcet's disease (H) 12/10/2014     Priority: Medium    Headaches due to old head injury 11/12/2013     Priority: Medium    Milk protein intolerance 10/11/2013     Priority: Medium    Intestinal malabsorption 10/11/2013     Priority: Medium    Concussion 02/13/2013     Priority: Medium     Jan 2013, with prolonged recovery- followed by sports med        Knee pain 01/03/2013     Priority: Medium    Generalized anxiety disorder 06/25/2009     Priority: Medium    Tics - Tourette syndrome 05/18/2009     Priority: Medium     Followed by psychotherapy. Symptoms well managed. Originally diagnosed at [facility]. (Dr. Simpson)          IBS (irritable bowel syndrome) 05/18/2009     Priority: Medium    Allergic rhinitis 05/18/2009     Priority: Medium    GERD (gastroesophageal reflux disease) 01/10/2008     Priority: Medium    Gastroparesis 1994     Priority: Medium      Family History   Problem Relation Age of Onset    Depression Mother     Neurologic Disorder Mother         Migraines, take imitrex injection.  Also in maternal grandmother.       Alcohol/Drug Father     Hypertension Father     Depression Father     Osteoarthritis Father     Cardiovascular Maternal Grandmother     Depression Maternal Grandmother     Hypertension Maternal Grandmother     Alzheimer Disease Maternal Grandmother     Cardiovascular Maternal Grandfather     Hypertension Maternal Grandfather     Depression Maternal Grandfather     Alcohol/Drug Maternal Grandfather     Diabetes Maternal Grandfather     Cardiovascular Paternal Grandmother     Hypertension Paternal Grandmother     Cardiovascular Paternal Grandfather     Hypertension Paternal Grandfather     Glaucoma No family hx of     Macular Degeneration No family hx of       Allergies   Allergen Reactions    Amoxil [Penicillins] Rash     Dad unsure of reaction.    Bee Venom Anaphylaxis    Bioflavonoids Anaphylaxis    Citrus Anaphylaxis     All Apache    Contrast Dye Rash     Contrast Media Ready-Box [facility], 04/09/2014.; Contrast Media Ready-Box [facility], 04/09/2014.  NOTE: this is a contrast media oral with iodine. Premedicate with methylpred standard for IV contrast, request barium contrast for oral contrast.    Iodinated Contrast Media Hives and Rash     Contrast Media Ready-Box [facility], 04/09/2014.; Contrast Media Ready-Box [facility], 04/09/2014.  NOTE: this is a contrast media oral with iodine. Premedicate with methylpred standard for IV contrast, request barium contrast for oral contrast.    Pineapple Anaphylaxis, Difficulty breathing and Rash    Reglan [Metoclopramide] Other (See Comments)     IV dose only, in ER, rapid heart rate.    Ace Inhibitors      Difficulty in breathing and GI upset    Amitiza [Lubiprostone] Nausea and Vomiting    Amoxicillin-Pot Clavulanate     Midazolam Unknown     parent states that when pt takes this medication, she wakes up being very violent .    Midazolam Hcl      Coming out of pelvic exam at age of 5, was kicking and screaming when coming out of the versed.    Other [No Clinical Screening - See Comments]       Bleech/ chest tightness, itchy throat, swollen tongue, hives    Tizanidine Other (See Comments)     Confusion, back pain, photophobia, abdominal pain, shaking, anxious      Adhesive Tape Rash    Azithromycin Hives and Rash    Cephalexin Itching and Rash    Sulfa Antibiotics Rash     Skin scarring      Current Outpatient Medications   Medication Sig Dispense Refill    albuterol (PROAIR HFA/PROVENTIL HFA/VENTOLIN HFA) 108 (90 Base) MCG/ACT inhaler Inhale 2 puffs into the lungs every 6 hours as needed for shortness of breath / dyspnea or wheezing 1 Inhaler 1    amitriptyline (ELAVIL) 25 MG tablet TAKE 1 TABLET BY MOUTH EVERY NIGHT AT BEDTIME 90 tablet 1    apremilast (OTEZLA) 30 MG tablet Take 1 tablet (30 mg) by mouth 2 times daily Hold for signs of infection, and seek medical attention. 60 tablet 5    artificial tears OINT ophthalmic ointment 0.5 inch strip each eye at night 1 Tube 11    azelaic acid (FINACIA) 15 % external gel Once daily to scars, upper chest and small portion of back and spot on belly button. Hold if irritating 50 g 5    benzocaine (AMERICAINE) 20 % external aerosol Apply to perineum four times daily as needed for pain 57 g 3    benzocaine (TOPICALE XTRA) 20 % GEL Apply as needed locally to mouth or nasal ulcers for pain; 4 times daily as needed 30 g 1    betamethasone valerate (VALISONE) 0.1 % cream Apply topically 2 times daily as needed       bisacodyl (DULCOLAX) 5 MG EC tablet Take 2 tablets (10 mg) by mouth daily as needed for constipation 30 tablet 0    celecoxib (CELEBREX) 100 MG capsule Take 1 capsule (100 mg) by mouth 2 times daily as needed for pain 60 capsule 1    citalopram (CELEXA) 20 MG tablet Take 1 tablet (20 mg) by mouth daily 90 tablet 1    EPINEPHrine (EPIPEN 2-EAMON) 0.3 MG/0.3ML injection Inject 0.3 mLs (0.3 mg) into the muscle as needed for anaphylaxis 0.6 mL 3    fluocinonide (LIDEX) 0.05 % external gel Apply topically 2 times daily 60 g 11    gabapentin (NEURONTIN) 300 MG  "capsule Take 1 capsule (300 mg) by mouth every morning 60 capsule 1    hydrocortisone, Perianal, (ANUSOL-HC) 2.5 % cream Place rectally 3 times daily as needed for hemorrhoids 30 g 0    hypromellose (GENTEAL) 0.3 % SOLN 1 drop every hour as needed for dry eyes       lanolin ointment Apply topically every hour as needed for other (sore nipples) 7 g 3    lidocaine (LMX4) 4 % external cream Apply topically once as needed for mild pain 120 g 1    LINZESS 290 MCG capsule TAKE 1 CAPSULE BY MOUTH EVERY DAY IN THE MORNING BEFORE BREAKFAST 90 capsule 0    mometasone (ELOCON) 0.1 % external ointment Apply topically 2 times daily 45 g 11    polyethylene glycol (MIRALAX/GLYCOLAX) powder Take 1 capful by mouth 3 times daily      Prenatal MV-Min-Fe Fum-FA-DHA (PRENATAL 1 PO)       sodium fluoride 1.1 % CREA Apply 1 Application topically daily      lactulose 20 GM/30ML SOLN Take 30 mLs by mouth 3 times daily as needed (for constipation) (Patient not taking: Reported on 5/12/2023) 300 mL 3    sucralfate (CARAFATE) 1 GM/10ML suspension Take 10 mLs (1 g) by mouth 4 times daily (Patient not taking: Reported on 5/12/2023) 1200 mL 2    triamcinolone (KENALOG) 0.1 % external ointment Apply twice daily as needed to lesions on the genitals and body. OK to use very sparingly on the face. (Patient not taking: Reported on 9/27/2023) 454 g 2           Physical Exam   /71   Pulse 116   Ht 1.6 m (5' 3\")   Wt 69.5 kg (153 lb 4.8 oz)   BMI 27.16 kg/m    GA: NAD, pleasant  HEENT: nl sclera/conj, no oral ulcers appreciated  Cardio: RRR, no M/R/G  Pulm: Lungs cl to auscultation bilaterally  Abdomen: soft, NT  MSK: mild bilateral hand and digit swelling, discomfort w pressure. Bilateral ankle swelling L>R  Skin: no rash  Neuro: non focal  Psych: non focal      There is currently no information documented on the homunculus. Go to the Rheumatology activity and complete the homunculus joint exam.  Joint Exam 09/27/2023     No joint exam has " been documented for this visit          Data   Data      10/28/2022   BEAUCHAMP-28 (ESR) --   BEAUCHAMP-28 (CRP) --   Tender (BEAUCHAMP-28) 2 / 28    Swollen (BEAUCHAMP-28) 0 / 28    Provider Global --   Patient Global --   ESR 17 mm/hr   CRP --     No results found for any visits on 09/27/23.  CBC RESULTS: Recent Labs   Lab Test 09/07/20  1147   WBC 4.1   RBC 5.09   HGB 13.0   HCT 43.0   MCV 85   MCH 25.5*   MCHC 30.2*   RDW 15.0        TSH   Date Value Ref Range Status   09/07/2020 0.77 0.40 - 4.00 mU/L Final   03/21/2019 0.53 0.40 - 4.00 mU/L Final   10/30/2018 1.06 0.40 - 4.00 mU/L Final   11/26/2016 0.87 0.40 - 4.00 mU/L Final     T4 Free   Date Value Ref Range Status   01/31/2007 1.26 0.70 - 1.85 ng/dL Final     Rheumatoid Factor   Date Value Ref Range Status   09/30/2020 <7 <12 IU/mL Final   05/06/2016 <20 <20 IU/mL Final       Reviewed Rheumatology lab flowsheet      Dominga Sosa MD

## 2024-02-09 NOTE — TELEPHONE ENCOUNTER
Called patient back today about the follow up. I had to leave a voicemail, but stated on there that MATTHEW Saldivar said for her to follow up with Michael, but to keep in contact with us appt any other appts there.

## 2024-02-16 ENCOUNTER — HOSPITAL ENCOUNTER (OUTPATIENT)
Dept: CT IMAGING | Age: 69
Discharge: HOME | End: 2024-02-16
Payer: MEDICARE

## 2024-02-16 DIAGNOSIS — N20.0 NEPHROLITHIASIS: ICD-10-CM

## 2024-02-16 DIAGNOSIS — N15.1 RENAL ABSCESS: ICD-10-CM

## 2024-02-16 PROCEDURE — 74150 CT ABDOMEN W/O CONTRAST: CPT

## 2024-02-19 DIAGNOSIS — G89.4 CHRONIC PAIN DISORDER: ICD-10-CM

## 2024-02-19 RX ORDER — TRAMADOL HYDROCHLORIDE 50 MG/1
50 TABLET ORAL EVERY 6 HOURS PRN
Qty: 210 TABLET | Refills: 1 | Status: SHIPPED | OUTPATIENT
Start: 2024-02-19 | End: 2024-02-19 | Stop reason: SDUPTHER

## 2024-02-19 RX ORDER — TRAMADOL HYDROCHLORIDE 50 MG/1
50 TABLET ORAL EVERY 6 HOURS PRN
Qty: 210 TABLET | Refills: 1 | Status: SHIPPED | OUTPATIENT
Start: 2024-02-19

## 2024-02-19 NOTE — TELEPHONE ENCOUNTER
Rx Refill Note  Requested Prescriptions     Pending Prescriptions Disp Refills    traMADol (ULTRAM) 50 MG tablet [Pharmacy Med Name: TRAMADOL HYDROCHLORIDE 50MG TABLET] 210 tablet 1     Sig: TAKE ONE (1) TABLET BY MOUTH EVERY SIX (6) (SIX) HOURS AS NEEDED FOR MODERATE PAIN.      Last office visit with prescribing clinician: 8/18/2023   Last telemedicine visit with prescribing clinician: Visit date not found   Next office visit with prescribing clinician: 4/19/2024                         Would you like a call back once the refill request has been completed: [] Yes [] No    If the office needs to give you a call back, can they leave a voicemail: [] Yes [] No    Kris Garnica MA  02/19/24, 11:05 CST

## 2024-02-19 NOTE — TELEPHONE ENCOUNTER
Caller: Jessica Kern    Relationship: Self    Best call back number: [phone]     Requested Prescriptions:   Requested Prescriptions     Pending Prescriptions Disp Refills    traMADol (ULTRAM) 50 MG tablet 210 tablet 1     Sig: Take 1 tablet by mouth Every 6 (Six) Hours As Needed for Moderate Pain.        Pharmacy where request should be sent: [facility] [address] - [phone] [facility] [phone] FX     Last office visit with prescribing clinician: 8/18/2023   Last telemedicine visit with prescribing clinician: Visit date not found   Next office visit with prescribing clinician: 4/19/2024     Additional details provided by patient: PATIENT STATES HER MEDICATION ORDER WAS PLACED WRONG, SHE HAS ALWAYS TAKEN MEDICATION  TABLETS FOR 1-2 EVERY 6 HOURS AS NEEDED, ORDER PLACE INCORRECT ON LAST REFILL. PATIENT IS NEEDING MEDICATION REFILL FIX.     Does the patient have less than a 3 day supply:  [x] Yes  [] No    Would you like a call back once the refill request has been completed: [] Yes [x] No    If the office needs to give you a call back, can they leave a voicemail: [] Yes [x] No    Korina Raygoza Rep   02/19/24 15:30 CST

## 2024-03-05 RX ORDER — GLUCOSAM/CHON-MSM1/C/MANG/BOSW 500-416.6
1 TABLET ORAL 2 TIMES DAILY
Qty: 210 EACH | Refills: 5 | Status: SHIPPED | OUTPATIENT
Start: 2024-03-05

## 2024-03-05 RX ORDER — CALCIUM CITRATE/VITAMIN D3 200MG-6.25
TABLET ORAL
Qty: 200 EACH | Refills: 12 | Status: SHIPPED | OUTPATIENT
Start: 2024-03-05

## 2024-03-07 ENCOUNTER — TELEPHONE (OUTPATIENT)
Dept: INTERNAL MEDICINE | Facility: CLINIC | Age: 69
End: 2024-03-07
Payer: MEDICARE

## 2024-03-07 DIAGNOSIS — G89.4 CHRONIC PAIN DISORDER: ICD-10-CM

## 2024-03-07 RX ORDER — TRAMADOL HYDROCHLORIDE 50 MG/1
TABLET ORAL
Qty: 210 TABLET | Refills: 1 | Status: SHIPPED | OUTPATIENT
Start: 2024-03-07

## 2024-03-07 NOTE — TELEPHONE ENCOUNTER
PATIENT HAS CALLED SHE STATED THAT HER TRAMADOL HAS ALWAYS SAID TAKE 1 TO 2 TABLETS EVERY 6 HOURS AS NEEDED FOR PAIN.     SHE STATED THAT SHE THE PHARMACY GAVE  ON THE QUANTITY .   I DID CALL [facility] AND IT WAS EXPLAINED FROM THE PHARMACY THAT PATIENTS INSURANCE WOULD ONLY PAY FOR 1 MONTH OF TRAMADOL.   PATIENT HAS BEEN CALLED AND DID NOT UNDERSTAND, SHE WILL CALL HER PHARMACY OR INSURANCE TO GET BETTER UNDERSTANDING      CALLED [facility] BACK AND THEY STATED THAT IT WAS THE WAY THE SCRIPT FOR TRAMADOL WAS WRITTEN AND THAT IS WHY THE INSURANCE ONLY PAID FOR PART.   SHE HAS NORMALLY GOTTEN 210 BUT THE PHARMACIST STATED THAT SCRIPT HAS NORMALLY STATED 1 TO 2 TABLETS  EVERY 6 HOURS AS NEEDED.

## 2024-03-11 ENCOUNTER — TELEPHONE (OUTPATIENT)
Dept: INTERNAL MEDICINE | Facility: CLINIC | Age: 69
End: 2024-03-11
Payer: MEDICARE

## 2024-03-11 ENCOUNTER — TELEPHONE (OUTPATIENT)
Dept: UROLOGY | Age: 69
End: 2024-03-11

## 2024-03-11 NOTE — TELEPHONE ENCOUNTER
Santa had procedure at [facility] about 3 weeks ago to break up and remove Kidney stones. Per patient's daughter, they are having trouble with urgency/frequency and pain since surgery. She stated that Janna told her to check back in if there were any issues. She does have another procedure in the next few weeks (4/5/) at [facility]. Blood sugar has been running high and they are concerned. They did advise that they have contacted nurse line a [place] but haven't gotten a response.    Please advise on if patient needs appointment scheduled.

## 2024-03-11 NOTE — TELEPHONE ENCOUNTER
Daughter is calling to let us know of some blood sugar issues that patient is having. It has been running in the 170's with a high of 254 on Friday. This has been going on since her surgery 3 weeks ago. Can we call to discuss this with her.  [phone] is [facility] number

## 2024-03-11 NOTE — TELEPHONE ENCOUNTER
Patient has been contacted.  She has scheduled an appointment to see Pat on Friday.  She said she is only taking the Ozempic right now.  Metformin made her sick.  She said we discussed increasing Ozempic to 2 mg after she is out of the 1 mg injections and she will be out of those in the next couple of weeks.

## 2024-03-11 NOTE — TELEPHONE ENCOUNTER
I called the patient and explained everything Janna stated. I explained we would be more than happy to do any testing they need if they are okay with this. She sated she reached out to them and they are handling it from here, but that she would call back if plans changed.

## 2024-03-15 ENCOUNTER — OFFICE VISIT (OUTPATIENT)
Dept: WOUND CARE | Facility: HOSPITAL | Age: 69
End: 2024-03-15
Payer: MEDICARE

## 2024-03-15 ENCOUNTER — LAB (OUTPATIENT)
Dept: LAB | Facility: HOSPITAL | Age: 69
End: 2024-03-15
Payer: MEDICARE

## 2024-03-15 ENCOUNTER — TRANSCRIBE ORDERS (OUTPATIENT)
Dept: ADMINISTRATIVE | Facility: HOSPITAL | Age: 69
End: 2024-03-15
Payer: MEDICARE

## 2024-03-15 ENCOUNTER — OFFICE VISIT (OUTPATIENT)
Dept: INTERNAL MEDICINE | Facility: CLINIC | Age: 69
End: 2024-03-15
Payer: MEDICARE

## 2024-03-15 VITALS
OXYGEN SATURATION: 95 % | DIASTOLIC BLOOD PRESSURE: 70 MMHG | HEIGHT: 64 IN | HEART RATE: 110 BPM | BODY MASS INDEX: 48.83 KG/M2 | WEIGHT: 286 LBS | TEMPERATURE: 97.7 F | SYSTOLIC BLOOD PRESSURE: 110 MMHG | RESPIRATION RATE: 16 BRPM

## 2024-03-15 DIAGNOSIS — N17.9 AKI (ACUTE KIDNEY INJURY): ICD-10-CM

## 2024-03-15 DIAGNOSIS — N18.4 STAGE 4 CHRONIC KIDNEY DISEASE: ICD-10-CM

## 2024-03-15 DIAGNOSIS — N20.0 NEPHROLITHIASIS: Primary | ICD-10-CM

## 2024-03-15 DIAGNOSIS — E78.2 MIXED HYPERLIPIDEMIA: ICD-10-CM

## 2024-03-15 DIAGNOSIS — E11.65 TYPE 2 DIABETES MELLITUS WITH HYPERGLYCEMIA, WITHOUT LONG-TERM CURRENT USE OF INSULIN: Primary | ICD-10-CM

## 2024-03-15 DIAGNOSIS — N20.0 NEPHROLITHIASIS: ICD-10-CM

## 2024-03-15 DIAGNOSIS — I10 ESSENTIAL HYPERTENSION: ICD-10-CM

## 2024-03-15 DIAGNOSIS — R53.83 OTHER FATIGUE: ICD-10-CM

## 2024-03-15 DIAGNOSIS — E11.65 TYPE 2 DIABETES MELLITUS WITH HYPERGLYCEMIA, WITHOUT LONG-TERM CURRENT USE OF INSULIN: ICD-10-CM

## 2024-03-15 DIAGNOSIS — R94.4 DECREASED GFR: ICD-10-CM

## 2024-03-15 LAB
ALBUMIN SERPL-MCNC: 3.8 G/DL (ref 3.5–5.2)
ALBUMIN/GLOB SERPL: 1 G/DL
ALP SERPL-CCNC: 116 U/L (ref 39–117)
ALT SERPL W P-5'-P-CCNC: 8 U/L (ref 1–33)
ANION GAP SERPL CALCULATED.3IONS-SCNC: 12 MMOL/L (ref 5–15)
AST SERPL-CCNC: 10 U/L (ref 1–32)
BILIRUB SERPL-MCNC: 0.2 MG/DL (ref 0–1.2)
BUN SERPL-MCNC: 57 MG/DL (ref 8–23)
BUN/CREAT SERPL: 26.4 (ref 7–25)
CALCIUM SPEC-SCNC: 9.1 MG/DL (ref 8.6–10.5)
CHLORIDE SERPL-SCNC: 97 MMOL/L (ref 98–107)
CHOLEST SERPL-MCNC: 164 MG/DL (ref 0–200)
CO2 SERPL-SCNC: 25 MMOL/L (ref 22–29)
CREAT SERPL-MCNC: 2.16 MG/DL (ref 0.57–1)
DEPRECATED RDW RBC AUTO: 44.7 FL (ref 37–54)
EGFRCR SERPLBLD CKD-EPI 2021: 24.3 ML/MIN/1.73
ERYTHROCYTE [DISTWIDTH] IN BLOOD BY AUTOMATED COUNT: 13.4 % (ref 12.3–15.4)
GLOBULIN UR ELPH-MCNC: 3.8 GM/DL
GLUCOSE SERPL-MCNC: 188 MG/DL (ref 65–99)
HBA1C MFR BLD: 7.4 % (ref 4.8–5.6)
HCT VFR BLD AUTO: 40.5 % (ref 34–46.6)
HDLC SERPL-MCNC: 32 MG/DL (ref 40–60)
HGB BLD-MCNC: 12.9 G/DL (ref 12–15.9)
LDLC SERPL CALC-MCNC: 86 MG/DL (ref 0–100)
LDLC/HDLC SERPL: 2.39 {RATIO}
MCH RBC QN AUTO: 28.9 PG (ref 26.6–33)
MCHC RBC AUTO-ENTMCNC: 31.9 G/DL (ref 31.5–35.7)
MCV RBC AUTO: 90.8 FL (ref 79–97)
PLATELET # BLD AUTO: 244 10*3/MM3 (ref 140–450)
PMV BLD AUTO: 10.5 FL (ref 6–12)
POTASSIUM SERPL-SCNC: 4.5 MMOL/L (ref 3.5–5.2)
PROT SERPL-MCNC: 7.6 G/DL (ref 6–8.5)
RBC # BLD AUTO: 4.46 10*6/MM3 (ref 3.77–5.28)
SODIUM SERPL-SCNC: 134 MMOL/L (ref 136–145)
TRIGL SERPL-MCNC: 277 MG/DL (ref 0–150)
TSH SERPL DL<=0.05 MIU/L-ACNC: 3.07 UIU/ML (ref 0.27–4.2)
VLDLC SERPL-MCNC: 46 MG/DL (ref 5–40)
WBC NRBC COR # BLD AUTO: 7.51 10*3/MM3 (ref 3.4–10.8)

## 2024-03-15 PROCEDURE — 36415 COLL VENOUS BLD VENIPUNCTURE: CPT

## 2024-03-15 PROCEDURE — 84443 ASSAY THYROID STIM HORMONE: CPT

## 2024-03-15 PROCEDURE — 80061 LIPID PANEL: CPT

## 2024-03-15 PROCEDURE — 80053 COMPREHEN METABOLIC PANEL: CPT

## 2024-03-15 PROCEDURE — 87086 URINE CULTURE/COLONY COUNT: CPT

## 2024-03-15 PROCEDURE — 87077 CULTURE AEROBIC IDENTIFY: CPT

## 2024-03-15 PROCEDURE — 85027 COMPLETE CBC AUTOMATED: CPT

## 2024-03-15 PROCEDURE — G0463 HOSPITAL OUTPT CLINIC VISIT: HCPCS

## 2024-03-15 PROCEDURE — 87186 SC STD MICRODIL/AGAR DIL: CPT

## 2024-03-15 PROCEDURE — 83036 HEMOGLOBIN GLYCOSYLATED A1C: CPT

## 2024-03-15 RX ORDER — IBUPROFEN 800 MG/1
800 TABLET ORAL EVERY 8 HOURS PRN
COMMUNITY
Start: 2024-02-23

## 2024-03-15 RX ORDER — SEMAGLUTIDE 2.68 MG/ML
2 INJECTION, SOLUTION SUBCUTANEOUS WEEKLY
Qty: 3 ML | Refills: 2 | Status: SHIPPED | OUTPATIENT
Start: 2024-03-15

## 2024-03-15 RX ORDER — OXYCODONE HYDROCHLORIDE 5 MG/1
2.5 TABLET ORAL EVERY 6 HOURS PRN
COMMUNITY
Start: 2024-03-11 | End: 2024-03-15

## 2024-03-15 RX ORDER — ACETAMINOPHEN 500 MG
1000 TABLET ORAL EVERY 8 HOURS
COMMUNITY

## 2024-03-15 RX ORDER — TAMSULOSIN HYDROCHLORIDE 0.4 MG/1
0.4 CAPSULE ORAL DAILY
COMMUNITY
Start: 2024-03-05 | End: 2024-04-05

## 2024-03-15 RX ORDER — PHENAZOPYRIDINE HYDROCHLORIDE 100 MG/1
100 TABLET, FILM COATED ORAL 3 TIMES DAILY PRN
COMMUNITY
Start: 2024-03-11 | End: 2024-03-15

## 2024-03-15 NOTE — ASSESSMENT & PLAN NOTE
Hypertension is stable and controlled  Continue current treatment regimen.  Blood pressure will be reassessed  at next scheduled appt  .  Meets blood pressure goal of less than 150/90 per JNC 8 guidelines.

## 2024-03-15 NOTE — ASSESSMENT & PLAN NOTE
Diabetes is worsening.   Medication changes per orders.  Recommended an ADA diet.  Discussed foot care.  Reminded to get yearly retinal exam.  Diabetes will be reassessed  6 weeks  A1C goal is less than 7.5  Will see what A1C is today and go ahead and increase ozempic to 2 mg per week   If blurred vision does not improve with lowered glucose will need to follow back up with ophthalmology  Requested previous eye exam

## 2024-03-15 NOTE — PROGRESS NOTES
Chief Complaint  Blood Sugar Problem (Has been in the low 200's, but mainly around 170 which is abnormal for her, BS was 167 this morning fasting), Blurred Vision, and Fatigue (Malaise...Pt states she doesn't feel right)    Subjective        Jessica AUBREY Kern presents to [facility] for evaluation of the above complaints.     Has been having elevated blood glucose readings over the past month.  Feels it has been due to ongoing issues with pain and urinary problems.  She has been going to [facility] because she had a renal abscess as well as multiple renal stones.  She has had stent placements in is very painful for her.  She is only taking Ozempic 1 mg/week at this time.  We had discussed increasing the dose to 2 mg and she had just paid for a supply of the 1 mg so she wanted to try being on that dose for a little bit longer.  She has been having some blurred vision over the past couple of weeks.  Her last eye exam was in January 2024 and she needed a prescription for new glasses but says she was told she did not have glaucoma or retinopathy.  I have requested this note.  Denies any nonhealing wounds or sores with the exception of the wound she is currently seeing wound care for.  She says she feels more fatigued than usual.  Is due to have lipid panel drawn also.    Has been on metformin in the past which she could not tolerate due to GI side effects.  She is only been on insulin during hospitalizations but never long-term.  Has tried metformin, upset stomach and had to stop  Has been on insulin with hospitalizations but not after discharge      Review of Systems - History obtained from the patient  General ROS: positive for  - fatigue  negative for - chills or fever  Respiratory ROS: no cough, shortness of breath, or wheezing  Cardiovascular ROS: no chest pain or dyspnea on exertion  Gastrointestinal ROS: no abdominal pain, change in bowel habits, or black or bloody  "stools  Neurological ROS: no TIA or stroke symptoms      Objective   Vital Signs:  /70   Pulse 110   Temp 97.7 °F (36.5 °C) (Temporal)   Resp 16   Ht 162.6 cm (64\")   Wt 130 kg (286 lb)   SpO2 95%   BMI 49.09 kg/m²   Estimated body mass index is 49.09 kg/m² as calculated from the following:    Height as of this encounter: 162.6 cm (64\").    Weight as of this encounter: 130 kg (286 lb).           Physical Exam  Vitals reviewed.   Constitutional:       General: She is not in acute distress.     Appearance: Normal appearance. She is obese. She is not ill-appearing.   Neck:      Thyroid: No thyroid mass, thyromegaly or thyroid tenderness.      Vascular: Normal carotid pulses. No carotid bruit, hepatojugular reflux or JVD.      Trachea: Trachea and phonation normal.   Cardiovascular:      Rate and Rhythm: Normal rate and regular rhythm.      Pulses: Normal pulses.      Heart sounds: Normal heart sounds. No murmur heard.     No friction rub. No gallop.   Pulmonary:      Effort: Pulmonary effort is normal. No respiratory distress.      Breath sounds: Normal breath sounds. No wheezing.   Musculoskeletal:         General: Normal range of motion.      Cervical back: Normal range of motion and neck supple.      Comments: Examined in wheelchair due to mobility issues.     Lymphadenopathy:      Cervical: No cervical adenopathy.   Skin:     General: Skin is warm and dry.      Capillary Refill: Capillary refill takes less than 2 seconds.   Neurological:      General: No focal deficit present.      Mental Status: She is alert and oriented to person, place, and time.   Psychiatric:         Mood and Affect: Mood normal.         Behavior: Behavior normal.         Thought Content: Thought content normal.         Judgment: Judgment normal.        Result Review :  The following data was reviewed by: WOODY Rondon on 03/15/2024:  CMP          9/1/2023    14:49 9/8/2023    14:00 1/19/2024    11:41   CMP   Glucose 143  " 94  168    BUN 33  19  31    Creatinine 1.25  0.81  1.76    EGFR 47.0  79.2  31.0    Sodium 136  138  142    Potassium 4.5  4.1  4.5    Chloride 98  96  101    Calcium 9.8  9.7  9.8    Total Protein 7.2      Albumin 3.9      Globulin 3.3      Total Bilirubin 0.3      Alkaline Phosphatase 122      AST (SGOT) 15      ALT (SGPT) 14      Albumin/Globulin Ratio 1.2      BUN/Creatinine Ratio 26.4  23.5  17.6    Anion Gap 11.0  12.0  11.0      CBC          12/9/2023    04:08 12/10/2023    05:17 12/29/2023    09:30   CBC   WBC 7.18     7.25     8.0       RBC 4.17     4.27     4.83       Hemoglobin 12.2     12.4     14.4       Hematocrit 37.6     38.3     45.3       MCV 90.2     89.7     93.8       MCH 29.3     29.0     29.8       MCHC 32.4     32.4     31.8       RDW 13.1     13.1     13.4       Platelets 190     206     218          Details          This result is from an external source.             Lipid Panel          9/1/2023    14:49 1/19/2024    11:41   Lipid Panel   Total Cholesterol 182  188    Triglycerides 196  285    HDL Cholesterol 43  34    VLDL Cholesterol 34  49    LDL Cholesterol  105  105    LDL/HDL Ratio 2.32  2.85      TSH          1/19/2024    11:41   TSH   TSH 1.840      Most Recent A1C          9/1/2023    14:49   HGBA1C Most Recent   Hemoglobin A1C 6.60                   Assessment and Plan   Diagnoses and all orders for this visit:    1. Type 2 diabetes mellitus with hyperglycemia, without long-term current use of insulin (Primary)  Assessment & Plan:  Diabetes is worsening.   Medication changes per orders.  Recommended an ADA diet.  Discussed foot care.  Reminded to get yearly retinal exam.  Diabetes will be reassessed  6 weeks  A1C goal is less than 7.5  Will see what A1C is today and go ahead and increase ozempic to 2 mg per week   If blurred vision does not improve with lowered glucose will need to follow back up with ophthalmology  Requested previous eye exam     Orders:  -     Comprehensive  Metabolic Panel; Future  -     Hemoglobin A1c; Future  -     Semaglutide, 2 MG/DOSE, (Ozempic, 2 MG/DOSE,) 8 MG/3ML solution pen-injector; Inject 2 mg under the skin into the appropriate area as directed 1 (One) Time Per Week.  Dispense: 3 mL; Refill: 2    2. Mixed hyperlipidemia  Assessment & Plan:   Lipid goals are:  LDL <70  HDL >40 for men, >50 for women  Trigs <150      Orders:  -     Lipid Panel; Future  -     TSH; Future    3. Essential hypertension  Assessment & Plan:  Hypertension is stable and controlled  Continue current treatment regimen.  Blood pressure will be reassessed  at next scheduled appt  .  Meets blood pressure goal of less than 150/90 per JNC 8 guidelines.       4. Other fatigue  -     CBC (No Diff); Future  -     TSH; Future           I spent 30 minutes caring for Iris on this date of service. This time includes time spent by me in the following activities:preparing for the visit, reviewing tests, obtaining and/or reviewing a separately obtained history, performing a medically appropriate examination and/or evaluation , counseling and educating the patient/family/caregiver, ordering medications, tests, or procedures, and documenting information in the medical record  Follow Up   Return in about 6 weeks (around 4/26/2024) for Recheck.  Patient was given instructions and counseling regarding her condition or for health maintenance advice. Please see specific information pulled into the AVS if appropriate.

## 2024-03-17 LAB — BACTERIA SPEC AEROBE CULT: ABNORMAL

## 2024-03-18 ENCOUNTER — TELEPHONE (OUTPATIENT)
Dept: INTERNAL MEDICINE | Facility: CLINIC | Age: 69
End: 2024-03-18
Payer: MEDICARE

## 2024-03-18 ENCOUNTER — TELEPHONE (OUTPATIENT)
Dept: UROLOGY | Age: 69
End: 2024-03-18

## 2024-03-18 NOTE — TELEPHONE ENCOUNTER
has requested labs from [facility]. They sent a message over to clinical staff, should be in media as soon as they are received.

## 2024-03-18 NOTE — TELEPHONE ENCOUNTER
[facility] urology would like to have the patients most recent labs faxed over to them at [phone]

## 2024-03-18 NOTE — TELEPHONE ENCOUNTER
Patient left  requesting Javier or Janna return her call in regards to lab results. She stated she received lab results from PCP and that they are concerned about her renal function and she would like to discuss. She stated she wasn't sure if we could see these labs through my chart, if we can't she said she would be happy to send them to us.

## 2024-03-19 NOTE — TELEPHONE ENCOUNTER
I called the number given and verablized that per Janna, \"Looks like she is a bit dehydrated which could be affecting her kidney function. There is really nothing I can suggest for her other than increasing her fluids at this time. She is scheduled for surgery with [facility] at the end of the month. She may need to see if she can get in with them earlier or if they want to order any imaging on her. There is always a possibility a stone is moving, but I think dehydration is playing a large role now.\"

## 2024-03-20 ENCOUNTER — APPOINTMENT (OUTPATIENT)
Dept: CT IMAGING | Age: 69
DRG: 690 | End: 2024-03-20
Payer: MEDICARE

## 2024-03-20 ENCOUNTER — PATIENT MESSAGE (OUTPATIENT)
Dept: INTERNAL MEDICINE | Facility: CLINIC | Age: 69
End: 2024-03-20
Payer: MEDICARE

## 2024-03-20 ENCOUNTER — HOSPITAL ENCOUNTER (INPATIENT)
Age: 69
LOS: 3 days | Discharge: NURSING, REHAB OR LONG-TERM CARE | DRG: 690 | End: 2024-03-23
Attending: EMERGENCY MEDICINE
Payer: MEDICARE

## 2024-03-20 DIAGNOSIS — T83.84XA PAIN DUE TO URETERAL STENT, INITIAL ENCOUNTER (HCC): ICD-10-CM

## 2024-03-20 DIAGNOSIS — N30.00 ACUTE CYSTITIS WITHOUT HEMATURIA: ICD-10-CM

## 2024-03-20 DIAGNOSIS — N17.9 AKI (ACUTE KIDNEY INJURY) (HCC): Primary | ICD-10-CM

## 2024-03-20 DIAGNOSIS — M15.9 PRIMARY OSTEOARTHRITIS INVOLVING MULTIPLE JOINTS: ICD-10-CM

## 2024-03-20 PROBLEM — N12 PYELONEPHRITIS: Status: ACTIVE | Noted: 2024-03-20

## 2024-03-20 PROBLEM — R06.09 DOE (DYSPNEA ON EXERTION): Status: ACTIVE | Noted: 2024-03-20

## 2024-03-20 PROBLEM — E87.5 HYPERKALEMIA: Status: ACTIVE | Noted: 2024-03-20

## 2024-03-20 PROBLEM — E11.9 TYPE II DIABETES MELLITUS (HCC): Status: ACTIVE | Noted: 2024-03-20

## 2024-03-20 LAB
ALBUMIN SERPL-MCNC: 3.6 G/DL (ref 3.5–5.2)
ALBUMIN SERPL-MCNC: 3.6 G/DL (ref 3.5–5.2)
ALP SERPL-CCNC: 96 U/L (ref 35–104)
ALP SERPL-CCNC: 98 U/L (ref 35–104)
ALT SERPL-CCNC: 6 U/L (ref 5–33)
ALT SERPL-CCNC: 8 U/L (ref 5–33)
ANION GAP SERPL CALCULATED.3IONS-SCNC: 13 MMOL/L (ref 7–19)
ANION GAP SERPL CALCULATED.3IONS-SCNC: 15 MMOL/L (ref 7–19)
AST SERPL-CCNC: 11 U/L (ref 5–32)
AST SERPL-CCNC: 12 U/L (ref 5–32)
BACTERIA #/AREA URNS HPF: ABNORMAL /HPF
BASOPHILS # BLD: 0.1 K/UL (ref 0–0.2)
BASOPHILS NFR BLD: 0.9 % (ref 0–1)
BILIRUB SERPL-MCNC: <0.2 MG/DL (ref 0.2–1.2)
BILIRUB SERPL-MCNC: <0.2 MG/DL (ref 0.2–1.2)
BILIRUB UR QL STRIP: NEGATIVE
BNP BLD-MCNC: 51 PG/ML (ref 0–124)
BUN SERPL-MCNC: 47 MG/DL (ref 8–23)
BUN SERPL-MCNC: 56 MG/DL (ref 8–23)
CALCIUM SERPL-MCNC: 8.8 MG/DL (ref 8.8–10.2)
CALCIUM SERPL-MCNC: 9.2 MG/DL (ref 8.8–10.2)
CHLORIDE SERPL-SCNC: 101 MMOL/L (ref 98–111)
CHLORIDE SERPL-SCNC: 102 MMOL/L (ref 98–111)
CK SERPL-CCNC: 52 U/L (ref 26–192)
CLARITY UR: ABNORMAL
CO2 SERPL-SCNC: 20 MMOL/L (ref 22–29)
CO2 SERPL-SCNC: 22 MMOL/L (ref 22–29)
COLOR UR: YELLOW
CREAT SERPL-MCNC: 1.5 MG/DL (ref 0.5–0.9)
CREAT SERPL-MCNC: 1.7 MG/DL (ref 0.5–0.9)
D DIMER PPP FEU-MCNC: 4.91 UG/ML FEU (ref 0–0.48)
EOSINOPHIL # BLD: 0.2 K/UL (ref 0–0.6)
EOSINOPHIL NFR BLD: 2.7 % (ref 0–5)
ERYTHROCYTE [DISTWIDTH] IN BLOOD BY AUTOMATED COUNT: 13 % (ref 11.5–14.5)
ERYTHROCYTE [DISTWIDTH] IN BLOOD BY AUTOMATED COUNT: 13.3 % (ref 11.5–14.5)
GLUCOSE SERPL-MCNC: 123 MG/DL (ref 74–109)
GLUCOSE SERPL-MCNC: 162 MG/DL (ref 74–109)
GLUCOSE UR STRIP.AUTO-MCNC: NEGATIVE MG/DL
HCT VFR BLD AUTO: 33.3 % (ref 37–47)
HCT VFR BLD AUTO: 38 % (ref 37–47)
HGB BLD-MCNC: 10 G/DL (ref 12–16)
HGB BLD-MCNC: 12.9 G/DL (ref 12–16)
HGB UR STRIP.AUTO-MCNC: ABNORMAL MG/L
IMM GRANULOCYTES # BLD: 0.1 K/UL
KETONES UR STRIP.AUTO-MCNC: NEGATIVE MG/DL
LACTATE BLDV-SCNC: 1.2 MMOL/L (ref 0.5–1.9)
LACTATE BLDV-SCNC: 1.3 MMOL/L (ref 0.5–1.9)
LEUKOCYTE ESTERASE UR QL STRIP.AUTO: ABNORMAL
LYMPHOCYTES # BLD: 1.9 K/UL (ref 1.1–4.5)
LYMPHOCYTES NFR BLD: 24.7 % (ref 20–40)
MAGNESIUM SERPL-MCNC: 2 MG/DL (ref 1.6–2.4)
MCH RBC QN AUTO: 28.7 PG (ref 27–31)
MCH RBC QN AUTO: 29.1 PG (ref 27–31)
MCHC RBC AUTO-ENTMCNC: 30 G/DL (ref 33–37)
MCHC RBC AUTO-ENTMCNC: 33.9 G/DL (ref 33–37)
MCV RBC AUTO: 85.6 FL (ref 81–99)
MCV RBC AUTO: 95.4 FL (ref 81–99)
MONOCYTES # BLD: 0.7 K/UL (ref 0–0.9)
MONOCYTES NFR BLD: 9.4 % (ref 0–10)
NEUTROPHILS # BLD: 4.8 K/UL (ref 1.5–7.5)
NEUTS SEG NFR BLD: 61.4 % (ref 50–65)
NITRITE UR QL STRIP.AUTO: NEGATIVE
PH UR STRIP.AUTO: 5.5 [PH] (ref 5–8)
PLATELET # BLD AUTO: 231 K/UL (ref 130–400)
PLATELET # BLD AUTO: 257 K/UL (ref 130–400)
PMV BLD AUTO: 10.1 FL (ref 9.4–12.3)
PMV BLD AUTO: 10.6 FL (ref 9.4–12.3)
POTASSIUM SERPL-SCNC: 4 MMOL/L (ref 3.5–5)
POTASSIUM SERPL-SCNC: 5.6 MMOL/L (ref 3.5–5)
PROT SERPL-MCNC: 6.5 G/DL (ref 6.6–8.7)
PROT SERPL-MCNC: 7.4 G/DL (ref 6.6–8.7)
PROT UR STRIP.AUTO-MCNC: 100 MG/DL
PTH-INTACT SERPL-MCNC: 48.9 PG/ML (ref 15–65)
RBC # BLD AUTO: 3.49 M/UL (ref 4.2–5.4)
RBC # BLD AUTO: 4.44 M/UL (ref 4.2–5.4)
RBC #/AREA URNS HPF: ABNORMAL /HPF (ref 0–2)
REASON FOR REJECTION: NORMAL
REASON FOR REJECTION: NORMAL
REJECTED TEST: NORMAL
REJECTED TEST: NORMAL
SODIUM SERPL-SCNC: 136 MMOL/L (ref 136–145)
SODIUM SERPL-SCNC: 137 MMOL/L (ref 136–145)
SP GR UR STRIP.AUTO: 1.01 (ref 1–1.03)
SQUAMOUS #/AREA URNS HPF: ABNORMAL /HPF
TSH SERPL DL<=0.005 MIU/L-ACNC: 1.65 UIU/ML (ref 0.27–4.2)
UROBILINOGEN UR STRIP.AUTO-MCNC: 0.2 E.U./DL
WBC # BLD AUTO: 7.8 K/UL (ref 4.8–10.8)
WBC # BLD AUTO: 8.8 K/UL (ref 4.8–10.8)
WBC #/AREA URNS HPF: ABNORMAL /HPF (ref 0–5)

## 2024-03-20 PROCEDURE — 87186 SC STD MICRODIL/AGAR DIL: CPT

## 2024-03-20 PROCEDURE — 2580000003 HC RX 258: Performed by: EMERGENCY MEDICINE

## 2024-03-20 PROCEDURE — 83970 ASSAY OF PARATHORMONE: CPT

## 2024-03-20 PROCEDURE — 83605 ASSAY OF LACTIC ACID: CPT

## 2024-03-20 PROCEDURE — 84443 ASSAY THYROID STIM HORMONE: CPT

## 2024-03-20 PROCEDURE — 82550 ASSAY OF CK (CPK): CPT

## 2024-03-20 PROCEDURE — 85025 COMPLETE CBC W/AUTO DIFF WBC: CPT

## 2024-03-20 PROCEDURE — 82607 VITAMIN B-12: CPT

## 2024-03-20 PROCEDURE — 74176 CT ABD & PELVIS W/O CONTRAST: CPT

## 2024-03-20 PROCEDURE — 1200000000 HC SEMI PRIVATE

## 2024-03-20 PROCEDURE — 99284 EMERGENCY DEPT VISIT MOD MDM: CPT

## 2024-03-20 PROCEDURE — 87077 CULTURE AEROBIC IDENTIFY: CPT

## 2024-03-20 PROCEDURE — 96374 THER/PROPH/DIAG INJ IV PUSH: CPT

## 2024-03-20 PROCEDURE — 80053 COMPREHEN METABOLIC PANEL: CPT

## 2024-03-20 PROCEDURE — 87086 URINE CULTURE/COLONY COUNT: CPT

## 2024-03-20 PROCEDURE — 36415 COLL VENOUS BLD VENIPUNCTURE: CPT

## 2024-03-20 PROCEDURE — 84550 ASSAY OF BLOOD/URIC ACID: CPT

## 2024-03-20 PROCEDURE — 83880 ASSAY OF NATRIURETIC PEPTIDE: CPT

## 2024-03-20 PROCEDURE — 96375 TX/PRO/DX INJ NEW DRUG ADDON: CPT

## 2024-03-20 PROCEDURE — 82306 VITAMIN D 25 HYDROXY: CPT

## 2024-03-20 PROCEDURE — 85379 FIBRIN DEGRADATION QUANT: CPT

## 2024-03-20 PROCEDURE — 6360000002 HC RX W HCPCS: Performed by: EMERGENCY MEDICINE

## 2024-03-20 PROCEDURE — 83735 ASSAY OF MAGNESIUM: CPT

## 2024-03-20 PROCEDURE — 93005 ELECTROCARDIOGRAM TRACING: CPT

## 2024-03-20 PROCEDURE — 85027 COMPLETE CBC AUTOMATED: CPT

## 2024-03-20 PROCEDURE — 81001 URINALYSIS AUTO W/SCOPE: CPT

## 2024-03-20 PROCEDURE — 83540 ASSAY OF IRON: CPT

## 2024-03-20 PROCEDURE — 83550 IRON BINDING TEST: CPT

## 2024-03-20 PROCEDURE — 82746 ASSAY OF FOLIC ACID SERUM: CPT

## 2024-03-20 PROCEDURE — 82728 ASSAY OF FERRITIN: CPT

## 2024-03-20 RX ORDER — HYDROMORPHONE HYDROCHLORIDE 1 MG/ML
0.5 INJECTION, SOLUTION INTRAMUSCULAR; INTRAVENOUS; SUBCUTANEOUS ONCE
Status: COMPLETED | OUTPATIENT
Start: 2024-03-20 | End: 2024-03-20

## 2024-03-20 RX ORDER — SODIUM CHLORIDE 0.9 % (FLUSH) 0.9 %
5-40 SYRINGE (ML) INJECTION EVERY 12 HOURS SCHEDULED
Status: DISCONTINUED | OUTPATIENT
Start: 2024-03-20 | End: 2024-03-23 | Stop reason: HOSPADM

## 2024-03-20 RX ORDER — MAGNESIUM SULFATE IN WATER 40 MG/ML
2000 INJECTION, SOLUTION INTRAVENOUS PRN
Status: DISCONTINUED | OUTPATIENT
Start: 2024-03-20 | End: 2024-03-23 | Stop reason: HOSPADM

## 2024-03-20 RX ORDER — 0.9 % SODIUM CHLORIDE 0.9 %
1000 INTRAVENOUS SOLUTION INTRAVENOUS ONCE
Status: COMPLETED | OUTPATIENT
Start: 2024-03-20 | End: 2024-03-20

## 2024-03-20 RX ORDER — ENOXAPARIN SODIUM 100 MG/ML
30 INJECTION SUBCUTANEOUS 2 TIMES DAILY
Status: DISCONTINUED | OUTPATIENT
Start: 2024-03-21 | End: 2024-03-21

## 2024-03-20 RX ORDER — ACETAMINOPHEN 650 MG/1
650 SUPPOSITORY RECTAL EVERY 6 HOURS PRN
Status: DISCONTINUED | OUTPATIENT
Start: 2024-03-20 | End: 2024-03-23 | Stop reason: HOSPADM

## 2024-03-20 RX ORDER — 0.9 % SODIUM CHLORIDE 0.9 %
1000 INTRAVENOUS SOLUTION INTRAVENOUS ONCE
Status: DISCONTINUED | OUTPATIENT
Start: 2024-03-20 | End: 2024-03-22

## 2024-03-20 RX ORDER — BISACODYL 5 MG/1
5 TABLET, DELAYED RELEASE ORAL DAILY
Status: DISCONTINUED | OUTPATIENT
Start: 2024-03-21 | End: 2024-03-23 | Stop reason: HOSPADM

## 2024-03-20 RX ORDER — ONDANSETRON 4 MG/1
4 TABLET, ORALLY DISINTEGRATING ORAL EVERY 8 HOURS PRN
Status: DISCONTINUED | OUTPATIENT
Start: 2024-03-20 | End: 2024-03-23 | Stop reason: HOSPADM

## 2024-03-20 RX ORDER — ONDANSETRON 2 MG/ML
4 INJECTION INTRAMUSCULAR; INTRAVENOUS EVERY 6 HOURS PRN
Status: DISCONTINUED | OUTPATIENT
Start: 2024-03-20 | End: 2024-03-23 | Stop reason: HOSPADM

## 2024-03-20 RX ORDER — SODIUM CHLORIDE 0.9 % (FLUSH) 0.9 %
5-40 SYRINGE (ML) INJECTION PRN
Status: DISCONTINUED | OUTPATIENT
Start: 2024-03-20 | End: 2024-03-23 | Stop reason: HOSPADM

## 2024-03-20 RX ORDER — OXYCODONE AND ACETAMINOPHEN 7.5; 325 MG/1; MG/1
1 TABLET ORAL EVERY 4 HOURS PRN
Status: DISCONTINUED | OUTPATIENT
Start: 2024-03-20 | End: 2024-03-21

## 2024-03-20 RX ORDER — POTASSIUM CHLORIDE 20 MEQ/1
40 TABLET, EXTENDED RELEASE ORAL PRN
Status: DISCONTINUED | OUTPATIENT
Start: 2024-03-20 | End: 2024-03-23 | Stop reason: HOSPADM

## 2024-03-20 RX ORDER — HYDROMORPHONE HYDROCHLORIDE 1 MG/ML
0.25 INJECTION, SOLUTION INTRAMUSCULAR; INTRAVENOUS; SUBCUTANEOUS EVERY 6 HOURS PRN
Status: DISCONTINUED | OUTPATIENT
Start: 2024-03-20 | End: 2024-03-20

## 2024-03-20 RX ORDER — ONDANSETRON 2 MG/ML
4 INJECTION INTRAMUSCULAR; INTRAVENOUS ONCE
Status: COMPLETED | OUTPATIENT
Start: 2024-03-20 | End: 2024-03-20

## 2024-03-20 RX ORDER — SODIUM CHLORIDE 9 MG/ML
INJECTION, SOLUTION INTRAVENOUS PRN
Status: DISCONTINUED | OUTPATIENT
Start: 2024-03-20 | End: 2024-03-23 | Stop reason: HOSPADM

## 2024-03-20 RX ORDER — HYDROMORPHONE HYDROCHLORIDE 1 MG/ML
0.5 INJECTION, SOLUTION INTRAMUSCULAR; INTRAVENOUS; SUBCUTANEOUS EVERY 6 HOURS PRN
Status: DISCONTINUED | OUTPATIENT
Start: 2024-03-20 | End: 2024-03-23 | Stop reason: HOSPADM

## 2024-03-20 RX ORDER — POTASSIUM CHLORIDE 7.45 MG/ML
10 INJECTION INTRAVENOUS PRN
Status: DISCONTINUED | OUTPATIENT
Start: 2024-03-20 | End: 2024-03-23 | Stop reason: HOSPADM

## 2024-03-20 RX ORDER — ACETAMINOPHEN 325 MG/1
650 TABLET ORAL EVERY 6 HOURS PRN
Status: DISCONTINUED | OUTPATIENT
Start: 2024-03-20 | End: 2024-03-23 | Stop reason: HOSPADM

## 2024-03-20 RX ORDER — SODIUM CHLORIDE 9 MG/ML
INJECTION, SOLUTION INTRAVENOUS CONTINUOUS
Status: ACTIVE | OUTPATIENT
Start: 2024-03-20 | End: 2024-03-22

## 2024-03-20 RX ORDER — POLYETHYLENE GLYCOL 3350 17 G/17G
17 POWDER, FOR SOLUTION ORAL DAILY PRN
Status: DISCONTINUED | OUTPATIENT
Start: 2024-03-20 | End: 2024-03-23 | Stop reason: HOSPADM

## 2024-03-20 RX ADMIN — CEFTRIAXONE 1000 MG: 1 INJECTION, POWDER, FOR SOLUTION INTRAMUSCULAR; INTRAVENOUS at 19:46

## 2024-03-20 RX ADMIN — SODIUM CHLORIDE 1000 ML: 9 INJECTION, SOLUTION INTRAVENOUS at 17:00

## 2024-03-20 RX ADMIN — ONDANSETRON 4 MG: 2 INJECTION INTRAMUSCULAR; INTRAVENOUS at 19:53

## 2024-03-20 RX ADMIN — HYDROMORPHONE HYDROCHLORIDE 0.5 MG: 1 INJECTION, SOLUTION INTRAMUSCULAR; INTRAVENOUS; SUBCUTANEOUS at 19:55

## 2024-03-20 RX ADMIN — SODIUM CHLORIDE 1000 ML: 9 INJECTION, SOLUTION INTRAVENOUS at 16:46

## 2024-03-20 ASSESSMENT — ENCOUNTER SYMPTOMS
NAUSEA: 0
RHINORRHEA: 0
VOMITING: 0
SHORTNESS OF BREATH: 1
DIARRHEA: 0
ABDOMINAL PAIN: 1
SHORTNESS OF BREATH: 0
SORE THROAT: 0

## 2024-03-20 ASSESSMENT — LIFESTYLE VARIABLES: HOW OFTEN DO YOU HAVE A DRINK CONTAINING ALCOHOL: NEVER

## 2024-03-20 ASSESSMENT — PAIN DESCRIPTION - ORIENTATION: ORIENTATION: LOWER

## 2024-03-20 ASSESSMENT — PAIN SCALES - GENERAL: PAINLEVEL_OUTOF10: 9

## 2024-03-20 ASSESSMENT — PAIN DESCRIPTION - LOCATION: LOCATION: BACK

## 2024-03-20 ASSESSMENT — PAIN - FUNCTIONAL ASSESSMENT: PAIN_FUNCTIONAL_ASSESSMENT: NONE - DENIES PAIN

## 2024-03-20 NOTE — TELEPHONE ENCOUNTER
From: Jessica Kern  To: Pat Benito  Sent: 3/20/2024 10:22 AM CDT  Subject: Low blood pressure     “ Let me know how your blood pressure is at the beginning of the week and if it is still on low side of normal we can discontinue HCTZ which should help and could lower lisinopril.”    Bp has low but severino like in your office. this morning it was 89/48 when I got up at 9:30 I took it again at 9:45 and it was 96/54 I took it again at 10 and it was 107/60.   I didn’t take my HCTZ or my lisinopril either one today. I feel really bad and dizzy and exhausted and just want to lay with my eyes shut. Saturday and Sunday was awful but I’ve slowly.been getting a little better

## 2024-03-20 NOTE — ED PROVIDER NOTES
[facility] EMERGENCY DEPT  eMERGENCYdEPARTMENT eNCOUnter      Pt Name: Santa Puente  MRN: [id number]  Birthdate 1955  Date of evaluation: 3/20/2024  Provider:VERONIQUE DAWSON MD    Emergency Department care of this patient was assumed at 1830 from Dr. Solano.  We have discussed the case and the plan of care.  I have seen and evaluated patient and reviewed ED course.      CHIEF COMPLAINT       Chief Complaint   Patient presents with    Fatigue     Pt arrives from home via EMS. Reports having increased weakness/fatigue after having kidney stents placed approx 2 weeks ago (worsening over last 5 days).          PHYSICAL EXAM    (up to 7 for level 4, 8 or more for level 5)     ED Triage Vitals [03/20/24 1312]   BP Temp Temp src Pulse Respirations SpO2 Height Weight - Scale   100/64 97.8 °F (36.6 °C) -- 98 18 90 % -- (!) 175.1 kg (386 lb)       Physical Exam  Vitals and nursing note reviewed.   Constitutional:       General: She is not in acute distress.     Appearance: She is obese. She is not ill-appearing.   Neurological:      Mental Status: She is alert.         DIAGNOSTIC RESULTS     EKG: All EKG's are interpreted by the Emergency Department Physician who either signs or Co-signs this chart inthe absence of a cardiologist.            RADIOLOGY:   Non-plain film imagessuch as CT, Ultrasound and MRI are read by the radiologist. Plain radiographic images are visualized and preliminarily interpreted by the emergency physician with the below findings:      CT ABDOMEN PELVIS WO CONTRAST Additional Contrast? None   Final Result   Impression:   Bilateral nephrolithiasis.  Bilateral nephro-ureteral stents in place.  Mild bilateral hydronephrosis.   Colonic diverticulosis.   Fat-containing abdominal hernia.        All CT scans are performed using dose optimization techniques as appropriate to the performed exam and include    at least one of the following: Automated exposure control, adjustment of the mA and/or kV according to size,  and the use of iterative reconstruction technique.        ______________________________________    Electronically signed by: FILIBERTO SALDANA D.O.   Date:     03/20/2024   Time:    17:39               LABS:  Labs Reviewed   CBC WITH AUTO DIFFERENTIAL - Abnormal; Notable for the following components:       Result Value    RBC 3.49 (*)     Hemoglobin 10.0 (*)     Hematocrit 33.3 (*)     MCHC 30.0 (*)     All other components within normal limits   URINALYSIS WITH REFLEX TO CULTURE - Abnormal; Notable for the following components:    Clarity, UA TURBID (*)     Blood, Urine LARGE (*)     Protein,  (*)     Leukocyte Esterase, Urine LARGE (*)     All other components within normal limits   COMPREHENSIVE METABOLIC PANEL - Abnormal; Notable for the following components:    Potassium 5.6 (*)     CO2 20 (*)     Glucose 162 (*)     BUN 56 (*)     Creatinine 1.7 (*)     Est, Glom Filt Rate 32 (*)     Total Protein 6.5 (*)     All other components within normal limits   MICROSCOPIC URINALYSIS - Abnormal; Notable for the following components:    WBC, UA TNTC (*)     RBC, UA 21-30 (*)     All other components within normal limits   CULTURE, URINE   LACTIC ACID   SPECIMEN REJECTION   SPECIMEN REJECTION   COMPREHENSIVE METABOLIC PANEL   BASIC METABOLIC PANEL   LACTIC ACID   MAGNESIUM   TSH   VITAMIN D 25 HYDROXY   PTH, INTACT   URIC ACID   CK   IRON AND TIBC   FERRITIN   VITAMIN B12   FOLATE   CBC   BASIC METABOLIC PANEL       All other labs were within normal range or not returned as of this dictation.    EMERGENCY DEPARTMENT COURSE and DIFFERENTIAL DIAGNOSIS/MDM:   Vitals:    Vitals:    03/20/24 1707 03/20/24 1708 03/20/24 1745 03/20/24 1800   BP:   108/79 118/69   Pulse: (!) 103 (!) 104 95 98   Resp: 17 17 (!) 9 10   Temp:       SpO2: (!) 86% (!) 89% 95% 95%   Weight:           MDM    VSS afebrile, following up urine and cmp, known bilateral ureteral stents followed at [facility] and has seen urology here at [facility] as

## 2024-03-20 NOTE — ED PROVIDER NOTES
[facility] EMERGENCY DEPT  eMERGENCY dEPARTMENT eNCOUnter      Pt Name: Santa Puente  MRN: [id number]  Birthdate 1955  Date of evaluation: 3/20/2024  Provider: Bala Solano MD    CHIEF COMPLAINT       Chief Complaint   Patient presents with    Fatigue     Pt arrives from home via EMS. Reports having increased weakness/fatigue after having kidney stents placed approx 2 weeks ago (worsening over last 5 days).          HISTORY OF PRESENT ILLNESS   (Location/Symptom, Timing/Onset,Context/Setting, Quality, Duration, Modifying Factors, Severity)  Note limiting factors.   Santa Puente is a 68 y.o. female who presents to the emergency department abdominal pain generalized weakness    HPI    Patient is a 68-year-old white female with history of hypertension; obstructive sleep apnea COPD; degenerative joint disease; type 2 diabetes; morbid obesity; history of kidney stones; followed by urology at [facility] by  recently put in bilateral ureteral stents secondary treating her kidney stones who had been on NSAIDs in addition to her other chronic pain medication including gabapentin and tramadol; saw her primary care doctor on Friday and because of worsening renal function stopped her NSAIDs; who presents with generalized weakness pain who presents with back pain/ stent of discomfort in the setting of having no stents placed approximately 2 weeks ago.  No fever.  No nausea or vomiting.  On Saturday she fell back on her buttock but did not injure her head or neck.  She feels very uncomfortable and has been unable secondary in part to her morbid obesity to get to the bathroom has been wearing a diaper.  No documented fever.  No nausea or vomiting.    NursingNotes were reviewed.    REVIEW OF SYSTEMS    (2-9 systems for level 4, 10 or more for level 5)     Review of Systems   Constitutional:  Negative for chills, diaphoresis, fatigue and fever.   HENT:  Negative for rhinorrhea, sinus pressure and sore throat.    Eyes:        Effort: Pulmonary effort is normal. No respiratory distress.      Breath sounds: Normal breath sounds. No wheezing or rales.   Abdominal:      General: Bowel sounds are normal.      Palpations: Abdomen is soft. There is no mass.      Tenderness: There is no abdominal tenderness. There is no guarding or rebound.   Musculoskeletal:         General: No tenderness. Normal range of motion.      Cervical back: Normal range of motion and neck supple.   Skin:     General: Skin is warm and dry.      Capillary Refill: Capillary refill takes less than 2 seconds.   Neurological:      Mental Status: She is alert and oriented to person, place, and time.   Psychiatric:         Behavior: Behavior normal.         Thought Content: Thought content normal.         Judgment: Judgment normal.         DIAGNOSTIC RESULTS     RADIOLOGY:  Non-plain film images such as CT, Ultrasound and MRI are read by the radiologist. Plain radiographic images are visualized and preliminarily interpreted bythe emergency physician with the below findings:          CT ABDOMEN PELVIS WO CONTRAST Additional Contrast? None   Final Result   Impression:   Bilateral nephrolithiasis.  Bilateral nephro-ureteral stents in place.  Mild bilateral hydronephrosis.   Colonic diverticulosis.   Fat-containing abdominal hernia.        All CT scans are performed using dose optimization techniques as appropriate to the performed exam and include    at least one of the following: Automated exposure control, adjustment of the mA and/or kV according to size, and the use of iterative reconstruction technique.        ______________________________________    Electronically signed by: FILIBERTO SALDANA D.O.   Date:     03/20/2024   Time:    17:39               LABS:  Labs Reviewed   CBC WITH AUTO DIFFERENTIAL - Abnormal; Notable for the following components:       Result Value    RBC 3.49 (*)     Hemoglobin 10.0 (*)     Hematocrit 33.3 (*)     MCHC 30.0 (*)     All other

## 2024-03-21 ENCOUNTER — APPOINTMENT (OUTPATIENT)
Dept: NUCLEAR MEDICINE | Age: 69
DRG: 690 | End: 2024-03-21
Payer: MEDICARE

## 2024-03-21 LAB
25(OH)D3 SERPL-MCNC: 9.4 NG/ML
ANION GAP SERPL CALCULATED.3IONS-SCNC: 12 MMOL/L (ref 7–19)
BUN SERPL-MCNC: 36 MG/DL (ref 8–23)
CALCIUM SERPL-MCNC: 8.8 MG/DL (ref 8.8–10.2)
CHLORIDE SERPL-SCNC: 103 MMOL/L (ref 98–111)
CO2 SERPL-SCNC: 23 MMOL/L (ref 22–29)
CREAT SERPL-MCNC: 1.1 MG/DL (ref 0.5–0.9)
FERRITIN SERPL-MCNC: 380.5 NG/ML (ref 13–150)
FOLATE SERPL-MCNC: 10.8 NG/ML (ref 4.8–37.3)
GLUCOSE BLD-MCNC: 126 MG/DL (ref 70–99)
GLUCOSE BLD-MCNC: 148 MG/DL (ref 70–99)
GLUCOSE SERPL-MCNC: 152 MG/DL (ref 74–109)
IRON SATN MFR SERPL: 16 % (ref 14–50)
IRON SERPL-MCNC: 43 UG/DL (ref 37–145)
PERFORMED ON: ABNORMAL
PERFORMED ON: ABNORMAL
POTASSIUM SERPL-SCNC: 5 MMOL/L (ref 3.5–5)
SODIUM SERPL-SCNC: 138 MMOL/L (ref 136–145)
TIBC SERPL-MCNC: 265 UG/DL (ref 250–400)
URATE SERPL-MCNC: 8.5 MG/DL (ref 2.4–5.7)
VIT B12 SERPL-MCNC: 689 PG/ML (ref 211–946)

## 2024-03-21 PROCEDURE — 93970 EXTREMITY STUDY: CPT | Performed by: SURGERY

## 2024-03-21 PROCEDURE — 82962 GLUCOSE BLOOD TEST: CPT

## 2024-03-21 PROCEDURE — 2500000003 HC RX 250 WO HCPCS

## 2024-03-21 PROCEDURE — 6360000002 HC RX W HCPCS

## 2024-03-21 PROCEDURE — 36415 COLL VENOUS BLD VENIPUNCTURE: CPT

## 2024-03-21 PROCEDURE — 6370000000 HC RX 637 (ALT 250 FOR IP): Performed by: HOSPITALIST

## 2024-03-21 PROCEDURE — 6370000000 HC RX 637 (ALT 250 FOR IP): Performed by: STUDENT IN AN ORGANIZED HEALTH CARE EDUCATION/TRAINING PROGRAM

## 2024-03-21 PROCEDURE — A9540 TC99M MAA: HCPCS | Performed by: HOSPITALIST

## 2024-03-21 PROCEDURE — 2580000003 HC RX 258

## 2024-03-21 PROCEDURE — 93970 EXTREMITY STUDY: CPT

## 2024-03-21 PROCEDURE — 6370000000 HC RX 637 (ALT 250 FOR IP)

## 2024-03-21 PROCEDURE — 6360000002 HC RX W HCPCS: Performed by: HOSPITALIST

## 2024-03-21 PROCEDURE — 3430000000 HC RX DIAGNOSTIC RADIOPHARMACEUTICAL: Performed by: HOSPITALIST

## 2024-03-21 PROCEDURE — C8929 TTE W OR WO FOL WCON,DOPPLER: HCPCS

## 2024-03-21 PROCEDURE — 2580000003 HC RX 258: Performed by: HOSPITALIST

## 2024-03-21 PROCEDURE — 78580 LUNG PERFUSION IMAGING: CPT

## 2024-03-21 PROCEDURE — 80048 BASIC METABOLIC PNL TOTAL CA: CPT

## 2024-03-21 PROCEDURE — 51798 US URINE CAPACITY MEASURE: CPT

## 2024-03-21 PROCEDURE — 94760 N-INVAS EAR/PLS OXIMETRY 1: CPT

## 2024-03-21 PROCEDURE — 6360000004 HC RX CONTRAST MEDICATION

## 2024-03-21 PROCEDURE — 1200000000 HC SEMI PRIVATE

## 2024-03-21 RX ORDER — POLYETHYLENE GLYCOL 3350 17 G/17G
17 POWDER, FOR SOLUTION ORAL 2 TIMES DAILY
Status: DISCONTINUED | OUTPATIENT
Start: 2024-03-21 | End: 2024-03-23 | Stop reason: HOSPADM

## 2024-03-21 RX ORDER — ERGOCALCIFEROL 1.25 MG/1
50000 CAPSULE ORAL WEEKLY
Status: DISCONTINUED | OUTPATIENT
Start: 2024-03-21 | End: 2024-03-21

## 2024-03-21 RX ORDER — 0.9 % SODIUM CHLORIDE 0.9 %
500 INTRAVENOUS SOLUTION INTRAVENOUS ONCE
Status: COMPLETED | OUTPATIENT
Start: 2024-03-21 | End: 2024-03-22

## 2024-03-21 RX ORDER — TRAMADOL HYDROCHLORIDE 50 MG/1
50 TABLET ORAL EVERY 6 HOURS PRN
Status: DISCONTINUED | OUTPATIENT
Start: 2024-03-21 | End: 2024-03-23 | Stop reason: HOSPADM

## 2024-03-21 RX ORDER — INSULIN LISPRO 100 [IU]/ML
0-4 INJECTION, SOLUTION INTRAVENOUS; SUBCUTANEOUS
Status: DISCONTINUED | OUTPATIENT
Start: 2024-03-21 | End: 2024-03-23 | Stop reason: HOSPADM

## 2024-03-21 RX ORDER — ALLOPURINOL 100 MG/1
100 TABLET ORAL DAILY
Status: DISCONTINUED | OUTPATIENT
Start: 2024-03-21 | End: 2024-03-23 | Stop reason: HOSPADM

## 2024-03-21 RX ORDER — INSULIN LISPRO 100 [IU]/ML
0-4 INJECTION, SOLUTION INTRAVENOUS; SUBCUTANEOUS NIGHTLY
Status: DISCONTINUED | OUTPATIENT
Start: 2024-03-21 | End: 2024-03-23 | Stop reason: HOSPADM

## 2024-03-21 RX ORDER — TRAMADOL HYDROCHLORIDE 50 MG/1
50 TABLET ORAL EVERY 6 HOURS PRN
Status: DISCONTINUED | OUTPATIENT
Start: 2024-03-21 | End: 2024-03-21

## 2024-03-21 RX ORDER — OXYCODONE AND ACETAMINOPHEN 7.5; 325 MG/1; MG/1
1 TABLET ORAL EVERY 4 HOURS PRN
Status: DISCONTINUED | OUTPATIENT
Start: 2024-03-21 | End: 2024-03-23 | Stop reason: HOSPADM

## 2024-03-21 RX ORDER — TROSPIUM CHLORIDE 20 MG/1
20 TABLET, FILM COATED ORAL
Status: DISCONTINUED | OUTPATIENT
Start: 2024-03-21 | End: 2024-03-23 | Stop reason: HOSPADM

## 2024-03-21 RX ORDER — GABAPENTIN 600 MG/1
600 TABLET ORAL 4 TIMES DAILY
Status: DISCONTINUED | OUTPATIENT
Start: 2024-03-21 | End: 2024-03-23 | Stop reason: HOSPADM

## 2024-03-21 RX ORDER — DEXTROSE MONOHYDRATE 100 MG/ML
INJECTION, SOLUTION INTRAVENOUS CONTINUOUS PRN
Status: DISCONTINUED | OUTPATIENT
Start: 2024-03-21 | End: 2024-03-23 | Stop reason: HOSPADM

## 2024-03-21 RX ORDER — PRAVASTATIN SODIUM 20 MG
40 TABLET ORAL DAILY
Status: DISCONTINUED | OUTPATIENT
Start: 2024-03-21 | End: 2024-03-23 | Stop reason: HOSPADM

## 2024-03-21 RX ORDER — ENOXAPARIN SODIUM 100 MG/ML
30 INJECTION SUBCUTANEOUS 2 TIMES DAILY
Status: DISCONTINUED | OUTPATIENT
Start: 2024-03-22 | End: 2024-03-23 | Stop reason: HOSPADM

## 2024-03-21 RX ORDER — ENOXAPARIN SODIUM 150 MG/ML
1 INJECTION SUBCUTANEOUS 2 TIMES DAILY
Status: DISCONTINUED | OUTPATIENT
Start: 2024-03-21 | End: 2024-03-21

## 2024-03-21 RX ORDER — METOPROLOL TARTRATE 50 MG/1
100 TABLET, FILM COATED ORAL 2 TIMES DAILY
Status: DISCONTINUED | OUTPATIENT
Start: 2024-03-21 | End: 2024-03-23 | Stop reason: HOSPADM

## 2024-03-21 RX ORDER — TAMSULOSIN HYDROCHLORIDE 0.4 MG/1
0.4 CAPSULE ORAL DAILY
COMMUNITY

## 2024-03-21 RX ORDER — ERGOCALCIFEROL 1.25 MG/1
50000 CAPSULE ORAL WEEKLY
Status: DISCONTINUED | OUTPATIENT
Start: 2024-03-22 | End: 2024-03-23 | Stop reason: HOSPADM

## 2024-03-21 RX ORDER — PANTOPRAZOLE SODIUM 40 MG/1
40 TABLET, DELAYED RELEASE ORAL
Status: DISCONTINUED | OUTPATIENT
Start: 2024-03-21 | End: 2024-03-23 | Stop reason: HOSPADM

## 2024-03-21 RX ADMIN — MEROPENEM 1000 MG: 1 INJECTION, POWDER, FOR SOLUTION INTRAVENOUS at 00:31

## 2024-03-21 RX ADMIN — TRAMADOL HYDROCHLORIDE 50 MG: 50 TABLET, COATED ORAL at 07:44

## 2024-03-21 RX ADMIN — TROSPIUM CHLORIDE 20 MG: 20 TABLET, FILM COATED ORAL at 06:35

## 2024-03-21 RX ADMIN — SODIUM CHLORIDE: 9 INJECTION, SOLUTION INTRAVENOUS at 00:25

## 2024-03-21 RX ADMIN — PRAVASTATIN SODIUM 40 MG: 20 TABLET ORAL at 07:44

## 2024-03-21 RX ADMIN — METOPROLOL TARTRATE 100 MG: 50 TABLET, FILM COATED ORAL at 07:43

## 2024-03-21 RX ADMIN — OXYCODONE HYDROCHLORIDE AND ACETAMINOPHEN 1 TABLET: 7.5; 325 TABLET ORAL at 11:31

## 2024-03-21 RX ADMIN — GABAPENTIN 600 MG: 600 TABLET, FILM COATED ORAL at 01:15

## 2024-03-21 RX ADMIN — HYDROMORPHONE HYDROCHLORIDE 0.5 MG: 1 INJECTION, SOLUTION INTRAMUSCULAR; INTRAVENOUS; SUBCUTANEOUS at 10:43

## 2024-03-21 RX ADMIN — SODIUM CHLORIDE, PRESERVATIVE FREE 10 ML: 5 INJECTION INTRAVENOUS at 07:44

## 2024-03-21 RX ADMIN — ERGOCALCIFEROL 50000 UNITS: 1.25 CAPSULE ORAL at 07:43

## 2024-03-21 RX ADMIN — GABAPENTIN 600 MG: 600 TABLET, FILM COATED ORAL at 21:24

## 2024-03-21 RX ADMIN — MICONAZOLE NITRATE: 2 POWDER TOPICAL at 21:35

## 2024-03-21 RX ADMIN — OXYCODONE HYDROCHLORIDE AND ACETAMINOPHEN 1 TABLET: 7.5; 325 TABLET ORAL at 04:25

## 2024-03-21 RX ADMIN — PERFLUTREN 1.5 ML: 6.52 INJECTION, SUSPENSION INTRAVENOUS at 08:30

## 2024-03-21 RX ADMIN — Medication 5 MILLICURIE: at 13:03

## 2024-03-21 RX ADMIN — SODIUM CHLORIDE: 9 INJECTION, SOLUTION INTRAVENOUS at 11:55

## 2024-03-21 RX ADMIN — ENOXAPARIN SODIUM 150 MG: 150 INJECTION SUBCUTANEOUS at 07:45

## 2024-03-21 RX ADMIN — GABAPENTIN 600 MG: 600 TABLET, FILM COATED ORAL at 13:41

## 2024-03-21 RX ADMIN — ALLOPURINOL 100 MG: 100 TABLET ORAL at 07:43

## 2024-03-21 RX ADMIN — TRAMADOL HYDROCHLORIDE 50 MG: 50 TABLET, COATED ORAL at 01:15

## 2024-03-21 RX ADMIN — PANTOPRAZOLE SODIUM 40 MG: 40 TABLET, DELAYED RELEASE ORAL at 06:35

## 2024-03-21 RX ADMIN — ACETAMINOPHEN 650 MG: 325 TABLET ORAL at 21:24

## 2024-03-21 RX ADMIN — SODIUM CHLORIDE, PRESERVATIVE FREE 10 ML: 5 INJECTION INTRAVENOUS at 00:20

## 2024-03-21 RX ADMIN — MICONAZOLE NITRATE: 2 POWDER TOPICAL at 07:45

## 2024-03-21 RX ADMIN — SODIUM CHLORIDE 500 ML: 9 INJECTION, SOLUTION INTRAVENOUS at 23:05

## 2024-03-21 RX ADMIN — OXYCODONE HYDROCHLORIDE AND ACETAMINOPHEN 1 TABLET: 7.5; 325 TABLET ORAL at 16:17

## 2024-03-21 RX ADMIN — SODIUM ZIRCONIUM CYCLOSILICATE 5 G: 5 POWDER, FOR SUSPENSION ORAL at 00:13

## 2024-03-21 RX ADMIN — OXYCODONE HYDROCHLORIDE AND ACETAMINOPHEN 1 TABLET: 7.5; 325 TABLET ORAL at 22:43

## 2024-03-21 RX ADMIN — GABAPENTIN 600 MG: 600 TABLET, FILM COATED ORAL at 07:43

## 2024-03-21 RX ADMIN — MEROPENEM 1000 MG: 1 INJECTION, POWDER, FOR SOLUTION INTRAVENOUS at 11:57

## 2024-03-21 RX ADMIN — SODIUM ZIRCONIUM CYCLOSILICATE 5 G: 5 POWDER, FOR SUSPENSION ORAL at 13:41

## 2024-03-21 RX ADMIN — TRAMADOL HYDROCHLORIDE 50 MG: 50 TABLET, COATED ORAL at 13:41

## 2024-03-21 RX ADMIN — GABAPENTIN 600 MG: 600 TABLET, FILM COATED ORAL at 17:49

## 2024-03-21 RX ADMIN — MICONAZOLE NITRATE: 2 POWDER TOPICAL at 00:14

## 2024-03-21 RX ADMIN — BISACODYL 5 MG: 5 TABLET, COATED ORAL at 07:43

## 2024-03-21 RX ADMIN — NALOXEGOL OXALATE 12.5 MG: 12.5 TABLET, FILM COATED ORAL at 00:13

## 2024-03-21 RX ADMIN — OXYCODONE HYDROCHLORIDE AND ACETAMINOPHEN 1 TABLET: 7.5; 325 TABLET ORAL at 00:13

## 2024-03-21 RX ADMIN — SODIUM ZIRCONIUM CYCLOSILICATE 5 G: 5 POWDER, FOR SUSPENSION ORAL at 07:43

## 2024-03-21 RX ADMIN — METOPROLOL TARTRATE 100 MG: 50 TABLET, FILM COATED ORAL at 01:15

## 2024-03-21 ASSESSMENT — PAIN DESCRIPTION - LOCATION
LOCATION: FLANK
LOCATION: BACK
LOCATION: FLANK
LOCATION: FLANK
LOCATION: BACK
LOCATION: FLANK
LOCATION: BACK

## 2024-03-21 ASSESSMENT — PAIN DESCRIPTION - ORIENTATION
ORIENTATION: RIGHT;LEFT
ORIENTATION: LOWER
ORIENTATION: LOWER

## 2024-03-21 ASSESSMENT — PAIN DESCRIPTION - DESCRIPTORS
DESCRIPTORS: STABBING
DESCRIPTORS: ACHING
DESCRIPTORS: STABBING
DESCRIPTORS: STABBING;SHOOTING

## 2024-03-21 ASSESSMENT — PAIN SCALES - GENERAL
PAINLEVEL_OUTOF10: 5
PAINLEVEL_OUTOF10: 5
PAINLEVEL_OUTOF10: 9
PAINLEVEL_OUTOF10: 10
PAINLEVEL_OUTOF10: 6
PAINLEVEL_OUTOF10: 10

## 2024-03-21 NOTE — CONSULTS
Palliative Care:     Pt known to Palliative Care.    69 y/o female with diabetes, HTN, COPD, FREDDY,   Nephrolithiasis presented with complaint of increased weakness/fatigue.  Pt is s/p bilateral stent placement at [facility] [place] 2/23/34.  She has follow up scheduled with them for cysto with laser lithotripsy.  Met with pt to review life at home since last encounter.  She had been to [facility] and completed a rehab stay then returned home.  She lives alone, has support from family.  She voiced it being harder and harder to care or herself.  She is up with a rollator and transfers to a recliner where she eats/sleeps.  Up to the bathroom PRN. She receives Meals on Wheels for lunch.  She is wearing oxygen here, no home oxygen but requests to be evaluated for home O2.  She reports wearing O2 at the facility at night, and felt it helped her.  Pain is an issue and pt says she cannot sit up due to stents, and whiting cath discomfort.    Dr. Piña in for his assessment and plans for whiting to be removed.  Addressed her pain and will adjust pain medications.  Pt has been followed by [facility] in the past, no recent visits, but she requests out pt Palliative NP resume visits.  We will send referral for resumption of care.            Past Medical History:        Past Medical History:   Diagnosis Date    Arthritis     HTN (hypertension)     Palliative care patient 03/21/2024       Advance Directives:   DNR per pt request  ACP note completed.        Pain/Other Symptoms:      Pain currently at 7.  She is due PRN pain medication and communicated to her nurse, who is preparing to bring it to her.         How are symptoms affecting QOL    Limited mobility and pain are making it difficult for pt to care for herself.  She requests a referral to [facility] to return for skilled care.  Notified Care Manager to follow up.  Pt requests they coordinate with her daughter, Saida Collins.  Contact information is on file.                   Plan:   continue

## 2024-03-21 NOTE — PROGRESS NOTES
Vascular lab preliminary.  Bilateral L/E venous duplex scan completed.  No evidence for DVT, SVT, or reflux noted at this time.  Final report pending.

## 2024-03-21 NOTE — ED NOTES
ED TO INPATIENT SBAR HANDOFF    Patient Name: Santa Puente   : 1955  68 y.o.   Family/Caregiver Present: No  Code Status Order: Full Code    C-SSRS: Risk of Suicide: No Risk  Sitter No  Restraints:         Situation  Chief Complaint:   Chief Complaint   Patient presents with    Fatigue     Pt arrives from home via EMS. Reports having increased weakness/fatigue after having kidney stents placed approx 2 weeks ago (worsening over last 5 days).      Patient Diagnosis: Pyelonephritis [N12]     Brief Description of Patient's Condition: Pt being admitted for BINH and acute cystitis. Pt recently had stents placed and has been having weakness and fatigue the last couple days. Pt was incontinent in the ED and whiting catheter was placed. Urine output was cloudy with abnormal color to it. Pt has not been able to ambulate independently while in the ED. While cleaning pt after episode of incontinence I noticed a rash to the buttox applied zinc oxide to the site. Pt has been tachy while in ED and o2 sat begins to drop while pt is sleeping placed pt on 2L which improved sat.  Mental Status: alert, coherent, logical, and thought processes intact  Arrived from: home    Imaging:   CT ABDOMEN PELVIS WO CONTRAST Additional Contrast? None   Final Result   Impression:   Bilateral nephrolithiasis.  Bilateral nephro-ureteral stents in place.  Mild bilateral hydronephrosis.   Colonic diverticulosis.   Fat-containing abdominal hernia.        All CT scans are performed using dose optimization techniques as appropriate to the performed exam and include    at least one of the following: Automated exposure control, adjustment of the mA and/or kV according to size, and the use of iterative reconstruction technique.        ______________________________________    Electronically signed by: FILIBERTO SALDANA D.O.   Date:     2024   Time:    17:39         COVID-19 Results:   Internal Administration   First Dose COVID-19, MODERNA BLUE  border, Primary or Immunocompromised, (age 12y+), IM, 100 mcg/0.5mL  03/02/2021   Second Dose COVID-19, MODERNA BLUE border, Primary or Immunocompromised, (age 12y+), IM, 100 mcg/0.5mL   03/30/2021       Last COVID Lab SARS-CoV-2, NAAT (no units)   Date Value   07/01/2022 Not Detected     POC Glucose (mg/dl)   Date Value   07/01/2022 115 (H)           Abnormal labs:   Abnormal Labs Reviewed   CBC WITH AUTO DIFFERENTIAL - Abnormal; Notable for the following components:       Result Value    RBC 3.49 (*)     Hemoglobin 10.0 (*)     Hematocrit 33.3 (*)     MCHC 30.0 (*)     All other components within normal limits   URINALYSIS WITH REFLEX TO CULTURE - Abnormal; Notable for the following components:    Clarity, UA TURBID (*)     Blood, Urine LARGE (*)     Protein,  (*)     Leukocyte Esterase, Urine LARGE (*)     All other components within normal limits   COMPREHENSIVE METABOLIC PANEL - Abnormal; Notable for the following components:    Potassium 5.6 (*)     CO2 20 (*)     Glucose 162 (*)     BUN 56 (*)     Creatinine 1.7 (*)     Est, Glom Filt Rate 32 (*)     Total Protein 6.5 (*)     All other components within normal limits   MICROSCOPIC URINALYSIS - Abnormal; Notable for the following components:    WBC, UA TNTC (*)     RBC, UA 21-30 (*)     All other components within normal limits     Background  Allergies:   Allergies   Allergen Reactions    Codeine      Current Medications:   Medications Administered         cefTRIAXone (ROCEPHIN) 1,000 mg in sterile water 10 mL IV syringe Admin Date  03/20/2024 Action  Given Dose  1,000 mg Rate   Route  IntraVENous Administered By  Shikha Grewal LPN        HYDROmorphone HCl PF (DILAUDID) injection 0.5 mg Admin Date  03/20/2024 Action  Given Dose  0.5 mg Rate   Route  IntraVENous Administered By  Shikha Grewal LPN        ondansetron (ZOFRAN) injection 4 mg Admin Date  03/20/2024 Action  Given Dose  4 mg Rate   Route  IntraVENous Administered By  Shikha Grewal LPN

## 2024-03-21 NOTE — PLAN OF CARE
Problem: Discharge Planning  Goal: Discharge to home or other facility with appropriate resources  Outcome: Progressing  Flowsheets (Taken 3/21/2024 0047)  Discharge to home or other facility with appropriate resources:   Identify barriers to discharge with patient and caregiver   Arrange for needed discharge resources and transportation as appropriate     Problem: Pain  Goal: Verbalizes/displays adequate comfort level or baseline comfort level  Outcome: Progressing     Problem: Skin/Tissue Integrity  Goal: Absence of new skin breakdown  Description: 1.  Monitor for areas of redness and/or skin breakdown  2.  Assess vascular access sites hourly  3.  Every 4-6 hours minimum:  Change oxygen saturation probe site  4.  Every 4-6 hours:  If on nasal continuous positive airway pressure, respiratory therapy assess nares and determine need for appliance change or resting period.  Outcome: Progressing     Problem: Safety - Adult  Goal: Free from fall injury  Outcome: Progressing

## 2024-03-21 NOTE — H&P
appendicitis.  Colonic diverticulosis without focal diverticulitis.  Fat-containing abdominal hernia.  No acute osseous abnormality.      Impression: Bilateral nephrolithiasis.  Bilateral nephro-ureteral stents in place.  Mild bilateral hydronephrosis. Colonic diverticulosis. Fat-containing abdominal hernia.  All CT scans are performed using dose optimization techniques as appropriate to the performed exam and include at least one of the following: Automated exposure control, adjustment of the mA and/or kV according to size, and the use of iterative reconstruction technique.  ______________________________________ Electronically signed by: FILIBERTO SALDANA D.O. Date:     03/20/2024 Time:    17:39       Assessment/Plan:  Principal Problem:    Pyelonephritis   -Urinalysis & Culture    -prior urine culture Dec 2023 E.coli   -Recent March 17 2024 Klebsiella oxyocta    -IV antibiotic, Rocephin    -Monitor I&O  Active Problems:  Dsypnea on exertion   -CXR  -BNP   -D-Dimer   -ECHO   -Supplemental oxygen as warranted  Hyperkalemia   -Lokelma X1   -IVF    -avoid offending agents    -daily labs     Hypertension   -Currently 118/69   -monitor vital signs     Type II diabetes mellitus   -Accucheck  -A1c  -Sliding scale insulin   -Hypoglycemia protocol as warranted      DVT prophylactic: Lovenox     Signed:  MATTHEW Polanco - CNP, 3/20/2024 8:25 PM    EMR Dragon/Transcription disclaimer:   Much of this encounter note is an electronic transcription/translation of spoken language to printed text. The electronic translation of spoken language may permit erroneous, or at times, nonsensical words or phrases to be inadvertently transcribed; although attempts have made to review the note for such errors, some may still exist.

## 2024-03-21 NOTE — ACP (ADVANCE CARE PLANNING)
Advance Care Planning     Advance Care Planning Activator (Inpatient)  Conversation Note      Date of ACP Conversation: 3/21/2024     Conversation Conducted with: Patient with Decision Making Capacity    ACP Activator: Susan Williamson RN        Health Care Decision Maker:     Current Designated Health Care Decision Maker:     Primary Decision Maker: JOSE SHERMAN - Child - [phone]    Primary Decision Maker: Saida Garcia - Child - [phone]      Ventilation:  \"If you were in your present state of health and suddenly became very ill and were unable to breathe on your own, what would your preference be about the use of a ventilator (breathing machine) if it were available to you?\"      Would the patient desire the use of ventilator (breathing machine)?:   NO          Resuscitation  \"CPR works best to restart the heart when there is a sudden event, like a heart attack, in someone who is otherwise healthy. Unfortunately, CPR does not typically restart the heart for people who have serious health conditions or who are very sick.\"    \"In the event your heart stopped as a result of an underlying serious health condition, would you want attempts to be made to restart your heart (answer \"yes\" for attempt to resuscitate) or would you prefer a natural death (answer \"no\" for do not attempt to resuscitate)?\"        NO       [x] Yes   [] No   Educated Patient / Decision Maker regarding differences between Advance Directives and portable DNR orders.        Conversation Outcomes:  ACP discussion completed and Existing advance directive reviewed with patient; no changes to patient's previously recorded wishes

## 2024-03-21 NOTE — PROGRESS NOTES
Recent Labs     03/20/24  1817   BUN 56*       Recent Labs     03/20/24 1817   CREATININE 1.7*       Estimated Creatinine Clearance: 44 mL/min (A) (based on SCr of 1.7 mg/dL (H)).      Plan: Changed Merrem to 1000mg Q12hr due to patients renal function and indication for use.    Electronically signed by Amilcar Hoffman RPH on 3/20/2024 at 10:14 PM

## 2024-03-21 NOTE — CARE COORDINATION
Spoke with pts daughter Srini about dc plans. They are looking at pt needing nhp once ready for dc.  She would like referrals [facility].  No specific order. Pt will need pt/ot orders and will require a precert.  Electronically signed by Sis Ortega RN on 3/21/2024 at 5:17 PM

## 2024-03-22 ENCOUNTER — READMISSION MANAGEMENT (OUTPATIENT)
Dept: CALL CENTER | Facility: HOSPITAL | Age: 69
End: 2024-03-22
Payer: MEDICARE

## 2024-03-22 LAB
ANION GAP SERPL CALCULATED.3IONS-SCNC: 10 MMOL/L (ref 7–19)
BACTERIA UR CULT: ABNORMAL
BUN SERPL-MCNC: 28 MG/DL (ref 8–23)
CALCIUM SERPL-MCNC: 8.8 MG/DL (ref 8.8–10.2)
CHLORIDE SERPL-SCNC: 110 MMOL/L (ref 98–111)
CO2 SERPL-SCNC: 24 MMOL/L (ref 22–29)
CREAT SERPL-MCNC: 1.2 MG/DL (ref 0.5–0.9)
ERYTHROCYTE [DISTWIDTH] IN BLOOD BY AUTOMATED COUNT: 13.2 % (ref 11.5–14.5)
GLUCOSE BLD-MCNC: 117 MG/DL (ref 70–99)
GLUCOSE BLD-MCNC: 133 MG/DL (ref 70–99)
GLUCOSE BLD-MCNC: 138 MG/DL (ref 70–99)
GLUCOSE BLD-MCNC: 160 MG/DL (ref 70–99)
GLUCOSE SERPL-MCNC: 155 MG/DL (ref 74–109)
HCT VFR BLD AUTO: 34.2 % (ref 37–47)
HGB BLD-MCNC: 11.2 G/DL (ref 12–16)
MCH RBC QN AUTO: 29.2 PG (ref 27–31)
MCHC RBC AUTO-ENTMCNC: 32.7 G/DL (ref 33–37)
MCV RBC AUTO: 89.1 FL (ref 81–99)
ORGANISM: ABNORMAL
PERFORMED ON: ABNORMAL
PLATELET # BLD AUTO: 218 K/UL (ref 130–400)
PMV BLD AUTO: 10.4 FL (ref 9.4–12.3)
POTASSIUM SERPL-SCNC: 4.5 MMOL/L (ref 3.5–5)
RBC # BLD AUTO: 3.84 M/UL (ref 4.2–5.4)
SODIUM SERPL-SCNC: 144 MMOL/L (ref 136–145)
WBC # BLD AUTO: 6.4 K/UL (ref 4.8–10.8)

## 2024-03-22 PROCEDURE — 2700000000 HC OXYGEN THERAPY PER DAY

## 2024-03-22 PROCEDURE — 6370000000 HC RX 637 (ALT 250 FOR IP)

## 2024-03-22 PROCEDURE — 6370000000 HC RX 637 (ALT 250 FOR IP): Performed by: STUDENT IN AN ORGANIZED HEALTH CARE EDUCATION/TRAINING PROGRAM

## 2024-03-22 PROCEDURE — 85027 COMPLETE CBC AUTOMATED: CPT

## 2024-03-22 PROCEDURE — 6360000002 HC RX W HCPCS: Performed by: HOSPITALIST

## 2024-03-22 PROCEDURE — 51798 US URINE CAPACITY MEASURE: CPT

## 2024-03-22 PROCEDURE — 94760 N-INVAS EAR/PLS OXIMETRY 1: CPT

## 2024-03-22 PROCEDURE — 6360000002 HC RX W HCPCS: Performed by: STUDENT IN AN ORGANIZED HEALTH CARE EDUCATION/TRAINING PROGRAM

## 2024-03-22 PROCEDURE — 80048 BASIC METABOLIC PNL TOTAL CA: CPT

## 2024-03-22 PROCEDURE — 6370000000 HC RX 637 (ALT 250 FOR IP): Performed by: HOSPITALIST

## 2024-03-22 PROCEDURE — 1200000000 HC SEMI PRIVATE

## 2024-03-22 PROCEDURE — 6360000002 HC RX W HCPCS

## 2024-03-22 PROCEDURE — 97161 PT EVAL LOW COMPLEX 20 MIN: CPT

## 2024-03-22 PROCEDURE — 36415 COLL VENOUS BLD VENIPUNCTURE: CPT

## 2024-03-22 PROCEDURE — 97116 GAIT TRAINING THERAPY: CPT

## 2024-03-22 PROCEDURE — 2580000003 HC RX 258

## 2024-03-22 PROCEDURE — 97530 THERAPEUTIC ACTIVITIES: CPT

## 2024-03-22 PROCEDURE — 97165 OT EVAL LOW COMPLEX 30 MIN: CPT

## 2024-03-22 PROCEDURE — 82962 GLUCOSE BLOOD TEST: CPT

## 2024-03-22 PROCEDURE — 2580000003 HC RX 258: Performed by: STUDENT IN AN ORGANIZED HEALTH CARE EDUCATION/TRAINING PROGRAM

## 2024-03-22 RX ORDER — FLUCONAZOLE 100 MG/1
200 TABLET ORAL DAILY
Status: DISCONTINUED | OUTPATIENT
Start: 2024-03-22 | End: 2024-03-23 | Stop reason: HOSPADM

## 2024-03-22 RX ADMIN — OXYCODONE HYDROCHLORIDE AND ACETAMINOPHEN 1 TABLET: 7.5; 325 TABLET ORAL at 05:05

## 2024-03-22 RX ADMIN — OXYCODONE HYDROCHLORIDE AND ACETAMINOPHEN 1 TABLET: 7.5; 325 TABLET ORAL at 20:18

## 2024-03-22 RX ADMIN — ACETAMINOPHEN 650 MG: 325 TABLET ORAL at 11:58

## 2024-03-22 RX ADMIN — NALOXEGOL OXALATE 12.5 MG: 12.5 TABLET, FILM COATED ORAL at 05:05

## 2024-03-22 RX ADMIN — ENOXAPARIN SODIUM 30 MG: 100 INJECTION SUBCUTANEOUS at 20:17

## 2024-03-22 RX ADMIN — METOPROLOL TARTRATE 100 MG: 50 TABLET, FILM COATED ORAL at 09:34

## 2024-03-22 RX ADMIN — TRAMADOL HYDROCHLORIDE 50 MG: 50 TABLET, COATED ORAL at 00:59

## 2024-03-22 RX ADMIN — GABAPENTIN 600 MG: 600 TABLET, FILM COATED ORAL at 09:33

## 2024-03-22 RX ADMIN — GABAPENTIN 600 MG: 600 TABLET, FILM COATED ORAL at 20:17

## 2024-03-22 RX ADMIN — OXYCODONE HYDROCHLORIDE AND ACETAMINOPHEN 1 TABLET: 7.5; 325 TABLET ORAL at 09:51

## 2024-03-22 RX ADMIN — GABAPENTIN 600 MG: 600 TABLET, FILM COATED ORAL at 18:07

## 2024-03-22 RX ADMIN — BISACODYL 5 MG: 5 TABLET, COATED ORAL at 09:34

## 2024-03-22 RX ADMIN — OXYCODONE HYDROCHLORIDE AND ACETAMINOPHEN 1 TABLET: 7.5; 325 TABLET ORAL at 14:36

## 2024-03-22 RX ADMIN — PRAVASTATIN SODIUM 40 MG: 20 TABLET ORAL at 09:34

## 2024-03-22 RX ADMIN — FLUCONAZOLE 200 MG: 100 TABLET ORAL at 13:58

## 2024-03-22 RX ADMIN — ERGOCALCIFEROL 50000 UNITS: 1.25 CAPSULE ORAL at 09:34

## 2024-03-22 RX ADMIN — ENOXAPARIN SODIUM 30 MG: 100 INJECTION SUBCUTANEOUS at 09:33

## 2024-03-22 RX ADMIN — MICONAZOLE NITRATE: 2 POWDER TOPICAL at 09:00

## 2024-03-22 RX ADMIN — GABAPENTIN 600 MG: 600 TABLET, FILM COATED ORAL at 13:46

## 2024-03-22 RX ADMIN — WATER 1000 MG: 1 INJECTION INTRAMUSCULAR; INTRAVENOUS; SUBCUTANEOUS at 13:45

## 2024-03-22 RX ADMIN — ALLOPURINOL 100 MG: 100 TABLET ORAL at 09:34

## 2024-03-22 RX ADMIN — METOPROLOL TARTRATE 100 MG: 50 TABLET, FILM COATED ORAL at 20:18

## 2024-03-22 RX ADMIN — TRAMADOL HYDROCHLORIDE 50 MG: 50 TABLET, COATED ORAL at 18:10

## 2024-03-22 RX ADMIN — PANTOPRAZOLE SODIUM 40 MG: 40 TABLET, DELAYED RELEASE ORAL at 05:05

## 2024-03-22 RX ADMIN — MEROPENEM 1000 MG: 1 INJECTION, POWDER, FOR SOLUTION INTRAVENOUS at 00:49

## 2024-03-22 ASSESSMENT — PAIN SCALES - GENERAL
PAINLEVEL_OUTOF10: 7
PAINLEVEL_OUTOF10: 6
PAINLEVEL_OUTOF10: 5
PAINLEVEL_OUTOF10: 8

## 2024-03-22 ASSESSMENT — PAIN DESCRIPTION - LOCATION
LOCATION: BACK
LOCATION: PELVIS

## 2024-03-22 ASSESSMENT — PAIN DESCRIPTION - ORIENTATION
ORIENTATION: LOWER

## 2024-03-22 ASSESSMENT — PAIN DESCRIPTION - DESCRIPTORS
DESCRIPTORS: DISCOMFORT
DESCRIPTORS: DISCOMFORT
DESCRIPTORS: STABBING

## 2024-03-22 NOTE — PROGRESS NOTES
Daily Progress Note    Date:3/22/2024  Patient: Santa Puente  : 1955  MRN:[id number]  CODE:DNR No additional code details  PCP:Bala Regan    Admit Date: 3/20/2024  1:11 PM   LOS: 2 days     Chief Complaint   Patient presents with    Fatigue     Pt arrives from home via EMS. Reports having increased weakness/fatigue after having kidney stents placed approx 2 weeks ago (worsening over last 5 days).          Subjective: Continues to endorse dysuria, it has improved somewhat. Krishna catheter removed yesterday, voiding without difficulty. No fevers or chills.         Hospital Summary: 67 yo F with T2DM, HTN, COPD, FREDDY, HLD, nephrolithiasis, renal abscess who presented to [facility] ED with fatigue, dysuria, back pain, dyspnea.   CT abdomen/pelvis showed bilateral nephrolithiasis, bilateral nephro-ureteral stents with mild hydronephrosis. UA showed TNTC WBCs, 4+ bacteria. Cr elevated at 1.6.  Pt recently underwent cystoscopy w/ lithrotripsy and bilateral ureteral stent placement on 24. Plan was for 2nd stage ureteroscopy in ~4 weeks, scheduled for 3/29.   Case discussed with urology prior to admission, no intervention warranted at this time.     Pt admitted to hospitalist service for further management.   UTI treated empirically with Meropenem given history of ESBL. Urine culture grew Klebsiella and Shasha Krusei. Transitioned ABX to Ceftriaxone based on sensitivities. Opted to treat Candida with PO Fluconazole given recent instrumentation and indwelling ureteral stents.  Renal function improving with IVF. Krishna catheter was placed in the ED and removed on 3/21/24.   Trospium held as it may be worsening her urinary retention.  Pt is planning to go to SNF at discharge. Referrals are pending. Plan is for her family to transport her from [facility] to [facility] on 3/29 for urologic procedure.         Review of Systems   All other systems reviewed and are negative.      Objective:      Vital signs in last 24  4+ bacteria  -- Urine culture growing Klebsiella and Shasha Krusei  -- Transition Meropenem to Ceftriaxone  -- Start PO Fluconazole; opted to treat Candida given recent urologic instrumentation and indwelling ureteral stents  -- Will continue antibiotics through planned procedure on 3/29    BINH  -- Prerenal vs post-obstructive  -- Cr 1.6 OA; improving with IVF and whiting catheter  -- Continue IVF  -- DC whiting catheter  -- Q6H bladder scan, straight cath if >300  -- Daily BMP  -- Stop Trospium as it may be worsening urinary retention    Constipation  -- Movantik, Miralax BID    T2DM  -- Low SSI  -- A1C 5.8    HTN  -- Continue Lopressor      DVT prophylaxis: Lovenox    DISPOSITION:  Discussed with case management, SNF referrals pending. Would need to be able to go to [place] on 3/29 for outpatient procedure.    DNR No additional code details         After evaluating the complexity of problems addressed and management options selected today, I believe the patient's risk of complications and/or morbidity or mortality to be high.      Bimal Piña MD 3/22/2024 1:38 PM

## 2024-03-22 NOTE — PROGRESS NOTES
Automatic Dose Adjustment of                Subcutaneous Anticoagulant for Prophylaxis    Santa Puente is a 68 y.o. female.     Recent Labs     03/20/24  2229 03/21/24  1032   CREATININE 1.5* 1.1*       Estimated Creatinine Clearance: 69 mL/min (A) (based on SCr of 1.1 mg/dL (H)).    Weight:  Wt Readings from Last 1 Encounters:   03/20/24 (!) 143.1 kg (315 lb 8 oz)           Pharmacy has adjusted subcutaneous anticoagulant for prophylaxis to Enoxaparin 30 mg SC twice daily based on the patient's weight and estimated CrCl per [facility] policy.             SIMONA WEISS, PHARM D, 3/21/2024, 7:27 PM

## 2024-03-22 NOTE — OUTREACH NOTE
Prep Survey      Flowsheet Row Responses   [facility]thod[facility] facility patient discharged from? Non-BH   Is LACE score < 7 ? Non-BH Discharge   Eligibility [facility]   Date of Admission 03/20/24   Date of Discharge 03/21/24   Discharge Disposition Home or Self Care   Discharge diagnosis SHAINA, acute cystitis   Does the patient have one of the following disease processes/diagnoses(primary or secondary)? Other   Prep survey completed? Yes            Ana Laura GRAY - Registered Nurse

## 2024-03-22 NOTE — PLAN OF CARE
Problem: Discharge Planning  Goal: Discharge to home or other facility with appropriate resources  Outcome: Progressing  Flowsheets (Taken 3/22/2024 1058)  Discharge to home or other facility with appropriate resources: Identify barriers to discharge with patient and caregiver     Problem: Pain  Goal: Verbalizes/displays adequate comfort level or baseline comfort level  Outcome: Progressing     Problem: Skin/Tissue Integrity  Goal: Absence of new skin breakdown  Description: 1.  Monitor for areas of redness and/or skin breakdown  2.  Assess vascular access sites hourly  3.  Every 4-6 hours minimum:  Change oxygen saturation probe site  4.  Every 4-6 hours:  If on nasal continuous positive airway pressure, respiratory therapy assess nares and determine need for appliance change or resting period.  Outcome: Progressing     Problem: Safety - Adult  Goal: Free from fall injury  Outcome: Progressing

## 2024-03-22 NOTE — PROGRESS NOTES
Pharmacy Adjustment per [facility] protocol    Santa Puente is a 68 y.o. female. Pharmacy has adjusted medications per [facility] protocol.    Recent Labs     03/21/24  1032 03/22/24  0416   BUN 36* 28*       Recent Labs     03/21/24  1032 03/22/24  0416   CREATININE 1.1* 1.2*       Estimated Creatinine Clearance: 63 mL/min (A) (based on SCr of 1.2 mg/dL (H)).    Height:   Ht Readings from Last 1 Encounters:   03/20/24 1.626 m (5' 4\")     Weight:  Wt Readings from Last 1 Encounters:   03/20/24 (!) 143.1 kg (315 lb 8 oz)         Plan: Adjust the following medications based on [facility] protocol:           Change meropenem to 1000mg IV every 8 hours to finish out 5 days    SIMONA WEISS, PHARM D, 3/22/2024, 10:54 AM

## 2024-03-22 NOTE — PROGRESS NOTES
Physical Therapy  Facility/Department: [facility] 3 DEBRA/VAS/MED  Physical Therapy Initial Assessment    Name: Santa Puente  : 1955  MRN: [id number]  Date of Service: 3/22/2024    Discharge Recommendations:  Continue to assess pending progress, Patient would benefit from continued therapy after discharge (CHART NOTES pt PLANS TO DC TO SNF FOR REHAB)          Patient Diagnosis(es): The primary encounter diagnosis was BINH (acute kidney injury) (HCC). Diagnoses of Acute cystitis without hematuria and Pain due to ureteral stent, initial encounter ([facility]) were also pertinent to this visit.  Past Medical History:  has a past medical history of Arthritis, HTN (hypertension), and Palliative care patient.  Past Surgical History:  has a past surgical history that includes Cholecystectomy; Ankle fracture surgery (Right); Hysterectomy; Incontinence surgery; Bladder surgery (Left, 6/15/2022); Cystoscopy (Left, 2022); and Cystoscopy (Left, 2022).    Assessment   Body Structures, Functions, Activity Limitations Requiring Skilled Therapeutic Intervention: Decreased functional mobility ;Decreased endurance  Assessment: pt WOULD BENEFIT FROM SKILLED PT IN THIS SETTING TO PROGRESS HER MOBILITY AND ASSIST IN DC PLANNING  Therapy Prognosis: Good  Decision Making: Low Complexity  Requires PT Follow-Up: Yes  Activity Tolerance  Activity Tolerance: Patient tolerated treatment well;Patient limited by endurance     Plan   Physical Therapy Plan  General Plan: 5-7 times per week  Therapy Duration: 2 Weeks  Current Treatment Recommendations: Strengthening, Functional mobility training, Transfer training, Gait training, Safety education & training, Positioning, Therapeutic activities  Additional Comments: 02, PROGRESS AMBULATION DISTANCE AS TOLERATED. MAY NEED ASSIST OF TWO INITIALY DUE TO MORBID OBESITY  Safety Devices  Type of Devices: Call light within reach, Left in chair, Gait belt, Heels elevated for pressure relief, Nurse notified  height  Distance: TOOK STEPS FROM BED TO RECLINER PLACED AT BEDSIDE.  Comments: WILL REQUIRE 02 TO AMB FARTHER                    Goals  Short Term Goals  Time Frame for Short Term Goals: 2 WKS  Short Term Goal 1: AMB 50 FT WITH RW, CGA       Education  Patient Education  Education Given To: Patient  Education Provided: Role of Therapy;Plan of Care;Transfer Training  Education Method: Verbal  Barriers to Learning: None  Education Outcome: Verbalized understanding      Therapy Time   Individual Concurrent Group Co-treatment   Time In           Time Out           Minutes                   Yuki Odell PT   Electronically signed by Yuki Odell PT on 3/22/2024 at 11:51 AM

## 2024-03-22 NOTE — CARE COORDINATION
Pt's [facility]; precert was initiated today; precert was also received today; per attending, likely ready for d/c tomorrow;  Fely    P   F  Electronically signed by JAVIER Watson on 3/22/2024 at 2:33 PM

## 2024-03-22 NOTE — PROGRESS NOTES
assistance  LE Bathing: Maximum assistance  UE Dressing: Moderate assistance  LE Dressing: Maximum assistance  Toileting: Moderate assistance  Additional Comments: using RW        Bed mobility  Supine to Sit: Stand by assistance  Transfers  Sit to stand: Contact guard assistance;Minimal assistance  Transfer Comments: Pt t/fered to chair using RW with CGA     Cognition  Overall Cognitive Status: WFL               Gross Assessment  AROM: Within functional limits  Strength: Generally decreased, functional (B UE strength 3+/5)                         Plan   Occupational Therapy Plan  Times Per Week: 3-5  Current Treatment Recommendations: Strengthening, Balance training, Functional mobility training, Endurance training, Patient/Caregiver education & training, Self-Care / ADL, Safety education & training, Positioning, Equipment evaluation, education, & procurement, Home management training       Goals  Short Term Goals  Time Frame for Short Term Goals: 1-2 weeks  Short Term Goal 1: Perform dressing with min A  Short Term Goal 2: Perform toileting with CGA  Short Term Goal 3: Upgrade activity to include light ambulatory ADL  Short Term Goal 4: Patient and family will be independent with therapeutic activity recommendations, positioning,and safety recommendations  Short Term Goal 5: Pt will increase B UE strength to 4-/5.        Tx initiated: Pt was willing to participate during eval and tx. Pt was eager to sit up. Pt displayed significant B UE weakness impairing ability to complete functional mobility, ADL routine, and t/fers safely. Pt t/fered from bed to chair using RW with CGA. Pt requires more assistance for ADL routine due to weakness. Pt is motivated to go to rehab facility. (15 minutes)  GERARDO Anne/L  Electronically signed by Saima OLGUIN on 3/22/2024 at 11:51 AM.

## 2024-03-23 VITALS
OXYGEN SATURATION: 98 % | TEMPERATURE: 98.2 F | DIASTOLIC BLOOD PRESSURE: 81 MMHG | BODY MASS INDEX: 50.02 KG/M2 | HEIGHT: 64 IN | SYSTOLIC BLOOD PRESSURE: 143 MMHG | WEIGHT: 293 LBS | RESPIRATION RATE: 16 BRPM | HEART RATE: 101 BPM

## 2024-03-23 LAB
ANION GAP SERPL CALCULATED.3IONS-SCNC: 8 MMOL/L (ref 7–19)
BUN SERPL-MCNC: 21 MG/DL (ref 8–23)
CALCIUM SERPL-MCNC: 9.3 MG/DL (ref 8.8–10.2)
CHLORIDE SERPL-SCNC: 106 MMOL/L (ref 98–111)
CO2 SERPL-SCNC: 28 MMOL/L (ref 22–29)
CREAT SERPL-MCNC: 0.9 MG/DL (ref 0.5–0.9)
ERYTHROCYTE [DISTWIDTH] IN BLOOD BY AUTOMATED COUNT: 13.2 % (ref 11.5–14.5)
GLUCOSE BLD-MCNC: 111 MG/DL (ref 70–99)
GLUCOSE BLD-MCNC: 126 MG/DL (ref 70–99)
GLUCOSE BLD-MCNC: 137 MG/DL (ref 70–99)
GLUCOSE SERPL-MCNC: 136 MG/DL (ref 74–109)
HCT VFR BLD AUTO: 35.4 % (ref 37–47)
HGB BLD-MCNC: 10.7 G/DL (ref 12–16)
MCH RBC QN AUTO: 27.7 PG (ref 27–31)
MCHC RBC AUTO-ENTMCNC: 30.2 G/DL (ref 33–37)
MCV RBC AUTO: 91.7 FL (ref 81–99)
PERFORMED ON: ABNORMAL
PLATELET # BLD AUTO: 216 K/UL (ref 130–400)
PMV BLD AUTO: 9.7 FL (ref 9.4–12.3)
POTASSIUM SERPL-SCNC: 4.3 MMOL/L (ref 3.5–5)
RBC # BLD AUTO: 3.86 M/UL (ref 4.2–5.4)
SODIUM SERPL-SCNC: 142 MMOL/L (ref 136–145)
WBC # BLD AUTO: 6.8 K/UL (ref 4.8–10.8)

## 2024-03-23 PROCEDURE — 6360000002 HC RX W HCPCS: Performed by: HOSPITALIST

## 2024-03-23 PROCEDURE — 82962 GLUCOSE BLOOD TEST: CPT

## 2024-03-23 PROCEDURE — 2580000003 HC RX 258: Performed by: STUDENT IN AN ORGANIZED HEALTH CARE EDUCATION/TRAINING PROGRAM

## 2024-03-23 PROCEDURE — 36415 COLL VENOUS BLD VENIPUNCTURE: CPT

## 2024-03-23 PROCEDURE — 6370000000 HC RX 637 (ALT 250 FOR IP): Performed by: STUDENT IN AN ORGANIZED HEALTH CARE EDUCATION/TRAINING PROGRAM

## 2024-03-23 PROCEDURE — 85027 COMPLETE CBC AUTOMATED: CPT

## 2024-03-23 PROCEDURE — 6370000000 HC RX 637 (ALT 250 FOR IP)

## 2024-03-23 PROCEDURE — 80048 BASIC METABOLIC PNL TOTAL CA: CPT

## 2024-03-23 PROCEDURE — 94760 N-INVAS EAR/PLS OXIMETRY 1: CPT

## 2024-03-23 PROCEDURE — 6370000000 HC RX 637 (ALT 250 FOR IP): Performed by: HOSPITALIST

## 2024-03-23 PROCEDURE — 2580000003 HC RX 258: Performed by: HOSPITALIST

## 2024-03-23 PROCEDURE — 6360000002 HC RX W HCPCS: Performed by: STUDENT IN AN ORGANIZED HEALTH CARE EDUCATION/TRAINING PROGRAM

## 2024-03-23 RX ORDER — ERGOCALCIFEROL 1.25 MG/1
50000 CAPSULE ORAL WEEKLY
Qty: 5 CAPSULE | Refills: 0 | Status: SHIPPED | OUTPATIENT
Start: 2024-03-29

## 2024-03-23 RX ORDER — TRAMADOL HYDROCHLORIDE 50 MG/1
100 TABLET ORAL 4 TIMES DAILY
Qty: 24 TABLET | Refills: 0 | Status: SHIPPED | OUTPATIENT
Start: 2024-03-23 | End: 2024-03-26

## 2024-03-23 RX ORDER — FLUCONAZOLE 200 MG/1
200 TABLET ORAL DAILY
Qty: 8 TABLET | Refills: 0 | Status: SHIPPED | OUTPATIENT
Start: 2024-03-24 | End: 2024-04-01

## 2024-03-23 RX ORDER — METOPROLOL TARTRATE 100 MG/1
100 TABLET ORAL 2 TIMES DAILY
Qty: 60 TABLET | Refills: 1 | Status: SHIPPED | OUTPATIENT
Start: 2024-03-23

## 2024-03-23 RX ORDER — OXYCODONE HYDROCHLORIDE 5 MG/1
2.5 TABLET ORAL EVERY 6 HOURS PRN
Qty: 6 TABLET | Refills: 0 | Status: SHIPPED | OUTPATIENT
Start: 2024-03-23 | End: 2024-03-26

## 2024-03-23 RX ORDER — CEFDINIR 300 MG/1
300 CAPSULE ORAL 2 TIMES DAILY
Qty: 16 CAPSULE | Refills: 0 | Status: SHIPPED | OUTPATIENT
Start: 2024-03-23 | End: 2024-03-31

## 2024-03-23 RX ORDER — NALOXONE HYDROCHLORIDE 4 MG/.1ML
1 SPRAY NASAL PRN
Qty: 1 EACH | Refills: 0 | Status: SHIPPED | OUTPATIENT
Start: 2024-03-23

## 2024-03-23 RX ORDER — GABAPENTIN 600 MG/1
600 TABLET ORAL 4 TIMES DAILY
Qty: 12 TABLET | Refills: 0 | Status: SHIPPED | OUTPATIENT
Start: 2024-03-23 | End: 2024-03-26

## 2024-03-23 RX ORDER — ACETAMINOPHEN 500 MG
1000 TABLET ORAL 3 TIMES DAILY
Qty: 180 TABLET | Refills: 0 | Status: SHIPPED | OUTPATIENT
Start: 2024-03-23

## 2024-03-23 RX ORDER — POLYETHYLENE GLYCOL 3350 17 G/17G
17 POWDER, FOR SOLUTION ORAL 2 TIMES DAILY
Qty: 527 G | Refills: 1 | Status: SHIPPED | OUTPATIENT
Start: 2024-03-23 | End: 2024-04-23

## 2024-03-23 RX ADMIN — GABAPENTIN 600 MG: 600 TABLET, FILM COATED ORAL at 08:50

## 2024-03-23 RX ADMIN — TRAMADOL HYDROCHLORIDE 50 MG: 50 TABLET, COATED ORAL at 00:45

## 2024-03-23 RX ADMIN — GABAPENTIN 600 MG: 600 TABLET, FILM COATED ORAL at 17:18

## 2024-03-23 RX ADMIN — ACETAMINOPHEN 650 MG: 325 TABLET ORAL at 04:42

## 2024-03-23 RX ADMIN — PRAVASTATIN SODIUM 40 MG: 20 TABLET ORAL at 08:49

## 2024-03-23 RX ADMIN — GABAPENTIN 600 MG: 600 TABLET, FILM COATED ORAL at 13:06

## 2024-03-23 RX ADMIN — SODIUM CHLORIDE, PRESERVATIVE FREE 10 ML: 5 INJECTION INTRAVENOUS at 08:52

## 2024-03-23 RX ADMIN — ALLOPURINOL 100 MG: 100 TABLET ORAL at 08:50

## 2024-03-23 RX ADMIN — OXYCODONE HYDROCHLORIDE AND ACETAMINOPHEN 1 TABLET: 7.5; 325 TABLET ORAL at 16:26

## 2024-03-23 RX ADMIN — METOPROLOL TARTRATE 100 MG: 50 TABLET, FILM COATED ORAL at 08:50

## 2024-03-23 RX ADMIN — ENOXAPARIN SODIUM 30 MG: 100 INJECTION SUBCUTANEOUS at 08:50

## 2024-03-23 RX ADMIN — WATER 1000 MG: 1 INJECTION INTRAMUSCULAR; INTRAVENOUS; SUBCUTANEOUS at 14:34

## 2024-03-23 RX ADMIN — MICONAZOLE NITRATE: 2 POWDER TOPICAL at 09:02

## 2024-03-23 RX ADMIN — BISACODYL 5 MG: 5 TABLET, COATED ORAL at 08:50

## 2024-03-23 RX ADMIN — HYDROMORPHONE HYDROCHLORIDE 0.5 MG: 1 INJECTION, SOLUTION INTRAMUSCULAR; INTRAVENOUS; SUBCUTANEOUS at 20:08

## 2024-03-23 RX ADMIN — OXYCODONE HYDROCHLORIDE AND ACETAMINOPHEN 1 TABLET: 7.5; 325 TABLET ORAL at 11:26

## 2024-03-23 RX ADMIN — FLUCONAZOLE 200 MG: 100 TABLET ORAL at 08:58

## 2024-03-23 RX ADMIN — OXYCODONE HYDROCHLORIDE AND ACETAMINOPHEN 1 TABLET: 7.5; 325 TABLET ORAL at 06:40

## 2024-03-23 ASSESSMENT — PAIN DESCRIPTION - DESCRIPTORS
DESCRIPTORS: ACHING;DISCOMFORT
DESCRIPTORS: ACHING;DISCOMFORT

## 2024-03-23 ASSESSMENT — PAIN DESCRIPTION - ORIENTATION
ORIENTATION: LOWER
ORIENTATION: LOWER

## 2024-03-23 ASSESSMENT — PAIN - FUNCTIONAL ASSESSMENT
PAIN_FUNCTIONAL_ASSESSMENT: PREVENTS OR INTERFERES WITH MANY ACTIVE NOT PASSIVE ACTIVITIES
PAIN_FUNCTIONAL_ASSESSMENT: PREVENTS OR INTERFERES WITH MANY ACTIVE NOT PASSIVE ACTIVITIES

## 2024-03-23 ASSESSMENT — PAIN DESCRIPTION - LOCATION
LOCATION: BACK
LOCATION: BACK

## 2024-03-23 ASSESSMENT — PAIN SCALES - GENERAL
PAINLEVEL_OUTOF10: 7
PAINLEVEL_OUTOF10: 8

## 2024-03-23 NOTE — PLAN OF CARE
Problem: Discharge Planning  Goal: Discharge to home or other facility with appropriate resources  Outcome: Adequate for Discharge     Problem: Pain  Goal: Verbalizes/displays adequate comfort level or baseline comfort level  Outcome: Adequate for Discharge     Problem: Skin/Tissue Integrity  Goal: Absence of new skin breakdown  Description: 1.  Monitor for areas of redness and/or skin breakdown  2.  Assess vascular access sites hourly  3.  Every 4-6 hours minimum:  Change oxygen saturation probe site  4.  Every 4-6 hours:  If on nasal continuous positive airway pressure, respiratory therapy assess nares and determine need for appliance change or resting period.  Outcome: Adequate for Discharge     Problem: Safety - Adult  Goal: Free from fall injury  Outcome: Adequate for Discharge  Flowsheets (Taken 3/23/2024 0825 by Yari Weinberg RN)  Free From Fall Injury:   Instruct family/caregiver on patient safety   Based on caregiver fall risk screen, instruct family/caregiver to ask for assistance with transferring infant if caregiver noted to have fall risk factors     Problem: Chronic Conditions and Co-morbidities  Goal: Patient's chronic conditions and co-morbidity symptoms are monitored and maintained or improved  Outcome: Adequate for Discharge

## 2024-03-23 NOTE — CARE COORDINATION
03/23/24 1203   IMM Letter   IMM Letter given to Patient/Family/Significant other/Guardian/POA/by: stacy andrews   IMM Letter date given: 03/23/24   IMM Letter time given: 1202     Second IMM given to patient and explained with patient verbalizing understanding.  All questions and concerns addressed     Signed letter placed in pt soft chart  Patient declined waiting 4 hr period prior to discharge.  Electronically signed by URVASHI DURBIN on 3/23/2024 at 12:03 PM

## 2024-03-23 NOTE — PROGRESS NOTES
Physical Therapy  Name: Santa Puente  MRN:  [id number]  Date of service:  3/23/2024       03/23/24 1041   Subjective   Subjective Attempted to see pt for therapy tx, however pt states she had just been up to the BSC with nursing and back to bed. Also states that she had a bed bath this morning and feels that she has gotten \"enough movement for now\".         Electronically signed by Marvin Ga PTA on 3/23/2024 at 10:42 AM

## 2024-03-23 NOTE — DISCHARGE SUMMARY
[address] [place] [address]    DEPARTMENT OF HOSPITALIST MEDICINE      DISCHARGE SUMMARY:      PATIENT NAME:  Santa Puente  :  1955  MRN:  [id number]    Admission Date:   3/20/2024  1:11 PM Attending: Bimal Piña MD   Discharge Date:   3/23/2024              PCP: Bala Regan  Length of Stay: 3 days     Chief Complaint on Admission:   Chief Complaint   Patient presents with    Fatigue     Pt arrives from home via EMS. Reports having increased weakness/fatigue after having kidney stents placed approx 2 weeks ago (worsening over last 5 days).        Consultants:     PALLIATIVE CARE STEVEAL       CUMULATIVE  HOSPITAL  COURSE  AND  TREATMENT:  69 yo F with T2DM, HTN, COPD, FREDDY, HLD, nephrolithiasis, renal abscess who presented to [facility] ED with fatigue, dysuria, back pain, dyspnea.   CT abdomen/pelvis showed bilateral nephrolithiasis, bilateral nephro-ureteral stents with mild hydronephrosis. UA showed TNTC WBCs, 4+ bacteria. Cr elevated at 1.6.  Pt recently underwent cystoscopy w/ lithrotripsy and bilateral ureteral stent placement on 24. Plan was for 2nd stage ureteroscopy in ~4 weeks, scheduled for 3/29.   Case discussed with urology prior to admission, no intervention warranted at this time.      Pt admitted to hospitalist service for further management.   UTI treated empirically with Meropenem given history of ESBL. Urine culture grew Klebsiella and Shasha Krusei. Transitioned ABX to Ceftriaxone based on sensitivities. Opted to treat Candida with PO Fluconazole given recent instrumentation and indwelling ureteral stents.  Renal function improving with IVF. Krishna catheter was placed in the ED and removed on 3/21/24.   Trospium held as it may be worsening her urinary retention.  Pt discharged to SNF in stable condition. Plan is for her family to transport her from SNF to [place] on 3/29 for urologic procedure. Ordered antibiotics to continue through the date of scheduled  ! +------------------------------------+----------+---------------+----------+ !Dist Femoral                        !Yes       !Yes            !None      ! +------------------------------------+----------+---------------+----------+ !Deep Femoral                        !Yes       !Yes            !None      ! +------------------------------------+----------+---------------+----------+ !Popliteal                           !Yes       !Yes            !None      ! +------------------------------------+----------+---------------+----------+ !SSV                                 !Yes       !Yes            !None      ! +------------------------------------+----------+---------------+----------+ !Gastroc                             !Yes       !Yes            !None      ! +------------------------------------+----------+---------------+----------+ !PTV                                 !Yes       !Yes            !None      ! +------------------------------------+----------+---------------+----------+ !GSV                                 !Yes       !Yes            !None      ! +------------------------------------+----------+---------------+----------+ !ATV                                 !Yes       !Yes            !None      ! +------------------------------------+----------+---------------+----------+ !Peroneal                            !Yes       !Yes            !None      ! +------------------------------------+----------+---------------+----------+ !Soleal                              !Yes       !Yes            !None      ! +------------------------------------+----------+---------------+----------+ Left Lower Extremities DVT Study Measurements Left 2D Measurements +------------------------------------+----------+---------------+----------+ !Location                            !Visualized!Compressibility!Thrombosis! +------------------------------------+----------+---------------+----------+ !Sapheno Femoral Junction

## 2024-03-25 ENCOUNTER — TRANSITIONAL CARE MANAGEMENT TELEPHONE ENCOUNTER (OUTPATIENT)
Dept: CALL CENTER | Facility: HOSPITAL | Age: 69
End: 2024-03-25
Payer: MEDICARE

## 2024-03-25 NOTE — PROGRESS NOTES
Physician Progress Note      PATIENT:               JYOTHI SHERMAN  CSN #:                  [id number]  :                       1955  ADMIT DATE:       3/20/2024 1:11 PM  DISCH DATE:        3/23/2024 9:10 PM  RESPONDING  PROVIDER #:        Bimal Piña MD          QUERY TEXT:    Pt admitted with UTI.  Pt noted to have S/P stent placement on . If   possible, please document in the progress notes and discharge summary if you   are evaluating and/or treating any of the following:    [UTI due to ureteral stent::UTI is due to ureteral stent.]]    The medical record reflects the following:  Risk Factors: DMII, HTN, UTI  Clinical Indicators: Dysuria, back pain, US 4+ bacteria. S/P cystoscopy with   lithotripsy and bilateral ureteral stenr placement . Plan for second stage   3/29. Culture grew Klelbsiella and chad Krusei.  Treatment: Rocephin 1 g IVPB,   ml/hr, 100 ml/hr, labs, Merrem 1,000   IVPB,    Thank you  Antonette Mckeon RN, BSN, [facility]  [phone]  Options provided:  -- UTI not due to ureteral stent  -- Other - I will add my own diagnosis  -- Disagree - Not applicable / Not valid  -- Disagree - Clinically unable to determine / Unknown  -- Refer to Clinical Documentation Reviewer    PROVIDER RESPONSE TEXT:    UTI is not due to ureteral stent.    Query created by: Antonette Mckeon on 3/25/2024 7:57 AM      Electronically signed by:  Bimal Piña MD 3/25/2024 4:05 PM

## 2024-03-25 NOTE — OUTREACH NOTE
Call Center TCM Note      Flowsheet Row Responses   [facility] patient discharged from? Non-   Does the patient have one of the following disease processes/diagnoses(primary or secondary)? Other   TCM attempt successful? No   Unsuccessful attempts Attempt 1   Change in Health Status Moved to LTC/SNF/Hospice  [Patient was transferred to [facility]]            Radha Caban RN    3/25/2024, 13:34 CDT

## 2024-03-26 LAB
EKG P AXIS: 53 DEGREES
EKG P-R INTERVAL: 154 MS
EKG Q-T INTERVAL: 364 MS
EKG QRS DURATION: 98 MS
EKG QTC CALCULATION (BAZETT): 439 MS
EKG T AXIS: 35 DEGREES

## 2024-04-01 ENCOUNTER — TELEPHONE (OUTPATIENT)
Dept: UROLOGY | Age: 69
End: 2024-04-01

## 2024-04-01 NOTE — TELEPHONE ENCOUNTER
Pt called to schedule post-op, pt saw Dr. Jacob Padilla at [place]. Pt had stents placed on 3/29/24. Dr. Padilla is suppose to send orders/notes to office for stent removal. Please contact pt to discuss, thank you.

## 2024-04-08 ENCOUNTER — TRANSCRIBE ORDERS (OUTPATIENT)
Dept: HOME HEALTH SERVICES | Facility: HOME HEALTHCARE | Age: 69
End: 2024-04-08
Payer: MEDICARE

## 2024-04-08 ENCOUNTER — HOME HEALTH ADMISSION (OUTPATIENT)
Dept: HOME HEALTH SERVICES | Facility: HOME HEALTHCARE | Age: 69
End: 2024-04-08
Payer: MEDICARE

## 2024-04-08 ENCOUNTER — PROCEDURE VISIT (OUTPATIENT)
Dept: UROLOGY | Age: 69
End: 2024-04-08
Payer: MEDICARE

## 2024-04-08 VITALS — HEIGHT: 64 IN | WEIGHT: 274 LBS | TEMPERATURE: 97.5 F | BODY MASS INDEX: 46.78 KG/M2

## 2024-04-08 DIAGNOSIS — Z48.816 ENCOUNTER FOR SURGICAL AFTERCARE FOLLOWING SURGERY OF GENITOURINARY SYSTEM: Primary | ICD-10-CM

## 2024-04-08 DIAGNOSIS — N20.0 RIGHT RENAL STONE: ICD-10-CM

## 2024-04-08 DIAGNOSIS — N15.1 RENAL ABSCESS, LEFT: ICD-10-CM

## 2024-04-08 DIAGNOSIS — Z96.0 RETAINED URETERAL STENT: Primary | ICD-10-CM

## 2024-04-08 DIAGNOSIS — N20.0 LEFT RENAL STONE: ICD-10-CM

## 2024-04-08 LAB
BACTERIA URINE, POC: 0
BILIRUBIN URINE: 0 MG/DL
BLOOD, URINE: POSITIVE
CASTS URINE, POC: 0
CLARITY: ABNORMAL
COLOR: YELLOW
CRYSTALS URINE, POC: 0
EPI CELLS URINE, POC: 0
GLUCOSE URINE: ABNORMAL
KETONES, URINE: NEGATIVE
LEUKOCYTE EST, POC: ABNORMAL
NITRITE, URINE: POSITIVE
PH UA: 5.5 (ref 4.5–8)
PROTEIN UA: POSITIVE
RBC URINE, POC: 100
SPECIFIC GRAVITY UA: 1.03 (ref 1–1.03)
UROBILINOGEN, URINE: ABNORMAL
WBC URINE, POC: ABNORMAL
YEAST URINE, POC: 0

## 2024-04-08 PROCEDURE — 52310 CYSTOSCOPY AND TREATMENT: CPT | Performed by: UROLOGY

## 2024-04-08 PROCEDURE — 81001 URINALYSIS AUTO W/SCOPE: CPT | Performed by: UROLOGY

## 2024-04-08 RX ORDER — OXYCODONE HYDROCHLORIDE 5 MG/1
2.5 TABLET ORAL EVERY 6 HOURS PRN
COMMUNITY

## 2024-04-08 RX ORDER — IBUPROFEN 800 MG/1
800 TABLET ORAL EVERY 8 HOURS PRN
COMMUNITY
Start: 2024-02-23

## 2024-04-08 RX ORDER — LEVOFLOXACIN 750 MG/1
750 TABLET, FILM COATED ORAL DAILY
Qty: 1 TABLET | Refills: 0 | Status: SHIPPED | OUTPATIENT
Start: 2024-04-08 | End: 2024-04-09

## 2024-04-08 RX ORDER — ROSUVASTATIN CALCIUM 5 MG/1
5 TABLET, COATED ORAL NIGHTLY
COMMUNITY
Start: 2024-03-24

## 2024-04-08 NOTE — PROGRESS NOTES
(Auto)  - levoFLOXacin (LEVAQUIN) 750 MG tablet; Take 1 tablet by mouth daily for 1 dose  Dispense: 1 tablet; Refill: 0  - OK CYSTO W/SIMPLE REMOVAL STONE & STENT    2. Right renal stone  Refer you to the operative findings from Dr. Padilla.  2 stage endoscopic treatment.  She will need a follow-up CT urogram to assess for any residual stone burden.  - CT ABDOMEN PELVIS W WO CONTRAST Additional Contrast? Radiologist Recommendation (Urogram Protocol); Future    3. Left renal stone  Refer to the operative findings from Michael status post two-stage endoscopic treatment will need follow-up CT urogram to assess for any residual stone burden.  - CT ABDOMEN PELVIS W WO CONTRAST Additional Contrast? Radiologist Recommendation (Urogram Protocol); Future    4. Renal abscess, left  She has had percutaneous drainage done at [facility].  Follow-up CT scan.  - CT ABDOMEN PELVIS W WO CONTRAST Additional Contrast? Radiologist Recommendation (Urogram Protocol); Future      Orders Placed This Encounter   Procedures    CT ABDOMEN PELVIS W WO CONTRAST Additional Contrast? Radiologist Recommendation (Urogram Protocol)     Standing Status:   Future     Standing Expiration Date:   4/8/2025     Scheduling Instructions:      CT for Urogram protocol     Order Specific Question:   Additional Contrast?     Answer:   Radiologist Recommendation     Comments:   Urogram Protocol     Order Specific Question:   STAT Creatinine as needed:     Answer:   No     Order Specific Question:   Reason for exam:     Answer:   Renal calculi status post ureteroscopy, pyeloscopy laser lithotripsy stents removed follow-up imaging to assess for any residual stone burden also history of left perinephric abscess    POCT Urinalysis Dipstick w/ Micro (Auto)    OK CYSTO W/SIMPLE REMOVAL STONE & STENT     Cysto stent removal, bilateral        Return in about 6 weeks (around 5/20/2024) for Follow-up with Janna CASTRO next visit, office visit after xray study.

## 2024-04-09 ENCOUNTER — HOME CARE VISIT (OUTPATIENT)
Dept: HOME HEALTH SERVICES | Facility: CLINIC | Age: 69
End: 2024-04-09
Payer: MEDICARE

## 2024-04-09 ENCOUNTER — DOCUMENTATION (OUTPATIENT)
Dept: HOME HEALTH SERVICES | Facility: HOME HEALTHCARE | Age: 69
End: 2024-04-09
Payer: MEDICARE

## 2024-04-09 VITALS
RESPIRATION RATE: 18 BRPM | BODY MASS INDEX: 48.32 KG/M2 | WEIGHT: 283 LBS | HEART RATE: 87 BPM | TEMPERATURE: 97.9 F | HEIGHT: 64 IN | DIASTOLIC BLOOD PRESSURE: 64 MMHG | SYSTOLIC BLOOD PRESSURE: 120 MMHG | OXYGEN SATURATION: 87 %

## 2024-04-09 PROCEDURE — G0299 HHS/HOSPICE OF RN EA 15 MIN: HCPCS

## 2024-04-09 NOTE — PROGRESS NOTES
Received HH order from [facility] for SN/PT/OT. Dr. Mcallister will follow. Spoke with pt who is agreeable to services. Confirmed demographics.

## 2024-04-09 NOTE — HOME HEALTH
"SOC Note:  59 year old female admitted to [facility] with recurrent UTIs then transferred to [facility] with kidney stones with obstructions with stents, transferred to [facility] re[facility] for rehab and removal of stents then home on 4/8/24    Home Health ordered for: disciplines PT, OT, SN    Reason for Hosp/Primary Dx/Co-morbidities: Chronic obstructive pyleonephritis, HTN, GERD, COPD, osteoarthritis, type 2 diabetes, obesity.,hyperlipidemia      Focus of Care:Aftercare of  system/ Chronic obstructive pyleonephritis    Patient's goal(s):\"To get stronger\"    Current Functional status/mobility/DME: Ambulatory short distances in own home with rollator walker    HB status/Living Arrangements: Lives alone    Skin Integrity/wound status: Clear except for nonpressure wound to back of right knee    Code Status: Full Code    Fall Risk/Safety concerns: High    Educated on Emergency Plan, steps to take prior to going to the ER and when to Call Home Health First:  Yes     Medication issues/Concerns:No concerns    Additional Problems/Concerns: No    SDOH Barriers (i.e. caregiver concerns, social isolation, transportation, food insecurity, environment, income etc.)/Need for MSW: Wants assist to get Medicaid    Plan for next visit: Wound care, medication education. UTI prevention"

## 2024-04-09 NOTE — Clinical Note
"SOC Note:  59 year old female admitted to [facility] with recurrent UTIs then transferred to [place] with kidney stones with obstructions with stents, transferred to [facility] for rehab and removal of stents then home on 4/8/24    Home Health ordered for: disciplines PT, OT, SN    Reason for Hosp/Primary Dx/Co-morbidities: Chronic obstructive pyleonephritis, HTN, GERD, COPD, osteoarthritis, type 2 diabetes, obesity.,hyperlipidemia      Focus of Care: Chronic obstructive pyleonephritis    Patient's goal(s):\"To get stronger\"    Current Functional status/mobility/DME: Ambulatory short distances in own home with rollator walker    HB status/Living Arrangements: Lives alone    Skin Integrity/wound status: Clear except for nonpressure wound to back of right knee    Code Status: Full Code    Fall Risk/Safety concerns: High    Educated on Emergency Plan, steps to take prior to going to the ER and when to Call Home Health First:  Yes     Medication issues/Concerns:No concerns    Additional Problems/Concerns: No    SDOH Barriers (i.e. caregiver concerns, social isolation, transportation, food insecurity, environment, income etc.)/Need for MSW: Wants assist to get Medicaid    Plan for next visit: Wound care, medication education. UTI prevention"

## 2024-04-10 ENCOUNTER — HOME CARE VISIT (OUTPATIENT)
Dept: HOME HEALTH SERVICES | Facility: CLINIC | Age: 69
End: 2024-04-10
Payer: MEDICARE

## 2024-04-10 DIAGNOSIS — I10 ESSENTIAL HYPERTENSION: ICD-10-CM

## 2024-04-10 PROCEDURE — G0151 HHCP-SERV OF PT,EA 15 MIN: HCPCS

## 2024-04-10 RX ORDER — METOPROLOL TARTRATE 100 MG/1
100 TABLET ORAL 2 TIMES DAILY
Qty: 180 TABLET | Refills: 1 | Status: SHIPPED | OUTPATIENT
Start: 2024-04-10

## 2024-04-10 NOTE — TELEPHONE ENCOUNTER
Rx Refill Note  Requested Prescriptions     Pending Prescriptions Disp Refills    metoprolol tartrate (LOPRESSOR) 100 MG tablet [Pharmacy Med Name: METOPROLOL TARTRATE 100MG TABLET] 180 tablet 1     Sig: TAKE ONE (1) TABLET BY MOUTH 2 (TWO) TIMES A DAY      Last office visit with prescribing clinician: 8/18/2023   Last telemedicine visit with prescribing clinician: Visit date not found   Next office visit with prescribing clinician: 5/3/2024                         Would you like a call back once the refill request has been completed: [] Yes [] No    If the office needs to give you a call back, can they leave a voicemail: [] Yes [] No    Kris Garnica MA  04/10/24, 14:56 CDT

## 2024-04-10 NOTE — Clinical Note
Routine Visit Note: PT evaluation. 2w4    Skill/education provided: Vitals taken and documented, physical assessment, gait, posture, and balance assessed, instructed to get up and walk at each TV show break, instructed to continue with her HEP from rehab    Patient/caregiver response: good response to training this date.    Plan for next visit: Training with HEP, safety, gait, transfers, and balance.    Other pertinent info: NONE

## 2024-04-11 VITALS
RESPIRATION RATE: 18 BRPM | HEART RATE: 71 BPM | TEMPERATURE: 97 F | SYSTOLIC BLOOD PRESSURE: 112 MMHG | DIASTOLIC BLOOD PRESSURE: 70 MMHG | OXYGEN SATURATION: 99 %

## 2024-04-12 ENCOUNTER — TELEPHONE (OUTPATIENT)
Dept: INTERNAL MEDICINE | Facility: CLINIC | Age: 69
End: 2024-04-12
Payer: MEDICARE

## 2024-04-12 ENCOUNTER — HOME CARE VISIT (OUTPATIENT)
Dept: HOME HEALTH SERVICES | Facility: CLINIC | Age: 69
End: 2024-04-12
Payer: MEDICARE

## 2024-04-12 ENCOUNTER — HOME CARE VISIT (OUTPATIENT)
Dept: HOME HEALTH SERVICES | Facility: HOME HEALTHCARE | Age: 69
End: 2024-04-12
Payer: MEDICARE

## 2024-04-12 VITALS
SYSTOLIC BLOOD PRESSURE: 132 MMHG | HEART RATE: 92 BPM | TEMPERATURE: 96.5 F | RESPIRATION RATE: 18 BRPM | DIASTOLIC BLOOD PRESSURE: 85 MMHG | OXYGEN SATURATION: 98 %

## 2024-04-12 PROCEDURE — G0300 HHS/HOSPICE OF LPN EA 15 MIN: HCPCS

## 2024-04-12 PROCEDURE — G0157 HHC PT ASSISTANT EA 15: HCPCS

## 2024-04-12 NOTE — TELEPHONE ENCOUNTER
GOT A VM FROM TANK MACIAS WITH [facility].  SHE STATED THAT HER SUPERVISOR AS IF THEY COULD USE CODE   # S80.211 FOR ABRASION OF RIGHT KNEE?

## 2024-04-12 NOTE — TELEPHONE ENCOUNTER
TANK MACIAS WITH [facility].  SHE STATED PATIENT TOLD HOME HEALTH THAT WHEN THE A TEST WAS BEING PERFORMED AT [place] THEY WIPED OFF THE GEL AND CAUSED A SKIN TEAR TO HER RIGHT PATELLA.      TANK MACIAS STATED THAT THEY NEED TO KNOW THE ETIOLOGY OF THIS WOUND.  SHE STATED THAT THEY HAVE LOOKED THROUGH THE RECORDS AND CANNOT FIND ANYTHING FROM [place].   IT IS ALSO NOT MENTIONED IN THE NURSING HOME NOTES     SHE STATED THAT THEY NEED TO KNOW HOW THE WOUND HAPPENED.      [phone]

## 2024-04-12 NOTE — TELEPHONE ENCOUNTER
TANK MACIAS WITH [facility] HAS BEEN CALLED, GIVEN MESSAGE AND UNDERSTANDING VOICED.

## 2024-04-12 NOTE — HOME HEALTH
SUBJECTIVE: Patient states she has been feeling better but last night and this morning she has started feeling bad again. She says it still hurts a lot to pee. She says the urinalysis taken on Monday was still positive for infection. She says she feels tired and nauseated.    SIGNS AND SYMPTOMS OF COVID: no    FALLS: no    INSURANCE CHANGES: no    MEDICATION CHANGES: no    EDEMA:     PLAN: Continue with focus on gait outdoors if weater permits, strength and improved posture.

## 2024-04-15 ENCOUNTER — HOME CARE VISIT (OUTPATIENT)
Dept: HOME HEALTH SERVICES | Facility: CLINIC | Age: 69
End: 2024-04-15
Payer: MEDICARE

## 2024-04-15 VITALS
DIASTOLIC BLOOD PRESSURE: 78 MMHG | HEART RATE: 64 BPM | RESPIRATION RATE: 16 BRPM | TEMPERATURE: 97.7 F | SYSTOLIC BLOOD PRESSURE: 122 MMHG | OXYGEN SATURATION: 93 %

## 2024-04-15 VITALS
TEMPERATURE: 97.9 F | RESPIRATION RATE: 18 BRPM | SYSTOLIC BLOOD PRESSURE: 110 MMHG | DIASTOLIC BLOOD PRESSURE: 72 MMHG | HEART RATE: 89 BPM | OXYGEN SATURATION: 97 %

## 2024-04-15 VITALS
SYSTOLIC BLOOD PRESSURE: 132 MMHG | DIASTOLIC BLOOD PRESSURE: 85 MMHG | OXYGEN SATURATION: 98 % | HEART RATE: 92 BPM | RESPIRATION RATE: 18 BRPM | TEMPERATURE: 96.5 F

## 2024-04-15 VITALS
HEART RATE: 89 BPM | RESPIRATION RATE: 18 BRPM | SYSTOLIC BLOOD PRESSURE: 110 MMHG | TEMPERATURE: 97.4 F | DIASTOLIC BLOOD PRESSURE: 70 MMHG | OXYGEN SATURATION: 97 %

## 2024-04-15 PROCEDURE — G0299 HHS/HOSPICE OF RN EA 15 MIN: HCPCS

## 2024-04-15 PROCEDURE — G0157 HHC PT ASSISTANT EA 15: HCPCS

## 2024-04-15 PROCEDURE — G0152 HHCP-SERV OF OT,EA 15 MIN: HCPCS

## 2024-04-15 NOTE — HOME HEALTH
Routine Visit Note:    Upon arrival Patient reports painful burning when voiding, states she seen [facility] on Monday and had a urine sample when being seen after getting stent but has not heard back from them. This nurse called Dr. Farley's office and left message with reportings and nurses name and call back number for advisement.    Patient also states the nursing home tramaine not send her home with any of her new medications from the hospital she was started on and her pharmacy stated they needed orders from [facility]. This nurse assisted Patient with calling sNF- states the  is who assisted setting up her disharge to go home. Requested to speak to  Kenzie-was told she was in a meeting but smoke to assistant -was told she would speak to nursing staff and give Patient a call back about sending orders to [facility]. Patient voices she does still have her old supply of meds, but none of the recently started on meds.    SKill/Education Provided: Assessment, education-s/s of infection, s/s of UTIs, , wound care, Call to MD for symptoms, Call to prev. SNF for medication orders to be faxed    Patient/Caregiver Response: Patient voices understanding with all education this visit.     Falls: No    Med Changes: No    Physician Contact: Yes, see above noted.    Plan for Next Visit: Assessment, education-fall prevention/med education, wound care, follow up regarding urinary symptoms and new medications    Other: No s/s of Covid noted.

## 2024-04-15 NOTE — HOME HEALTH
SUBJECTIVE: Reports in Dec she was at [place] with kidney abcess/ drained, hospitalized 5 days, then several times at [place]; has therapy with Marsha for home health several weeks.   DX: Encounter for surgical aftercare following surgery on the genitourinary system  [facility] Patient reports in Dec she was at [facility] with kidney abcess/ drained, hospitalized 5 days, then several times at [facility] due to kidney stones;  transferred to [facility] nursing and rehab for rehab and home on 4/8/24  PRECAUTIONS: no lifting more than 10 lbs  OXYGEN USE: no  EQUIPMENT: rollator, tub bench, grab bars, manual w/c  PRIOR LEVEL OF FUNCTION: lives alone, was independent with self care  MEDICAL NECESSITY: skilled OT recommended to transition back into home environment with safety with ADLs, increase safety with shower, set up and train on UE HEP  PATIENT GOALS:  Would like to increase safety with shower, increase UE strength and be able to go out onto deck.  COMMUNICATION / CARE COORDINATION: with admitting RN   DATE OF NEXT APPOINTMENT WITH DOCTOR:  May 3rd Dr. Mcallister; and podiatrist; urologist later in month  ANTICIPATED DISCHARGE PLAN: to self care when goals met  PATIENT/CAREGIVER AGREE WITH DISCHARGE PLAN: yes  SPECIFIC INTERVENTIONS AND GOALS TO ADDRESS ON NEXT VISIT: UE HEP TRAINING, safety getting outside to sit on deck, shower as able to schedule  FREQUENCY AND DURATION: 1 wk 4  PATIENT HAS RECEIVED EDUCATION ON COVID-19, PREVENTION, HANDWASHING, SOCIAL DISTANCING PER CDC

## 2024-04-15 NOTE — HOME HEALTH
FOCUS OF CARE/SKILLED NEED: wound care. medication education      TEACHING/INTERVENTIONS:wound care teachint/tolerated well  PROGRESS TOWARD GOALS:  ongoingand progressing    PHYSICIAN CONTACT:  no    INSURANCE CHANGES?  no    FALLS SINCE LAST VISIT?  no    MEDICATION CHANGES? no    PLAN FOR NEXT VISIT: wound care/medication education, UTI prevention  PRE-SCREENED FOR COVID?

## 2024-04-15 NOTE — HOME HEALTH
SUBJECTIVE: Patient states she had a good weekend. She says she did not feel well on Friday but started feelin better as the weekend progressed.Patient denies pain at rest but notes continued knee pain when walking.     SIGNS AND SYMPTOMS OF COVID: no    FALLS: no    INSURANCE CHANGES: no    MEDICATION CHANGES: no    EDEMA: trace B LE's    PLAN: Continue with focus on gait, posture, and strength.

## 2024-04-17 ENCOUNTER — LAB REQUISITION (OUTPATIENT)
Dept: LAB | Facility: HOSPITAL | Age: 69
End: 2024-04-17
Payer: MEDICARE

## 2024-04-17 ENCOUNTER — HOME CARE VISIT (OUTPATIENT)
Dept: HOME HEALTH SERVICES | Facility: CLINIC | Age: 69
End: 2024-04-17
Payer: MEDICARE

## 2024-04-17 ENCOUNTER — TELEPHONE (OUTPATIENT)
Dept: UROLOGY | Age: 69
End: 2024-04-17

## 2024-04-17 VITALS
TEMPERATURE: 97.8 F | DIASTOLIC BLOOD PRESSURE: 92 MMHG | SYSTOLIC BLOOD PRESSURE: 128 MMHG | OXYGEN SATURATION: 93 % | RESPIRATION RATE: 16 BRPM | HEART RATE: 89 BPM

## 2024-04-17 DIAGNOSIS — F41.1 GENERALIZED ANXIETY DISORDER: ICD-10-CM

## 2024-04-17 DIAGNOSIS — Z87.74 PERSONAL HISTORY OF (CORRECTED) CONGENITAL MALFORMATIONS OF HEART AND CIRCULATORY SYSTEM: ICD-10-CM

## 2024-04-17 DIAGNOSIS — N39.0 RECURRENT UTI: Primary | ICD-10-CM

## 2024-04-17 LAB
BACTERIA UR QL AUTO: ABNORMAL /HPF
BILIRUB UR QL STRIP: NEGATIVE
CLARITY UR: ABNORMAL
COLOR UR: YELLOW
GLUCOSE UR STRIP-MCNC: NEGATIVE MG/DL
HGB UR QL STRIP.AUTO: ABNORMAL
HYALINE CASTS UR QL AUTO: ABNORMAL /LPF
KETONES UR QL STRIP: NEGATIVE
LEUKOCYTE ESTERASE UR QL STRIP.AUTO: ABNORMAL
NITRITE UR QL STRIP: NEGATIVE
PH UR STRIP.AUTO: 5.5 [PH] (ref 5–8)
PROT UR QL STRIP: ABNORMAL
RBC # UR STRIP: ABNORMAL /HPF
REF LAB TEST METHOD: ABNORMAL
SP GR UR STRIP: 1.01 (ref 1–1.03)
SQUAMOUS #/AREA URNS HPF: ABNORMAL /HPF
UROBILINOGEN UR QL STRIP: ABNORMAL
WBC # UR STRIP: ABNORMAL /HPF

## 2024-04-17 PROCEDURE — 81001 URINALYSIS AUTO W/SCOPE: CPT | Performed by: NURSE PRACTITIONER

## 2024-04-17 PROCEDURE — 87086 URINE CULTURE/COLONY COUNT: CPT | Performed by: NURSE PRACTITIONER

## 2024-04-17 PROCEDURE — G0300 HHS/HOSPICE OF LPN EA 15 MIN: HCPCS

## 2024-04-17 RX ORDER — HYDROXYZINE HYDROCHLORIDE 25 MG/1
25 TABLET, FILM COATED ORAL NIGHTLY
Qty: 90 TABLET | Refills: 1 | Status: SHIPPED | OUTPATIENT
Start: 2024-04-17

## 2024-04-17 NOTE — HOME HEALTH
Routine Visit Note:    Patient still having complaints of pain and burning when voiding, Patient states she has not heard back from Urologist office after message was left with them on Friday. This Nurse called [facility] and spoke to Zohra-informed of Patient's complaints and inquired about specimen taken last Monday when stents were removed. Zohra stated that specimen was not sent for culture due to stent removal would contaminate results. Zohra stated Patient could come in for appt, Pt states she would nogt be able to make it in for an appt. till after Friday-informed Zohra that Home Health could obtained Specimen-Zohra stated she will check with WOODY Martin and see if that would be okay with her to obtain urine specimen for culture. Office Nurse RAUL Redd notified.     SKill/Education Provided: Assessment, education-edema control/pressure prevention/hydration importance/, wound care    Patient/Caregiver Response: Patient voices understanding with all education this visit.     Falls: No    Med Changes: No    Physician Contact: Yes, see above note.     Plan for Next Visit: Assessment, education, wound care, follow up urine complaints with provider    Other: No s/s of Covid noted.

## 2024-04-17 NOTE — TELEPHONE ENCOUNTER
I called the patient to let her know we will do it, and that the  nurse will need to get a cath urine specimen for most accurate results.

## 2024-04-17 NOTE — TELEPHONE ENCOUNTER
Patient's home health nurse called to see if Janna would be okay with us ordering a culture, and then HH collecting it. The patient has been experiencing burning and frequency since cysto to remove stents. Feels like she might have a UTI.

## 2024-04-18 ENCOUNTER — HOME CARE VISIT (OUTPATIENT)
Dept: HOME HEALTH SERVICES | Facility: CLINIC | Age: 69
End: 2024-04-18
Payer: MEDICARE

## 2024-04-18 LAB — BACTERIA SPEC AEROBE CULT: NORMAL

## 2024-04-18 PROCEDURE — G0155 HHCP-SVS OF CSW,EA 15 MIN: HCPCS

## 2024-04-19 ENCOUNTER — HOME CARE VISIT (OUTPATIENT)
Dept: HOME HEALTH SERVICES | Facility: CLINIC | Age: 69
End: 2024-04-19
Payer: MEDICARE

## 2024-04-19 VITALS
SYSTOLIC BLOOD PRESSURE: 140 MMHG | DIASTOLIC BLOOD PRESSURE: 85 MMHG | OXYGEN SATURATION: 97 % | TEMPERATURE: 97.4 F | RESPIRATION RATE: 16 BRPM | HEART RATE: 88 BPM

## 2024-04-19 VITALS
TEMPERATURE: 97 F | HEART RATE: 90 BPM | OXYGEN SATURATION: 97 % | DIASTOLIC BLOOD PRESSURE: 90 MMHG | RESPIRATION RATE: 18 BRPM | SYSTOLIC BLOOD PRESSURE: 116 MMHG

## 2024-04-19 PROCEDURE — G0300 HHS/HOSPICE OF LPN EA 15 MIN: HCPCS

## 2024-04-19 PROCEDURE — G0157 HHC PT ASSISTANT EA 15: HCPCS

## 2024-04-19 NOTE — TELEPHONE ENCOUNTER
Okay for Pyridium.  Would recommend eliminating bladder irritating foods and beverages.  May also need a vaginal cream.  Can get over-the-counter A&D ointment

## 2024-04-19 NOTE — HOME HEALTH
Routine Visit Note:    SKill/Education Provided: Assessment, education-fully empting bladder/pressure prevention/edema control/hydration importance, wound care    Patient/Caregiver Response: Patient voices understanding with all education this visit.     Falls: No    Med Changes: No    Physician Contact: No     Plan for Next Visit: Assessment, education-edema control, wound care, pictures and measurements, follow up urine results and any new orders    Other: No s/s of Covid noted.

## 2024-04-19 NOTE — HOME HEALTH
PRN Visit Note:    Urine specimen obtained via straight cath clean catch using aquino catheter for Urinalysis with Micro C&S if indicated per WOODY Martin. Patient tolerated procedure well. Specimen taken to [facility], office Ivania CARTER notified via email.

## 2024-04-19 NOTE — TELEPHONE ENCOUNTER
I called the patient. We received a voicemail from [facility] stating the culture results were in. Looking at the results it does not look like the patient has a true infection. She stated she still is feeling like she needs to go quite often and its getting her down every time she does go because it hurts so bad.

## 2024-04-19 NOTE — HOME HEALTH
SUBJECTIVE: Patient says she is doing well today. She says the results of her UA was inconlcusive.   She reports some stinging at back of right knee where wound is and burning when she pees.    SIGNS AND SYMPTOMS OF COVID: no    FALLS: no    INSURANCE CHANGES: no    MEDICATION CHANGES: no    EDEMA:  no    PLAN: continue with focus on gait distance, mechanics and strength and prolonged standind so patient can water her indoor plants.

## 2024-04-22 ENCOUNTER — HOME CARE VISIT (OUTPATIENT)
Dept: HOME HEALTH SERVICES | Facility: CLINIC | Age: 69
End: 2024-04-22
Payer: MEDICARE

## 2024-04-22 VITALS
DIASTOLIC BLOOD PRESSURE: 82 MMHG | RESPIRATION RATE: 18 BRPM | SYSTOLIC BLOOD PRESSURE: 142 MMHG | OXYGEN SATURATION: 98 % | HEART RATE: 80 BPM | TEMPERATURE: 97.5 F

## 2024-04-22 PROCEDURE — G0157 HHC PT ASSISTANT EA 15: HCPCS

## 2024-04-22 PROCEDURE — G0300 HHS/HOSPICE OF LPN EA 15 MIN: HCPCS

## 2024-04-22 NOTE — HOME HEALTH
SUBJECTIVE: Patient says she is feeling better today than she has in a few days. She says     SIGNS AND SYMPTOMS OF COVID: no    FALLS: no    INSURANCE CHANGES: no    MEDICATION CHANGES: no    EDEMA: Trace B LE's    PLAN: Continu with focus on gait and strength.

## 2024-04-23 VITALS
OXYGEN SATURATION: 96 % | SYSTOLIC BLOOD PRESSURE: 118 MMHG | HEART RATE: 95 BPM | RESPIRATION RATE: 16 BRPM | TEMPERATURE: 97.2 F | DIASTOLIC BLOOD PRESSURE: 80 MMHG

## 2024-04-23 NOTE — HOME HEALTH
Routine Visit Note:    SKill/Education Provided: Assessment, education-bowel regimen/hydration importance/UTI symptoms, wound care, photos and measurements    Patient/Caregiver Response: Patient voices understanding with all education this visit.     Falls: No    Med Changes: No    Physician Contact: No     Plan for Next Visit: Assessment, education-hydration importance/fall prevention, wound care    Other: No s/s of Covid noted.

## 2024-04-24 ENCOUNTER — HOME CARE VISIT (OUTPATIENT)
Dept: HOME HEALTH SERVICES | Facility: CLINIC | Age: 69
End: 2024-04-24
Payer: MEDICARE

## 2024-04-24 VITALS
DIASTOLIC BLOOD PRESSURE: 76 MMHG | HEART RATE: 91 BPM | SYSTOLIC BLOOD PRESSURE: 132 MMHG | OXYGEN SATURATION: 7 % | TEMPERATURE: 97.4 F | RESPIRATION RATE: 16 BRPM

## 2024-04-24 VITALS
RESPIRATION RATE: 16 BRPM | SYSTOLIC BLOOD PRESSURE: 114 MMHG | HEART RATE: 101 BPM | DIASTOLIC BLOOD PRESSURE: 72 MMHG | TEMPERATURE: 97.2 F | OXYGEN SATURATION: 95 %

## 2024-04-24 PROCEDURE — G0157 HHC PT ASSISTANT EA 15: HCPCS

## 2024-04-24 PROCEDURE — G0300 HHS/HOSPICE OF LPN EA 15 MIN: HCPCS

## 2024-04-24 NOTE — HOME HEALTH
Routine Visit Note:    SKill/Education Provided: Assessment, education-skin integrity/edema control/pressure prevention/safety, wound care    Patient/Caregiver Response: Patient voices understanding with all education this visit.     Falls: No    Med Changes: No    Physician Contact: No     Plan for Next Visit: Assessment, education-medication review/UTI symptoms/fall prevention, wound care    Other: No s/s of Covid noted.

## 2024-04-24 NOTE — HOME HEALTH
SUBJECTIVE: Patient says she having bladder pain all day yesterday but she says it has improved this morning after taking AZO.     SIGNS AND SYMPTOMS OF COVID: no    FALLS: none    INSURANCE CHANGES: none    MEDICATION CHANGES: none    EDEMA: Min. B LE's    PLAN: Continue with focus on gait to mailbox and strength.

## 2024-04-25 ENCOUNTER — HOME CARE VISIT (OUTPATIENT)
Dept: HOME HEALTH SERVICES | Facility: CLINIC | Age: 69
End: 2024-04-25
Payer: MEDICARE

## 2024-04-25 VITALS
OXYGEN SATURATION: 97 % | RESPIRATION RATE: 18 BRPM | TEMPERATURE: 96.9 F | DIASTOLIC BLOOD PRESSURE: 82 MMHG | SYSTOLIC BLOOD PRESSURE: 130 MMHG | HEART RATE: 99 BPM

## 2024-04-25 PROCEDURE — G0155 HHCP-SVS OF CSW,EA 15 MIN: HCPCS

## 2024-04-25 PROCEDURE — G0158 HHC OT ASSISTANT EA 15: HCPCS

## 2024-04-26 ENCOUNTER — HOME CARE VISIT (OUTPATIENT)
Dept: HOME HEALTH SERVICES | Facility: CLINIC | Age: 69
End: 2024-04-26
Payer: MEDICARE

## 2024-04-26 PROCEDURE — G0300 HHS/HOSPICE OF LPN EA 15 MIN: HCPCS

## 2024-04-26 NOTE — HOME HEALTH
COVID: no signs or symptoms        SUBJECTIVE:Patient reports going to make an appointment with gynocologist      FOCUS fOR VISIT: ADL training for safety and BUE ther ex/HEP        PROGRESS:ongoing/progress      PHYSICIAN CONTACT: appointment on 53/2024      PLAN:ADL training/shower

## 2024-04-29 ENCOUNTER — TELEPHONE (OUTPATIENT)
Dept: PALLATIVE CARE | Age: 69
End: 2024-04-29

## 2024-04-29 ENCOUNTER — HOME CARE VISIT (OUTPATIENT)
Dept: HOME HEALTH SERVICES | Facility: CLINIC | Age: 69
End: 2024-04-29
Payer: MEDICARE

## 2024-04-29 VITALS
TEMPERATURE: 97.2 F | DIASTOLIC BLOOD PRESSURE: 60 MMHG | HEART RATE: 99 BPM | RESPIRATION RATE: 16 BRPM | SYSTOLIC BLOOD PRESSURE: 112 MMHG | OXYGEN SATURATION: 94 %

## 2024-04-29 VITALS
TEMPERATURE: 97.4 F | DIASTOLIC BLOOD PRESSURE: 80 MMHG | RESPIRATION RATE: 14 BRPM | HEART RATE: 97 BPM | OXYGEN SATURATION: 95 % | SYSTOLIC BLOOD PRESSURE: 108 MMHG

## 2024-04-29 PROCEDURE — G0300 HHS/HOSPICE OF LPN EA 15 MIN: HCPCS

## 2024-04-29 PROCEDURE — G0157 HHC PT ASSISTANT EA 15: HCPCS

## 2024-04-29 NOTE — HOME HEALTH
Routine Visit Note:    Patient reports follow-up with PCP is this Friday at 1130.     SKill/Education Provided: Assessment, education-skin integrity/pressure prevention/energy conservation techniques, skin assessment    Patient/Caregiver Response: Patient voices understanding with all education this visit.     Falls: No    Med Changes: No    Physician Contact: No     Plan for Next Visit: Assessment, education-fall prevention/edema controlDM compliance importance, wound/skin assessment    Other: No s/s of Covid noted.

## 2024-04-29 NOTE — TELEPHONE ENCOUNTER
I left a message for the patient to call me back in regards to a follow-up appointment. I can be reached at  [phone].

## 2024-04-29 NOTE — HOME HEALTH
Routine Visit Note:    SKill/Education Provided: Assessment, education-pressure prevention/hydration importance, meds reviewed, wound/skin assessment    Patient/Caregiver Response: Patient voices understanding with all education this visit.     Falls: No    Med Changes: No    Physician Contact: No     Plan for Next Visit: Assessment, education-fall prevention/safety/skin integrity, wound/skin assessment    Other: No s/s of Covid noted.

## 2024-04-29 NOTE — PROGRESS NOTES
[facility] - PODIATRY    Today's Date: 05/03/2024     Patient Name: Jessica Kern  MRN: [id number]  CSN: [id number]  PCP: Aron Mcallister DO  Referring Provider: No ref. provider found    SUBJECTIVE     Chief Complaint   Patient presents with    Follow-up     Aron Mcallister  05/03/2024 3 MTH FU DIABETIC- pt states feet doing good, heels have been a little tinder over last week or so- pt denies pain     Diabetes     HPI: Jessica Kern, a 68 y.o.female, comes to clinic as a(n) established patient presenting for diabetic foot exam and complaining of thickened, irregular toenails of both feet . Patient has h/o arthritis, asthma, CKD, DM, gout, HTN, IGTN, neuropathy . Patient is NIDDM and unsure of last BG level. Patient presents with complaints of long, thickened, and irregular toenails of both feet. States that she is unable and uncomfortable caring for them herself at home. Has issues with gait and mobility and uses walker in the home and wheelchair for long distances. Notes significant numbness in both feet.  Reports tenderness to heels, most often after sitting in recliner with heels touching.  Denies pain. Relates previous treatment(s) including foot care by podiatry . Denies any constitutional symptoms. No other pedal complaints at this time.    Past Medical History:   Diagnosis Date    Arthritis     Asthma 2022    Sleep apnea    Chronic kidney disease 2022    Kidnew stones that incurred spetic shock    Diabetes mellitus June 2022    Possibly/probably as much as two years earlier untreated but according to labs    Difficulty walking     Arthritis for yearss in back and kneews.  But poor balance after septic shock 2022    Gout     Random last two years    High arches     Is this the same as high instep?    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 Two years in  fingers pointet and thumb both hands     Past Surgical History:   Procedure Laterality Date    ANKLE OPEN REDUCTION INTERNAL FIXATION  1992    Right ankle    BACK SURGERY      2001    BLADDER REPAIR      Lift    CHOLECYSTECTOMY      HYSTERECTOMY      KIDNEY STONE SURGERY      KIDNEY SURGERY Left 2023    Abscess drained    TUBAL ABDOMINAL LIGATION       Family History   Problem Relation Age of Onset    Lung cancer Mother     Cancer Mother         Lung tumor     Hypertension Mother     Thyroid disease Mother     Brain cancer Father     Cancer Father         Brain tumor [id number]    Thyroid disease Father     Breast cancer Maternal Aunt     Cancer Maternal Aunt         Breast cancer    Diabetes Maternal Aunt     Hypertension Maternal Aunt     Psoriasis Maternal Aunt     Rashes / Skin problems Maternal Aunt     Severe sprains Maternal Aunt         Ankle    Thyroid disease Maternal Aunt      Social History     Socioeconomic History    Marital status:    Tobacco Use    Smoking status: Former     Current packs/day: 0.00     Average packs/day: 2.0 packs/day for 40.0 years (80.0 ttl pk-yrs)     Types: Cigarettes     Start date: 1970     Quit date: 2/10/2010     Years since quittin.2     Passive exposure: Never    Smokeless tobacco: Never    Tobacco comments:     Quit 12  Years ago   Vaping Use    Vaping status: Never Used   Substance and Sexual Activity    Alcohol use: Not Currently    Drug use: Never    Sexual activity: Not Currently     Partners: Male     Birth control/protection: Condom, Pill, I.U.D., Spermicide, Post-menopausal, Tubal ligation, Hysterectomy, Same-sex partner     Allergies   Allergen Reactions    Codeine Other (See Comments)     Various reactions      Current Outpatient Medications   Medication Sig Dispense Refill    acetaminophen (TYLENOL) 500 MG tablet Take 2 tablets by mouth Every 8 (Eight) Hours. Indications: Pain      clotrimazole-betamethasone (LOTRISONE) 1-0.05 %  cream Apply  topically to the appropriate area as directed 2 (Two) Times a Day. 60 each 3    diclofenac (VOLTAREN) 50 MG EC tablet Take 1 tablet by mouth 2 (Two) Times a Day. Indications: Joint Damage causing Pain and Loss of Function      Diclofenac Sodium (VOLTAREN) 1 % gel gel Apply  topically to the appropriate area as directed 4 (Four) Times a Day. (Patient taking differently: Apply 4 g topically to the appropriate area as directed 4 (Four) Times a Day. Indications: Joint Damage causing Pain and Loss of Function) 100 g 3    docusate sodium (Colace) 100 MG capsule Take 1 capsule by mouth 3 (Three) Times a Day As Needed for Constipation. Indications: Constipation      Ergocalciferol (VITAMIN D2 PO) Take 1 dose by mouth Every 7 (Seven) Days. Indications: supplement      gabapentin (NEURONTIN) 600 MG tablet TAKE ONE (1) TABLET BY MOUTH 4 (FOUR) TIMES A  tablet 1    hydroCHLOROthiazide (HYDRODIURIL) 12.5 MG tablet Take 1 tablet by mouth Daily. Indications: Cardiac Failure, Edema (Patient taking differently: Take 1 tablet by mouth Daily.) 90 tablet 1    hydrOXYzine (ATARAX) 25 MG tablet Take 1 tablet by mouth Every Night. 90 tablet 1    lisinopril (PRINIVIL,ZESTRIL) 40 MG tablet Take 1 tablet by mouth Daily. (Patient taking differently: Take 1 tablet by mouth Daily.) 90 tablet 1    Magic Barrier Cream Apply 1 Application topically to the appropriate area as directed 2 (Two) Times a Day to perineal area as needed 120 g 0    metoprolol tartrate (LOPRESSOR) 100 MG tablet Take 1 tablet by mouth 2 (Two) Times a Day. 180 tablet 1    omeprazole (priLOSEC) 40 MG capsule Take 1 capsule by mouth Daily. Indications: Gastroesophageal Reflux Disease, Heartburn 90 capsule 1    oxyCODONE (Roxicodone) 5 MG immediate release tablet Take 1 tablet by mouth Every 6 (Six) Hours As Needed for Severe Pain. Indications: Acute Pain      pravastatin (PRAVACHOL) 80 MG tablet Take 1 tablet by mouth Daily. Indications: High Amount of Fats  in the Blood 90 tablet 1    rosuvastatin (Crestor) 5 MG tablet Take 1 tablet by mouth Daily. Indications: High Amount of Fats in the Blood      Semaglutide, 2 MG/DOSE, (Ozempic, 2 MG/DOSE,) 8 MG/3ML solution pen-injector Inject 2 mg under the skin into the appropriate area as directed 1 (One) Time Per Week. 3 mL 2    tamsulosin (FLOMAX) 0.4 MG capsule 24 hr capsule Take 1 capsule by mouth Daily. Indications: Dysfunction of the Urinary Bladder      traMADol (ULTRAM) 50 MG tablet Take 1-2 tabs q 6 hr prn moderate pain 210 tablet 1     No current facility-administered medications for this visit.     Review of Systems   Constitutional:  Negative for chills and fever.   HENT:  Negative for congestion.    Respiratory:  Negative for shortness of breath.    Cardiovascular:  Positive for leg swelling. Negative for chest pain.   Gastrointestinal:  Negative for constipation, diarrhea, nausea and vomiting.   Musculoskeletal:  Positive for gait problem.   Skin:  Negative for wound.   Neurological:  Positive for numbness.       OBJECTIVE     Vitals:    24 0907   BP: 132/80   Pulse: 96   SpO2: 100%           PHYSICAL EXAM  GEN:   Accompanied by grandson.     Foot/Ankle Exam    GENERAL  Diabetic foot exam performed    Appearance:  appears stated age and obese  Orientation:  AAOx3  Affect:  appropriate  Gait:  (unsteady)  Assistance:  wheelchair  Right shoe gear: sandal  Left shoe gear: sandal    VASCULAR     Right Foot Vascularity   Dorsalis pedis:  2+  Posterior tibial:  2+  Skin temperature:  warm  Edema grading:  Non-pitting and 1+  CFT:  3  Pedal hair growth:  Present  Varicosities:  none     Left Foot Vascularity   Dorsalis pedis:  2+  Posterior tibial:  2+  Skin temperature:  warm  Edema gradin+ and non-pitting  CFT:  3  Pedal hair growth:  Present  Varicosities:  none     NEUROLOGIC     Right Foot Neurologic   Light touch sensation: diminished  Vibratory sensation: diminished  Hot/Cold sensation:  diminished  Protective Sensation using [place] Monofilament:   Sites intact: 2  Sites tested: 10     Left Foot Neurologic   Light touch sensation: diminished  Vibratory sensation: diminished  Hot/Cold sensation:  diminished  Protective Sensation using [place] Monofilament:   Sites intact: 1  Sites tested: 10    MUSCULOSKELETAL     Right Foot Musculoskeletal   Tenderness:  none    Arch:  Pes planus (mild)  Hallux valgus: No       Left Foot Musculoskeletal   Tenderness:  none  Arch:  Pes planus (mild)  Hallux valgus: No      MUSCLE STRENGTH     Right Foot Muscle Strength   Foot dorsiflexion:  4+  Foot plantar flexion:  4+  Foot inversion:  4+  Foot eversion:  4+     Left Foot Muscle Strength   Foot dorsiflexion:  4+  Foot plantar flexion:  4+  Foot inversion:  4+  Foot eversion:  4+    RANGE OF MOTION     Right Foot Range of Motion   Foot and ankle ROM within normal limits       Left Foot Range of Motion   Foot and ankle ROM within normal limits      DERMATOLOGIC      Right Foot Dermatologic   Skin  Right foot skin is intact.   Nails  1.  Positive for elongated, abnormal thickness and dystrophic nail.  2.  Positive for elongated, abnormal thickness and dystrophic nail.  3.  Positive for elongated, abnormal thickness and dystrophic nail.  4.  Positive for elongated, abnormal thickness and dystrophic nail.  5.  Positive for elongated, abnormal thickness and dystrophic nail.     Left Foot Dermatologic   Skin  Left foot skin is intact.   Nails  1.  Positive for elongated, abnormal thickness and dystrophic nail.  2.  Positive for elongated, abnormal thickness and dystrophic nail.  3.  Positive for elongated, abnormal thickness and dystrophic nail.  4.  Positive for elongated, abnormally thick and dystrophic nail.  5.  Positive for abnormally thick and dystrophic nail.      RADIOLOGY/NUCLEAR:  No results found.    LABORATORY/CULTURE RESULTS:      PATHOLOGY RESULTS:       ASSESSMENT/PLAN     Diagnoses and  all orders for this visit:    1. Thickened nails (Primary)    2. Controlled type 2 diabetes mellitus with diabetic polyneuropathy, without long-term current use of insulin    3. Gait abnormality    Comprehensive lower extremity examination and evaluation was performed.  Discussed findings and treatment plan including risks, benefits, and treatment options with patient in detail. Patient agreed with treatment plan.  After verbal consent obtained, nail(s) x10 debrided of length and thickness with nail nipper without incidence  Patient may maintain nails and calluses at home utilizing emery board or pumice stone between visits as needed  Reviewed at home diabetic foot care including daily foot checks   Continue diabetic monitoring and control under direction of PCP.  Reposition regularly when in recliner to decrease pressure to heels.    Continue use of walker and wheelchair for ambulation assistance to prevent falls.  An After Visit Summary was printed and given to the patient at discharge, including (if requested) any available informative/educational handouts regarding diagnosis, treatment, or medications. All questions were answered to patient/family satisfaction. Should symptoms fail to improve or worsen they agree to call or return to clinic or to go to the Emergency Department. Discussed the importance of following up with any needed screening tests/labs/specialist appointments and any requested follow-up recommended by me today. Importance of maintaining follow-up discussed and patient accepts that missed appointments can delay diagnosis and potentially lead to worsening of conditions.  Return in about 3 months (around 8/3/2024) for Follow-up with APRN, Follow-up in [facility] or sooner if acute issues arise.    Lab Frequency Next Occurrence   Home Sleep Study Once 10/04/2022       This document has been electronically signed by WOODY Woods on May 3, 2024 09:37 CDT

## 2024-04-30 VITALS
SYSTOLIC BLOOD PRESSURE: 122 MMHG | RESPIRATION RATE: 18 BRPM | HEART RATE: 102 BPM | OXYGEN SATURATION: 95 % | DIASTOLIC BLOOD PRESSURE: 80 MMHG | TEMPERATURE: 97.6 F

## 2024-05-01 ENCOUNTER — HOME CARE VISIT (OUTPATIENT)
Dept: HOME HEALTH SERVICES | Facility: CLINIC | Age: 69
End: 2024-05-01
Payer: MEDICARE

## 2024-05-01 ENCOUNTER — HOME CARE VISIT (OUTPATIENT)
Dept: HOME HEALTH SERVICES | Facility: HOME HEALTHCARE | Age: 69
End: 2024-05-01
Payer: MEDICARE

## 2024-05-01 PROCEDURE — G0300 HHS/HOSPICE OF LPN EA 15 MIN: HCPCS

## 2024-05-01 PROCEDURE — G0151 HHCP-SERV OF PT,EA 15 MIN: HCPCS

## 2024-05-01 NOTE — Clinical Note
Routine Visit Note: PT reassessment this date. 2w2    Skill/education provided: vitals taken and recorded, pain assessment B knees, gait trraining    Patient/caregiver response: pain increased B knee with weight bearing, vitals stable, gait 60', 48' and 48' w/4wRW and CGA, 2 rest breaks required    Plan for next visit: vitals, pain assess, training with gait to be indep with ambulation to mailbox    Other pertinent info: none

## 2024-05-01 NOTE — HOME HEALTH
SUBJECTIVE: Patient states she is feeling better but continues to have some discomfort with her bladder still. She requests to continue PT in order to be more safe with ambulation to the mailbox.    SIGNS AND SYMPTOMS OF COVID: no    FALLS: no    INSURANCE CHANGES: no    MEDICATION CHANGES: no    EDEMA: 1+ B LE's    PLAN: Reassess next visit.

## 2024-05-02 ENCOUNTER — HOME CARE VISIT (OUTPATIENT)
Dept: HOME HEALTH SERVICES | Facility: CLINIC | Age: 69
End: 2024-05-02
Payer: MEDICARE

## 2024-05-02 ENCOUNTER — OFFICE VISIT (OUTPATIENT)
Dept: PALLATIVE CARE | Age: 69
End: 2024-05-02
Payer: MEDICARE

## 2024-05-02 VITALS
SYSTOLIC BLOOD PRESSURE: 110 MMHG | HEART RATE: 101 BPM | TEMPERATURE: 97.4 F | OXYGEN SATURATION: 93 % | RESPIRATION RATE: 18 BRPM | DIASTOLIC BLOOD PRESSURE: 80 MMHG

## 2024-05-02 VITALS
OXYGEN SATURATION: 93 % | DIASTOLIC BLOOD PRESSURE: 80 MMHG | TEMPERATURE: 97.4 F | SYSTOLIC BLOOD PRESSURE: 110 MMHG | HEART RATE: 101 BPM | RESPIRATION RATE: 18 BRPM

## 2024-05-02 DIAGNOSIS — Z78.9 IMPAIRED MOBILITY AND ACTIVITIES OF DAILY LIVING: ICD-10-CM

## 2024-05-02 DIAGNOSIS — R39.15 URINARY URGENCY: Primary | ICD-10-CM

## 2024-05-02 DIAGNOSIS — Z74.09 IMPAIRED MOBILITY AND ACTIVITIES OF DAILY LIVING: ICD-10-CM

## 2024-05-02 DIAGNOSIS — Z60.2 LIVES ALONE WITH HELP AVAILABLE: ICD-10-CM

## 2024-05-02 DIAGNOSIS — R33.9 UNABLE TO EMPTY BLADDER: ICD-10-CM

## 2024-05-02 DIAGNOSIS — Z51.5 ENCOUNTER FOR PALLIATIVE CARE: ICD-10-CM

## 2024-05-02 PROCEDURE — G0158 HHC OT ASSISTANT EA 15: HCPCS

## 2024-05-02 PROCEDURE — 99350 HOME/RES VST EST HIGH MDM 60: CPT

## 2024-05-02 PROCEDURE — 3017F COLORECTAL CA SCREEN DOC REV: CPT

## 2024-05-02 PROCEDURE — 1090F PRES/ABSN URINE INCON ASSESS: CPT

## 2024-05-02 PROCEDURE — 1123F ACP DISCUSS/DSCN MKR DOCD: CPT

## 2024-05-02 PROCEDURE — 1036F TOBACCO NON-USER: CPT

## 2024-05-02 PROCEDURE — G8417 CALC BMI ABV UP PARAM F/U: HCPCS

## 2024-05-02 SDOH — SOCIAL STABILITY - SOCIAL INSECURITY: PROBLEMS RELATED TO LIVING ALONE: Z60.2

## 2024-05-02 NOTE — HOME HEALTH
Routine Visit Note: PT reassessment this date. 2w2    Skill/education provided: vitals taken and recorded, pain assessment B knees, gait training    Patient/caregiver response: pain increased B knee with weight bearing, vitals stable, gait 60', 48' and 48' w/4wRW and CGA, 2 rest breaks required    Plan for next visit: vitals, pain assess, training with gait to be indep with ambulation to mailbox    Other pertinent info: Pt reported the passing away of her brother-in-law over last couple of days. Family coming to her home this date for few days.

## 2024-05-02 NOTE — HOME HEALTH
Routine Visit Note:    SKill/Education Provided: Assessment, education-safety/hydration importance/protein rich diet    Patient/Caregiver Response: Patient voices understanding with all education this visit.     Falls: No    Med Changes: No    Physician Contact: No     Plan for Next Visit: Assessment, education-energy conservation techniques/fall prevention/UTI symptoms, follow up from Pt's appt with PCP    Other: No s/s of Covid noted.

## 2024-05-03 ENCOUNTER — HOME CARE VISIT (OUTPATIENT)
Dept: HOME HEALTH SERVICES | Facility: CLINIC | Age: 69
End: 2024-05-03
Payer: MEDICARE

## 2024-05-03 ENCOUNTER — OFFICE VISIT (OUTPATIENT)
Dept: INTERNAL MEDICINE | Facility: CLINIC | Age: 69
End: 2024-05-03
Payer: MEDICARE

## 2024-05-03 ENCOUNTER — OFFICE VISIT (OUTPATIENT)
Dept: PODIATRY | Facility: CLINIC | Age: 69
End: 2024-05-03
Payer: MEDICARE

## 2024-05-03 VITALS
TEMPERATURE: 98.2 F | HEART RATE: 99 BPM | SYSTOLIC BLOOD PRESSURE: 134 MMHG | OXYGEN SATURATION: 92 % | DIASTOLIC BLOOD PRESSURE: 84 MMHG

## 2024-05-03 VITALS
HEART RATE: 96 BPM | SYSTOLIC BLOOD PRESSURE: 116 MMHG | WEIGHT: 272.5 LBS | BODY MASS INDEX: 46.52 KG/M2 | RESPIRATION RATE: 16 BRPM | OXYGEN SATURATION: 98 % | HEIGHT: 64 IN | DIASTOLIC BLOOD PRESSURE: 82 MMHG

## 2024-05-03 VITALS
OXYGEN SATURATION: 100 % | WEIGHT: 283 LBS | HEART RATE: 96 BPM | BODY MASS INDEX: 48.32 KG/M2 | SYSTOLIC BLOOD PRESSURE: 132 MMHG | DIASTOLIC BLOOD PRESSURE: 80 MMHG | HEIGHT: 64 IN

## 2024-05-03 DIAGNOSIS — L60.2 THICKENED NAILS: Primary | ICD-10-CM

## 2024-05-03 DIAGNOSIS — R26.9 GAIT ABNORMALITY: ICD-10-CM

## 2024-05-03 DIAGNOSIS — G89.29 CHRONIC MIDLINE LOW BACK PAIN WITHOUT SCIATICA: ICD-10-CM

## 2024-05-03 DIAGNOSIS — R30.0 DYSURIA: Primary | ICD-10-CM

## 2024-05-03 DIAGNOSIS — N13.39 OTHER HYDRONEPHROSIS: ICD-10-CM

## 2024-05-03 DIAGNOSIS — G89.4 CHRONIC PAIN DISORDER: ICD-10-CM

## 2024-05-03 DIAGNOSIS — E55.9 VITAMIN D DEFICIENCY: ICD-10-CM

## 2024-05-03 DIAGNOSIS — M54.50 CHRONIC MIDLINE LOW BACK PAIN WITHOUT SCIATICA: ICD-10-CM

## 2024-05-03 DIAGNOSIS — G57.93 NEUROPATHY INVOLVING BOTH LOWER EXTREMITIES: ICD-10-CM

## 2024-05-03 DIAGNOSIS — E11.42 CONTROLLED TYPE 2 DIABETES MELLITUS WITH DIABETIC POLYNEUROPATHY, WITHOUT LONG-TERM CURRENT USE OF INSULIN: ICD-10-CM

## 2024-05-03 RX ORDER — TRAMADOL HYDROCHLORIDE 50 MG/1
TABLET ORAL
Qty: 210 TABLET | Refills: 1 | Status: SHIPPED | OUTPATIENT
Start: 2024-05-03

## 2024-05-03 RX ORDER — NALOXONE HYDROCHLORIDE 4 MG/.1ML
1 SPRAY NASAL
COMMUNITY
Start: 2024-03-11

## 2024-05-03 RX ORDER — TAMSULOSIN HYDROCHLORIDE 0.4 MG/1
1 CAPSULE ORAL DAILY
Qty: 30 CAPSULE | Refills: 5 | Status: SHIPPED | OUTPATIENT
Start: 2024-05-03

## 2024-05-03 RX ORDER — AMOXICILLIN AND CLAVULANATE POTASSIUM 875; 125 MG/1; MG/1
1 TABLET, FILM COATED ORAL 2 TIMES DAILY
Qty: 14 TABLET | Refills: 0 | Status: SHIPPED | OUTPATIENT
Start: 2024-05-03 | End: 2024-05-10

## 2024-05-03 RX ORDER — MULTIVIT-MIN/IRON/FOLIC ACID/K 18-600-40
2000 CAPSULE ORAL DAILY
Qty: 90 CAPSULE | Refills: 3 | Status: SHIPPED | OUTPATIENT
Start: 2024-05-03

## 2024-05-03 RX ORDER — CHOLECALCIFEROL (VITAMIN D3) 125 MCG
2000 TABLET ORAL DAILY
Qty: 90 TABLET | Refills: 1 | Status: CANCELLED | OUTPATIENT
Start: 2024-05-03

## 2024-05-03 NOTE — HOME HEALTH
Routine Visit Note:    Skill/education provided: Instruction for safety and ECT with ADLs and HEP review    Patient/caregiver response: Pt performed toilet and shower tranfser with modified independence                                           Pt performed bathing with AE and SBA for bathing perineal area                                          Pt performed dressing with instruction for ECT                                          Pt was able to perform UB ther ex with teachback method using HEP with written instructionv and illustrations provided      Plan for next visit: OT reassessment with possible discharge    Other pertinent info: Patient has appointment with Pallative Care at 3:30 this afternoon.

## 2024-05-03 NOTE — PROGRESS NOTES
CC: Follow-up dysuria    History:  Jessica Kern is a 68 y.o. female   She notes she is still having dysuria with occasional frequency.  She feels there is a positional component to this and often cannot stand nor sit on the toilet without severe pain.  She has had urologic instrumentation and had her nephro ureteral stents removed last month.      ROS:  Review of Systems   Respiratory:  Negative for shortness of breath.    Cardiovascular:  Negative for chest pain.   Genitourinary:  Positive for difficulty urinating, dysuria, pelvic pain and vaginal pain.   Musculoskeletal:  Positive for arthralgias.        reports that she quit smoking about 14 years ago. Her smoking use included cigarettes. She started smoking about 53 years ago. She has a 78.4 pack-year smoking history. She has never been exposed to tobacco smoke. She has never used smokeless tobacco. She reports that she does not currently use alcohol. She reports that she does not use drugs.      Current Outpatient Medications:     acetaminophen (TYLENOL) 500 MG tablet, Take 2 tablets by mouth Every 8 (Eight) Hours. Indications: Pain, Disp: , Rfl:     clotrimazole-betamethasone (LOTRISONE) 1-0.05 % cream, Apply  topically to the appropriate area as directed 2 (Two) Times a Day., Disp: 60 each, Rfl: 3    diclofenac (VOLTAREN) 50 MG EC tablet, Take 1 tablet by mouth 2 (Two) Times a Day. Indications: Joint Damage causing Pain and Loss of Function, Disp: , Rfl:     Diclofenac Sodium (VOLTAREN) 1 % gel gel, Apply  topically to the appropriate area as directed 4 (Four) Times a Day. (Patient taking differently: Apply 4 g topically to the appropriate area as directed 4 (Four) Times a Day. Indications: Joint Damage causing Pain and Loss of Function), Disp: 100 g, Rfl: 3    docusate sodium (Colace) 100 MG capsule, Take 1 capsule by mouth 3 (Three) Times a Day As Needed for Constipation. Indications: Constipation, Disp: , Rfl:     Ergocalciferol (VITAMIN D2 PO), Take 1  "dose by mouth Every 7 (Seven) Days. Indications: supplement, Disp: , Rfl:     gabapentin (NEURONTIN) 600 MG tablet, TAKE ONE (1) TABLET BY MOUTH 4 (FOUR) TIMES A DAY, Disp: 360 tablet, Rfl: 1    hydrOXYzine (ATARAX) 25 MG tablet, Take 1 tablet by mouth Every Night., Disp: 90 tablet, Rfl: 1    Magic Barrier Cream, Apply 1 Application topically to the appropriate area as directed 2 (Two) Times a Day to perineal area as needed, Disp: 120 g, Rfl: 0    metoprolol tartrate (LOPRESSOR) 100 MG tablet, Take 1 tablet by mouth 2 (Two) Times a Day., Disp: 180 tablet, Rfl: 1    omeprazole (priLOSEC) 40 MG capsule, Take 1 capsule by mouth Daily. Indications: Gastroesophageal Reflux Disease, Heartburn, Disp: 90 capsule, Rfl: 1    pravastatin (PRAVACHOL) 80 MG tablet, Take 1 tablet by mouth Daily. Indications: High Amount of Fats in the Blood, Disp: 90 tablet, Rfl: 1    Semaglutide, 2 MG/DOSE, (Ozempic, 2 MG/DOSE,) 8 MG/3ML solution pen-injector, Inject 2 mg under the skin into the appropriate area as directed 1 (One) Time Per Week., Disp: 3 mL, Rfl: 2    tamsulosin (FLOMAX) 0.4 MG capsule 24 hr capsule, Take 1 capsule by mouth Daily. Indications: Dysfunction of the Urinary Bladder, Disp: 30 capsule, Rfl: 5    traMADol (ULTRAM) 50 MG tablet, Take 1-2 tabs q 6 hr prn moderate pain  Indications: Pain, Disp: 210 tablet, Rfl: 1    amoxicillin-clavulanate (AUGMENTIN) 875-125 MG per tablet, Take 1 tablet by mouth 2 (Two) Times a Day for 7 days., Disp: 14 tablet, Rfl: 0    Cholecalciferol (Vitamin D) 50 MCG (2000 UT) capsule, Take 1 capsule by mouth Daily., Disp: 90 capsule, Rfl: 3    naloxone (NARCAN) 4 MG/0.1ML nasal spray, 1 spray into the nostril(s) as directed by provider. (Patient not taking: Reported on 5/3/2024), Disp: , Rfl:     OBJECTIVE:  /82 (BP Location: Left arm, Patient Position: Sitting, Cuff Size: Adult)   Pulse 96   Resp 16   Ht 162.6 cm (64\")   Wt 124 kg (272 lb 8 oz)   SpO2 98%   BMI 46.77 kg/m²    Physical " Exam  Constitutional:       General: She is not in acute distress.  Pulmonary:      Effort: Pulmonary effort is normal. No respiratory distress.   Neurological:      Mental Status: She is alert and oriented to person, place, and time.         Assessment/Plan     Diagnoses and all orders for this visit:    1. Dysuria (Primary)  -     amoxicillin-clavulanate (AUGMENTIN) 875-125 MG per tablet; Take 1 tablet by mouth 2 (Two) Times a Day for 7 days.  Dispense: 14 tablet; Refill: 0  She had mixed urogenital kehinde on most recent culture. Given persistent symptoms, we will try treating empirically once more, but if symptoms persist, we would pursue with further urologic and/or GYN evaluation, especially given the vaginal pressure she is having.    2. Chronic pain disorder  3. Neuropathy involving both lower extremities  -     traMADol (ULTRAM) 50 MG tablet; Take 1-2 tabs q 6 hr prn moderate pain  Indications: Pain  Dispense: 210 tablet; Refill: 1  PDMP data is reviewed and tramadol is refilled.    4. Vitamin D deficiency  -     Cholecalciferol (Vitamin D) 50 MCG (2000 UT) capsule; Take 1 capsule by mouth Daily.  Dispense: 90 capsule; Refill: 3  Continue therapy with vitamin D.    5. Chronic midline low back pain without sciatica  -     XR Spine Lumbar 2 or 3 View; Future  Given persistent pain in her back despite physical therapy, we will order roentgenogram of the low back to determine potential for further fracture or degenerative change.  She is working with physical therapy, but if not improving we would consider advanced imaging of her lumbar spine.    6. Other hydronephrosis  -     tamsulosin (FLOMAX) 0.4 MG capsule 24 hr capsule; Take 1 capsule by mouth Daily. Indications: Dysfunction of the Urinary Bladder  Dispense: 30 capsule; Refill: 5        An After Visit Summary was printed and given to the patient at discharge.  Return in about 3 months (around 8/3/2024) for Recheck.      Aron Mcallister D.O. 5/3/2024    Electronically signed.

## 2024-05-06 ENCOUNTER — HOME CARE VISIT (OUTPATIENT)
Dept: HOME HEALTH SERVICES | Facility: CLINIC | Age: 69
End: 2024-05-06
Payer: MEDICARE

## 2024-05-06 VITALS
TEMPERATURE: 97.4 F | SYSTOLIC BLOOD PRESSURE: 122 MMHG | DIASTOLIC BLOOD PRESSURE: 85 MMHG | HEART RATE: 90 BPM | OXYGEN SATURATION: 92 % | RESPIRATION RATE: 18 BRPM

## 2024-05-06 PROCEDURE — G0157 HHC PT ASSISTANT EA 15: HCPCS

## 2024-05-06 RX ORDER — DICLOFENAC POTASSIUM 50 MG/1
50 TABLET, FILM COATED ORAL 3 TIMES DAILY
COMMUNITY

## 2024-05-07 ENCOUNTER — HOME CARE VISIT (OUTPATIENT)
Dept: HOME HEALTH SERVICES | Facility: HOME HEALTHCARE | Age: 69
End: 2024-05-07
Payer: MEDICARE

## 2024-05-07 ENCOUNTER — HOME CARE VISIT (OUTPATIENT)
Dept: HOME HEALTH SERVICES | Facility: CLINIC | Age: 69
End: 2024-05-07
Payer: MEDICARE

## 2024-05-07 VITALS
RESPIRATION RATE: 18 BRPM | DIASTOLIC BLOOD PRESSURE: 70 MMHG | HEART RATE: 101 BPM | OXYGEN SATURATION: 93 % | TEMPERATURE: 97.4 F | SYSTOLIC BLOOD PRESSURE: 118 MMHG

## 2024-05-07 PROCEDURE — G0152 HHCP-SERV OF OT,EA 15 MIN: HCPCS

## 2024-05-08 ENCOUNTER — HOME CARE VISIT (OUTPATIENT)
Dept: HOME HEALTH SERVICES | Facility: CLINIC | Age: 69
End: 2024-05-08
Payer: MEDICARE

## 2024-05-08 VITALS
SYSTOLIC BLOOD PRESSURE: 122 MMHG | DIASTOLIC BLOOD PRESSURE: 85 MMHG | HEART RATE: 85 BPM | OXYGEN SATURATION: 93 % | RESPIRATION RATE: 18 BRPM | TEMPERATURE: 97.5 F

## 2024-05-08 PROCEDURE — G0157 HHC PT ASSISTANT EA 15: HCPCS

## 2024-05-08 PROCEDURE — G0299 HHS/HOSPICE OF RN EA 15 MIN: HCPCS

## 2024-05-09 VITALS
HEART RATE: 82 BPM | DIASTOLIC BLOOD PRESSURE: 80 MMHG | SYSTOLIC BLOOD PRESSURE: 112 MMHG | RESPIRATION RATE: 20 BRPM | TEMPERATURE: 97 F | OXYGEN SATURATION: 94 %

## 2024-05-09 DIAGNOSIS — R33.9 UNABLE TO EMPTY BLADDER: Primary | ICD-10-CM

## 2024-05-13 ENCOUNTER — HOME CARE VISIT (OUTPATIENT)
Dept: HOME HEALTH SERVICES | Facility: CLINIC | Age: 69
End: 2024-05-13
Payer: MEDICARE

## 2024-05-13 VITALS
HEART RATE: 85 BPM | TEMPERATURE: 97.6 F | DIASTOLIC BLOOD PRESSURE: 90 MMHG | RESPIRATION RATE: 16 BRPM | SYSTOLIC BLOOD PRESSURE: 122 MMHG | OXYGEN SATURATION: 92 %

## 2024-05-13 PROCEDURE — G0157 HHC PT ASSISTANT EA 15: HCPCS

## 2024-05-13 NOTE — HOME HEALTH
SUBJECTIVE: Patient says she continue with bladder pain and mild knee only when walking today.    SIGNS AND SYMPTOMS OF COVID: no    FALLS: no    INSURANCE CHANGES: no    MEDICATION CHANGES: no    EDEMA: 1+ B LE's    SKILLS/EDUCATION and RESPONSE: Patient ambulates down driveway and back today with continued required rest breaks due to knee and arm pain. She is self aware of poor posture and corrects but due to pain cannot maintaing. She is appropriate for DC next visit.    PLAN: DC next visit. Patient is independent with HEP and has family close that can walk with her in driveway to mailbox.

## 2024-05-14 ENCOUNTER — HOME CARE VISIT (OUTPATIENT)
Dept: HOME HEALTH SERVICES | Facility: CLINIC | Age: 69
End: 2024-05-14
Payer: MEDICARE

## 2024-05-14 VITALS — OXYGEN SATURATION: 99 % | TEMPERATURE: 97.7 F | HEART RATE: 99 BPM | RESPIRATION RATE: 18 BRPM

## 2024-05-14 PROCEDURE — G0151 HHCP-SERV OF PT,EA 15 MIN: HCPCS

## 2024-05-15 ENCOUNTER — HOME CARE VISIT (OUTPATIENT)
Dept: HOME HEALTH SERVICES | Facility: CLINIC | Age: 69
End: 2024-05-15
Payer: MEDICARE

## 2024-05-15 PROCEDURE — G0300 HHS/HOSPICE OF LPN EA 15 MIN: HCPCS

## 2024-05-16 VITALS
OXYGEN SATURATION: 91 % | SYSTOLIC BLOOD PRESSURE: 122 MMHG | HEART RATE: 93 BPM | DIASTOLIC BLOOD PRESSURE: 68 MMHG | TEMPERATURE: 97.1 F | RESPIRATION RATE: 16 BRPM

## 2024-05-16 NOTE — HOME HEALTH
Routine Visit Note:    Patient voices soreness to buttocks/coccyx area, assessed by nurse and noted to have blanchable redness with broken skin on left buttock. Educated Patient to apply zinc-based paste to area after each episode of incontince and keeping area clean and dry. Also educated Patient on turning and repositioning a minimum of 2 hours. Reinforced benefits of sleeping in a bed versus sleeping in recliner related to pressure interventions.  notified.     SKill/Education Provided: Assessment, education-pressure prevention/importance of protein in diet with wound healing/skin integrity, wound assessment, photos and measurements    Patient/Caregiver Response: Patient voices understanding with all education this visit.     Falls: No    Med Changes: No    Physician Contact: No     Plan for Next Visit: Assessment, education-skin integrity/pressure prevention/fall prevention, skin assessment & monitoring    Other: No s/s of Covid noted.

## 2024-05-17 ENCOUNTER — OFFICE VISIT (OUTPATIENT)
Dept: OBGYN CLINIC | Age: 69
End: 2024-05-17

## 2024-05-17 ENCOUNTER — HOSPITAL ENCOUNTER (OUTPATIENT)
Dept: CT IMAGING | Age: 69
Discharge: HOME | End: 2024-05-17
Attending: UROLOGY
Payer: MEDICARE

## 2024-05-17 VITALS
BODY MASS INDEX: 46.95 KG/M2 | SYSTOLIC BLOOD PRESSURE: 92 MMHG | WEIGHT: 275 LBS | DIASTOLIC BLOOD PRESSURE: 71 MMHG | HEIGHT: 64 IN | HEART RATE: 99 BPM

## 2024-05-17 DIAGNOSIS — Z87.440 HISTORY OF UTI: ICD-10-CM

## 2024-05-17 DIAGNOSIS — N15.1 RENAL ABSCESS, LEFT: ICD-10-CM

## 2024-05-17 DIAGNOSIS — N20.0 LEFT RENAL STONE: ICD-10-CM

## 2024-05-17 DIAGNOSIS — R39.198 DIFFICULTY URINATING: ICD-10-CM

## 2024-05-17 DIAGNOSIS — N20.0 RIGHT RENAL STONE: ICD-10-CM

## 2024-05-17 DIAGNOSIS — Z90.710 H/O: HYSTERECTOMY: ICD-10-CM

## 2024-05-17 DIAGNOSIS — Z76.89 ENCOUNTER TO ESTABLISH CARE: Primary | ICD-10-CM

## 2024-05-17 LAB — CREAT SERPL-MCNC: 1.2 MG/DL (ref 0.3–1.3)

## 2024-05-17 PROCEDURE — 6360000004 HC RX CONTRAST MEDICATION: Performed by: UROLOGY

## 2024-05-17 PROCEDURE — 82565 ASSAY OF CREATININE: CPT

## 2024-05-17 PROCEDURE — 74178 CT ABD&PLV WO CNTR FLWD CNTR: CPT

## 2024-05-17 RX ORDER — VIBEGRON 75 MG/1
TABLET, FILM COATED ORAL DAILY
COMMUNITY

## 2024-05-17 RX ADMIN — IOPAMIDOL 75 ML: 755 INJECTION, SOLUTION INTRAVENOUS at 12:04

## 2024-05-17 NOTE — PROGRESS NOTES
Pt is here for a new patient visit.   She cannot empty her bladder. She had an extremely bad kidney infection and went into septic shock. She kept a uti all fall and winter. She has had stones removed on two occasions. She thought the pain would be gone but the pain does not go away. Bladder spasms. This past fall is when all this started.  
back: Normal range of motion.   Skin:     General: Skin is warm and dry.   Neurological:      Mental Status: She is alert and oriented to person, place, and time.         MEDICATIONS:  Orders Placed This Encounter   Medications    estradiol (ESTRACE VAGINAL) 0.1 MG/GM vaginal cream     Sig: Place 1 g vaginally daily X 2 weeks, then twice weekly     Dispense:  42.5 g     Refill:  3       ORDERS:  No orders of the defined types were placed in this encounter.      Plan:  Discussed with Janna Gomes in urology, and decision made to restart Gemtesa for patient.  Estrace vaginal cream for atrophy  Return in 4-6 weeks

## 2024-05-22 ENCOUNTER — HOME CARE VISIT (OUTPATIENT)
Dept: HOME HEALTH SERVICES | Facility: CLINIC | Age: 69
End: 2024-05-22
Payer: MEDICARE

## 2024-05-22 PROCEDURE — G0300 HHS/HOSPICE OF LPN EA 15 MIN: HCPCS

## 2024-05-24 VITALS
SYSTOLIC BLOOD PRESSURE: 126 MMHG | HEART RATE: 98 BPM | RESPIRATION RATE: 18 BRPM | TEMPERATURE: 97.2 F | DIASTOLIC BLOOD PRESSURE: 86 MMHG | OXYGEN SATURATION: 94 %

## 2024-05-24 RX ORDER — ESTRADIOL 0.1 MG/G
1 CREAM VAGINAL DAILY
Qty: 42.5 G | Refills: 3 | Status: SHIPPED | OUTPATIENT
Start: 2024-05-24

## 2024-05-24 ASSESSMENT — ENCOUNTER SYMPTOMS
CONSTIPATION: 0
SHORTNESS OF BREATH: 0
CHEST TIGHTNESS: 0
NAUSEA: 0
ABDOMINAL PAIN: 0
DIARRHEA: 0
RECTAL PAIN: 0
BACK PAIN: 0
GASTROINTESTINAL NEGATIVE: 1
RESPIRATORY NEGATIVE: 1

## 2024-05-24 NOTE — HOME HEALTH
"Pre-Discharge Visit Note:    Patient voices that she went to her gynocologist appointment, and states that she was told her pain could be from her bladder and rectum sagging putting pressure on bladder/vagina and si where her pain when voiding could be coming from. Was prescribed Estrogen cream but hasn't recieved it yet. Has follow up in 1 month to see if cream assists with decreasing irritation, so then Patient may be fitted for a pessary. Patient appears hopeful that this was found and will help ease her pain when voiding once fixed.    Assessed Patient's bottom area, blanchable dry skin with broken skin appearing the same as last week. Using 2 pillows as cushions in recliner chair, states she is unable to sleep anywhere else other than her recliner. Recomended using a pressure relieft cushion or ring along with butt cream. Patient advised to apply paste after each bathoom/incontinent episode. Patient also educated on not \"scooting\" or \"sliding\" when changing positions in recliner chair.     SKill/Education Provided: Assessment, education-pressure prevention interventions/skin integrity, skin monitoring    Patient/Caregiver Response: Patient voices understanding with all education this visit.     Falls: No    Med Changes: No    Physician Contact: No     Plan for Next Visit: Assessment, education, [facility] Discharge    Other: No s/s of Covid noted."

## 2024-05-29 ENCOUNTER — TELEPHONE (OUTPATIENT)
Dept: INTERNAL MEDICINE | Facility: CLINIC | Age: 69
End: 2024-05-29
Payer: MEDICARE

## 2024-05-29 ENCOUNTER — HOME CARE VISIT (OUTPATIENT)
Dept: HOME HEALTH SERVICES | Facility: CLINIC | Age: 69
End: 2024-05-29
Payer: MEDICARE

## 2024-05-29 PROCEDURE — G0299 HHS/HOSPICE OF RN EA 15 MIN: HCPCS

## 2024-05-29 NOTE — TELEPHONE ENCOUNTER
Caller: GABBY    Relationship: [facility]    Best call back number: [phone]    What medication are you requesting:   GLUCOMETER KIT   TESTING STRIPS   LANCETS         If a prescription is needed, what is your preferred pharmacy and phone number: [facility] - [place], [place] - [address] - [phone] [facility] [phone] FX

## 2024-05-29 NOTE — Clinical Note
Discharge Summary/Summary of Care Provided: Pt was admitted to [facility] for surgical aftercare following surgery on the GI tract. Pt has SN, PT, OT, and MSW. Pt also had wound assessment and care tdue to a laceration on her leg and irritation on buttocks wheich are now resolved.   Patient received home health for diagnosis: Surgical aftercare for surgery on GI tract  Current level of functional ability: Ab with walker  Living arrangements: Lives alone with assist of friends and family.  Progress towards goals and/or Were all goals met? Goals met  If not all goals met, barriers that prevented patient from meeting goals: N/A  SDOH concerns (i.e. Caregiver availability, social isolation, environment, income, transportation access, food insecurity etc.)Pt dofunctional blood sugar machine. Call to MD. Call to pharmacy who states that correct perscriptions had been received and they would deliver to patient.   Follow-up appointment plans and community resources provided: Instructed to keep MD appointments. Instructed to report medical changes to MD. Pt does receive delivered meals.Discharge Summary/Summary of Care Provided: Pt was admitted to [facility] for surgical aftercare following surgery on the GI tract. Pt has SN, PT, OT, and MSW. Pt also had wound assessment and care tdue to a laceration on her leg and irritation on buttocks wheich are now resolved.   Patient received home health for diagnosis: Surgical aftercare for surgery on GI tract  Current level of functional ability: Ab with walker  Living arrangements: Lives alone with assist of friends and family.  Progress towards goals and/or Were all goals met? Goals met  If not all goals met, barriers that prevented patient from meeting goals: N/A  SDOH concerns (i.e. Caregiver availability, social isolation, environment, income, transportation access, food insecurity etc.)Pt dofunctional blood sugar machine. Call to MD. Call to pharmacy who states  that correct perscriptions had been received and they would deliver to patient.   Follow-up appointment plans and community resources provided: Instructed to keep MD appointments. Instructed to report medical changes to MD. Pt does receive delivered meals

## 2024-05-30 ENCOUNTER — OFFICE VISIT (OUTPATIENT)
Dept: PALLATIVE CARE | Age: 69
End: 2024-05-30
Payer: MEDICARE

## 2024-05-30 DIAGNOSIS — Z74.09 IMPAIRED MOBILITY AND ACTIVITIES OF DAILY LIVING: ICD-10-CM

## 2024-05-30 DIAGNOSIS — N32.89 BLADDER SPASMS: ICD-10-CM

## 2024-05-30 DIAGNOSIS — B37.2 YEAST DERMATITIS: ICD-10-CM

## 2024-05-30 DIAGNOSIS — R35.0 URINARY FREQUENCY: Primary | ICD-10-CM

## 2024-05-30 DIAGNOSIS — K59.09 INTERMITTENT CONSTIPATION: ICD-10-CM

## 2024-05-30 DIAGNOSIS — N13.39 OTHER HYDRONEPHROSIS: ICD-10-CM

## 2024-05-30 DIAGNOSIS — Z51.5 ENCOUNTER FOR PALLIATIVE CARE: ICD-10-CM

## 2024-05-30 DIAGNOSIS — Z78.9 IMPAIRED MOBILITY AND ACTIVITIES OF DAILY LIVING: ICD-10-CM

## 2024-05-30 PROCEDURE — 1123F ACP DISCUSS/DSCN MKR DOCD: CPT

## 2024-05-30 PROCEDURE — 1036F TOBACCO NON-USER: CPT

## 2024-05-30 PROCEDURE — 3017F COLORECTAL CA SCREEN DOC REV: CPT

## 2024-05-30 PROCEDURE — 1090F PRES/ABSN URINE INCON ASSESS: CPT

## 2024-05-30 PROCEDURE — G8417 CALC BMI ABV UP PARAM F/U: HCPCS

## 2024-05-30 PROCEDURE — 99350 HOME/RES VST EST HIGH MDM 60: CPT

## 2024-05-30 RX ORDER — TAMSULOSIN HYDROCHLORIDE 0.4 MG/1
1 CAPSULE ORAL DAILY
Qty: 90 CAPSULE | Refills: 3 | Status: SHIPPED | OUTPATIENT
Start: 2024-05-30

## 2024-05-30 RX ORDER — CLOTRIMAZOLE AND BETAMETHASONE DIPROPIONATE 10; .64 MG/G; MG/G
CREAM TOPICAL 2 TIMES DAILY
Qty: 60 EACH | Refills: 3 | Status: SHIPPED | OUTPATIENT
Start: 2024-05-30

## 2024-05-30 RX ORDER — TAMSULOSIN HYDROCHLORIDE 0.4 MG/1
1 CAPSULE ORAL DAILY
Qty: 30 CAPSULE | Refills: 5 | OUTPATIENT
Start: 2024-05-30

## 2024-05-30 NOTE — PROGRESS NOTES
and mildly tachycardic.  She was noted to have acute renal failure with creatinine up to 3.0.  White blood cell count was 17,000 lactic acid 4.1.  She was treated with IV fluids and antibiotics.  CT scan abdomen showed proximal left ureteral calculus.  She was taken to the OR emergently for sepsis obstructive pyelonephritis secondary to left proximal ureteral calculus and a stent was placed.  She did require pressor support.  Urine and blood cultures positive for E. Coli.  She had cystoscopy with left stent removal and ureteral stone extraction on 6/28/2022 per Dr. Coelho.  Discharged to [facility] for rehab and returned home.     Now seeing Dr. Regan for primary care.  Hemoglobin A1c was elevated during hospitalization.  He is continuing to monitor this.     Multiple recent hospitalizations for kidney stone removal.  Patient currently being followed by urology.    HPI AND VISIT SUMMARY:   I saw Santa Puente in Her home. Upon my arrival patient was noted to be awake, alert, oriented, in no distress.  Interval history was reviewed since last visit.  The patient is seen gynecology for potential pessary placement.  Gynecology would like her to use estrogen cream to help fortify vaginal tissue prior to pessary fitting and insertion.  Gynecology found that she had a cystocele as well as a rectocele. She continues to have burning with urination, as well as urinary frequency.  She has began taking her Gemtesa again with her Flomax.  She believes the Gemtesa provides great benefit.  Additionally she has been supplementing with Azo. She has a follow-up appointment with urology scheduled at the end of this week, and plans to discuss this further.  I reviewed the CT of her abdomen pelvis with her, showing nonobstructing stones with mild right hydronephrosis.  I explained that urology would probably explain it in greater detail.  The patient was appreciative and verbalized understanding.    The patient has been discharged

## 2024-05-30 NOTE — TELEPHONE ENCOUNTER
Caller: [facility] - [place], [place] - [address] - [phone] [facility] [phone] FX    Relationship: Pharmacy    Best call back number: [phone]     Requested Prescriptions:   Requested Prescriptions     Pending Prescriptions Disp Refills    diclofenac (VOLTAREN) 50 MG EC tablet       Sig: Take 1 tablet by mouth 2 (Two) Times a Day. Indications: Joint Damage causing Pain and Loss of Function    tamsulosin (FLOMAX) 0.4 MG capsule 24 hr capsule 30 capsule 5     Sig: Take 1 capsule by mouth Daily. Indications: Dysfunction of the Urinary Bladder    clotrimazole-betamethasone (LOTRISONE) 1-0.05 % cream 60 each 3     Sig: Apply  topically to the appropriate area as directed 2 (Two) Times a Day. Indications: Ringworm of Groin Area        Pharmacy where request should be sent: [facility] - [place], [place] - [address] - [phone] [facility] [phone] FX  [facility] - [place] [address] - [phone] [facility] [phone] FX     Last office visit with prescribing clinician: 5/3/2024   Last telemedicine visit with prescribing clinician: Visit date not found   Next office visit with prescribing clinician: Visit date not found     Additional details provided by patient:     Does the patient have less than a 3 day supply:  [] Yes  [x] No    Would you like a call back once the refill request has been completed: [x] Yes [] No      Korina Horn Rep   05/30/24 09:16 CDT

## 2024-05-31 ENCOUNTER — OFFICE VISIT (OUTPATIENT)
Dept: UROLOGY | Age: 69
End: 2024-05-31

## 2024-05-31 VITALS
HEART RATE: 76 BPM | RESPIRATION RATE: 20 BRPM | SYSTOLIC BLOOD PRESSURE: 122 MMHG | TEMPERATURE: 98 F | OXYGEN SATURATION: 92 % | DIASTOLIC BLOOD PRESSURE: 80 MMHG

## 2024-05-31 VITALS — HEIGHT: 64 IN | WEIGHT: 275 LBS | BODY MASS INDEX: 46.95 KG/M2 | TEMPERATURE: 97.1 F

## 2024-05-31 DIAGNOSIS — N20.0 LEFT RENAL STONE: ICD-10-CM

## 2024-05-31 DIAGNOSIS — N32.89 BLADDER SPASMS: ICD-10-CM

## 2024-05-31 DIAGNOSIS — N13.30 HYDRONEPHROSIS OF RIGHT KIDNEY: ICD-10-CM

## 2024-05-31 DIAGNOSIS — N20.0 RIGHT RENAL STONE: ICD-10-CM

## 2024-05-31 DIAGNOSIS — N32.81 OAB (OVERACTIVE BLADDER): Primary | ICD-10-CM

## 2024-05-31 LAB
BACTERIA URINE, POC: ABNORMAL
BILIRUBIN URINE: 0 MG/DL
BLOOD, URINE: POSITIVE
CASTS URINE, POC: ABNORMAL
CLARITY: CLEAR
COLOR: ABNORMAL
CRYSTALS URINE, POC: ABNORMAL
EPI CELLS URINE, POC: ABNORMAL
GLUCOSE URINE: ABNORMAL
KETONES, URINE: NEGATIVE
LEUKOCYTE EST, POC: ABNORMAL
NITRITE, URINE: POSITIVE
PH UA: 5 (ref 4.5–8)
POST VOID RESIDUAL (PVR): NORMAL ML
PROTEIN UA: POSITIVE
RBC URINE, POC: >50
SPECIFIC GRAVITY UA: 1.01 (ref 1–1.03)
UROBILINOGEN, URINE: ABNORMAL
WBC URINE, POC: >50
YEAST URINE, POC: ABNORMAL

## 2024-05-31 RX ORDER — SEMAGLUTIDE 2.68 MG/ML
INJECTION, SOLUTION SUBCUTANEOUS
COMMUNITY
Start: 2024-05-14

## 2024-05-31 RX ORDER — PRAVASTATIN SODIUM 80 MG/1
TABLET ORAL
COMMUNITY
Start: 2024-04-15

## 2024-05-31 RX ORDER — MULTIVIT-MIN/IRON/FOLIC ACID/K 18-600-40
2000 CAPSULE ORAL DAILY
COMMUNITY
Start: 2024-05-03

## 2024-05-31 RX ORDER — TRAMADOL HYDROCHLORIDE 50 MG/1
TABLET ORAL
COMMUNITY
Start: 2024-05-03

## 2024-05-31 RX ORDER — HYDROXYZINE HYDROCHLORIDE 25 MG/1
25 TABLET, FILM COATED ORAL 3 TIMES DAILY PRN
Qty: 90 TABLET | Refills: 5 | Status: SHIPPED | OUTPATIENT
Start: 2024-05-31

## 2024-05-31 ASSESSMENT — ENCOUNTER SYMPTOMS
BACK PAIN: 0
VOMITING: 0
ABDOMINAL PAIN: 0
ABDOMINAL DISTENTION: 0
NAUSEA: 0

## 2024-05-31 NOTE — PROGRESS NOTES
Santa Puente is a 68 y.o., female, Established patient who presents today   Chief Complaint   Patient presents with    Follow-up     I am here today for my follow up.  Patient states her bladder spasms are still pretty bad.       Patient presents for follow-up after CT imaging.  She had a known left upper pole perinephric renal abscess secondary to pyelonephritis which required placement of a drain on 12/7/2023.  Repeat imaging revealed near complete resolution of the abscess.  She was also noted to have bilateral renal stones measuring 2.2 cm in the right upper pole and 3 cm in the left lower pole.  She was referred to [facility] for treatment of the stones.  She underwent bilateral ureteroscopy laser lithotripsy and stent placement posterior vacuum stone extraction bilateral on 2/23/2024, and second stage procedure with left ureteroscopy stone basket extraction and right ureteroscopy laser lithotripsy bilateral stent placement on 3/29/2024 with Dr. Padilla.  Bilateral ureteral stents were removed in office by Dr. Coelho on 4/8/2024.  Repeat CT imaging was obtained for follow-up of her renal abscess and to reevaluate stone burden.    In his operative note, Dr. Padilla noted small renal pelvis bilaterally.  Also reported a tough angle to reach the upper pole in the right kidney.  Also reports that all stones were treated and dusted to 1 mm or less in size.    Patient also follows for lower urinary tract symptoms including frequency, urgency, mixed incontinence.  Also with history of recurrent urinary tract infection.  She reports during recent hospitalization being taken off her Gemtesa.  She reports since that time she has experienced significant return of symptoms.  She is also currently utilizing Flomax.  I am uncertain if these were given for treatment of stent symptoms or if she had some incomplete bladder emptying.  She has been back on Gemtesa for a few weeks now and reports that she is doing relatively well, but is

## 2024-05-31 NOTE — HOME HEALTH
Discharge Summary/Summary of Care Provided: Pt was admitted to [facility] for surgical aftercare following surgery on the GI tract. Pt has SN, PT, OT, and MSW. Pt also had wound assessment and care tdue to a laceration on her leg and irritation on buttocks wheich are now resolved.   Patient received home health for diagnosis: Surgical aftercare for surgery on GI tract  Current level of functional ability: Ab with walker  Living arrangements: Lives alone with assist of friends and family.  Progress towards goals and/or Were all goals met? Goals met  If not all goals met, barriers that prevented patient from meeting goals: N/A  SDOH concerns (i.e. Caregiver availability, social isolation, environment, income, transportation access, food insecurity etc.)Pt dofunctional blood sugar machine. Call to MD. Call to pharmacy who states that correct perscriptions had been received and they would deliver to patient.   Follow-up appointment plans and community resources provided: Instructed to keep MD appointments. Instructed to report medical changes to MD. Pt does receive delivered meals from [facility] [employer]

## 2024-06-06 DIAGNOSIS — E11.65 TYPE 2 DIABETES MELLITUS WITH HYPERGLYCEMIA, WITHOUT LONG-TERM CURRENT USE OF INSULIN: ICD-10-CM

## 2024-06-06 RX ORDER — SEMAGLUTIDE 2.68 MG/ML
2 INJECTION, SOLUTION SUBCUTANEOUS WEEKLY
Qty: 9 ML | Refills: 1 | Status: SHIPPED | OUTPATIENT
Start: 2024-06-06

## 2024-06-18 DIAGNOSIS — G89.4 CHRONIC PAIN DISORDER: ICD-10-CM

## 2024-07-19 ENCOUNTER — HOSPITAL ENCOUNTER (OUTPATIENT)
Dept: CT IMAGING | Age: 69
Discharge: HOME | End: 2024-07-19
Payer: MEDICARE

## 2024-07-19 DIAGNOSIS — N13.30 HYDRONEPHROSIS OF RIGHT KIDNEY: ICD-10-CM

## 2024-07-19 PROCEDURE — 6360000004 HC RX CONTRAST MEDICATION: Performed by: NURSE PRACTITIONER

## 2024-07-19 PROCEDURE — 74178 CT ABD&PLV WO CNTR FLWD CNTR: CPT

## 2024-07-19 RX ADMIN — IOPAMIDOL 75 ML: 755 INJECTION, SOLUTION INTRAVENOUS at 08:52

## 2024-07-25 NOTE — PROGRESS NOTES
[facility] - PODIATRY    Today's Date: 08/02/2024     Patient Name: Jessica Kern  MRN: [id number]  CSN: [id number]  PCP: Aron Mcallister DO  Referring Provider: No ref. provider found    SUBJECTIVE     Chief Complaint   Patient presents with   • Follow-up     Aron Mcallister 05/03/2024 3 MO FU DIABETIC FOOT CARE CL[facility]-pt states she is here today for diabetic nail/foot care pt presents today in a wheel chair-pt denies pain    • Diabetes     HPI: Jessica Kern, a 68 y.o.female, comes to clinic as a(n) established patient complaining of thickened, irregular toenails of both feet . Patient has h/o arthritis, asthma, CKD, DM, gout, HTN, IGTN, neuropathy . Patient is NIDDM and unsure of last BG level. Patient presents with complaints of long, thickened, and irregular toenails of both feet. Reports redness and swelling to left great toe recently. States that she is unable and uncomfortable caring for them herself at home. Has issues with gait and mobility and uses walker in the home and wheelchair for long distances. Notes significant numbness in both feet.  Denies pain. Relates previous treatment(s) including foot care by podiatry . Denies any constitutional symptoms. No other pedal complaints at this time.    Past Medical History:   Diagnosis Date   • Arthritis    • Asthma 2022    Sleep apnea   • Chronic kidney disease 2022    Kidnew stones that incurred spetic shock   • Diabetes mellitus June 2022    Possibly/probably as much as two years earlier untreated but according to labs   • Difficulty walking     Arthritis for yearss in back and kneews.  But poor balance after septic shock 2022   • Gout     Random last two years   • High arches     Is this the same as high instep?   • Hypertension 1976   • Ingrown toenail     Several times over several years   • Neuropathy in diabetes June 2023    20 years in feet due to nerve damage in lower back. Two years in fingers pointet and thumb both hands      Past Surgical History:   Procedure Laterality Date   • ANKLE OPEN REDUCTION INTERNAL FIXATION      Right ankle   • BACK SURGERY         • BLADDER REPAIR      Lift   • CHOLECYSTECTOMY     • HYSTERECTOMY     • KIDNEY STONE SURGERY     • KIDNEY SURGERY Left 2023    Abscess drained   • TUBAL ABDOMINAL LIGATION       Family History   Problem Relation Age of Onset   • Lung cancer Mother    • Cancer Mother         Lung tumor    • Hypertension Mother    • Thyroid disease Mother    • Brain cancer Father    • Cancer Father         Brain tumor [id number]   • Thyroid disease Father    • Breast cancer Maternal Aunt    • Cancer Maternal Aunt         Breast cancer   • Diabetes Maternal Aunt    • Hypertension Maternal Aunt    • Psoriasis Maternal Aunt    • Rashes / Skin problems Maternal Aunt    • Severe sprains Maternal Aunt         Ankle   • Thyroid disease Maternal Aunt      Social History     Socioeconomic History   • Marital status:    Tobacco Use   • Smoking status: Former     Current packs/day: 0.00     Average packs/day: 2.0 packs/day for 39.2 years (78.4 ttl pk-yrs)     Types: Cigarettes     Start date: 1970     Quit date: 2/10/2010     Years since quittin.4     Passive exposure: Never   • Smokeless tobacco: Never   • Tobacco comments:     Quit 12  Years ago   Vaping Use   • Vaping status: Never Used   Substance and Sexual Activity   • Alcohol use: Not Currently   • Drug use: Never   • Sexual activity: Not Currently     Partners: Male     Birth control/protection: Condom, Pill, I.U.D., Spermicide, Post-menopausal, Tubal ligation, Hysterectomy, Same-sex partner     Allergies   Allergen Reactions   • Codeine Other (See Comments)     Various reactions      Current Outpatient Medications   Medication Sig Dispense Refill   • acetaminophen (TYLENOL) 500 MG tablet Take 2 tablets by mouth Every 8 (Eight) Hours. Indications: Pain     • Cholecalciferol (Vitamin D) 50 MCG ( UT) capsule  Take 1 capsule by mouth Daily. 90 capsule 3   • clotrimazole-betamethasone (LOTRISONE) 1-0.05 % cream Apply  topically to the appropriate area as directed 2 (Two) Times a Day. Indications: Ringworm of Groin Area 60 each 3   • diclofenac (VOLTAREN) 50 MG EC tablet Take 1 tablet by mouth 2 (Two) Times a Day. Indications: Joint Damage causing Pain and Loss of Function 180 tablet 2   • Diclofenac Sodium (VOLTAREN) 1 % gel gel Apply  topically to the appropriate area as directed 4 (Four) Times a Day. (Patient taking differently: Apply 4 g topically to the appropriate area as directed 4 (Four) Times a Day. Indications: Joint Damage causing Pain and Loss of Function) 100 g 3   • docusate sodium (Colace) 100 MG capsule Take 1 capsule by mouth 3 (Three) Times a Day As Needed for Constipation. Indications: Constipation     • estradiol (ESTRACE) 0.1 MG/GM vaginal cream Insert 2 g into the vagina Daily.     • hydrOXYzine (ATARAX) 25 MG tablet Take 1 tablet by mouth Every Night. 90 tablet 1   • Magic Barrier Cream Apply 1 Application topically to the appropriate area as directed 2 (Two) Times a Day to perineal area as needed 120 g 0   • metoprolol tartrate (LOPRESSOR) 100 MG tablet Take 1 tablet by mouth 2 (Two) Times a Day. 180 tablet 1   • Semaglutide, 2 MG/DOSE, (Ozempic, 2 MG/DOSE,) 8 MG/3ML solution pen-injector Inject 2 mg under the skin into the appropriate area as directed 1 (One) Time Per Week. Indications: Type 2 Diabetes 9 mL 1   • tamsulosin (FLOMAX) 0.4 MG capsule 24 hr capsule Take 1 capsule by mouth Daily. Indications: Dysfunction of the Urinary Bladder 90 capsule 3   • traMADol (ULTRAM) 50 MG tablet Take 1-2 tabs q 6 hr prn moderate pain  Indications: Pain 210 tablet 1   • Vibegron 75 MG tablet Take 1 tablet by mouth Daily. Indications: Overactive Bladder     • doxycycline (VIBRAMYCIN) 100 MG capsule Take 1 capsule by mouth 2 (Two) Times a Day for 10 days. 20 capsule 0   • gabapentin (NEURONTIN) 600 MG tablet  Take 1 tablet by mouth 4 (Four) Times a Day. Indications: Neuropathic Pain 360 tablet 1   • omeprazole (priLOSEC) 40 MG capsule Take 1 capsule by mouth Daily. Indications: Gastroesophageal Reflux Disease, Heartburn 90 capsule 2   • pravastatin (PRAVACHOL) 80 MG tablet Take 1 tablet by mouth Daily. Indications: High Amount of Fats in the Blood 90 tablet 1     No current facility-administered medications for this visit.     Review of Systems   Constitutional:  Negative for chills and fever.   HENT:  Negative for congestion.    Respiratory:  Negative for shortness of breath.    Cardiovascular:  Positive for leg swelling. Negative for chest pain.   Gastrointestinal:  Negative for constipation, diarrhea, nausea and vomiting.   Musculoskeletal:  Positive for gait problem.   Skin:  Negative for wound.   Neurological:  Positive for numbness.       OBJECTIVE     Vitals:    24 0900   BP: 140/90   Pulse: 75   SpO2: 96%       PHYSICAL EXAM  GEN:   Accompanied by none.     Foot/Ankle Exam    GENERAL  Diabetic foot exam performed    Appearance:  appears stated age and obese  Orientation:  AAOx3  Affect:  appropriate  Gait:  (unsteady)  Assistance:  wheelchair  Right shoe gear: sandal  Left shoe gear: sandal    VASCULAR     Right Foot Vascularity   Dorsalis pedis:  2+  Posterior tibial:  2+  Skin temperature:  warm  Edema grading:  Non-pitting and 1+  CFT:  3  Pedal hair growth:  Present  Varicosities:  none     Left Foot Vascularity   Dorsalis pedis:  2+  Posterior tibial:  2+  Skin temperature:  warm  Edema gradin+ and non-pitting  CFT:  3  Pedal hair growth:  Present  Varicosities:  none     NEUROLOGIC     Right Foot Neurologic   Light touch sensation: diminished  Vibratory sensation: diminished  Hot/Cold sensation: diminished  Protective Sensation using Elmore-Consuelo Monofilament:   Sites intact: 2  Sites tested: 10     Left Foot Neurologic   Light touch sensation: diminished  Vibratory sensation:  diminished  Hot/Cold sensation:  diminished  Protective Sensation using Volborg-Consuelo Monofilament:   Sites intact: 1  Sites tested: 10    MUSCULOSKELETAL     Right Foot Musculoskeletal   Tenderness:  none    Arch:  Pes planus (mild)  Hallux valgus: No       Left Foot Musculoskeletal   Tenderness:  none  Arch:  Pes planus (mild)  Hallux valgus: No      MUSCLE STRENGTH     Right Foot Muscle Strength   Foot dorsiflexion:  4+  Foot plantar flexion:  4+  Foot inversion:  4+  Foot eversion:  4+     Left Foot Muscle Strength   Foot dorsiflexion:  4+  Foot plantar flexion:  4+  Foot inversion:  4+  Foot eversion:  4+    RANGE OF MOTION     Right Foot Range of Motion   Foot and ankle ROM within normal limits       Left Foot Range of Motion   Foot and ankle ROM within normal limits      DERMATOLOGIC      Right Foot Dermatologic   Skin  Right foot skin is intact.   Nails  1.  Positive for elongated, abnormal thickness and dystrophic nail.  2.  Positive for elongated, abnormal thickness and dystrophic nail.  3.  Positive for elongated, abnormal thickness and dystrophic nail.  4.  Positive for elongated, abnormal thickness and dystrophic nail.  5.  Positive for elongated, abnormal thickness and dystrophic nail.     Left Foot Dermatologic   Skin  Left foot skin is intact.   Nails  1.  Positive for elongated, abnormal thickness, dystrophic nail, ingrown toenail and paronychia.  2.  Positive for elongated, abnormal thickness and dystrophic nail.  3.  Positive for elongated, abnormal thickness and dystrophic nail.  4.  Positive for elongated, abnormally thick and dystrophic nail.  5.  Positive for abnormally thick and dystrophic nail.     Left foot additional comments: Slant back to left hallux nail.  Scant amount of purulent drainage from area.       RADIOLOGY/NUCLEAR:  CT Abdomen Pelvis With Contrast    Result Date: 7/24/2024  Narrative: EXAM:  CT ABDOMEN/PELVIS WITHOUT AND WITH CONTRAST HISTORY:  Hydronephrosis of right  kidney COMPARISON:  CT abdomen/pelvis from 05/17/2024 TECHNIQUE:  Multi-slice precontrast and postcontrast transaxial helical images are acquired through the abdomen and pelvis with coronal and sagittal reconstructed images.    All CT scans are performed using dose optimization techniques as appropriate to the performed exam and includes at least one of the following:  Automated exposure control, adjustment of the mA and/or kV according to size, and the use of iterative reconstruction technique. CONTRAST: 75 mL Isovue 370 IV FINDINGS: Lung bases:  The lung bases are clear. Liver:  Normal. Gallbladder/bilary tree:  The gallbladder is surgically absent.  There is minimal intrahepatic biliary dilatation.  The common bile duct diameter is 1.3 cm. Pancreas:  Normal. Adrenal glands: Normal. Spleen:  Normal. Kidneys:  There is mild persistent hydroureteronephrosis there is suggestion of focal narrowing versus spasm of the distal right ureter at the level of the acetabulum.  The left renal collecting system has normal caliber.  No suspicious renal lesions.   Calculus at the interpolar region left kidney 0.4 cm.  A calculus at the inferior pole of the left kidney 0.4 cm.  There is a cluster of multiple calculi near the interpolar to superior polar region of the right kidney measuring 0.3 cm or less. Retroperitoneum:  The aorta is atherosclerotic.  No retroperitoneal lymphadenopathy or hematomas. Peritoneum:  Normal. Bowel:  Diverticula arise from large without CT evidence of diverticulitis.  The intestines have normal caliber without evidence of obstruction or acute inflammation.  The appendix is normal. Pelvis:  The uterus is surgically absent and there are no adnexal masses.  The bladder is low-lying with a possible cystocele.  No obvious bladder masses are visualized. Addominal wall/bones:  A small fat containing umbilical hernia is noted.  A supraumbilical ventral hernia has a transverse defect of 3.2 cm and contains  fat.  No suspicious bone lesions or acute osseous abnormalities.  A small right femoral hernia is also suggested containing fat.    Impression: - Possible structures versus peristalsis of the distal right ureter at the level of the acetabulum.  Mild proximal hydronephrosis. - Nonobstructing bilateral nephrolithiasis. - No suspicious renal masses. - Low-lying bladder with possible cystocele. - Dilatation post cholecystectomy.  Recommend correlation with an MRI of the abdomen without and with contrast/MRCP if clinically indicated. - ASCVD. - Colonic diverticulosis without CT evidence of diverticulitis. - Ventral abdominal wall hernias containing fat x 2 and small right fat containing femoral hernia. . All CT scans are performed using dose optimization techniques as appropriate to the performed exam and include at least one of the following: Automated exposure control, adjustment of the mA and/or kV according to size, and the use of iterative reconstruction technique. ______________________________________ Electronically signed by: MATILDE DAVID D.O. Date:     07/24/2024 Time:    11:34      LABORATORY/CULTURE RESULTS:      PATHOLOGY RESULTS:       ASSESSMENT/PLAN     Diagnoses and all orders for this visit:    1. Thickened nails (Primary)    2. Controlled type 2 diabetes mellitus with diabetic polyneuropathy, without long-term current use of insulin    3. Gait abnormality    4. Stage 4 chronic kidney disease    5. Paronychia of great toe of left foot  -     doxycycline (VIBRAMYCIN) 100 MG capsule; Take 1 capsule by mouth 2 (Two) Times a Day for 10 days.  Dispense: 20 capsule; Refill: 0      Comprehensive lower extremity examination and evaluation was performed.  Discussed findings and treatment plan including risks, benefits, and treatment options with patient in detail. Patient agreed with treatment plan.  After verbal consent obtained, nail(s) x10 debrided of length and thickness with nail nipper without  incidence  Patient may maintain nails and calluses at home utilizing emery board or pumice stone between visits as needed  Reviewed at home diabetic foot care including daily foot checks   Continue diabetic monitoring and control under direction of PCP.  Reposition regularly when in recliner to decrease pressure to heels.    Patient will begin oral antibiotic for left great toe paronychia.  If she continues to have redness or drainage, patient may need a partial nail avulsion.  Patient verbalized understanding.   Continue use of walker and wheelchair for ambulation assistance to prevent falls.  An After Visit Summary was printed and given to the patient at discharge, including (if requested) any available informative/educational handouts regarding diagnosis, treatment, or medications. All questions were answered to patient/family satisfaction. Should symptoms fail to improve or worsen they agree to call or return to clinic or to go to the Emergency Department. Discussed the importance of following up with any needed screening tests/labs/specialist appointments and any requested follow-up recommended by me today. Importance of maintaining follow-up discussed and patient accepts that missed appointments can delay diagnosis and potentially lead to worsening of conditions.  Return in about 3 months (around 11/2/2024) for With Mary MCKAY, Schedule Foot Care Clinic., or sooner if acute issues arise.    Lab Frequency Next Occurrence   Home Sleep Study Once 10/04/2022       This document has been electronically signed by WOODY Woods on August 2, 2024 11:23 CDT

## 2024-08-01 ENCOUNTER — TELEPHONE (OUTPATIENT)
Age: 69
End: 2024-08-01
Payer: MEDICARE

## 2024-08-01 NOTE — TELEPHONE ENCOUNTER
Hub to relay  Spoke with patient regarding appt on 08/02/2024. Patient confirmed date and time off appt.   
Unable to assess

## 2024-08-02 ENCOUNTER — OFFICE VISIT (OUTPATIENT)
Age: 69
End: 2024-08-02
Payer: MEDICARE

## 2024-08-02 ENCOUNTER — OFFICE VISIT (OUTPATIENT)
Dept: INTERNAL MEDICINE | Facility: CLINIC | Age: 69
End: 2024-08-02
Payer: MEDICARE

## 2024-08-02 VITALS
OXYGEN SATURATION: 95 % | HEART RATE: 76 BPM | BODY MASS INDEX: 49 KG/M2 | WEIGHT: 287 LBS | RESPIRATION RATE: 16 BRPM | HEIGHT: 64 IN | SYSTOLIC BLOOD PRESSURE: 140 MMHG | DIASTOLIC BLOOD PRESSURE: 90 MMHG

## 2024-08-02 VITALS
HEIGHT: 64 IN | OXYGEN SATURATION: 96 % | DIASTOLIC BLOOD PRESSURE: 90 MMHG | HEART RATE: 75 BPM | WEIGHT: 287 LBS | SYSTOLIC BLOOD PRESSURE: 140 MMHG | BODY MASS INDEX: 49 KG/M2

## 2024-08-02 DIAGNOSIS — L60.2 THICKENED NAILS: Primary | ICD-10-CM

## 2024-08-02 DIAGNOSIS — R26.9 GAIT ABNORMALITY: ICD-10-CM

## 2024-08-02 DIAGNOSIS — E11.65 TYPE 2 DIABETES MELLITUS WITH HYPERGLYCEMIA, WITHOUT LONG-TERM CURRENT USE OF INSULIN: ICD-10-CM

## 2024-08-02 DIAGNOSIS — E78.2 MIXED HYPERLIPIDEMIA: ICD-10-CM

## 2024-08-02 DIAGNOSIS — G57.93 NEUROPATHY INVOLVING BOTH LOWER EXTREMITIES: ICD-10-CM

## 2024-08-02 DIAGNOSIS — L08.9 LOCAL SKIN INFECTION: Primary | ICD-10-CM

## 2024-08-02 DIAGNOSIS — L03.032 PARONYCHIA OF GREAT TOE OF LEFT FOOT: ICD-10-CM

## 2024-08-02 DIAGNOSIS — E11.42 CONTROLLED TYPE 2 DIABETES MELLITUS WITH DIABETIC POLYNEUROPATHY, WITHOUT LONG-TERM CURRENT USE OF INSULIN: ICD-10-CM

## 2024-08-02 DIAGNOSIS — K21.9 GASTROESOPHAGEAL REFLUX DISEASE WITHOUT ESOPHAGITIS: ICD-10-CM

## 2024-08-02 DIAGNOSIS — N18.4 STAGE 4 CHRONIC KIDNEY DISEASE: ICD-10-CM

## 2024-08-02 PROCEDURE — 1160F RVW MEDS BY RX/DR IN RCRD: CPT | Performed by: NURSE PRACTITIONER

## 2024-08-02 PROCEDURE — 3077F SYST BP >= 140 MM HG: CPT | Performed by: NURSE PRACTITIONER

## 2024-08-02 PROCEDURE — 99214 OFFICE O/P EST MOD 30 MIN: CPT | Performed by: NURSE PRACTITIONER

## 2024-08-02 PROCEDURE — 3080F DIAST BP >= 90 MM HG: CPT | Performed by: NURSE PRACTITIONER

## 2024-08-02 PROCEDURE — G2211 COMPLEX E/M VISIT ADD ON: HCPCS | Performed by: NURSE PRACTITIONER

## 2024-08-02 PROCEDURE — 3051F HG A1C>EQUAL 7.0%<8.0%: CPT | Performed by: NURSE PRACTITIONER

## 2024-08-02 PROCEDURE — 1159F MED LIST DOCD IN RCRD: CPT | Performed by: NURSE PRACTITIONER

## 2024-08-02 RX ORDER — SEMAGLUTIDE 2.68 MG/ML
2 INJECTION, SOLUTION SUBCUTANEOUS WEEKLY
Qty: 9 ML | Refills: 2 | Status: CANCELLED | OUTPATIENT
Start: 2024-08-02

## 2024-08-02 RX ORDER — DOXYCYCLINE HYCLATE 100 MG/1
100 CAPSULE ORAL 2 TIMES DAILY
Qty: 20 CAPSULE | Refills: 0 | Status: SHIPPED | OUTPATIENT
Start: 2024-08-02 | End: 2024-08-12

## 2024-08-02 RX ORDER — OMEPRAZOLE 40 MG/1
40 CAPSULE, DELAYED RELEASE ORAL DAILY
Qty: 90 CAPSULE | Refills: 2 | Status: SHIPPED | OUTPATIENT
Start: 2024-08-02

## 2024-08-02 RX ORDER — PRAVASTATIN SODIUM 80 MG/1
80 TABLET ORAL DAILY
Qty: 90 TABLET | Refills: 1 | Status: SHIPPED | OUTPATIENT
Start: 2024-08-02

## 2024-08-02 RX ORDER — GABAPENTIN 600 MG/1
600 TABLET ORAL 4 TIMES DAILY
Qty: 360 TABLET | Refills: 1 | Status: SHIPPED | OUTPATIENT
Start: 2024-08-02

## 2024-08-02 RX ORDER — ESTRADIOL 0.1 MG/G
2 CREAM VAGINAL DAILY
COMMUNITY
Start: 2024-05-24

## 2024-08-02 NOTE — PROGRESS NOTES
"Chief Complaint  Skin Problem (\"Open places\" behind the knees)    Subjective        Jessica Kern is a 68 y.o. female who presents today for evaluation of the above problems.      History of Present Illness  History of Present Illness  The patient presents for evaluation of multiple medical concerns.    The patient's home health nurse has intermittently monitored the condition of her knees.  She has recurrent localized skin infection behind both knees most likely related to Candida's.  The condition has progressively worsened over the past month.. Due to her diabetes, it is challenging to heal adequately. She reports excessive perspiration in the affected area. To manage the condition, she has been applying Neosporin and a paper towel to the posterior aspect of her knees while seated in her recliner, which has provided some relief. She has previously received a honey dressing and a gray sponge containing silver nitrate. However, she has been unable to locate a paper or Band-Aid to cover the affected area.  Is requesting home health to come assist.    Needs refill of gabapentin which she is taking for diabetic neuropathy.  Says it is effective in treating neuropathy and denies any adverse side effects.  Denies any episodes of hypoglycemia or hyperglycemia.  Blood pressure is slightly elevated today.  No symptoms.  Needs refill  on Prilosec and pravastatin.  Denies any adverse side effects of these medications.    Is following up with podiatry after this appointment.    Review of Systems - History obtained from the patient  General ROS: negative  Respiratory ROS: no cough, shortness of breath, or wheezing  Cardiovascular ROS: no chest pain or dyspnea on exertion  Gastrointestinal ROS: no abdominal pain, change in bowel habits, or black or bloody stools    Objective   Vital Signs:  /90 (BP Location: Right arm, Patient Position: Sitting, Cuff Size: Other (Comment))   Pulse 76   Resp 16   Ht 162.6 cm (64\")   " "Wt 130 kg (287 lb)   SpO2 95%   BMI 49.26 kg/m²   Estimated body mass index is 49.26 kg/m² as calculated from the following:    Height as of this encounter: 162.6 cm (64\").    Weight as of this encounter: 130 kg (287 lb).           Physical Exam   Physical Exam      Result Review :        Results             Assessment and Plan   Diagnoses and all orders for this visit:    1. Local skin infection (Primary)  Comments:  Consult home health.  Has an old dressing left that she is going to use until home health is consulted.  Declines further meds at this time  Orders:  -     Ambulatory Referral to Home Health    2. Type 2 diabetes mellitus with hyperglycemia, without long-term current use of insulin  Comments:  Stable.  A1c at next visit.  Keep close monitoring of wound due to diabetes and immobility.  Orders:  -     Ambulatory Referral to Home Health    3. Neuropathy involving both lower extremities  Comments:  Stable.  Gabapentin refilled.  Orders:  -     gabapentin (NEURONTIN) 600 MG tablet; Take 1 tablet by mouth 4 (Four) Times a Day. Indications: Neuropathic Pain  Dispense: 360 tablet; Refill: 1    4. Gastroesophageal reflux disease without esophagitis  Comments:  Stable.  PPI refilled.  Orders:  -     omeprazole (priLOSEC) 40 MG capsule; Take 1 capsule by mouth Daily. Indications: Gastroesophageal Reflux Disease, Heartburn  Dispense: 90 capsule; Refill: 2    5. Mixed hyperlipidemia  Comments:  Stable.  Pravastatin refilled.  Plan to reassess at next visit.  Orders:  -     pravastatin (PRAVACHOL) 80 MG tablet; Take 1 tablet by mouth Daily. Indications: High Amount of Fats in the Blood  Dispense: 90 tablet; Refill: 1      I will be providing care over continum for this patient including management of both chronic and acute medical conditions        I spent 30 minutes caring for Iris on this date of service. This time includes time spent by me in the following activities:preparing for the visit, reviewing tests, " obtaining and/or reviewing a separately obtained history, performing a medically appropriate examination and/or evaluation , counseling and educating the patient/family/caregiver, ordering medications, tests, or procedures, referring and communicating with other health care professionals , documenting information in the medical record, and independently interpreting results and communicating that information with the patient/family/caregiver  Follow Up   Return in about 6 weeks (around 9/13/2024) for Recheck.  Patient was given instructions and counseling regarding her condition or for health maintenance advice. Please see specific information pulled into the AVS if appropriate.       Patient or patient representative verbalized consent for the use of Ambient Listening during the visit with  WOODY Rondon for chart documentation. 8/2/2024  10:42 CDT

## 2024-08-03 ENCOUNTER — HOME HEALTH ADMISSION (OUTPATIENT)
Dept: HOME HEALTH SERVICES | Facility: HOME HEALTHCARE | Age: 69
End: 2024-08-03
Payer: MEDICARE

## 2024-08-05 ENCOUNTER — HOME CARE VISIT (OUTPATIENT)
Dept: HOME HEALTH SERVICES | Facility: CLINIC | Age: 69
End: 2024-08-05
Payer: MEDICARE

## 2024-08-05 PROCEDURE — G0299 HHS/HOSPICE OF RN EA 15 MIN: HCPCS

## 2024-08-05 NOTE — Clinical Note
"SOC Note:    Home Health ordered for: disciplines skilled nursing     Reason for Hosp/Primary Dx/Co-morbidities: type 2 DM with hyperglycemia    Focus of Care: Wound Care     Patient's goal(s):\"to my legs to heal\"    Current Functional status/mobility/DME: Patient uses walker    HB status/Living Arrangements: Patient lives alone     Skin Integrity/wound status:  skin tear behind knee on BLE    Code Status: CPR    Fall Risk/Safety concerns: moerate fall risk     Educated on Emergency Plan, steps to take prior to going to the ER and when to Call Home Health First: Copy of emergency plan left in home     Medication issues/Concerns: medication indications, route, and frequency discussed with patient.  Pt verbalizes understanding and denies additonal questions at this time     Additional Problems/Concerns: none at this time     SDOH Barriers (i.e. caregiver concerns, social isolation, transportation, food insecurity, environment, income etc.)/Need for MSW: no need at this time"

## 2024-08-06 VITALS
DIASTOLIC BLOOD PRESSURE: 80 MMHG | SYSTOLIC BLOOD PRESSURE: 120 MMHG | OXYGEN SATURATION: 95 % | HEART RATE: 72 BPM | RESPIRATION RATE: 18 BRPM | TEMPERATURE: 98.2 F

## 2024-08-07 NOTE — HOME HEALTH
"SOC Note:    Home Health ordered for: disciplines Skilled Nursing    Reason for Hosp/Primary Dx/Co-morbidities: type 2 Dm with hyperglycemia     Focus of Care: Wound Care to bilateral lower extremities     Patient's goal(s):\"to my legs to heal\"    Current Functional status/mobility/DME: Patient uses walker    HB status/Living Arrangements: Patient lives alone     Skin Integrity/wound status:  skin tear behind knee on BLE    Code Status: CPR    Fall Risk/Safety concerns: moerate fall risk     Educated on Emergency Plan, steps to take prior to going to the ER and when to Call Home Health First: Copy of emergency plan left in home     Medication issues/Concerns: medication indications, route, and frequency discussed with patient.  Pt verbalizes understanding and denies additonal questions at this time     Additional Problems/Concerns: none at this time     SDOH Barriers (i.e. caregiver concerns, social isolation, transportation, food insecurity, environment, income etc.)/Need for MSW: no need at this time"

## 2024-08-09 ENCOUNTER — HOME CARE VISIT (OUTPATIENT)
Dept: HOME HEALTH SERVICES | Facility: CLINIC | Age: 69
End: 2024-08-09
Payer: MEDICARE

## 2024-08-09 PROCEDURE — G0300 HHS/HOSPICE OF LPN EA 15 MIN: HCPCS

## 2024-08-11 VITALS
RESPIRATION RATE: 16 BRPM | OXYGEN SATURATION: 98 % | DIASTOLIC BLOOD PRESSURE: 84 MMHG | TEMPERATURE: 97.4 F | HEART RATE: 84 BPM | SYSTOLIC BLOOD PRESSURE: 126 MMHG

## 2024-08-12 ENCOUNTER — HOME CARE VISIT (OUTPATIENT)
Dept: HOME HEALTH SERVICES | Facility: CLINIC | Age: 69
End: 2024-08-12
Payer: MEDICARE

## 2024-08-12 PROCEDURE — G0300 HHS/HOSPICE OF LPN EA 15 MIN: HCPCS

## 2024-08-13 VITALS
HEART RATE: 84 BPM | TEMPERATURE: 97 F | DIASTOLIC BLOOD PRESSURE: 84 MMHG | SYSTOLIC BLOOD PRESSURE: 116 MMHG | RESPIRATION RATE: 14 BRPM | OXYGEN SATURATION: 98 %

## 2024-08-15 ENCOUNTER — HOME CARE VISIT (OUTPATIENT)
Dept: HOME HEALTH SERVICES | Facility: CLINIC | Age: 69
End: 2024-08-15
Payer: MEDICARE

## 2024-08-15 PROCEDURE — G0493 RN CARE EA 15 MIN HH/HOSPICE: HCPCS

## 2024-08-16 VITALS
TEMPERATURE: 97 F | DIASTOLIC BLOOD PRESSURE: 78 MMHG | HEART RATE: 87 BPM | RESPIRATION RATE: 18 BRPM | SYSTOLIC BLOOD PRESSURE: 132 MMHG | OXYGEN SATURATION: 98 %

## 2024-08-19 ENCOUNTER — HOME CARE VISIT (OUTPATIENT)
Dept: HOME HEALTH SERVICES | Facility: CLINIC | Age: 69
End: 2024-08-19
Payer: MEDICARE

## 2024-08-19 VITALS
HEART RATE: 84 BPM | DIASTOLIC BLOOD PRESSURE: 82 MMHG | RESPIRATION RATE: 16 BRPM | SYSTOLIC BLOOD PRESSURE: 120 MMHG | TEMPERATURE: 97.5 F | OXYGEN SATURATION: 96 %

## 2024-08-19 PROCEDURE — G0299 HHS/HOSPICE OF RN EA 15 MIN: HCPCS

## 2024-08-21 DIAGNOSIS — G89.4 CHRONIC PAIN DISORDER: ICD-10-CM

## 2024-08-21 RX ORDER — TRAMADOL HYDROCHLORIDE 50 MG/1
TABLET ORAL
Qty: 210 TABLET | Refills: 1 | Status: SHIPPED | OUTPATIENT
Start: 2024-08-21

## 2024-08-22 ENCOUNTER — HOME CARE VISIT (OUTPATIENT)
Dept: HOME HEALTH SERVICES | Facility: CLINIC | Age: 69
End: 2024-08-22
Payer: MEDICARE

## 2024-08-22 VITALS
DIASTOLIC BLOOD PRESSURE: 84 MMHG | RESPIRATION RATE: 16 BRPM | HEART RATE: 82 BPM | TEMPERATURE: 96.8 F | SYSTOLIC BLOOD PRESSURE: 138 MMHG | OXYGEN SATURATION: 96 %

## 2024-08-22 PROCEDURE — G0300 HHS/HOSPICE OF LPN EA 15 MIN: HCPCS

## 2024-08-23 ENCOUNTER — TELEPHONE (OUTPATIENT)
Dept: UROLOGY | Age: 69
End: 2024-08-23

## 2024-08-23 DIAGNOSIS — N32.81 OAB (OVERACTIVE BLADDER): Primary | ICD-10-CM

## 2024-08-23 NOTE — TELEPHONE ENCOUNTER
Patient called needing Gemtesa script sent in to pharmacy. Patient checked with pharmacy and they did not have script. Please send in medication

## 2024-08-28 ENCOUNTER — HOME CARE VISIT (OUTPATIENT)
Dept: HOME HEALTH SERVICES | Facility: CLINIC | Age: 69
End: 2024-08-28
Payer: MEDICARE

## 2024-08-28 PROCEDURE — G0300 HHS/HOSPICE OF LPN EA 15 MIN: HCPCS

## 2024-08-30 VITALS
OXYGEN SATURATION: 95 % | HEART RATE: 80 BPM | TEMPERATURE: 97.1 F | RESPIRATION RATE: 18 BRPM | SYSTOLIC BLOOD PRESSURE: 138 MMHG | DIASTOLIC BLOOD PRESSURE: 82 MMHG

## 2024-09-04 ENCOUNTER — HOME CARE VISIT (OUTPATIENT)
Dept: HOME HEALTH SERVICES | Facility: CLINIC | Age: 69
End: 2024-09-04
Payer: MEDICARE

## 2024-09-04 VITALS
HEART RATE: 76 BPM | OXYGEN SATURATION: 96 % | TEMPERATURE: 97.3 F | RESPIRATION RATE: 16 BRPM | SYSTOLIC BLOOD PRESSURE: 124 MMHG | DIASTOLIC BLOOD PRESSURE: 80 MMHG

## 2024-09-04 PROCEDURE — G0300 HHS/HOSPICE OF LPN EA 15 MIN: HCPCS

## 2024-09-09 ENCOUNTER — HOME CARE VISIT (OUTPATIENT)
Dept: HOME HEALTH SERVICES | Facility: CLINIC | Age: 69
End: 2024-09-09
Payer: MEDICARE

## 2024-09-09 PROCEDURE — G0299 HHS/HOSPICE OF RN EA 15 MIN: HCPCS

## 2024-09-10 VITALS
SYSTOLIC BLOOD PRESSURE: 118 MMHG | HEART RATE: 75 BPM | OXYGEN SATURATION: 96 % | DIASTOLIC BLOOD PRESSURE: 70 MMHG | RESPIRATION RATE: 20 BRPM | TEMPERATURE: 97.2 F

## 2024-09-10 NOTE — HOME HEALTH
Routine Visit Note:Alert and pleasant. Denies new health concerns.     Skill/education provided:   Skin assessment and teaching. Diabetic teaching and assessment. Pre d/c visit.    Patient/caregiver response: cooperative    Other pertinent info: No s/s of covid

## 2024-09-13 ENCOUNTER — OFFICE VISIT (OUTPATIENT)
Dept: INTERNAL MEDICINE | Facility: CLINIC | Age: 69
End: 2024-09-13
Payer: MEDICARE

## 2024-09-13 VITALS
HEART RATE: 83 BPM | BODY MASS INDEX: 50.02 KG/M2 | SYSTOLIC BLOOD PRESSURE: 138 MMHG | WEIGHT: 293 LBS | HEIGHT: 64 IN | OXYGEN SATURATION: 94 % | DIASTOLIC BLOOD PRESSURE: 78 MMHG | RESPIRATION RATE: 16 BRPM

## 2024-09-13 DIAGNOSIS — Z23 NEED FOR VACCINATION: ICD-10-CM

## 2024-09-13 DIAGNOSIS — Z87.891 PERSONAL HISTORY OF NICOTINE DEPENDENCE: ICD-10-CM

## 2024-09-13 DIAGNOSIS — E11.65 TYPE 2 DIABETES MELLITUS WITH HYPERGLYCEMIA, WITHOUT LONG-TERM CURRENT USE OF INSULIN: Primary | ICD-10-CM

## 2024-09-13 DIAGNOSIS — F41.1 GENERALIZED ANXIETY DISORDER: ICD-10-CM

## 2024-09-13 DIAGNOSIS — Z12.12 SCREENING FOR COLORECTAL CANCER: ICD-10-CM

## 2024-09-13 DIAGNOSIS — E66.01 CLASS 3 SEVERE OBESITY DUE TO EXCESS CALORIES WITH SERIOUS COMORBIDITY AND BODY MASS INDEX (BMI) OF 50.0 TO 59.9 IN ADULT: ICD-10-CM

## 2024-09-13 DIAGNOSIS — Z87.891 HISTORY OF NICOTINE DEPENDENCE: ICD-10-CM

## 2024-09-13 DIAGNOSIS — Z78.0 ENCOUNTER FOR OSTEOPOROSIS SCREENING IN ASYMPTOMATIC POSTMENOPAUSAL PATIENT: ICD-10-CM

## 2024-09-13 DIAGNOSIS — Z13.820 ENCOUNTER FOR OSTEOPOROSIS SCREENING IN ASYMPTOMATIC POSTMENOPAUSAL PATIENT: ICD-10-CM

## 2024-09-13 DIAGNOSIS — Z87.891 PERSONAL HISTORY OF TOBACCO USE, PRESENTING HAZARDS TO HEALTH: ICD-10-CM

## 2024-09-13 DIAGNOSIS — Z12.11 SCREENING FOR COLORECTAL CANCER: ICD-10-CM

## 2024-09-13 DIAGNOSIS — I10 ESSENTIAL HYPERTENSION: ICD-10-CM

## 2024-09-13 DIAGNOSIS — Z12.31 ENCOUNTER FOR SCREENING MAMMOGRAM FOR MALIGNANT NEOPLASM OF BREAST: ICD-10-CM

## 2024-09-13 LAB — HBA1C MFR BLD: 6.8 % (ref 4.5–5.7)

## 2024-09-13 RX ORDER — METOPROLOL TARTRATE 100 MG
100 TABLET ORAL 2 TIMES DAILY
Qty: 180 TABLET | Refills: 1 | Status: SHIPPED | OUTPATIENT
Start: 2024-09-13

## 2024-09-13 RX ORDER — HYDROXYZINE HYDROCHLORIDE 25 MG/1
25 TABLET, FILM COATED ORAL NIGHTLY
Qty: 90 TABLET | Refills: 1 | Status: SHIPPED | OUTPATIENT
Start: 2024-09-13

## 2024-09-13 RX ORDER — SEMAGLUTIDE 0.68 MG/ML
0.5 INJECTION, SOLUTION SUBCUTANEOUS WEEKLY
Qty: 9 ML | Refills: 0 | Status: SHIPPED | OUTPATIENT
Start: 2024-09-13

## 2024-09-16 NOTE — PROGRESS NOTES
CC: f/u diabetes    History:  Jessica Kern is a 68 y.o. female   History of Present Illness  The patient presents for a follow-up visit.    She reports overall good health but had to discontinue Ozempic due to severe nausea and vomiting, particularly when the dosage was increased to 2 mg. She has been off Ozempic for the past 6 weeks, during which she experienced two episodes of severe vomiting lasting about 12 hours each. She also mentions a recent weight gain, which she attributes to increased appetite after stopping Ozempic.    She has a history of irregular bowel movements, alternating between constipation and diarrhea, but this has improved recently. She has been using senna for bowel regulation. She has not undergone a colonoscopy but has used home test kits.    She has been receiving home health care for open sores, which are now nearly healed.     SOCIAL HISTORY  She quit smoking in 2010.        ROS:  Review of Systems   Constitutional:  Positive for unexpected weight change. Negative for chills and fever.   Respiratory:  Negative for cough and shortness of breath.    Cardiovascular:  Negative for chest pain and palpitations.   Gastrointestinal:  Negative for abdominal pain and constipation.   Genitourinary:  Negative for difficulty urinating and dysuria.   Skin:  Positive for wound.        reports that she quit smoking about 14 years ago. Her smoking use included cigarettes. She started smoking about 53 years ago. She has a 78.4 pack-year smoking history. She has never been exposed to tobacco smoke. She has never used smokeless tobacco. She reports that she does not currently use alcohol. She reports that she does not use drugs.      Current Outpatient Medications:     acetaminophen (TYLENOL) 500 MG tablet, Take 2 tablets by mouth Every 8 (Eight) Hours. Indications: Pain, Disp: , Rfl:     Cholecalciferol (Vitamin D) 50 MCG (2000 UT) capsule, Take 1 capsule by mouth Daily., Disp: 90 capsule, Rfl: 3     clotrimazole-betamethasone (LOTRISONE) 1-0.05 % cream, Apply  topically to the appropriate area as directed 2 (Two) Times a Day. Indications: Ringworm of Groin Area, Disp: 60 each, Rfl: 3    diclofenac (VOLTAREN) 50 MG EC tablet, Take 1 tablet by mouth 2 (Two) Times a Day. Indications: Joint Damage causing Pain and Loss of Function, Disp: 180 tablet, Rfl: 2    Diclofenac Sodium (VOLTAREN) 1 % gel gel, Apply  topically to the appropriate area as directed 4 (Four) Times a Day. (Patient taking differently: Apply 4 g topically to the appropriate area as directed 4 (Four) Times a Day. Indications: Joint Damage causing Pain and Loss of Function), Disp: 100 g, Rfl: 3    docusate sodium (Colace) 100 MG capsule, Take 1 capsule by mouth 3 (Three) Times a Day As Needed for Constipation. Indications: Constipation, Disp: , Rfl:     gabapentin (NEURONTIN) 600 MG tablet, Take 1 tablet by mouth 4 (Four) Times a Day. Indications: Neuropathic Pain, Disp: 360 tablet, Rfl: 1    hydrOXYzine (ATARAX) 25 MG tablet, Take 1 tablet by mouth Every Night. Indications: Feeling Anxious, Disp: 90 tablet, Rfl: 1    Magic Barrier Cream, Apply 1 Application topically to the appropriate area as directed 2 (Two) Times a Day to perineal area as needed, Disp: 120 g, Rfl: 0    metoprolol tartrate (LOPRESSOR) 100 MG tablet, Take 1 tablet by mouth 2 (Two) Times a Day. Indications: High Blood Pressure Disorder, Disp: 180 tablet, Rfl: 1    omeprazole (priLOSEC) 40 MG capsule, Take 1 capsule by mouth Daily. Indications: Gastroesophageal Reflux Disease, Heartburn, Disp: 90 capsule, Rfl: 2    pravastatin (PRAVACHOL) 80 MG tablet, Take 1 tablet by mouth Daily. Indications: High Amount of Fats in the Blood, Disp: 90 tablet, Rfl: 1    tamsulosin (FLOMAX) 0.4 MG capsule 24 hr capsule, Take 1 capsule by mouth Daily. Indications: Dysfunction of the Urinary Bladder, Disp: 90 capsule, Rfl: 3    traMADol (ULTRAM) 50 MG tablet, TAKE ONE (1) TO TWO (2) TABS EVERY SIX (6)  "HR AS NEEDED MODERATE PAIN, Disp: 210 tablet, Rfl: 1    Vibegron 75 MG tablet, Take 1 tablet by mouth Daily. Indications: Overactive Bladder, Disp: , Rfl:     Semaglutide,0.25 or 0.5MG/DOS, (Ozempic, 0.25 or 0.5 MG/DOSE,) 2 MG/3ML solution pen-injector, Inject 0.5 mg under the skin into the appropriate area as directed 1 (One) Time Per Week., Disp: 9 mL, Rfl: 0    OBJECTIVE:  /78 (BP Location: Left arm, Patient Position: Sitting, Cuff Size: Adult)   Pulse 83   Resp 16   Ht 162.6 cm (64\")   Wt 134 kg (296 lb 6.4 oz)   SpO2 94%   BMI 50.88 kg/m²    Physical Exam  Constitutional:       General: She is not in acute distress.  Pulmonary:      Effort: Pulmonary effort is normal. No respiratory distress.   Neurological:      Mental Status: She is alert and oriented to person, place, and time.           Assessment/Plan     Diagnoses and all orders for this visit:    1. Type 2 diabetes mellitus with hyperglycemia, without long-term current use of insulin (Primary)  -     POCT glycated hemoglobin, total  -     Semaglutide,0.25 or 0.5MG/DOS, (Ozempic, 0.25 or 0.5 MG/DOSE,) 2 MG/3ML solution pen-injector; Inject 0.5 mg under the skin into the appropriate area as directed 1 (One) Time Per Week.  Dispense: 9 mL; Refill: 0  A1c well controlled at 6.8%. Restart Ozempic at 0.5mg, but would increase dose relatively quickly if tolerated well.     2. Generalized anxiety disorder  -     hydrOXYzine (ATARAX) 25 MG tablet; Take 1 tablet by mouth Every Night. Indications: Feeling Anxious  Dispense: 90 tablet; Refill: 1    3. Essential hypertension  -     metoprolol tartrate (LOPRESSOR) 100 MG tablet; Take 1 tablet by mouth 2 (Two) Times a Day. Indications: High Blood Pressure Disorder  Dispense: 180 tablet; Refill: 1  Well controlled, BP goal for age is <140/90 per JNC 8 guidelines, and continue current medications    4. Class 3 severe obesity due to excess calories with serious comorbidity and body mass index (BMI) of 50.0 to " 59.9 in adult  Restart Ozempic and encourage ongoing diet control.      5. Encounter for screening mammogram for malignant neoplasm of breast  -     Mammo Screening Digital Tomosynthesis Bilateral With CAD; Future    6. Encounter for osteoporosis screening in asymptomatic postmenopausal patient  -     DEXA Bone Density Axial; Future    7. Personal history of nicotine dependence  -     CT chest low dose wo; Future    10. Screening for colorectal cancer  -     Cologuard - Stool, Per Rectum; Future    11. Need for vaccination  -     Fluzone High-Dose 65+yrs ([id number])    An After Visit Summary was printed and given to the patient at discharge.  Return in about 4 months (around 1/13/2025) for Recheck.      Patient or patient representative verbalized consent for the use of Ambient Listening during the visit with  Aron Mcallister DO for chart documentation. 9/15/2024  21:13 CDT    Aron Mcallister D.O. 9/15/2024   Electronically signed.

## 2024-09-17 ENCOUNTER — HOME CARE VISIT (OUTPATIENT)
Dept: HOME HEALTH SERVICES | Facility: CLINIC | Age: 69
End: 2024-09-17
Payer: MEDICARE

## 2024-09-17 DIAGNOSIS — G89.4 CHRONIC PAIN DISORDER: ICD-10-CM

## 2024-09-17 PROCEDURE — G0299 HHS/HOSPICE OF RN EA 15 MIN: HCPCS

## 2024-09-17 RX ORDER — TRAMADOL HYDROCHLORIDE 50 MG/1
TABLET ORAL
Qty: 210 TABLET | Refills: 1 | OUTPATIENT
Start: 2024-09-17

## 2024-09-18 VITALS
DIASTOLIC BLOOD PRESSURE: 80 MMHG | OXYGEN SATURATION: 95 % | TEMPERATURE: 98.1 F | SYSTOLIC BLOOD PRESSURE: 120 MMHG | RESPIRATION RATE: 18 BRPM | HEART RATE: 71 BPM

## 2024-09-20 ENCOUNTER — OFFICE VISIT (OUTPATIENT)
Dept: UROLOGY | Age: 69
End: 2024-09-20

## 2024-09-20 VITALS — TEMPERATURE: 97.3 F | WEIGHT: 293 LBS | HEIGHT: 64 IN | BODY MASS INDEX: 50.02 KG/M2

## 2024-09-20 DIAGNOSIS — N20.0 LEFT RENAL STONE: ICD-10-CM

## 2024-09-20 DIAGNOSIS — N32.89 BLADDER SPASMS: ICD-10-CM

## 2024-09-20 DIAGNOSIS — N39.9 URINARY PROBLEM IN FEMALE: ICD-10-CM

## 2024-09-20 DIAGNOSIS — N39.0 RECURRENT UTI: ICD-10-CM

## 2024-09-20 DIAGNOSIS — N13.30 HYDRONEPHROSIS OF RIGHT KIDNEY: ICD-10-CM

## 2024-09-20 DIAGNOSIS — N32.81 OAB (OVERACTIVE BLADDER): Primary | ICD-10-CM

## 2024-09-20 DIAGNOSIS — N20.0 RIGHT RENAL STONE: ICD-10-CM

## 2024-09-20 LAB
BACTERIA URINE, POC: ABNORMAL
BILIRUBIN URINE: 0 MG/DL
BLOOD, URINE: POSITIVE
CASTS URINE, POC: ABNORMAL
CLARITY, UA: CLEAR
COLOR, UA: YELLOW
CRYSTALS URINE, POC: ABNORMAL
EPI CELLS URINE, POC: ABNORMAL
GLUCOSE URINE: ABNORMAL
KETONES, URINE: NEGATIVE
LEUKOCYTE EST, POC: ABNORMAL
NITRITE, URINE: POSITIVE
PH UA: 5.5 (ref 4.5–8)
PROTEIN UA: POSITIVE
RBC URINE, POC: ABNORMAL
SPECIFIC GRAVITY UA: 1.03 (ref 1–1.03)
UROBILINOGEN, URINE: ABNORMAL
WBC URINE, POC: ABNORMAL
YEAST URINE, POC: 4

## 2024-09-20 RX ORDER — ACETAMINOPHEN 500 MG
500 TABLET ORAL EVERY 6 HOURS PRN
COMMUNITY

## 2024-09-20 ASSESSMENT — ENCOUNTER SYMPTOMS
ABDOMINAL PAIN: 0
NAUSEA: 0
ABDOMINAL DISTENTION: 0
VOMITING: 0

## 2024-10-15 DIAGNOSIS — G89.4 CHRONIC PAIN DISORDER: ICD-10-CM

## 2024-10-15 RX ORDER — TRAMADOL HYDROCHLORIDE 50 MG/1
TABLET ORAL
Qty: 210 TABLET | Refills: 1 | Status: SHIPPED | OUTPATIENT
Start: 2024-10-15

## 2024-10-15 NOTE — TELEPHONE ENCOUNTER
Rx Refill Note  Requested Prescriptions     Pending Prescriptions Disp Refills    traMADol (ULTRAM) 50 MG tablet [Pharmacy Med Name: TRAMADOL HYDROCHLORIDE 50MG TABLET] 210 tablet 1     Sig: TAKE ONE (1) TO TWO (2) TABS EVERY SIX (6) HR AS NEEDED MODERATE PAIN      Last office visit with prescribing clinician: 8/2/2024   Last telemedicine visit with prescribing clinician: Visit date not found   Next office visit with prescribing clinician: 1/10/2025                         Would you like a call back once the refill request has been completed: [] Yes [] No    If the office needs to give you a call back, can they leave a voicemail: [] Yes [] No    Kris Garnica MA  10/15/24, 09:42 CDT

## 2024-10-17 DIAGNOSIS — M25.562 PAIN IN BOTH KNEES, UNSPECIFIED CHRONICITY: Primary | ICD-10-CM

## 2024-10-17 DIAGNOSIS — M25.561 PAIN IN BOTH KNEES, UNSPECIFIED CHRONICITY: Primary | ICD-10-CM

## 2024-10-18 ENCOUNTER — OFFICE VISIT (OUTPATIENT)
Age: 69
End: 2024-10-18

## 2024-10-18 VITALS — WEIGHT: 293 LBS | HEIGHT: 64 IN | BODY MASS INDEX: 50.02 KG/M2

## 2024-10-18 DIAGNOSIS — M17.0 BILATERAL PRIMARY OSTEOARTHRITIS OF KNEE: Primary | ICD-10-CM

## 2024-10-18 RX ORDER — BETAMETHASONE SODIUM PHOSPHATE AND BETAMETHASONE ACETATE 3; 3 MG/ML; MG/ML
12 INJECTION, SUSPENSION INTRA-ARTICULAR; INTRALESIONAL; INTRAMUSCULAR; SOFT TISSUE ONCE
Status: COMPLETED | OUTPATIENT
Start: 2024-10-18 | End: 2024-10-18

## 2024-10-18 RX ORDER — LIDOCAINE HYDROCHLORIDE 10 MG/ML
5 INJECTION, SOLUTION EPIDURAL; INFILTRATION; INTRACAUDAL; PERINEURAL ONCE
Status: COMPLETED | OUTPATIENT
Start: 2024-10-18 | End: 2024-10-18

## 2024-10-18 RX ADMIN — LIDOCAINE HYDROCHLORIDE 5 ML: 10 INJECTION, SOLUTION EPIDURAL; INFILTRATION; INTRACAUDAL; PERINEURAL at 13:00

## 2024-10-18 RX ADMIN — BETAMETHASONE SODIUM PHOSPHATE AND BETAMETHASONE ACETATE 12 MG: 3; 3 INJECTION, SUSPENSION INTRA-ARTICULAR; INTRALESIONAL; INTRAMUSCULAR; SOFT TISSUE at 13:00

## 2024-10-18 RX ADMIN — BETAMETHASONE SODIUM PHOSPHATE AND BETAMETHASONE ACETATE 12 MG: 3; 3 INJECTION, SUSPENSION INTRA-ARTICULAR; INTRALESIONAL; INTRAMUSCULAR; SOFT TISSUE at 12:59

## 2024-10-18 RX ADMIN — LIDOCAINE HYDROCHLORIDE 5 ML: 10 INJECTION, SOLUTION EPIDURAL; INFILTRATION; INTRACAUDAL; PERINEURAL at 13:03

## 2024-10-18 NOTE — ASSESSMENT & PLAN NOTE
Orders:    DRAIN/INJECT LARGE JOINT/BURSA    betamethasone acetate-betamethasone sodium phosphate (CELESTONE) injection 12 mg    lidocaine PF 1 % injection 5 mL    betamethasone acetate-betamethasone sodium phosphate (CELESTONE) injection 12 mg    lidocaine PF 1 % injection 5 mL

## 2024-12-09 DIAGNOSIS — G89.4 CHRONIC PAIN DISORDER: ICD-10-CM

## 2024-12-09 RX ORDER — TRAMADOL HYDROCHLORIDE 50 MG/1
TABLET ORAL
Qty: 210 TABLET | Refills: 1 | Status: SHIPPED | OUTPATIENT
Start: 2024-12-09

## 2024-12-20 DIAGNOSIS — E78.2 MIXED HYPERLIPIDEMIA: ICD-10-CM

## 2024-12-20 RX ORDER — PRAVASTATIN SODIUM 80 MG/1
80 TABLET ORAL NIGHTLY
Qty: 90 TABLET | Refills: 3 | Status: SHIPPED | OUTPATIENT
Start: 2024-12-20

## 2025-01-30 DIAGNOSIS — G57.93 NEUROPATHY INVOLVING BOTH LOWER EXTREMITIES: ICD-10-CM

## 2025-01-30 RX ORDER — GABAPENTIN 600 MG/1
600 TABLET ORAL 4 TIMES DAILY
Qty: 360 TABLET | Refills: 1 | OUTPATIENT
Start: 2025-01-30

## 2025-01-30 NOTE — TELEPHONE ENCOUNTER
Caller: Jean-ClaudeJessica suarez    Relationship: Self    Best call back number: [phone]     Requested Prescriptions:   Requested Prescriptions     Pending Prescriptions Disp Refills    gabapentin (NEURONTIN) 600 MG tablet 360 tablet 1     Sig: Take 1 tablet by mouth 4 (Four) Times a Day. Indications: Neuropathic Pain        Pharmacy where request should be sent: [facility] - [facility] [address] - [phone]  - [phone] FX     Last office visit with prescribing clinician: 9/13/2024   Last telemedicine visit with prescribing clinician: Visit date not found   Next office visit with prescribing clinician: Visit date not found     Additional details provided by patient: COMPLETELY OUT     Does the patient have less than a 3 day supply:  [x] Yes  [] No    Would you like a call back once the refill request has been completed: [] Yes [] No    If the office needs to give you a call back, can they leave a voicemail: [] Yes [] No    Korina Moya Rep   01/30/25 15:04 CST

## 2025-02-03 DIAGNOSIS — G57.93 NEUROPATHY INVOLVING BOTH LOWER EXTREMITIES: ICD-10-CM

## 2025-02-03 DIAGNOSIS — G89.4 CHRONIC PAIN DISORDER: ICD-10-CM

## 2025-02-03 RX ORDER — TRAMADOL HYDROCHLORIDE 50 MG/1
TABLET ORAL
Qty: 210 TABLET | Refills: 1 | Status: SHIPPED | OUTPATIENT
Start: 2025-02-03

## 2025-02-03 RX ORDER — GABAPENTIN 600 MG/1
600 TABLET ORAL 4 TIMES DAILY
Qty: 360 TABLET | Refills: 1 | Status: SHIPPED | OUTPATIENT
Start: 2025-02-03

## 2025-02-03 NOTE — TELEPHONE ENCOUNTER
Rx Refill Note  Requested Prescriptions     Pending Prescriptions Disp Refills    gabapentin (NEURONTIN) 600 MG tablet 360 tablet 1     Sig: Take 1 tablet by mouth 4 (Four) Times a Day. Indications: Neuropathic Pain    traMADol (ULTRAM) 50 MG tablet 210 tablet 1     Sig: TAKE ONE (1) TO TWO (2) TABS EVERY SIX (6) HR AS NEEDED MODERATE PAIN  Indications: Pain      Last office visit with prescribing clinician: 9/13/2024   Last telemedicine visit with prescribing clinician: Visit date not found   Next office visit with prescribing clinician: Visit date not found                         Would you like a call back once the refill request has been completed: [] Yes [] No    If the office needs to give you a call back, can they leave a voicemail: [] Yes [] No    Kris Garnica MA  02/03/25, 13:55 CST

## 2025-02-03 NOTE — TELEPHONE ENCOUNTER
Caller: Jean-ClaudeJessica suarez    Relationship: Self    Best call back number: [phone]     Requested Prescriptions:   Requested Prescriptions     Pending Prescriptions Disp Refills    gabapentin (NEURONTIN) 600 MG tablet 360 tablet 1     Sig: Take 1 tablet by mouth 4 (Four) Times a Day. Indications: Neuropathic Pain    traMADol (ULTRAM) 50 MG tablet 210 tablet 1     Sig: TAKE ONE (1) TO TWO (2) TABS EVERY SIX (6) HR AS NEEDED MODERATE PAIN  Indications: Pain        Pharmacy where request should be sent: [facility] [place] [address] - [phone] [facility] [phone] FX     Last office visit with prescribing clinician: 9/13/2024   Last telemedicine visit with prescribing clinician: Visit date not found   Next office visit with prescribing clinician: Visit date not found     Additional details provided by patient: PATIENT HAS APPOINTMENT ON FRIDAY 2/7/25 AND IS NEEDING ENOUGH MEDICATION TO DUE HER TIL THEN.    Does the patient have less than a 3 day supply:  [x] Yes  [] No    Would you like a call back once the refill request has been completed: [x] Yes [] No    If the office needs to give you a call back, can they leave a voicemail: [x] Yes [] No    Korina Trammell   02/03/25 13:02 CST

## 2025-02-07 ENCOUNTER — OFFICE VISIT (OUTPATIENT)
Dept: INTERNAL MEDICINE | Facility: CLINIC | Age: 70
End: 2025-02-07
Payer: MEDICARE

## 2025-02-07 ENCOUNTER — LAB (OUTPATIENT)
Dept: LAB | Facility: HOSPITAL | Age: 70
End: 2025-02-07
Payer: MEDICARE

## 2025-02-07 VITALS
HEART RATE: 76 BPM | SYSTOLIC BLOOD PRESSURE: 128 MMHG | BODY MASS INDEX: 50.02 KG/M2 | HEIGHT: 64 IN | OXYGEN SATURATION: 96 % | DIASTOLIC BLOOD PRESSURE: 86 MMHG | WEIGHT: 293 LBS

## 2025-02-07 DIAGNOSIS — E11.65 TYPE 2 DIABETES MELLITUS WITH HYPERGLYCEMIA, WITHOUT LONG-TERM CURRENT USE OF INSULIN: ICD-10-CM

## 2025-02-07 DIAGNOSIS — Z00.00 ENCOUNTER FOR SUBSEQUENT ANNUAL WELLNESS VISIT (AWV) IN MEDICARE PATIENT: Primary | ICD-10-CM

## 2025-02-07 DIAGNOSIS — K21.9 GASTROESOPHAGEAL REFLUX DISEASE WITHOUT ESOPHAGITIS: ICD-10-CM

## 2025-02-07 DIAGNOSIS — Z23 ENCOUNTER FOR VACCINATION: ICD-10-CM

## 2025-02-07 DIAGNOSIS — I10 ESSENTIAL HYPERTENSION: ICD-10-CM

## 2025-02-07 DIAGNOSIS — F41.1 GENERALIZED ANXIETY DISORDER: ICD-10-CM

## 2025-02-07 LAB
ALBUMIN SERPL-MCNC: 4.3 G/DL (ref 3.5–5.2)
ALBUMIN UR-MCNC: 10.7 MG/DL
ALBUMIN/GLOB SERPL: 1.3 G/DL
ALP SERPL-CCNC: 135 U/L (ref 39–117)
ALT SERPL W P-5'-P-CCNC: 13 U/L (ref 1–33)
ANION GAP SERPL CALCULATED.3IONS-SCNC: 12 MMOL/L (ref 5–15)
AST SERPL-CCNC: 19 U/L (ref 1–32)
BILIRUB SERPL-MCNC: 0.4 MG/DL (ref 0–1.2)
BUN SERPL-MCNC: 16 MG/DL (ref 8–23)
BUN/CREAT SERPL: 18 (ref 7–25)
CALCIUM SPEC-SCNC: 9.3 MG/DL (ref 8.6–10.5)
CHLORIDE SERPL-SCNC: 93 MMOL/L (ref 98–107)
CHOLEST SERPL-MCNC: 193 MG/DL (ref 0–200)
CO2 SERPL-SCNC: 28 MMOL/L (ref 22–29)
CREAT SERPL-MCNC: 0.89 MG/DL (ref 0.57–1)
CREAT UR-MCNC: 42.8 MG/DL
DEPRECATED RDW RBC AUTO: 40.7 FL (ref 37–54)
EGFRCR SERPLBLD CKD-EPI 2021: 69.8 ML/MIN/1.73
ERYTHROCYTE [DISTWIDTH] IN BLOOD BY AUTOMATED COUNT: 12.7 % (ref 12.3–15.4)
GLOBULIN UR ELPH-MCNC: 3.3 GM/DL
GLUCOSE SERPL-MCNC: 460 MG/DL (ref 65–99)
HBA1C MFR BLD: 13.3 % (ref 4.8–5.6)
HCT VFR BLD AUTO: 45 % (ref 34–46.6)
HDLC SERPL-MCNC: 36 MG/DL (ref 40–60)
HGB BLD-MCNC: 14.6 G/DL (ref 12–15.9)
LDLC SERPL CALC-MCNC: 89 MG/DL (ref 0–100)
LDLC/HDLC SERPL: 2.04 {RATIO}
MCH RBC QN AUTO: 28.8 PG (ref 26.6–33)
MCHC RBC AUTO-ENTMCNC: 32.4 G/DL (ref 31.5–35.7)
MCV RBC AUTO: 88.8 FL (ref 79–97)
MICROALBUMIN/CREAT UR: 250 MG/G (ref 0–29)
PLATELET # BLD AUTO: 176 10*3/MM3 (ref 140–450)
PMV BLD AUTO: 10.8 FL (ref 6–12)
POTASSIUM SERPL-SCNC: 4.4 MMOL/L (ref 3.5–5.2)
PROT SERPL-MCNC: 7.6 G/DL (ref 6–8.5)
RBC # BLD AUTO: 5.07 10*6/MM3 (ref 3.77–5.28)
SODIUM SERPL-SCNC: 133 MMOL/L (ref 136–145)
TRIGL SERPL-MCNC: 417 MG/DL (ref 0–150)
VLDLC SERPL-MCNC: 68 MG/DL (ref 5–40)
WBC NRBC COR # BLD AUTO: 6.6 10*3/MM3 (ref 3.4–10.8)

## 2025-02-07 PROCEDURE — 3074F SYST BP LT 130 MM HG: CPT

## 2025-02-07 PROCEDURE — 99214 OFFICE O/P EST MOD 30 MIN: CPT

## 2025-02-07 PROCEDURE — 3079F DIAST BP 80-89 MM HG: CPT

## 2025-02-07 PROCEDURE — 90480 ADMN SARSCOV2 VAC 1/ONLY CMP: CPT

## 2025-02-07 PROCEDURE — 91320 SARSCV2 VAC 30MCG TRS-SUC IM: CPT

## 2025-02-07 PROCEDURE — 82570 ASSAY OF URINE CREATININE: CPT

## 2025-02-07 PROCEDURE — 83036 HEMOGLOBIN GLYCOSYLATED A1C: CPT

## 2025-02-07 PROCEDURE — 85027 COMPLETE CBC AUTOMATED: CPT

## 2025-02-07 PROCEDURE — 36415 COLL VENOUS BLD VENIPUNCTURE: CPT

## 2025-02-07 PROCEDURE — 82043 UR ALBUMIN QUANTITATIVE: CPT

## 2025-02-07 PROCEDURE — 80053 COMPREHEN METABOLIC PANEL: CPT

## 2025-02-07 PROCEDURE — 80061 LIPID PANEL: CPT

## 2025-02-07 PROCEDURE — 99397 PER PM REEVAL EST PAT 65+ YR: CPT

## 2025-02-07 PROCEDURE — 1170F FXNL STATUS ASSESSED: CPT

## 2025-02-07 PROCEDURE — G0439 PPPS, SUBSEQ VISIT: HCPCS

## 2025-02-07 PROCEDURE — 96160 PT-FOCUSED HLTH RISK ASSMT: CPT

## 2025-02-07 RX ORDER — METOPROLOL TARTRATE 100 MG/1
100 TABLET ORAL 2 TIMES DAILY
Qty: 180 TABLET | Refills: 1 | Status: SHIPPED | OUTPATIENT
Start: 2025-02-07

## 2025-02-07 RX ORDER — OMEPRAZOLE 40 MG/1
40 CAPSULE, DELAYED RELEASE ORAL DAILY
Qty: 90 CAPSULE | Refills: 1 | Status: SHIPPED | OUTPATIENT
Start: 2025-02-07

## 2025-02-07 RX ORDER — HYDROXYZINE HYDROCHLORIDE 25 MG/1
25 TABLET, FILM COATED ORAL 2 TIMES DAILY PRN
Qty: 180 TABLET | Refills: 1 | Status: SHIPPED | OUTPATIENT
Start: 2025-02-07

## 2025-02-07 NOTE — ASSESSMENT & PLAN NOTE
Hypertension is stable and controlled  Continue current treatment regimen.  Blood pressure will be reassessed 4 months.

## 2025-02-07 NOTE — PROGRESS NOTES
Subjective   The ABCs of the Annual Wellness Visit  Medicare Wellness Visit      Jessica Kern is a 69 y.o. patient who presents for a Medicare Wellness Visit.    The following portions of the patient's history were reviewed and   updated as appropriate: allergies, current medications, past family history, past medical history, past social history, past surgical history, and problem list.    Compared to one year ago, the patient's physical   health is better.  Compared to one year ago, the patient's mental   health is worse.    Recent Hospitalizations:  She was admitted within the past 365 days at [facility].     Current Medical Providers:  Patient Care Team:  Aron Mcallister DO as PCP - General (Internal Medicine)  Rachel Zapien APRN as Nurse Practitioner (Nurse Practitioner)    Outpatient Medications Prior to Visit   Medication Sig Dispense Refill    acetaminophen (TYLENOL) 500 MG tablet Take 2 tablets by mouth Every 8 (Eight) Hours. Indications: Pain      Cholecalciferol (Vitamin D) 50 MCG (2000 UT) capsule Take 1 capsule by mouth Daily. 90 capsule 3    clotrimazole-betamethasone (LOTRISONE) 1-0.05 % cream Apply  topically to the appropriate area as directed 2 (Two) Times a Day. Indications: Ringworm of Groin Area 60 each 3    diclofenac (VOLTAREN) 50 MG EC tablet Take 1 tablet by mouth 2 (Two) Times a Day. 180 tablet 3    Diclofenac Sodium (VOLTAREN) 1 % gel gel Apply  topically to the appropriate area as directed 4 (Four) Times a Day. (Patient taking differently: Apply 4 g topically to the appropriate area as directed 4 (Four) Times a Day. Indications: Joint Damage causing Pain and Loss of Function) 100 g 3    docusate sodium (Colace) 100 MG capsule Take 1 capsule by mouth 3 (Three) Times a Day As Needed for Constipation. Indications: Constipation      gabapentin (NEURONTIN) 600 MG tablet Take 1 tablet by mouth 4 (Four) Times a Day. Indications: Neuropathic Pain 360 tablet 1     hydrOXYzine (ATARAX) 25 MG tablet Take 1 tablet by mouth Every Night. Indications: Feeling Anxious 90 tablet 1    Magic Barrier Cream Apply 1 Application topically to the appropriate area as directed 2 (Two) Times a Day to perineal area as needed 120 g 0    metoprolol tartrate (LOPRESSOR) 100 MG tablet Take 1 tablet by mouth 2 (Two) Times a Day. Indications: High Blood Pressure Disorder 180 tablet 1    omeprazole (priLOSEC) 40 MG capsule Take 1 capsule by mouth Daily. Indications: Gastroesophageal Reflux Disease, Heartburn 90 capsule 2    pravastatin (PRAVACHOL) 80 MG tablet Take 1 tablet by mouth Every Night. 90 tablet 3    tamsulosin (FLOMAX) 0.4 MG capsule 24 hr capsule Take 1 capsule by mouth Daily. Indications: Dysfunction of the Urinary Bladder 90 capsule 3    traMADol (ULTRAM) 50 MG tablet TAKE ONE (1) TO TWO (2) TABS EVERY SIX (6) HR AS NEEDED MODERATE PAIN  Indications: Pain 210 tablet 1    Vibegron 75 MG tablet Take 1 tablet by mouth Daily. Indications: Overactive Bladder      Semaglutide,0.25 or 0.5MG/DOS, (Ozempic, 0.25 or 0.5 MG/DOSE,) 2 MG/3ML solution pen-injector Inject 0.5 mg under the skin into the appropriate area as directed 1 (One) Time Per Week. (Patient not taking: Reported on 2/7/2025) 9 mL 0     No facility-administered medications prior to visit.     Opioid medication/s are on active medication list.  and I have evaluated her active treatment plan and pain score trends (see table).  There were no vitals filed for this visit.  I have reviewed the chart for potential of high risk medication and harmful drug interactions in the elderly.        Aspirin is not on active medication list.  Aspirin use is not indicated based on review of current medical condition/s. Risk of harm outweighs potential benefits.  .    Patient Active Problem List   Diagnosis    Chronic pain disorder    Mixed hyperlipidemia    Class 3 severe obesity due to excess calories with serious comorbidity and body mass index  "(BMI) of 50.0 to 59.9 in adult    Obstructive sleep apnea    Essential hypertension    Type 2 diabetes mellitus with hyperglycemia    Gastroesophageal reflux disease without esophagitis    Bilateral primary osteoarthritis of knee    Other hydronephrosis     Advance Care Planning Advance Directive is on file.  ACP discussion was held with the patient during this visit. Patient has an advance directive in EMR which is still valid.             Objective   Vitals:    02/07/25 0910   BP: 128/86   BP Location: Left arm   Patient Position: Sitting   Cuff Size: Adult   Pulse: 76   SpO2: 96%   Weight: (!) 137 kg (301 lb)   Height: 162.6 cm (64\")       Estimated body mass index is 51.67 kg/m² as calculated from the following:    Height as of this encounter: 162.6 cm (64\").    Weight as of this encounter: 137 kg (301 lb).                Does the patient have evidence of cognitive impairment? No                                                                                                Health  Risk Assessment    Smoking Status:  Social History     Tobacco Use   Smoking Status Former    Current packs/day: 0.00    Average packs/day: 2.0 packs/day for 39.2 years (78.4 ttl pk-yrs)    Types: Cigarettes    Start date: 11/21/1970    Quit date: 2/10/2010    Years since quitting: 15.0    Passive exposure: Never   Smokeless Tobacco Never   Tobacco Comments    Quit 12  Years ago     Alcohol Consumption:  Social History     Substance and Sexual Activity   Alcohol Use Not Currently       Fall Risk Screen  STEADI Fall Risk Assessment was completed, and patient is at LOW risk for falls.Assessment completed on:2/7/2025    Depression Screening   Little interest or pleasure in doing things? Over half   Feeling down, depressed, or hopeless? Over half   PHQ-2 Total Score 4   Trouble falling or staying asleep, or sleeping too much? Over half   Feeling tired or having little energy? Over half   Poor appetite or overeating? Not at all   Feeling " bad about yourself - or that you are a failure or have let yourself or your family down? Not at all   Trouble concentrating on things, such as reading the newspaper or watching television? Over half   Moving or speaking so slowly that other people could have noticed? Or the opposite - being so fidgety or restless that you have been moving around a lot more than usual? Several days   Thoughts that you would be better off dead, or of hurting yourself in some way? Not at all   PHQ-9 Total Score 11   If you checked off any problems, how difficult have these problems made it for you to do your work, take care of things at home, or get along with other people? Not difficult at all      Health Habits and Functional and Cognitive Screenin/7/2025     9:00 AM   Functional & Cognitive Status   Do you have difficulty preparing food and eating? Yes   Do you have difficulty bathing yourself, getting dressed or grooming yourself? Yes   Do you have difficulty using the toilet? Yes   Do you have difficulty moving around from place to place? No   Do you have trouble with steps or getting out of a bed or a chair? Yes   Current Diet Unhealthy Diet   Dental Exam Not up to date   Eye Exam Not up to date   Exercise (times per week) 0 times per week   Current Exercises Include No Regular Exercise   Do you need help using the phone?  No   Are you deaf or do you have serious difficulty hearing?  No   Do you need help to go to places out of walking distance? Yes   Do you need help shopping? Yes   Do you need help preparing meals?  Yes   Do you need help with housework?  Yes   Do you need help with laundry? Yes   Do you need help taking your medications? No   Do you need help managing money? No   Do you ever drive or ride in a car without wearing a seat belt? No   Have you felt unusual stress, anger or loneliness in the last month? Yes   Who do you live with? Alone   If you need help, do you have trouble finding someone available to  you? No   Do you have difficulty concentrating, remembering or making decisions? Yes           Age-appropriate Screening Schedule:  Refer to the list below for future screening recommendations based on patient's age, sex and/or medical conditions. Orders for these recommended tests are listed in the plan section. The patient has been provided with a written plan.    Health Maintenance List  Health Maintenance   Topic Date Due    COLORECTAL CANCER SCREENING  Never done    LUNG CANCER SCREENING  Never done    DIABETIC EYE EXAM  10/07/2023    COVID-19 Vaccine (5 - 2024-25 season) 09/01/2024    DXA SCAN  11/28/2024    ANNUAL WELLNESS VISIT  01/19/2025    MAMMOGRAM  11/28/2024    HEMOGLOBIN A1C  03/13/2025    ZOSTER VACCINE (1 of 2) 02/07/2025 (Originally 11/21/2004)    URINE MICROALBUMIN  09/02/2025 (Originally 8/15/2023)    TDAP/TD VACCINES (1 - Tdap) 02/07/2026 (Originally 11/21/1974)    LIPID PANEL  03/15/2025    DIABETIC FOOT EXAM  08/02/2025    BMI FOLLOWUP  09/13/2025    HEPATITIS C SCREENING  Completed    INFLUENZA VACCINE  Completed    Pneumococcal Vaccine 65+  Completed                                                                                                                                                CMS Preventative Services Quick Reference  Risk Factors Identified During Encounter  Urinary Incontinence: Continue seeing urology  Dental Screening Recommended  Vision Screening Recommended    The above risks/problems have been discussed with the patient.  Pertinent information has been shared with the patient in the After Visit Summary.  An After Visit Summary and PPPS were made available to the patient.    Follow Up:   Next Medicare Wellness visit to be scheduled in 1 year.         Additional E&M Note during same encounter follows:  Patient has additional, significant, and separately identifiable condition(s)/problem(s) that require work above and beyond the Medicare Wellness Visit     Chief  Complaint  Diabetes (Pt needs order for new glucometer) and Medicare Wellness-subsequent    Subjective    HPI         The patient is a 69-year-old female who presents for her Medicare wellness visit as well as evaluation of diabetes, anxiety, depression, urinary incontinence, macular degeneration, and balance issues.    She has not completed her Cologuard test. She has no upper teeth but retains her lower teeth. Her last eye examination was conducted a year ago, during which she was advised to consult an ophthalmologist due to macular degeneration. She has not been monitoring her blood glucose levels since October 2024. She has undergone numerous CT scans for urological issues, with one scan revealing a small nodule that has been under observation. She had scheduled appointments for a mammogram, bone density scan, and lung CT scan in January 2025, but these were canceled due to inclement weather.    She has been experiencing urinary incontinence since October 2024 and has a scheduled appointment with Urology in 2 weeks.    She has been unable to read her glucometer results due to vision impairment. She has discontinued Ozempic 2.0 mg due to adverse effects and is currently not on any diabetes medication. She has previously tried metformin, which also caused GI side effects.    She has been experiencing heightened anxiety, which led to the cancellation of her appointment last week. She has also been experiencing increased sadness, which she attributes to the winter season and urinary incontinence. She has previously tried Lexapro twice for depression, but it was ineffective. She believes that better control of her anxiety would significantly improve her overall mental health.    SOCIAL HISTORY  She does not drink alcohol. She does not use tobacco or vape. She does not use drugs.    MEDICATIONS  metoprolol, omeprazole, Voltaren, hydroxyzine          Objective   Vital Signs:  /86 (BP Location: Left arm, Patient  "Position: Sitting, Cuff Size: Adult)   Pulse 76   Ht 162.6 cm (64\")   Wt (!) 137 kg (301 lb)   SpO2 96%   BMI 51.67 kg/m²   Physical Exam  Constitutional:       Appearance: Normal appearance. She is obese.      Comments: Pleasant, in wheelchair for exam.    HENT:      Head: Normocephalic.      Right Ear: Tympanic membrane normal.      Left Ear: Tympanic membrane normal.      Mouth/Throat:      Mouth: Mucous membranes are moist.   Cardiovascular:      Rate and Rhythm: Normal rate and regular rhythm.   Pulmonary:      Effort: Pulmonary effort is normal.      Breath sounds: Normal breath sounds.   Abdominal:      General: Bowel sounds are normal.      Palpations: Abdomen is soft.   Musculoskeletal:      Cervical back: Normal range of motion.   Skin:     General: Skin is warm and dry.   Neurological:      Mental Status: She is alert and oriented to person, place, and time.   Psychiatric:         Mood and Affect: Mood normal.         Behavior: Behavior normal.         Thought Content: Thought content normal.         Judgment: Judgment normal.           Ears were examined. Oral exam was performed.  Lungs were auscultated.             Assessment and Plan           1. Medicare wellness visit.  Her risk for falls is low, but her depression screening indicates a need for further discussion. She is due for colon cancer screening and lung cancer screening,  which were ordered in September 2024. She has an active advanced directive or living will. She has been experiencing urinary incontinence since October 2024 and has a scheduled appointment with Urology in 2 weeks. She is advised to maintain regular dental check-ups every 6 months. She is also encouraged to consult an ophthalmologist for her macular degeneration. She is advised to complete her Cologuard. She is also advised to reschedule her mammogram, bone density scan, and lung CT scan. Annual labs will be conducted today, including A1c and urine tests to monitor " protein levels and kidney function. She will receive her COVID-19 vaccine today. Refills for metoprolol, omeprazole, Voltaren, and hydroxyzine have been provided.    2. Diabetes Mellitus.  Her last A1c was 6.8, which is within the target range. She has not been checking her blood sugar since October 2024. She is not currently on any diabetes medication as previous medications like Ozempic and Metformin caused adverse effects. If her A1c remains stable, no changes will be made. If it is elevated, options like oral medications will be considered.  We will contact the diabetes educator to investigate a glucometer with a larger screen for better visualization.      3. Anxiety.  She reports high levels of anxiety and has been prescribed hydroxyzine in the past. She prefers to continue with hydroxyzine and will take it twice daily as needed. Refills for hydroxyzine have been provided. If her symptoms worsen or she feels the need for additional medication, she will inform us.    4. Depression.  She reports feeling sad, particularly during the winter. Previous medications like Lexapro were ineffective. She prefers to focus on managing her anxiety for now. If her symptoms worsen or she feels the need for additional medication, she will inform us.    5. Urinary Incontinence.  She reports becoming incontinent since October 2024.  Recommend keeping appointment with urology for further management.    6. Macular Degeneration.  She has been diagnosed with macular degeneration and reports significant vision loss. She is advised to see an ophthalmologist for further evaluation and potential treatment to prevent further vision loss.    Blood pressures have been within goal of less than 140/90, blood pressure today is 128/86.      PROCEDURE  The patient underwent kidney stone removal at [place].       Encounter for subsequent annual wellness visit (AWV) in Medicare patient    Essential hypertension  Hypertension is stable and  controlled  Continue current treatment regimen.  Blood pressure will be reassessed  4 months .  Gastroesophageal reflux disease without esophagitis    Generalized anxiety disorder    Type 2 diabetes mellitus with hyperglycemia, without long-term current use of insulin    Encounter for vaccination      No orders of the defined types were placed in this encounter.          I spent 48 minutes caring for Iris on this date of service. This time includes time spent by me in the following activities:preparing for the visit, reviewing tests, performing a medically appropriate examination and/or evaluation , counseling and educating the patient/family/caregiver, ordering medications, tests, or procedures, and documenting information in the medical record  Follow Up   No follow-ups on file.  Patient was given instructions and counseling regarding her condition or for health maintenance advice. Please see specific information pulled into the AVS if appropriate.  Patient or patient representative verbalized consent for the use of Ambient Listening during the visit with  WOODY Yoon for chart documentation. 2/7/2025  10:57 CST

## 2025-02-10 NOTE — HOME HEALTH
SUBJECTIVE: Patient says she twisted her right knee yesterday trying to kick her blanket off of her.     MEDICATION CHANGES: none    FALLS SINCE LAST VISIT: none    CHANGES TO INSURANCE: none    MENTAL STATUS: alert and oriented    PAIN: right knee when standing, 5/10    EDEMA:    PLAN: Continue with focus on strength, endurance and gait mechanics, No indicators present

## 2025-02-12 ENCOUNTER — TELEMEDICINE (OUTPATIENT)
Dept: INTERNAL MEDICINE | Facility: CLINIC | Age: 70
End: 2025-02-12
Payer: MEDICARE

## 2025-02-12 DIAGNOSIS — E11.65 TYPE 2 DIABETES MELLITUS WITH HYPERGLYCEMIA, WITH LONG-TERM CURRENT USE OF INSULIN: Primary | ICD-10-CM

## 2025-02-12 DIAGNOSIS — Z79.4 TYPE 2 DIABETES MELLITUS WITH HYPERGLYCEMIA, WITH LONG-TERM CURRENT USE OF INSULIN: Primary | ICD-10-CM

## 2025-02-12 PROCEDURE — 99214 OFFICE O/P EST MOD 30 MIN: CPT

## 2025-02-12 PROCEDURE — 3046F HEMOGLOBIN A1C LEVEL >9.0%: CPT

## 2025-02-12 RX ORDER — SEMAGLUTIDE 0.68 MG/ML
INJECTION, SOLUTION SUBCUTANEOUS
Qty: 9 ML | Refills: 1 | Status: SHIPPED | OUTPATIENT
Start: 2025-02-12

## 2025-02-12 RX ORDER — ACYCLOVIR 400 MG/1
1 TABLET ORAL
Qty: 10 EACH | Refills: 3 | Status: SHIPPED | OUTPATIENT
Start: 2025-02-12

## 2025-02-12 NOTE — PROGRESS NOTES
WOODY Yoon  [facility]   Family Medicine  [address]  [place] [address]  Phone: [phone]  Fax: [phone]     Jessica Kern is a 69 y.o. female.   : 1955    You have chosen to receive care through a telehealth visit.  Do you consent to use a video/audio connection for your medical care today? Yes    Pt located at Home and provider located at home office.     CC:   Chief Complaint   Patient presents with    Results     Lab results        HPI: Jessica Kern is a 69 y.o. female.    History of Present Illness  The patient is a 69-year-old female who presents via virtual visit for evaluation of diabetes.    She has been unable to monitor her blood glucose levels due to a malfunctioning glucometer. She has expressed interest in obtaining a new glucometer with a larger screen or a continuous glucose monitor. She typically procures her medications from [facility] for short-term use and from [facility] for maintenance medications. She has previously purchased a glucometer from [employer], which she found satisfactory until it ceased to function. She attempted to obtain the correct strips and lancets for this device from [facility], but they were incompatible. She recalls consuming water and possibly a cup of juice prior to her last blood draw, as she was unaware of the need for fasting.     In an attempt to follow a diabetic diet, she has ordered pre-prepared meals to adhere to a diabetic diet. She experienced gastrointestinal discomfort with metformin, which she took prior to starting Ozempic. She also reported issues with Ozempic when on a 2 mg dose but tolerated the lower doses well. However, she discontinued Ozempic due to concerns about potential side effects. She experienced constipation while on Ozempic but notes that this was a pre-existing condition. Currently, she is taking a stool softener.    She reports that diclofenac gel is no longer  available through [employer] and requests a prescription to be sent to [facility].    MEDICATIONS  Discontinued: Metformin, Ozempic.  Current: Diclofenac gel.      The following portions of the patient's history were reviewed and updated as appropriate: allergies, current medications, past family history, past medical history, past social history, past surgical history, and problem list.  Past Medical History:   Diagnosis Date    Arthritis     Asthma 2022    Sleep apnea    Chronic kidney disease 2022    Kidnew stones that incurred spetic shock    Diabetes mellitus June 2022    Possibly/probably as much as two years earlier untreated but according to labs    Difficulty walking     Arthritis for yearss in back and kneews.  But poor balance after septic shock 2022    Gout     Random last two years    High arches     Is this the same as high instep?    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 Two years in fingers pointet and thumb both hands     Family History   Problem Relation Age of Onset    Lung cancer Mother     Cancer Mother         Lung tumor 1996-97    Hypertension Mother     Thyroid disease Mother     Brain cancer Father     Cancer Father         Brain tumor [id number]    Thyroid disease Father     Breast cancer Maternal Aunt     Cancer Maternal Aunt         Breast cancer    Diabetes Maternal Aunt     Hypertension Maternal Aunt     Psoriasis Maternal Aunt     Rashes / Skin problems Maternal Aunt     Severe sprains Maternal Aunt         Ankle    Thyroid disease Maternal Aunt      Social History     Socioeconomic History    Marital status:    Tobacco Use    Smoking status: Former     Current packs/day: 0.00     Average packs/day: 2.0 packs/day for 39.2 years (78.4 ttl pk-yrs)     Types: Cigarettes     Start date: 11/21/1970     Quit date: 2/10/2010     Years since quitting:  15.0     Passive exposure: Never    Smokeless tobacco: Never    Tobacco comments:     Quit 12  Years ago   Vaping Use    Vaping status: Never Used   Substance and Sexual Activity    Alcohol use: Not Currently    Drug use: Never    Sexual activity: Not Currently     Partners: Male     Birth control/protection: Condom, Pill, I.U.D., Spermicide, Post-menopausal, Tubal ligation, Hysterectomy, Partner of same sex     Review of Systems  There were no vitals taken for this visit.  Physical Exam  Constitutional:       Appearance: Normal appearance.      Comments: Pleasant  Physical exam significantly limited due to telehealth nature of this appointment.    Neurological:      Mental Status: She is alert.   Psychiatric:         Mood and Affect: Mood normal.         Behavior: Behavior normal.         Thought Content: Thought content normal.         Judgment: Judgment normal.       Physical Exam        Assessment and Plan:   Diagnoses and all orders for this visit:    1. Type 2 diabetes mellitus with hyperglycemia, with long-term current use of insulin (Primary)  -     Continuous Glucose Sensor (Dexcom G7 Sensor) misc; Use 1 each Every 10 (Ten) Days.  Dispense: 10 each; Refill: 3  -     Insulin Glargine (LANTUS SOLOSTAR) 100 UNIT/ML injection pen; Inject 10 Units under the skin into the appropriate area as directed Daily.  Dispense: 10 mL; Refill: 1  -     Semaglutide,0.25 or 0.5MG/DOS, (Ozempic, 0.25 or 0.5 MG/DOSE,) 2 MG/3ML solution pen-injector; Inject .025mg weekly x 4 weeks, then increase to 0.5mg weekly.  Dispense: 9 mL; Refill: 1    Other orders  -     Diclofenac Sodium (VOLTAREN) 1 % gel gel; Apply  topically to the appropriate area as directed 4 (Four) Times a Day. Indications: Joint Damage causing Pain and Loss of Function  Dispense: 100 g; Refill: 3      Assessment & Plan  1. Diabetes mellitus.  Her A1c levels have escalated from 6.8 in September to 13.3 last week, indicating a significant increase. Her blood  glucose level was recorded at 460 during her last blood draw. She is advised to limit her sugar intake, including juices, and to reduce her consumption of breads, pastas, rice, and potatoes. She is encouraged to consider softer foods with lower carbohydrate content, such as chicken salad, tuna salad, or hard-boiled eggs. She will be initiated on a regimen of basal insulin, starting with a low dose of 10 units per day, which will be gradually increased based on her blood glucose levels. A prescription for semaglutide 0.25 mg once weekly will be provided, with the dosage to be increased to 0.5 mg after 4 weeks if tolerated. She is advised to take MiraLAX daily to manage potential constipation, a known side effect of semaglutide. A prescription for a continuous glucose monitor (Dexcom) will be sent to [facility], and if not covered by insurance, an attempt will be made to procure a new glucometer with a larger screen. She is advised to record her blood glucose levels every morning.    2. Medication management.  A prescription for diclofenac gel will be sent to [facility].    Follow-up  The patient is scheduled for a follow-up visit in 3 weeks.    I spent 35 minutes caring for Iris on this date of service. This time includes time spent by me in the following activities: preparing for the visit, reviewing tests, documenting information in the medical record, and ordering medications     WOODY Yoon   2/12/2025  10:08 CST    Patient or patient representative verbalized consent for the use of Ambient Listening during the visit with  WOODY Yoon for chart documentation. 2/12/2025  10:11 CST

## 2025-02-18 ENCOUNTER — OFFICE VISIT (OUTPATIENT)
Dept: PALLATIVE CARE | Age: 70
End: 2025-02-18
Payer: MEDICARE

## 2025-02-18 DIAGNOSIS — R35.0 URINARY FREQUENCY: ICD-10-CM

## 2025-02-18 DIAGNOSIS — Z78.9 IMPAIRED MOBILITY AND ACTIVITIES OF DAILY LIVING: Primary | ICD-10-CM

## 2025-02-18 DIAGNOSIS — E11.319 TYPE 2 DIABETES MELLITUS WITH RETINOPATHY OF BOTH EYES, WITH LONG-TERM CURRENT USE OF INSULIN, MACULAR EDEMA PRESENCE UNSPECIFIED, UNSPECIFIED RETINOPATHY SEVERITY (HCC): ICD-10-CM

## 2025-02-18 DIAGNOSIS — Z51.5 ENCOUNTER FOR PALLIATIVE CARE: ICD-10-CM

## 2025-02-18 DIAGNOSIS — Z74.09 IMPAIRED MOBILITY AND ACTIVITIES OF DAILY LIVING: Primary | ICD-10-CM

## 2025-02-18 DIAGNOSIS — Z79.4 TYPE 2 DIABETES MELLITUS WITH RETINOPATHY OF BOTH EYES, WITH LONG-TERM CURRENT USE OF INSULIN, MACULAR EDEMA PRESENCE UNSPECIFIED, UNSPECIFIED RETINOPATHY SEVERITY (HCC): ICD-10-CM

## 2025-02-18 PROCEDURE — 1090F PRES/ABSN URINE INCON ASSESS: CPT

## 2025-02-18 PROCEDURE — 3046F HEMOGLOBIN A1C LEVEL >9.0%: CPT

## 2025-02-18 PROCEDURE — 3017F COLORECTAL CA SCREEN DOC REV: CPT

## 2025-02-18 PROCEDURE — G8417 CALC BMI ABV UP PARAM F/U: HCPCS

## 2025-02-18 PROCEDURE — 1036F TOBACCO NON-USER: CPT

## 2025-02-18 PROCEDURE — 99350 HOME/RES VST EST HIGH MDM 60: CPT

## 2025-02-18 PROCEDURE — 1123F ACP DISCUSS/DSCN MKR DOCD: CPT

## 2025-02-24 NOTE — PROGRESS NOTES
Supportive Care/Community Based Palliative Care  Follow Up Note        Patient Name:  Santa Puente  Medical Record Number:  [id number]  YOB: 1955    Date of Visit: 2/18/2025  Location of Visit:  Home    Patient Care Team:  Bala Regan DO as PCP - General  Brant Cruz APRN - CNP as Advanced Practice Nurse (Nurse Practitioner, Family)    History obtained from:  patient, electronic medical record    PALLIATIVE DIAGNOSES AND ORDERS/RECOMMENDATIONS/PLAN:   Santa was seen today for follow-up.    Diagnoses and all orders for this visit:    Impaired mobility and activities of daily living  Patient considering getting additional help in the home  Family members assist with ADLs as needed    Urinary frequency  Continue Gemtesa    Type 2 diabetes mellitus with retinopathy of both eyes, with long-term current use of insulin, macular edema presence unspecified, unspecified retinopathy severity (HCC)  Continue semaglutide and Lantus  Follow-up with optometry    Encounter for palliative care  Current goals of care unchanged and include: Preserve independence/control/autonomy, Maintain or improve functional status, Maintain or improve quality of life, Remain at home, Would want hospitalization if needed     Continue to follow up with gynecology and urology  Will continue goals of care discussions based on clinical course  Follow up home visit in 3-6  weeks and as needed    CHIEF COMPLAINT:     Chief Complaint   Patient presents with    Follow-up     Follow-up diabetes and generalized weakness       CLINICAL SUMMARY:          Santa Puente is a 69 y.o. female with PMH of HTN, arthritis, FREDDY, COPD, degenerative joint disease, chronic pain syndrome who was admitted to [facility] 6/14/2022 to 7/1/2022 due to increased weakness, fatigue, abdominal pain.  Upon presentation to the emergency department she was hypotensive and mildly tachycardic.  She was noted to have acute renal failure with creatinine up to 3.0.

## 2025-02-27 ENCOUNTER — TELEPHONE (OUTPATIENT)
Dept: INTERNAL MEDICINE | Facility: CLINIC | Age: 70
End: 2025-02-27
Payer: MEDICARE

## 2025-02-27 NOTE — TELEPHONE ENCOUNTER
PATIENT HAS BEEN CALLED, SHE STATED THAT HER BLOOD SUGARS HAVE BEEN RUNNING THIS WEEK BETWEEN 220 .      PATIENT DOES HAVE AN APPOINTMENT ON 03/05/2024 AND WILL HAVE A LOG OF HER BLOOD SUGARS.

## 2025-02-27 NOTE — TELEPHONE ENCOUNTER
She has been taking both Ozempic and Insulin injection? If so, we can increase insulin to 14 units daily. Will discuss further changes at her appointment next week.

## 2025-02-27 NOTE — TELEPHONE ENCOUNTER
RECEIVED A CALL FROM CROW BERMUDEZ [email or web address]. SHE WANTED US TO KNOW THAT HER BLOOD SUGAR HAS BEEN A LITTLE HIGH. ITS BEEN RUNNING IN THE 300S SOME OVER 400.

## 2025-02-27 NOTE — TELEPHONE ENCOUNTER
Rx Refill Note  Requested Prescriptions     Pending Prescriptions Disp Refills    Insulin Pen Needle 32G X 4 MM misc 200 each 5     Sig: Use 1 each 4 (Four) Times a Day.      Last office visit with prescribing clinician: 9/13/2024   Last telemedicine visit with prescribing clinician: 2/12/2025   Next office visit with prescribing clinician: Visit date not found                         Would you like a call back once the refill request has been completed: [] Yes [] No    If the office needs to give you a call back, can they leave a voicemail: [] Yes [] No    Kris Garnica MA  02/27/25, 11:28 CST

## 2025-02-28 ENCOUNTER — OFFICE VISIT (OUTPATIENT)
Age: 70
End: 2025-02-28

## 2025-02-28 VITALS — BODY MASS INDEX: 48.48 KG/M2 | WEIGHT: 291 LBS | HEIGHT: 65 IN

## 2025-02-28 DIAGNOSIS — M17.0 BILATERAL PRIMARY OSTEOARTHRITIS OF KNEE: Primary | ICD-10-CM

## 2025-02-28 RX ORDER — LIDOCAINE HYDROCHLORIDE 10 MG/ML
6 INJECTION, SOLUTION INFILTRATION; PERINEURAL ONCE
Status: COMPLETED | OUTPATIENT
Start: 2025-02-28 | End: 2025-02-28

## 2025-02-28 RX ORDER — BETAMETHASONE SODIUM PHOSPHATE AND BETAMETHASONE ACETATE 3; 3 MG/ML; MG/ML
6 INJECTION, SUSPENSION INTRA-ARTICULAR; INTRALESIONAL; INTRAMUSCULAR; SOFT TISSUE ONCE
Status: COMPLETED | OUTPATIENT
Start: 2025-02-28 | End: 2025-02-28

## 2025-02-28 RX ADMIN — BETAMETHASONE SODIUM PHOSPHATE AND BETAMETHASONE ACETATE 6 MG: 3; 3 INJECTION, SUSPENSION INTRA-ARTICULAR; INTRALESIONAL; INTRAMUSCULAR; SOFT TISSUE at 14:43

## 2025-02-28 RX ADMIN — LIDOCAINE HYDROCHLORIDE 6 ML: 10 INJECTION, SOLUTION INFILTRATION; PERINEURAL at 14:44

## 2025-03-05 ENCOUNTER — TELEMEDICINE (OUTPATIENT)
Dept: INTERNAL MEDICINE | Facility: CLINIC | Age: 70
End: 2025-03-05
Payer: MEDICARE

## 2025-03-05 DIAGNOSIS — E11.65 TYPE 2 DIABETES MELLITUS WITH HYPERGLYCEMIA, WITH LONG-TERM CURRENT USE OF INSULIN: ICD-10-CM

## 2025-03-05 DIAGNOSIS — Z79.4 TYPE 2 DIABETES MELLITUS WITH HYPERGLYCEMIA, WITH LONG-TERM CURRENT USE OF INSULIN: ICD-10-CM

## 2025-03-05 NOTE — PROGRESS NOTES
WOODY Yoon  [facility]   Family Medicine  [address]  [place] [address]  Phone: [phone]  Fax: [phone]     Jessica Kern is a 69 y.o. female.   : 1955    You have chosen to receive care through a telehealth visit.  Do you consent to use a video/audio connection for your medical care today? Yes    Pt located at Home and provider located at home office.     CC:   Chief Complaint   Patient presents with    Diabetes     Needs a different test strips        HPI: Jessica Kern is a 69 y.o. female.    History of Present Illness  The patient is a 69-year-old female who presents via virtual visit for follow-up on diabetes.    She reports a gradual improvement in her blood glucose levels, with occasional spikes. Over the past week, her levels have decreased to the 180s and 190s, a significant reduction from previous readings above 200. She monitors her blood glucose upon waking, with fasting levels typically ranging between 220 and 320, influenced by her dietary intake. She has been on an increased insulin dosage of 14 units for just over a week, resulting in a decrease in her lowest recorded blood glucose level to the 180s and 190s. Her postprandial readings remain consistent with her fasting levels, between 220 and 320. She recalls an incident a week ago where consumption of a shrimp dinner caused a spike in her blood glucose to 400. She has been experimenting with her diet to identify foods that she can tolerate without causing significant blood glucose fluctuations. She has recently resumed Ozempic treatment, which she reports tolerating well. She encountered an issue with her insulin needles, having used all her Ozempic needles before receiving the correct ones. She is currently on a 0.25 mg dose of Ozempic. She has never been prescribed glucagon.     She has ordered diabetic-friendly meals from [employer], which she reports as beneficial. She had to  purchase a new glucometer from [facility] due to the unavailability of the Dexcom, and is now running low on test strips. [facility] attempted to provide compatible strips, but they were not suitable for her [facility] glucometer. She has been unable to obtain a continuous glucose monitor due to financial constraints, despite her insurance coverage. She has been on an increased insulin dosage of 14 units for just over a week.    Supplemental Information  She takes Gemtesa for her bladder.    MEDICATIONS  Current: insulin, Ozempic, gemtesa      The following portions of the patient's history were reviewed and updated as appropriate: allergies, current medications, past family history, past medical history, past social history, past surgical history, and problem list.  Past Medical History:   Diagnosis Date    Arthritis     Asthma 2022    Sleep apnea    Chronic kidney disease 2022    Kidnew stones that incurred spetic shock    Diabetes mellitus June 2022    Possibly/probably as much as two years earlier untreated but according to labs    Difficulty walking     Arthritis for yearss in back and kneews.  But poor balance after septic shock 2022    Gout     Random last two years    High arches     Is this the same as high instep?    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 Two years in fingers pointet and thumb both hands     Family History   Problem Relation Age of Onset    Lung cancer Mother     Cancer Mother         Lung tumor 1996-97    Hypertension Mother     Thyroid disease Mother     Brain cancer Father     Cancer Father         Brain tumor [id number]    Thyroid disease Father     Breast cancer Maternal Aunt     Cancer Maternal Aunt         Breast cancer    Diabetes Maternal Aunt     Hypertension Maternal Aunt     Psoriasis Maternal Aunt     Rashes / Skin problems Maternal Aunt     Severe sprains  Maternal Aunt         Ankle    Thyroid disease Maternal Aunt      Social History     Socioeconomic History    Marital status:    Tobacco Use    Smoking status: Former     Current packs/day: 0.00     Average packs/day: 2.0 packs/day for 39.2 years (78.4 ttl pk-yrs)     Types: Cigarettes     Start date: 11/21/1970     Quit date: 2/10/2010     Years since quitting: 15.0     Passive exposure: Never    Smokeless tobacco: Never    Tobacco comments:     Quit 12  Years ago   Vaping Use    Vaping status: Never Used   Substance and Sexual Activity    Alcohol use: Not Currently    Drug use: Never    Sexual activity: Not Currently     Partners: Male     Birth control/protection: Condom, Pill, I.U.D., Spermicide, Post-menopausal, Tubal ligation, Hysterectomy, Partner of same sex     Review of Systems  There were no vitals taken for this visit.  Physical Exam  Physical Exam        Assessment and Plan:   Diagnoses and all orders for this visit:    1. Type 2 diabetes mellitus with hyperglycemia, with long-term current use of insulin  -     Insulin Glargine (LANTUS SOLOSTAR) 100 UNIT/ML injection pen; Inject 20 Units under the skin into the appropriate area as directed Daily.  Dispense: 10 mL; Refill: 1  -     Glucagon 1 MG/0.2ML solution auto-injector; Inject 1 mg under the skin into the appropriate area as directed As Needed (hypoglycemia).  Dispense: 0.2 mL; Refill: 1    Other orders  -     glucose blood test strip; Metene brand  Dispense: 200 each; Refill: 12          Assessment & Plan  1. Diabetes Mellitus.  Her fasting blood glucose levels remain elevated, necessitating an increase in her insulin dosage to 20 units daily. She has been advised to exercise caution to prevent hypoglycemia. She will continue her current regimen of Ozempic 0.25 mg for another week, after which the dosage will be increased to 0.5 mg weekly. If she tolerates the 0.5 mg dose well, it may be further increased to 1 mg. She has been encouraged to  maintain a healthy diet, particularly focusing on carbohydrate intake. A prescription for glucagon has been provided for use in case of severe hypoglycemia (blood glucose levels below 60). Refills for all her medications have been ensured. A prescription for Metene test strips has been sent to [facility].    Follow-up  The patient is scheduled for a follow-up visit in 4 weeks.    I spent 30 minutes caring for Iris on this date of service. This time includes time spent by me in the following activities: preparing for the visit, reviewing tests, performing a medically appropriate examination and/or evaluation, counseling and educating the patient/family/caregiver, documenting information in the medical record, and ordering medications     WOODY Yoon   3/5/2025  09:30 CST    Patient or patient representative verbalized consent for the use of Ambient Listening during the visit with  WOODY Yoon for chart documentation. 3/5/2025  09:30 CST

## 2025-03-14 ENCOUNTER — HOSPITAL ENCOUNTER (OUTPATIENT)
Dept: CT IMAGING | Age: 70
Discharge: HOME | End: 2025-03-14
Payer: MEDICARE

## 2025-03-14 ENCOUNTER — TELEPHONE (OUTPATIENT)
Dept: INTERNAL MEDICINE | Facility: CLINIC | Age: 70
End: 2025-03-14
Payer: MEDICARE

## 2025-03-14 DIAGNOSIS — N13.30 HYDRONEPHROSIS OF RIGHT KIDNEY: ICD-10-CM

## 2025-03-14 DIAGNOSIS — N20.0 LEFT RENAL STONE: ICD-10-CM

## 2025-03-14 DIAGNOSIS — N39.9 URINARY PROBLEM IN FEMALE: ICD-10-CM

## 2025-03-14 DIAGNOSIS — N39.0 RECURRENT UTI: ICD-10-CM

## 2025-03-14 DIAGNOSIS — N20.0 RIGHT RENAL STONE: ICD-10-CM

## 2025-03-14 LAB — CREAT SERPL-MCNC: 1 MG/DL (ref 0.3–1.3)

## 2025-03-14 PROCEDURE — 74178 CT ABD&PLV WO CNTR FLWD CNTR: CPT

## 2025-03-14 PROCEDURE — 82565 ASSAY OF CREATININE: CPT

## 2025-03-14 PROCEDURE — 6360000004 HC RX CONTRAST MEDICATION: Performed by: NURSE PRACTITIONER

## 2025-03-14 RX ORDER — IOPAMIDOL 755 MG/ML
75 INJECTION, SOLUTION INTRAVASCULAR
Status: COMPLETED | OUTPATIENT
Start: 2025-03-14 | End: 2025-03-14

## 2025-03-14 RX ADMIN — IOPAMIDOL 75 ML: 755 INJECTION, SOLUTION INTRAVENOUS at 09:36

## 2025-03-14 NOTE — TELEPHONE ENCOUNTER
Caller: [employer] - Care Management    Relationship: Other    Best call back number:     [phone]     What was the call regarding: PATIENT WOULD LIKE TO LET DR. GARCIA KNOW THAT [facility] WILL BE FAXING THE OFFICE A REQUEST FOR DIABETIC SUPPLIES.

## 2025-03-21 ENCOUNTER — TELEMEDICINE (OUTPATIENT)
Dept: UROLOGY | Age: 70
End: 2025-03-21
Payer: MEDICARE

## 2025-03-21 ENCOUNTER — TELEPHONE (OUTPATIENT)
Dept: INTERNAL MEDICINE | Facility: CLINIC | Age: 70
End: 2025-03-21
Payer: MEDICARE

## 2025-03-21 DIAGNOSIS — N39.0 RECURRENT UTI: ICD-10-CM

## 2025-03-21 DIAGNOSIS — N39.46 MIXED STRESS AND URGE URINARY INCONTINENCE: ICD-10-CM

## 2025-03-21 DIAGNOSIS — N32.81 OAB (OVERACTIVE BLADDER): ICD-10-CM

## 2025-03-21 DIAGNOSIS — N20.1 RIGHT URETERAL STONE: Primary | ICD-10-CM

## 2025-03-21 DIAGNOSIS — N20.0 LEFT RENAL STONE: ICD-10-CM

## 2025-03-21 DIAGNOSIS — N32.89 BLADDER SPASMS: ICD-10-CM

## 2025-03-21 PROCEDURE — G8399 PT W/DXA RESULTS DOCUMENT: HCPCS | Performed by: NURSE PRACTITIONER

## 2025-03-21 PROCEDURE — 1090F PRES/ABSN URINE INCON ASSESS: CPT | Performed by: NURSE PRACTITIONER

## 2025-03-21 PROCEDURE — 1036F TOBACCO NON-USER: CPT | Performed by: NURSE PRACTITIONER

## 2025-03-21 PROCEDURE — G8417 CALC BMI ABV UP PARAM F/U: HCPCS | Performed by: NURSE PRACTITIONER

## 2025-03-21 PROCEDURE — 3017F COLORECTAL CA SCREEN DOC REV: CPT | Performed by: NURSE PRACTITIONER

## 2025-03-21 PROCEDURE — 1123F ACP DISCUSS/DSCN MKR DOCD: CPT | Performed by: NURSE PRACTITIONER

## 2025-03-21 PROCEDURE — G8428 CUR MEDS NOT DOCUMENT: HCPCS | Performed by: NURSE PRACTITIONER

## 2025-03-21 PROCEDURE — 99215 OFFICE O/P EST HI 40 MIN: CPT | Performed by: NURSE PRACTITIONER

## 2025-03-21 PROCEDURE — 0509F URINE INCON PLAN DOCD: CPT | Performed by: NURSE PRACTITIONER

## 2025-03-21 RX ORDER — FESOTERODINE FUMARATE 8 MG/1
8 TABLET, FILM COATED, EXTENDED RELEASE ORAL DAILY
Qty: 90 TABLET | Refills: 0 | Status: SHIPPED | OUTPATIENT
Start: 2025-03-21

## 2025-03-21 RX ORDER — SEMAGLUTIDE 0.68 MG/ML
0.5 INJECTION, SOLUTION SUBCUTANEOUS WEEKLY
COMMUNITY
Start: 2025-02-12

## 2025-03-21 RX ORDER — PEN NEEDLE, DIABETIC 32GX 5/32"
NEEDLE, DISPOSABLE MISCELLANEOUS
COMMUNITY
Start: 2025-02-27

## 2025-03-21 ASSESSMENT — ENCOUNTER SYMPTOMS
NAUSEA: 0
ABDOMINAL DISTENTION: 0
VOMITING: 0
BACK PAIN: 0
ABDOMINAL PAIN: 0

## 2025-03-21 NOTE — TELEPHONE ENCOUNTER
PATIENT HAS CALLED, SHE STATED THAT SHE IS NEEDING AN ORDER FOR A NEW GLUCOSE MONITOR, TEST STRIPS AND LANCETS SENT TO [facility].    P) [phone]    PATIENT STATED THAT THEY SENT AN ORDER FOR THIS 1 WEEK AGO.

## 2025-03-21 NOTE — PROGRESS NOTES
Santa Puente, was evaluated through a synchronous (real-time) audio-video encounter. The patient (or guardian if applicable) is aware that this is a billable service, which includes applicable co-pays. This Virtual Visit was conducted with patient's (and/or legal guardian's) consent. Patient identification was verified, and a caregiver was present when appropriate.   The patient was located at Home: [address] [place] [address] [place] [address]  Provider was located at Facility (Appt Dept): [address] [facility], [address]  [place] [address]  Confirm you are appropriately licensed, registered, or certified to deliver care in the state where the patient is located as indicated above. If you are not or unsure, please re-schedule the visit: Yes, I confirm.     Santa Puente (:  1955) is a Established patient, presenting virtually for evaluation of the following:      Below is the assessment and plan developed based on review of pertinent history, physical exam, labs, studies, and medications.     Assessment & Plan  Right ureteral stone   Patient presents with a right distal ureter stone.  Based on the stone location and severity of symptoms the patient has elected to proceed with a trial of medical expulsive therapy.    The patient is stable with no clinical evidence of sepsis or kidney function issues. her pain is manageable in the outpatient setting.    We have discussed other modalities of treatment, including but not limited to shockwave lithotripsy (ESWL), ureteroscopy with laser lithotripsy, and monitoring the stone with subsequent imaging.  Risks of medical management were discussed with the patient including but not limited to inability to pass the stone eventually requiring surgical intervention, an acute or chronic decline in kidney function, worsening of symptoms leading to non emergent/emergent surgical intervention.    she has been provided with alpha blocker therapy and tramadol  for symptom control

## 2025-03-26 NOTE — PROGRESS NOTES
[facility]  [facility]  [address]  [facility] [id number] [phone]         Santa Puente (: 1955) is a 69 y.o. female, patient, here for evaluation of the following chief complaint(s): Knee Pain (Bilateral )  .         Patient's PCP: Bala Regan DO     Patient's Last Appointment in this Department was on 10/18/2024      Subjective:     Chief Complaint   Patient presents with    Knee Pain     Bilateral         History of Present Illness  Patient presents for follow-up valuation of both knees.  She had knee pain for a long period of time.  She had several injections do seem to help for a few months and then wear off.  Right knee is worse than left.  Associated stiffness.              Medications  Current Outpatient Medications   Medication Sig Dispense Refill    acetaminophen (TYLENOL) 500 MG tablet Take 1 tablet by mouth every 6 hours as needed for Pain      vibegron (GEMTESA) 75 MG TABS tablet Take 1 tablet by mouth daily 90 tablet 3    diclofenac (VOLTAREN) 50 MG EC tablet       traMADol (ULTRAM) 50 MG tablet Take 1-2 tabs q 6 hr prn moderate pain  Indications: Pain      Cholecalciferol (VITAMIN D) 50 MCG ( UT) CAPS capsule Take 1 capsule by mouth daily      pravastatin (PRAVACHOL) 80 MG tablet       hydrOXYzine HCl (ATARAX) 25 MG tablet Take 1 tablet by mouth 3 times daily as needed for Anxiety (bladder pain) 90 tablet 5    hyoscyamine (LEVSIN/SL) 125 MCG sublingual tablet Place 1 tablet under the tongue every 4 hours as needed for Cramping 90 tablet 3    estradiol (ESTRACE VAGINAL) 0.1 MG/GM vaginal cream Place 1 g vaginally daily X 2 weeks, then twice weekly 42.5 g 3    diclofenac (CATAFLAM) 50 MG tablet Take 1 tablet by mouth 3 times daily      metoprolol tartrate (LOPRESSOR) 100 MG tablet Take 1 tablet by mouth 2 times daily 60 tablet 1    Ergocalciferol (VITAMIN D) 21062 units CAPS Take 50,000 Units by mouth  Previously 2/2025 with Dr. Mccracken: Patient seen 4/5/2023 by PA.  Has a history of thrombocytosis- resulting in 3 DVT's, on Eliquis.  Colonoscopy 9/20/2023 for screening unremarkable to the cecum within prep limitations. Good prep obtained only after significant irrigation and suctioning. Repeat colonoscopy is recommended 3 years given fair prep (2026).  Patient presents for reflux evaluation for years with recent worsening 6-9 months, occasional pain in her back, worse with laying flat at night. Patient reports heartburn, reflux. No dysphagia, no odynophagia, no n/v, no melena, no rectal bleeding. No chest pain or shortness or breath. Denies any unintentional weight loss or abdominal pain. No food allergies or diet restrictions. No chronic NSAIDs. Patient is taking Tums prn typically few times weekly. Rare early satiety.   Reports some weight gain. Does exercise regularly. No prior EGD. No hx of PUD or H pylori infection. no food allergies, no diet restrictions. Occasional alcohol few times weekly. No chronic NSAIDs.   recovering from early colon cancer.

## 2025-03-31 ENCOUNTER — TELEPHONE (OUTPATIENT)
Dept: UROLOGY | Age: 70
End: 2025-03-31

## 2025-03-31 DIAGNOSIS — G89.4 CHRONIC PAIN DISORDER: ICD-10-CM

## 2025-03-31 RX ORDER — TRAMADOL HYDROCHLORIDE 50 MG/1
TABLET ORAL
Qty: 210 TABLET | Refills: 1 | Status: SHIPPED | OUTPATIENT
Start: 2025-04-02

## 2025-04-02 ENCOUNTER — TELEMEDICINE (OUTPATIENT)
Dept: INTERNAL MEDICINE | Facility: CLINIC | Age: 70
End: 2025-04-02
Payer: MEDICARE

## 2025-04-02 DIAGNOSIS — E11.65 TYPE 2 DIABETES MELLITUS WITH HYPERGLYCEMIA, WITH LONG-TERM CURRENT USE OF INSULIN: ICD-10-CM

## 2025-04-02 DIAGNOSIS — Z79.4 TYPE 2 DIABETES MELLITUS WITH HYPERGLYCEMIA, WITH LONG-TERM CURRENT USE OF INSULIN: ICD-10-CM

## 2025-04-02 RX ORDER — GLUCOSAM/CHON-MSM1/C/MANG/BOSW 500-416.6
TABLET ORAL
COMMUNITY
Start: 2025-03-24

## 2025-04-02 RX ORDER — DIPHENHYDRAMINE HCL 25 MG
TABLET ORAL
COMMUNITY
Start: 2025-03-24

## 2025-04-02 RX ORDER — SEMAGLUTIDE 1.34 MG/ML
1 INJECTION, SOLUTION SUBCUTANEOUS WEEKLY
Qty: 9 ML | Refills: 1 | Status: SHIPPED | OUTPATIENT
Start: 2025-04-02

## 2025-04-02 RX ORDER — FESOTERODINE FUMARATE 8 MG/1
8 TABLET, FILM COATED, EXTENDED RELEASE ORAL
COMMUNITY
Start: 2025-03-21

## 2025-04-02 NOTE — PROGRESS NOTES
WOODY Yoon  [facility]   Family Medicine  [address]  [place] [address]  Phone: [phone]  Fax: [phone]     Jessica Kern is a 69 y.o. female.   : 1955    You have chosen to receive care through a telehealth visit.  Do you consent to use a video/audio connection for your medical care today? Yes    Pt located at Home and provider located at home office.     CC:   Chief Complaint   Patient presents with    Diabetes     F/u        HPI: Jessica Kern is a 69 y.o. female.    History of Present Illness  The patient is a 69-year-old female who presents via virtual visit for follow-up on diabetes.    She has been adhering to the prescribed regimen of insulin and Ozempic, with her fasting blood glucose levels consistently ranging between 150 and 200. She reports no episodes of hypoglycemia. Her insurance has declined coverage for the glucagon pen, which was intended for use in case of hypoglycemic events. She is currently on Ozempic 0.5 mg, which she tolerates well. She recalls a previous experience with Ozempic 2.0 mg, which induced nausea, leading to its discontinuation. She continues to consume Mom's Meals as part of her diabetic diet. Her physical activity is limited, although she managed to leave her house once two weeks ago. She is able to perform household chores such as taking out the trash and sorting laundry. However, she expresses concern about her balance and fear of falling.      MEDICATIONS  Current: insulin, Ozempic      The following portions of the patient's history were reviewed and updated as appropriate: allergies, current medications, past family history, past medical history, past social history, past surgical history, and problem list.  Past Medical History:   Diagnosis Date    Arthritis     Asthma     Sleep apnea    Chronic kidney disease     Kidnew stones that incurred spetic shock    Diabetes mellitus 2022     Possibly/probably as much as two years earlier untreated but according to labs    Difficulty walking     Arthritis for yearss in back and kneews.  But poor balance after septic shock 2022    Gout     Random last two years    High arches     Is this the same as high instep?    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 Two years in fingers pointet and thumb both hands     Family History   Problem Relation Age of Onset    Lung cancer Mother     Cancer Mother         Lung tumor 1996-97    Hypertension Mother     Thyroid disease Mother     Brain cancer Father     Cancer Father         Brain tumor [id number]    Thyroid disease Father     Breast cancer Maternal Aunt     Cancer Maternal Aunt         Breast cancer    Diabetes Maternal Aunt     Hypertension Maternal Aunt     Psoriasis Maternal Aunt     Rashes / Skin problems Maternal Aunt     Severe sprains Maternal Aunt         Ankle    Thyroid disease Maternal Aunt      Social History     Socioeconomic History    Marital status:    Tobacco Use    Smoking status: Former     Current packs/day: 0.00     Average packs/day: 2.0 packs/day for 39.2 years (78.4 ttl pk-yrs)     Types: Cigarettes     Start date: 11/21/1970     Quit date: 2/10/2010     Years since quitting: 15.1     Passive exposure: Never    Smokeless tobacco: Never    Tobacco comments:     Quit 12  Years ago   Vaping Use    Vaping status: Never Used   Substance and Sexual Activity    Alcohol use: Not Currently    Drug use: Never    Sexual activity: Not Currently     Partners: Male     Birth control/protection: Condom, Pill, I.U.D., Spermicide, Post-menopausal, Tubal ligation, Hysterectomy, Partner of same sex     Review of Systems  There were no vitals taken for this visit.  Physical Exam  Constitutional:       Appearance: Normal appearance.      Comments: Pleasant   Neurological:       Mental Status: She is alert and oriented to person, place, and time.   Psychiatric:         Mood and Affect: Mood normal.         Behavior: Behavior normal.         Thought Content: Thought content normal.         Judgment: Judgment normal.       Physical Exam        Assessment and Plan:   Diagnoses and all orders for this visit:    1. Type 2 diabetes mellitus with hyperglycemia, with long-term current use of insulin  -     Insulin Glargine (LANTUS SOLOSTAR) 100 UNIT/ML injection pen; Inject 25 Units under the skin into the appropriate area as directed Daily.  Dispense: 24 mL; Refill: 1  -     Semaglutide, 1 MG/DOSE, (Ozempic, 1 MG/DOSE,) 4 MG/3ML solution pen-injector; Inject 1 mg under the skin into the appropriate area as directed 1 (One) Time Per Week.  Dispense: 9 mL; Refill: 1          Assessment & Plan  1. Diabetes Mellitus.  Her fasting blood glucose levels remain elevated, necessitating further adjustments in her medication regimen. The target range for fasting glucose levels is between 70 and 120. She is advised to continue her diabetic diet and maintain physical activity as tolerated. The dosage of Ozempic will be escalated to 1 mg per week, while the insulin dosage will be increased to 25 units daily. A new prescription for insulin will be issued accordingly. An A1c test is scheduled for 05/07/2025 to monitor her progress.    I spent 19 minutes caring for Iris on this date of service. This time includes time spent by me in the following activities: preparing for the visit, reviewing tests, performing a medically appropriate examination and/or evaluation, counseling and educating the patient/family/caregiver, documenting information in the medical record, and ordering medications     WOODY Yoon   4/2/2025  08:43 CDT    Patient or patient representative verbalized consent for the use of Ambient Listening during the visit with  WOODY Yoon for chart documentation. 4/2/2025   08:47 CDT

## 2025-04-22 ENCOUNTER — NURSE ONLY (OUTPATIENT)
Dept: PALLATIVE CARE | Age: 70
End: 2025-04-22

## 2025-04-22 NOTE — PROGRESS NOTES
Patient is 69 year old female followed by [facility].  Pt states her preferred code status is Do not intubate.  SN arrived, greeted by patient. Patient is awake, alert, oriented x 4, sitting up in recliner. Patient reports no recent falls. Ambulates with Rolator. Pt states she has support from son and grandson who live 2 doors down. BG are running more regular with insulin management. Pt states her BG are averaging 117- 141s. Pt states she was ordered a Gvoke Hypopen but it was not covered by insurance. Education provided to pt on dietary options for raising sugar. Oj CASTRO ordered Glucagon tabs 1 tab [id number] PRN with BG less than 70.  Patient has no complaints of nausea, anxiety or shortness of air.  Pain reported as 0/10 at rest. 7/10 to right knee when walking. She is having discussion about knee and pain with ortho her PCP and her urologist. Patient takes Tramadol for pain prescribed by Dr. Regan. VSS for condition. /80 RR 16, HR- 85, O2% 96%, T- 97.1. Respirations even and unlabored. Lung sounds clear. Dry cough reported this visit, none noted. Patient reports appetite is fair apx 2 meals a day.  Pt reports no issues sleeping.  Regular BMs. Pt is continent of bowel/bladder.  Skin is red to the folds, patient manages with OTC Zeasorb. 3+ edema BLE. No medication needs this visit.      Electronically signed by Rae Vieira RN on 4/22/2025 at 12:03 PM

## 2025-05-16 ENCOUNTER — TELEPHONE (OUTPATIENT)
Dept: INTERNAL MEDICINE | Facility: CLINIC | Age: 70
End: 2025-05-16

## 2025-05-29 DIAGNOSIS — G89.4 CHRONIC PAIN DISORDER: ICD-10-CM

## 2025-05-29 RX ORDER — TRAMADOL HYDROCHLORIDE 50 MG/1
TABLET ORAL
Qty: 210 TABLET | Refills: 1 | Status: SHIPPED | OUTPATIENT
Start: 2025-05-30

## 2025-05-29 NOTE — TELEPHONE ENCOUNTER
Rx Refill Note  Requested Prescriptions     Pending Prescriptions Disp Refills    traMADol (ULTRAM) 50 MG tablet [Pharmacy Med Name: TRAMADOL HYDROCHLORIDE 50MG TABLET] 210 tablet 1     Sig: TAKE ONE (1) TO TWO (2) TABS EVERY SIX (6) HR AS NEEDED MODERATE PAIN INDICATIONS: PAIN      Last office visit with prescribing clinician: 9/13/2024   Last telemedicine visit with prescribing clinician: 4/2/2025   Next office visit with prescribing clinician: Visit date not found                         Would you like a call back once the refill request has been completed: [] Yes [] No    If the office needs to give you a call back, can they leave a voicemail: [] Yes [] No    Kris Garnica MA  05/29/25, 14:41 CDT

## 2025-05-30 ENCOUNTER — OFFICE VISIT (OUTPATIENT)
Dept: INTERNAL MEDICINE | Facility: CLINIC | Age: 70
End: 2025-05-30
Payer: MEDICARE

## 2025-05-30 ENCOUNTER — OFFICE VISIT (OUTPATIENT)
Age: 70
End: 2025-05-30

## 2025-05-30 VITALS
BODY MASS INDEX: 51.67 KG/M2 | DIASTOLIC BLOOD PRESSURE: 64 MMHG | OXYGEN SATURATION: 94 % | HEIGHT: 64 IN | HEART RATE: 92 BPM | SYSTOLIC BLOOD PRESSURE: 112 MMHG

## 2025-05-30 DIAGNOSIS — I10 ESSENTIAL HYPERTENSION: ICD-10-CM

## 2025-05-30 DIAGNOSIS — R60.0 EDEMA OF BOTH LOWER EXTREMITIES: ICD-10-CM

## 2025-05-30 DIAGNOSIS — M17.0 BILATERAL PRIMARY OSTEOARTHRITIS OF KNEE: ICD-10-CM

## 2025-05-30 DIAGNOSIS — G89.4 CHRONIC PAIN DISORDER: ICD-10-CM

## 2025-05-30 DIAGNOSIS — B37.2 YEAST DERMATITIS: ICD-10-CM

## 2025-05-30 DIAGNOSIS — F41.1 GENERALIZED ANXIETY DISORDER: ICD-10-CM

## 2025-05-30 DIAGNOSIS — K21.9 GASTROESOPHAGEAL REFLUX DISEASE WITHOUT ESOPHAGITIS: ICD-10-CM

## 2025-05-30 DIAGNOSIS — M25.561 PAIN IN BOTH KNEES, UNSPECIFIED CHRONICITY: ICD-10-CM

## 2025-05-30 DIAGNOSIS — Z79.4 TYPE 2 DIABETES MELLITUS WITH HYPERGLYCEMIA, WITH LONG-TERM CURRENT USE OF INSULIN: Primary | ICD-10-CM

## 2025-05-30 DIAGNOSIS — M25.562 PAIN IN BOTH KNEES, UNSPECIFIED CHRONICITY: ICD-10-CM

## 2025-05-30 DIAGNOSIS — E11.65 TYPE 2 DIABETES MELLITUS WITH HYPERGLYCEMIA, WITH LONG-TERM CURRENT USE OF INSULIN: Primary | ICD-10-CM

## 2025-05-30 LAB
EXPIRATION DATE: ABNORMAL
HBA1C MFR BLD: 7.3 % (ref 4.5–5.7)
Lab: ABNORMAL
POC AMPHETAMINES: NEGATIVE
POC BARBITURATES: NEGATIVE
POC BENZODIAZEPHINES: NEGATIVE
POC BUPRENORPHINE: NEGATIVE
POC COCAINE: NEGATIVE
POC METHADONE: NEGATIVE
POC METHAMPHETAMINE SCREEN URINE: NEGATIVE
POC OPIATES: NEGATIVE
POC OXYCODONE: NEGATIVE
POC PHENCYCLIDINE: NEGATIVE
POC PROPOXYPHENE: NEGATIVE
POC THC: NEGATIVE
POC TRICYCLIC ANTIDEPRESSANTS: NEGATIVE

## 2025-05-30 RX ORDER — OMEPRAZOLE 40 MG/1
40 CAPSULE, DELAYED RELEASE ORAL DAILY
Qty: 90 CAPSULE | Refills: 1 | Status: SHIPPED | OUTPATIENT
Start: 2025-05-30

## 2025-05-30 RX ORDER — LIDOCAINE HYDROCHLORIDE 10 MG/ML
6 INJECTION, SOLUTION INFILTRATION; PERINEURAL ONCE
Status: SHIPPED | OUTPATIENT
Start: 2025-05-30

## 2025-05-30 RX ORDER — BETAMETHASONE SODIUM PHOSPHATE AND BETAMETHASONE ACETATE 3; 3 MG/ML; MG/ML
6 INJECTION, SUSPENSION INTRA-ARTICULAR; INTRALESIONAL; INTRAMUSCULAR; SOFT TISSUE ONCE
Status: SHIPPED | OUTPATIENT
Start: 2025-05-30

## 2025-05-30 RX ORDER — CLOTRIMAZOLE AND BETAMETHASONE DIPROPIONATE 10; .64 MG/G; MG/G
CREAM TOPICAL 2 TIMES DAILY
Qty: 60 EACH | Refills: 3 | Status: SHIPPED | OUTPATIENT
Start: 2025-05-30

## 2025-05-30 RX ORDER — METOPROLOL TARTRATE 100 MG/1
100 TABLET ORAL 2 TIMES DAILY
Qty: 180 TABLET | Refills: 1 | Status: SHIPPED | OUTPATIENT
Start: 2025-05-30

## 2025-05-30 RX ORDER — TRAMADOL HYDROCHLORIDE 50 MG/1
TABLET ORAL
Qty: 210 TABLET | Refills: 1 | Status: CANCELLED | OUTPATIENT
Start: 2025-05-30

## 2025-05-30 RX ORDER — HYDROXYZINE HYDROCHLORIDE 25 MG/1
25 TABLET, FILM COATED ORAL 2 TIMES DAILY PRN
Qty: 180 TABLET | Refills: 1 | Status: SHIPPED | OUTPATIENT
Start: 2025-05-30

## 2025-05-30 RX ORDER — FUROSEMIDE 20 MG/1
20 TABLET ORAL 2 TIMES WEEKLY
Qty: 30 TABLET | Refills: 1 | Status: SHIPPED | OUTPATIENT
Start: 2025-06-02

## 2025-05-30 RX ORDER — ACYCLOVIR 400 MG/1
1 TABLET ORAL
Qty: 9 EACH | Refills: 1 | Status: SHIPPED | OUTPATIENT
Start: 2025-05-30

## 2025-05-30 ASSESSMENT — ENCOUNTER SYMPTOMS: SHORTNESS OF BREATH: 0

## 2025-05-30 NOTE — PROGRESS NOTES
[facility]  [facility]  [address] [place] [address]  Dept: [phone]  Dept Fax: [phone]  Loc: [phone]     [place] (:  1955) is a 69 y.o. female,Established patient, here for evaluation of the following:    Chief Complaint   Patient presents with    Knee Pain     Heath knee           Subjective   Patient is a 69-year-old  female came to the clinic for repeat steroid injections into her bilateral knees.  Last office visit was on 2025.  She reports her knee injections only last about 3 to 4 weeks.  She has pain upon standing.    Knee Pain   Pertinent negatives include no numbness.       Allergies   Allergen Reactions    Codeine Hives and Other (See Comments)     Various reactions     Past Surgical History:   Procedure Laterality Date    ANKLE FRACTURE SURGERY Right     BLADDER SURGERY Left 6/15/2022    CYSTOSCOPY, LEFT URETEROSCOPY, RETROGRADE PYELOGRAM, STENT INSERTION performed by Vini Coelho MD at [facility] OR    CHOLECYSTECTOMY      CYSTOSCOPY Left 2022    CYSTOSCOPy, LEFT URETEROSCOPY, LEFT URETEREAL  STONE BASKET EXTRACTION performed by Vini Coelho MD at [facility] OR    CYSTOSCOPY Left 2022    LEFT URETERAL STENT PLACEMENT performed by Vini Coelho MD at [facility] OR    HYSTERECTOMY (CERVIX STATUS UNKNOWN)      INCONTINENCE SURGERY       Social History     Tobacco Use    Smoking status: Former     Current packs/day: 0.00     Types: Cigarettes     Quit date: 2010     Years since quitting: 15.4    Smokeless tobacco: Never   Vaping Use    Vaping status: Never Used   Substance Use Topics    Alcohol use: Never    Drug use: Never          Review of Systems   Constitutional:  Negative for fatigue and fever.   Respiratory:  Negative for shortness of breath.    Cardiovascular:  Negative for chest pain.   Musculoskeletal:  Positive for arthralgias. Negative for joint swelling and myalgias.   Skin:

## 2025-05-30 NOTE — PROGRESS NOTES
Chief Complaint   Patient presents with    Diabetes       History:      Jessica Kern is a 69 y.o. female who presents today for evaluation of the above problems.      HPI  History of Present Illness  The patient is a 69-year-old female who presents for evaluation of diabetes, constipation, and lower extremity edema.    She reports persistent morning blood glucose levels ranging from 135 to 165, despite dietary modifications. She has been actively monitoring her diet and has experienced weight loss. She recalls a previous weight loss of approximately 25 pounds while on Ozempic but has since regained some weight. She expresses satisfaction with the current 1 mg dose of Ozempic and is open to increasing the dose if it would be beneficial. She prefers her blood glucose levels to be at or below 130. She does not monitor her postprandial blood glucose levels and has not yet obtained a continuous glucose monitor. She has a smartphone and is interested in obtaining a continuous glucose monitor. She also reports excessive thirst when her A1c levels were elevated, which disrupted her sleep due to frequent water intake. This symptom has since resolved. She is currently on insulin therapy and Ozempic 1 mg.    She reports experiencing constipation, which she attributes to her medication regimen. She manages this with stool softeners and MiraLAX, taken every three days.     She reports significant swelling in her feet, describing them as resembling footballs with indentations upon applying pressure. She has not used compression socks for this issue. She attempts to manage the swelling by elevating her feet when seated and using pillows under her legs while reclining. She recalls a previous improvement in her condition with HCTZ, but this was discontinued due to an episode of low blood pressure during a hospital stay. She has a history of hypertension and has been on diuretics.    She is on tramadol for pain  management.      ROS:  Review of Systems   Respiratory:  Negative for chest tightness and shortness of breath.    Cardiovascular:  Positive for leg swelling. Negative for chest pain and palpitations.   Endocrine: Negative for polydipsia and polyuria.         Current Outpatient Medications:     acetaminophen (TYLENOL) 500 MG tablet, Take 2 tablets by mouth Every 8 (Eight) Hours. Indications: Pain, Disp: , Rfl:     Blood Glucose Monitoring Suppl (True Metrix Air Glucose Meter) w/Device kit, , Disp: , Rfl:     Cholecalciferol (Vitamin D) 50 MCG (2000 UT) capsule, Take 1 capsule by mouth Daily., Disp: 90 capsule, Rfl: 3    clotrimazole-betamethasone (LOTRISONE) 1-0.05 % cream, Apply  topically to the appropriate area as directed 2 (Two) Times a Day. Indications: Ringworm of Groin Area, Disp: 60 each, Rfl: 3    diclofenac (VOLTAREN) 50 MG EC tablet, Take 1 tablet by mouth 2 (Two) Times a Day As Needed., Disp: , Rfl:     Diclofenac Sodium (VOLTAREN) 1 % gel gel, Apply  topically to the appropriate area as directed 4 (Four) Times a Day. Indications: Joint Damage causing Pain and Loss of Function, Disp: 100 g, Rfl: 3    docusate sodium (Colace) 100 MG capsule, Take 1 capsule by mouth 3 (Three) Times a Day As Needed for Constipation. Indications: Constipation, Disp: , Rfl:     fesoterodine fumarate (TOVIAZ ER) 8 MG tablet sustained-release 24 hour tablet, Take 1 tablet by mouth., Disp: , Rfl:     gabapentin (NEURONTIN) 600 MG tablet, Take 1 tablet by mouth 4 (Four) Times a Day. Indications: Neuropathic Pain, Disp: 360 tablet, Rfl: 1    glucose blood test strip, Metene brand, Disp: 200 each, Rfl: 12    hydrOXYzine (ATARAX) 25 MG tablet, Take 1 tablet by mouth 2 (Two) Times a Day As Needed for Anxiety. Indications: Feeling Anxious, Disp: 180 tablet, Rfl: 1    Insulin Glargine (LANTUS SOLOSTAR) 100 UNIT/ML injection pen, Inject 30 Units under the skin into the appropriate area as directed Daily., Disp: 30 mL, Rfl: 1    Insulin  Pen Needle 32G X 4 MM misc, Use 1 each 4 (Four) Times a Day., Disp: 200 each, Rfl: 5    Magic Barrier Cream, Apply 1 Application topically to the appropriate area as directed 2 (Two) Times a Day to perineal area as needed, Disp: 120 g, Rfl: 0    metoprolol tartrate (LOPRESSOR) 100 MG tablet, Take 1 tablet by mouth 2 (Two) Times a Day. Indications: High Blood Pressure, Disp: 180 tablet, Rfl: 1    omeprazole (priLOSEC) 40 MG capsule, Take 1 capsule by mouth Daily. Indications: Gastroesophageal Reflux Disease, Heartburn, Disp: 90 capsule, Rfl: 1    pravastatin (PRAVACHOL) 80 MG tablet, Take 1 tablet by mouth Every Night., Disp: 90 tablet, Rfl: 3    Semaglutide, 1 MG/DOSE, (Ozempic, 1 MG/DOSE,) 4 MG/3ML solution pen-injector, Inject 1 mg under the skin into the appropriate area as directed 1 (One) Time Per Week., Disp: 9 mL, Rfl: 1    traMADol (ULTRAM) 50 MG tablet, TAKE ONE (1) TO TWO (2) TABS EVERY SIX (6) HR AS NEEDED MODERATE PAIN  Indications: Pain, Disp: 210 tablet, Rfl: 1    TRUEplus Lancets 33G misc, , Disp: , Rfl:     Continuous Glucose Sensor (Dexcom G7 Sensor) misc, Use 1 each Every 10 (Ten) Days., Disp: 9 each, Rfl: 1    [START ON 6/2/2025] furosemide (Lasix) 20 MG tablet, Take 1 tablet by mouth 2 (Two) Times a Week. PRN edema of lower extremities., Disp: 30 tablet, Rfl: 1    Vibegron 75 MG tablet, Take 1 tablet by mouth Daily. Indications: Overactive Bladder (Patient not taking: Reported on 5/30/2025), Disp: , Rfl:     Lab Results   Component Value Date    GLUCOSE 460 (C) 02/07/2025    BUN 16 02/07/2025    CREATININE 0.89 02/07/2025     (L) 02/07/2025    K 4.4 02/07/2025    CL 93 (L) 02/07/2025    CALCIUM 9.3 02/07/2025    PROTEINTOT 7.6 02/07/2025    ALBUMIN 4.3 02/07/2025    ALT 13 02/07/2025    AST 19 02/07/2025    ALKPHOS 135 (H) 02/07/2025    BILITOT 0.4 02/07/2025    GLOB 3.3 02/07/2025    AGRATIO 1.3 02/07/2025    BCR 18.0 02/07/2025    ANIONGAP 12.0 02/07/2025    EGFR 69.8 02/07/2025       WBC    Date Value Ref Range Status   02/07/2025 6.60 3.40 - 10.80 10*3/mm3 Final   03/23/2024 6.8 4.8 - 10.8 K/uL Final     RBC   Date Value Ref Range Status   02/07/2025 5.07 3.77 - 5.28 10*6/mm3 Final   03/23/2024 3.86 (L) 4.20 - 5.40 M/uL Final   12/10/2023 4.27 3.55 - 5.31 10*6/uL Final     Hemoglobin   Date Value Ref Range Status   02/07/2025 14.6 12.0 - 15.9 g/dL Final   03/23/2024 10.7 (L) 12.0 - 16.0 g/dL Final   12/10/2023 12.4 11.5 - 15.6 g/dL Final     Hematocrit   Date Value Ref Range Status   02/07/2025 45.0 34.0 - 46.6 % Final   03/23/2024 35.4 (L) 37.0 - 47.0 % Final     MCV   Date Value Ref Range Status   02/07/2025 88.8 79.0 - 97.0 fL Final   03/23/2024 91.7 81.0 - 99.0 fL Final     MCH   Date Value Ref Range Status   02/07/2025 28.8 26.6 - 33.0 pg Final   03/23/2024 27.7 27.0 - 31.0 pg Final     MCHC   Date Value Ref Range Status   02/07/2025 32.4 31.5 - 35.7 g/dL Final   03/23/2024 30.2 (L) 33.0 - 37.0 g/dL Final     RDW   Date Value Ref Range Status   02/07/2025 12.7 12.3 - 15.4 % Final   03/23/2024 13.2 11.5 - 14.5 % Final     RDW-SD   Date Value Ref Range Status   02/07/2025 40.7 37.0 - 54.0 fl Final     MPV   Date Value Ref Range Status   02/07/2025 10.8 6.0 - 12.0 fL Final   03/23/2024 9.7 9.4 - 12.3 fL Final     Platelets   Date Value Ref Range Status   02/07/2025 176 140 - 450 10*3/mm3 Final   03/23/2024 216 130 - 400 K/uL Final     Neutrophil Rel %   Date Value Ref Range Status   03/20/2024 61.4 50.0 - 65.0 % Final     Lymphocyte Rel %   Date Value Ref Range Status   03/20/2024 24.7 20.0 - 40.0 % Final     Monocyte Rel %   Date Value Ref Range Status   03/20/2024 9.4 0.0 - 10.0 % Final     Eosinophil Rel %   Date Value Ref Range Status   03/20/2024 2.7 0.0 - 5.0 % Final     Basophil Rel %   Date Value Ref Range Status   03/20/2024 0.9 0.0 - 1.0 % Final     Immature Grans %   Date Value Ref Range Status   07/21/2022 0.4 0.0 - 0.5 % Final     Neutrophils Absolute   Date Value Ref Range Status  "  03/20/2024 4.8 1.5 - 7.5 K/uL Final     Lymphocytes Absolute   Date Value Ref Range Status   03/20/2024 1.9 1.1 - 4.5 K/uL Final     Monocytes Absolute   Date Value Ref Range Status   03/20/2024 0.70 0.00 - 0.90 K/uL Final     Eosinophils Absolute   Date Value Ref Range Status   03/20/2024 0.20 0.00 - 0.60 K/uL Final     Basophils Absolute   Date Value Ref Range Status   03/20/2024 0.10 0.00 - 0.20 K/uL Final     Immature Grans, Absolute   Date Value Ref Range Status   03/20/2024 0.1 K/uL Final     nRBC   Date Value Ref Range Status   07/21/2022 0.0 0.0 - 0.2 /100 WBC Final     OBJECTIVE:  Visit Vitals  /64 (BP Location: Left arm, Patient Position: Sitting, Cuff Size: Adult)   Pulse 92   Ht 162.6 cm (64\")   SpO2 94%   BMI 51.67 kg/m²      Physical Exam  Constitutional:       Appearance: Normal appearance.      Comments: Pleasant. Present in wheelchair   Cardiovascular:      Rate and Rhythm: Normal rate and regular rhythm.   Pulmonary:      Effort: Pulmonary effort is normal.      Breath sounds: Normal breath sounds.   Skin:     General: Skin is warm and dry.   Neurological:      Mental Status: She is alert.   Psychiatric:         Mood and Affect: Mood normal.         Behavior: Behavior normal.         Thought Content: Thought content normal.         Judgment: Judgment normal.       Physical Exam  Respiratory: Clear to auscultation, no wheezing, rales or rhonchi  Cardiovascular: Regular rate and rhythm, no murmurs, rubs, or gallops  Extremities: Swelling noted in the feet    Results  Labs   - A1c: 7.3   - Sodium level: Slightly low    Assessment/Plan    Diagnoses and all orders for this visit:    1. Type 2 diabetes mellitus with hyperglycemia, with long-term current use of insulin (Primary)  -     POC Glycosylated Hemoglobin (Hb A1C)  -     Continuous Glucose Sensor (Dexcom G7 Sensor) misc; Use 1 each Every 10 (Ten) Days.  Dispense: 9 each; Refill: 1  -     Insulin Glargine (LANTUS SOLOSTAR) 100 UNIT/ML " injection pen; Inject 30 Units under the skin into the appropriate area as directed Daily.  Dispense: 30 mL; Refill: 1    2. Chronic pain disorder  -     POC Urine Drug Screen, Triage    3. Yeast dermatitis  -     clotrimazole-betamethasone (LOTRISONE) 1-0.05 % cream; Apply  topically to the appropriate area as directed 2 (Two) Times a Day. Indications: Ringworm of Groin Area  Dispense: 60 each; Refill: 3    4. Generalized anxiety disorder  -     hydrOXYzine (ATARAX) 25 MG tablet; Take 1 tablet by mouth 2 (Two) Times a Day As Needed for Anxiety. Indications: Feeling Anxious  Dispense: 180 tablet; Refill: 1    5. Essential hypertension  -     metoprolol tartrate (LOPRESSOR) 100 MG tablet; Take 1 tablet by mouth 2 (Two) Times a Day. Indications: High Blood Pressure  Dispense: 180 tablet; Refill: 1    6. Gastroesophageal reflux disease without esophagitis  Comments:  Stable.  PPI refilled.  Orders:  -     omeprazole (priLOSEC) 40 MG capsule; Take 1 capsule by mouth Daily. Indications: Gastroesophageal Reflux Disease, Heartburn  Dispense: 90 capsule; Refill: 1    7. Edema of both lower extremities  -     furosemide (Lasix) 20 MG tablet; Take 1 tablet by mouth 2 (Two) Times a Week. PRN edema of lower extremities.  Dispense: 30 tablet; Refill: 1      Assessment & Plan  1. Diabetes Mellitus.  - A1c levels have significantly improved from 13.3 in 02/2025 to 7.3 currently, indicating a positive response to the Ozempic and insulin regimen.  - The target A1c level is set below 7. Fasting blood glucose levels should aim between 70 and 120.  - The dosage of insulin will be increased from 25 units to 30 units daily.  - A prescription for a continuous glucose monitor will be provided, pending insurance approval. An increase in Ozempic dosage may be considered during the next visit to aid in weight loss and further blood sugar management.    2. Constipation.  - Constipation is likely a side effect of medications, including  semaglutide and tramadol.  - Advised to take MiraLAX daily instead of every three days.  - Should allow the MiraLAX solution to sit for approximately five minutes before consumption to improve its texture and taste.    3. Lower extremity edema.  - Last sodium level was slightly low, suggesting excessive sodium intake is not the cause of edema.  - Advised to monitor sodium intake and ensure adequate hydration.  - Use of compression socks is recommended.  - A prescription for Lasix will be provided, to be taken no more than twice weekly.    Follow-up  The patient will follow up in 4 weeks via video consultation.      Return in about 4 weeks (around 6/27/2025) for Video visit, 6mo -a1c.      WOODY Yoon  10:47 CDT  5/30/2025   Electronically signed      Patient or patient representative verbalized consent for the use of Ambient Listening during the visit with  WOODY Yoon for chart documentation. 5/30/2025  12:03 CDT  Answers submitted by the patient for this visit:  Diabetes Questionnaire (Submitted on 5/27/2025)  Chief Complaint: Diabetes problem  Below 70: never

## 2025-06-18 ENCOUNTER — HOSPITAL ENCOUNTER (OUTPATIENT)
Dept: CT IMAGING | Age: 70
Discharge: HOME | End: 2025-06-18
Payer: MEDICARE

## 2025-06-18 ENCOUNTER — PREP FOR PROCEDURE (OUTPATIENT)
Dept: UROLOGY | Age: 70
End: 2025-06-18

## 2025-06-18 ENCOUNTER — OFFICE VISIT (OUTPATIENT)
Dept: UROLOGY | Age: 70
End: 2025-06-18
Payer: MEDICARE

## 2025-06-18 VITALS — BODY MASS INDEX: 48.82 KG/M2 | WEIGHT: 293 LBS | TEMPERATURE: 97.3 F | HEIGHT: 65 IN

## 2025-06-18 DIAGNOSIS — N20.1 RIGHT URETERAL STONE: Primary | ICD-10-CM

## 2025-06-18 DIAGNOSIS — N20.1 RIGHT DISTAL URETERAL CALCULUS: ICD-10-CM

## 2025-06-18 DIAGNOSIS — N39.46 MIXED STRESS AND URGE URINARY INCONTINENCE: ICD-10-CM

## 2025-06-18 DIAGNOSIS — N32.81 OAB (OVERACTIVE BLADDER): ICD-10-CM

## 2025-06-18 DIAGNOSIS — N20.1 RIGHT URETERAL STONE: ICD-10-CM

## 2025-06-18 DIAGNOSIS — N20.1 RIGHT DISTAL URETERAL CALCULUS: Primary | ICD-10-CM

## 2025-06-18 DIAGNOSIS — N20.0 LEFT RENAL STONE: ICD-10-CM

## 2025-06-18 LAB
BACTERIA URINE, POC: ABNORMAL
BILIRUBIN URINE: 1 MG/DL
BLOOD, URINE: POSITIVE
CASTS URINE, POC: ABNORMAL
CLARITY, UA: CLEAR
COLOR, UA: YELLOW
CRYSTALS URINE, POC: ABNORMAL
EPI CELLS URINE, POC: ABNORMAL
GLUCOSE URINE: ABNORMAL
KETONES, URINE: POSITIVE
LEUKOCYTE EST, POC: ABNORMAL
NITRITE, URINE: POSITIVE
PH UA: 5.5 (ref 4.5–8)
PROTEIN UA: ABNORMAL
RBC URINE, POC: 14
SPECIFIC GRAVITY UA: 1.03 (ref 1–1.03)
UROBILINOGEN, URINE: ABNORMAL
WBC URINE, POC: 6
YEAST URINE, POC: ABNORMAL

## 2025-06-18 PROCEDURE — 51798 US URINE CAPACITY MEASURE: CPT | Performed by: NURSE PRACTITIONER

## 2025-06-18 PROCEDURE — G8417 CALC BMI ABV UP PARAM F/U: HCPCS | Performed by: NURSE PRACTITIONER

## 2025-06-18 PROCEDURE — 3017F COLORECTAL CA SCREEN DOC REV: CPT | Performed by: NURSE PRACTITIONER

## 2025-06-18 PROCEDURE — 0509F URINE INCON PLAN DOCD: CPT | Performed by: NURSE PRACTITIONER

## 2025-06-18 PROCEDURE — 1159F MED LIST DOCD IN RCRD: CPT | Performed by: NURSE PRACTITIONER

## 2025-06-18 PROCEDURE — G8427 DOCREV CUR MEDS BY ELIG CLIN: HCPCS | Performed by: NURSE PRACTITIONER

## 2025-06-18 PROCEDURE — G8399 PT W/DXA RESULTS DOCUMENT: HCPCS | Performed by: NURSE PRACTITIONER

## 2025-06-18 PROCEDURE — 81001 URINALYSIS AUTO W/SCOPE: CPT | Performed by: NURSE PRACTITIONER

## 2025-06-18 PROCEDURE — 1123F ACP DISCUSS/DSCN MKR DOCD: CPT | Performed by: NURSE PRACTITIONER

## 2025-06-18 PROCEDURE — 1036F TOBACCO NON-USER: CPT | Performed by: NURSE PRACTITIONER

## 2025-06-18 PROCEDURE — 99215 OFFICE O/P EST HI 40 MIN: CPT | Performed by: NURSE PRACTITIONER

## 2025-06-18 PROCEDURE — 1090F PRES/ABSN URINE INCON ASSESS: CPT | Performed by: NURSE PRACTITIONER

## 2025-06-18 PROCEDURE — 74176 CT ABD & PELVIS W/O CONTRAST: CPT

## 2025-06-18 RX ORDER — TAMSULOSIN HYDROCHLORIDE 0.4 MG/1
0.4 CAPSULE ORAL DAILY
Qty: 90 CAPSULE | Refills: 3 | Status: SHIPPED | OUTPATIENT
Start: 2025-06-18

## 2025-06-18 RX ORDER — FESOTERODINE FUMARATE 8 MG/1
8 TABLET, FILM COATED, EXTENDED RELEASE ORAL DAILY
Qty: 90 TABLET | Refills: 3 | Status: SHIPPED | OUTPATIENT
Start: 2025-06-18

## 2025-06-18 RX ORDER — FUROSEMIDE 20 MG/1
20 TABLET ORAL
COMMUNITY
Start: 2025-06-02

## 2025-06-18 ASSESSMENT — ENCOUNTER SYMPTOMS
NAUSEA: 0
ABDOMINAL DISTENTION: 0
VOMITING: 0
ABDOMINAL PAIN: 0

## 2025-06-18 NOTE — H&P (VIEW-ONLY)
has been instructed to strain urine prior to the procedure.   - WILLIAN,POST-VOID RES,US,NON-IMAGING  - POCT Urinalysis Dipstick w/ Micro (Auto)  - Culture, Urine    2. OAB (overactive bladder)  Continue therapy with Toviaz.  Follow-up in a few months for symptom check and bladder scan.  - WILLIAN,POST-VOID RES,US,NON-IMAGING  - Fesoterodine Fumarate ER 8 MG TB24; Take 8 mg by mouth daily  Dispense: 90 tablet; Refill: 3  - tamsulosin (FLOMAX) 0.4 MG capsule; Take 1 capsule by mouth daily  Dispense: 90 capsule; Refill: 3  - POCT Urinalysis Dipstick w/ Micro (Auto)  - Culture, Urine    3. Mixed stress and urge urinary incontinence  Continue Toviaz.  Follow-up in a few months for symptom check and bladder scan.  Improving.  - WILLIAN,POST-VOID RES,US,NON-IMAGING  - POCT Urinalysis Dipstick w/ Micro (Auto)  - Culture, Urine    4. Left renal stone  Treat expectantly.  - WILLIAN,POST-VOID RES,US,NON-IMAGING  - POCT Urinalysis Dipstick w/ Micro (Auto)  - Culture, Urine    At least 53 minutes were spent on the day of the visit reviewing the patient's past medical records/imaging, speaking face to face with the patient, and charting in the post visit period.      Orders Placed This Encounter   Procedures    WILLIAN,POST-VOID RES,US,NON-IMAGING     155 ml    Culture, Urine    POCT Urinalysis Dipstick w/ Micro (Auto)        Return for surgery.    An electronic signature was used to authenticate this note.    MATTHEW BUSTILLOS - CNP    All information inputted into the note by the MA to include chief complaint, past medical history, past surgical history, medications, allergies, social and family history and review of systems has been reviewed and updated as needed by me.    EMR Dragon/transcription disclaimer: Much of this document is electronic transcription/translation of spoken language to printed text. The electronic translation of spoken language may be erroneous or, at times, nonsensical words or phrases may be inadvertently

## 2025-06-18 NOTE — PROGRESS NOTES
causing mild right hydroureteronephrosis/obstructive uropathy.     Left paraaortic lymphadenopathy may be reactive.     Left intrarenal nonobstructing calculi.     Colonic diverticulosis. Additional ancillary findings as above.      All CT scans are performed using dose optimization techniques as appropriate to the performed exam and include   at least one of the following: Automated exposure control, adjustment of the mA and/or kV according to size, and the use of iterative reconstruction technique.      ______________________________________   Electronically signed by: MAGDY CARLSON M.D.  Date:     06/18/2025  Time:    09:28     I have reviewed CT imaging obtained earlier today.  The previously identified right distal ureteral stone remains in place with mild resulting hydronephrosis.  No additional stones within the right kidney, but left nonobstructing nephrolithiasis continues.    ASSESSMENT/PLAN  1. Right distal ureteral calculus  Patient presents with a right distal ureter stone.  Based on the stone location and severity of symptoms the patient has elected to proceed with ureteroscopic management of the stone with ureteral stent placement.  Patient does have history of chronic bacteriuria.  Will go ahead and send her urine for culture today and treat with antibiotics up until day of surgery.  Discussed this with the patient and her family.    We have discussed other modalities of treatment, including but not limited to a trial of medical expulsion therapy, ESWL, and monitoring the stone with subsequent imaging.  Risks of the procedure were discussed which include but are not limited to bleeding, infection, postprocedural pain, damage to the kidney, ureter, bladder, or urethra, stricturing or perforation of the ureter which may require additional procedures, pain arising from the postoperative stent, failure to remove the stone, etc.    The patient has been provided with preoperative arrival times/lab work and

## 2025-06-20 ENCOUNTER — RESULTS FOLLOW-UP (OUTPATIENT)
Dept: UROLOGY | Age: 70
End: 2025-06-20

## 2025-06-20 DIAGNOSIS — N20.1 RIGHT DISTAL URETERAL CALCULUS: Primary | ICD-10-CM

## 2025-06-20 DIAGNOSIS — N30.01 ACUTE CYSTITIS WITH HEMATURIA: ICD-10-CM

## 2025-06-20 LAB
BACTERIA UR CULT: ABNORMAL
ORGANISM: ABNORMAL
ORGANISM: ABNORMAL

## 2025-06-20 RX ORDER — AMOXICILLIN 500 MG/1
500 CAPSULE ORAL 3 TIMES DAILY
Qty: 42 CAPSULE | Refills: 0 | Status: SHIPPED | OUTPATIENT
Start: 2025-06-20 | End: 2025-07-04

## 2025-06-27 ENCOUNTER — HOSPITAL ENCOUNTER (OUTPATIENT)
Dept: PREADMISSION TESTING | Age: 70
Discharge: HOME | End: 2025-07-01
Payer: MEDICARE

## 2025-06-27 DIAGNOSIS — N20.1 RIGHT URETERAL STONE: ICD-10-CM

## 2025-06-27 LAB
ALBUMIN SERPL-MCNC: 4.1 G/DL (ref 3.5–5.2)
ALP SERPL-CCNC: 91 U/L (ref 35–104)
ALT SERPL-CCNC: 24 U/L (ref 10–35)
ANION GAP SERPL CALCULATED.3IONS-SCNC: 15 MMOL/L (ref 8–16)
AST SERPL-CCNC: 28 U/L (ref 10–35)
BASOPHILS # BLD: 0.1 K/UL (ref 0–0.2)
BASOPHILS NFR BLD: 0.8 % (ref 0–1)
BILIRUB SERPL-MCNC: 0.2 MG/DL (ref 0.2–1.2)
BUN SERPL-MCNC: 24 MG/DL (ref 8–23)
CALCIUM SERPL-MCNC: 9.3 MG/DL (ref 8.8–10.2)
CHLORIDE SERPL-SCNC: 99 MMOL/L (ref 98–107)
CO2 SERPL-SCNC: 25 MMOL/L (ref 22–29)
CREAT SERPL-MCNC: 1.1 MG/DL (ref 0.5–0.9)
EKG P AXIS: 48 DEGREES
EKG P-R INTERVAL: 162 MS
EKG Q-T INTERVAL: 396 MS
EKG QRS DURATION: 98 MS
EKG QTC CALCULATION (BAZETT): 441 MS
EKG T AXIS: 12 DEGREES
EOSINOPHIL # BLD: 0.2 K/UL (ref 0–0.6)
EOSINOPHIL NFR BLD: 3.2 % (ref 0–5)
ERYTHROCYTE [DISTWIDTH] IN BLOOD BY AUTOMATED COUNT: 13.2 % (ref 11.5–14.5)
GLUCOSE SERPL-MCNC: 164 MG/DL (ref 70–99)
HCT VFR BLD AUTO: 42.9 % (ref 37–47)
HGB BLD-MCNC: 13.9 G/DL (ref 12–16)
IMM GRANULOCYTES # BLD: 0 K/UL
LYMPHOCYTES # BLD: 1.6 K/UL (ref 1.1–4.5)
LYMPHOCYTES NFR BLD: 21.8 % (ref 20–40)
MCH RBC QN AUTO: 29.5 PG (ref 27–31)
MCHC RBC AUTO-ENTMCNC: 32.4 G/DL (ref 33–37)
MCV RBC AUTO: 91.1 FL (ref 81–99)
MONOCYTES # BLD: 0.6 K/UL (ref 0–0.9)
MONOCYTES NFR BLD: 8.4 % (ref 0–10)
NEUTROPHILS # BLD: 4.8 K/UL (ref 1.5–7.5)
NEUTS SEG NFR BLD: 65.4 % (ref 50–65)
PLATELET # BLD AUTO: 174 K/UL (ref 130–400)
PMV BLD AUTO: 11.2 FL (ref 9.4–12.3)
POTASSIUM SERPL-SCNC: 4.3 MMOL/L (ref 3.5–5.1)
PROT SERPL-MCNC: 7 G/DL (ref 6.4–8.3)
RBC # BLD AUTO: 4.71 M/UL (ref 4.2–5.4)
SODIUM SERPL-SCNC: 139 MMOL/L (ref 136–145)
WBC # BLD AUTO: 7.3 K/UL (ref 4.8–10.8)

## 2025-06-27 PROCEDURE — 93010 ELECTROCARDIOGRAM REPORT: CPT | Performed by: INTERNAL MEDICINE

## 2025-06-27 PROCEDURE — 80053 COMPREHEN METABOLIC PANEL: CPT

## 2025-06-27 PROCEDURE — 85025 COMPLETE CBC W/AUTO DIFF WBC: CPT

## 2025-06-27 PROCEDURE — 93005 ELECTROCARDIOGRAM TRACING: CPT | Performed by: ANESTHESIOLOGY

## 2025-06-27 NOTE — DISCHARGE INSTRUCTIONS
PREOPERATIVE GUIDELINES WHEN RECEIVING ANESTHESIA    Do not eat anything after midnight the night before your surgery. You may have water up to 2 hours before your arrival time. No gum or candy the morning of surgery.  This is extremely important for your safety.    Take a bath (or shower) the night before your surgery and you may brush your teeth the morning of your surgery.    Morning of surgery no Nicotine products including smoking, vaping, patches, dip or snuff.     You will be scheduled to arrive at the hospital 2 hours before your surgery, or follow your surgeon's instructions.    Dress comfortably.  Wear loose clothing that will be easy to remove and comfortable for your trip home.    You may wear eyeglasses but bring your cases with you as they must be remove before your surgery. If you wear contacts they will have to be removed before your surgery.    Hearing aids and dentures will need to be removed before your surgery. If you wear dentures, do not glue them in the morning of surgery.     Do not wear any jewelry, including body jewelry.  All jewelry will need to be removed prior to your surgery. This includes wedding rings. Any metal touching your body can cause a burn or may have to be cut off due to swelling or injury.    Do not wear fingernail polish or make-up.    It is best not to bring any valuables with you.    If you are to stay in the hospital overnight, bring your robe, slippers and personal toiletries that you may need.    POSTOPERATIVE GUIDELINES AFTER RECEIVING ANESTHESIA    If you are to go home after your surgery, you will need a responsible adult to drive you home.     You will not be able to take public transportation after your discharge from the Operative Care Unit unless you are accompanied by a        responsible adult.    On returning home, be sure to follow your physician's orders regarding diet, activity and medications.    Remember, surgery with general anesthesia or sedation may  No

## 2025-07-02 ENCOUNTER — TELEMEDICINE (OUTPATIENT)
Dept: INTERNAL MEDICINE | Facility: CLINIC | Age: 70
End: 2025-07-02
Payer: MEDICARE

## 2025-07-02 DIAGNOSIS — L30.9 ECZEMA, UNSPECIFIED TYPE: Primary | ICD-10-CM

## 2025-07-02 DIAGNOSIS — E11.65 TYPE 2 DIABETES MELLITUS WITH HYPERGLYCEMIA, WITH LONG-TERM CURRENT USE OF INSULIN: ICD-10-CM

## 2025-07-02 DIAGNOSIS — Z79.4 TYPE 2 DIABETES MELLITUS WITH HYPERGLYCEMIA, WITH LONG-TERM CURRENT USE OF INSULIN: ICD-10-CM

## 2025-07-02 DIAGNOSIS — S81.812A SKIN TEAR OF LEFT LOWER LEG WITHOUT COMPLICATION, INITIAL ENCOUNTER: ICD-10-CM

## 2025-07-02 RX ORDER — TAMSULOSIN HYDROCHLORIDE 0.4 MG/1
CAPSULE ORAL
COMMUNITY
Start: 2025-06-18

## 2025-07-02 RX ORDER — MOMETASONE FUROATE 1 MG/G
1 OINTMENT TOPICAL DAILY
Qty: 45 G | Refills: 0 | Status: SHIPPED | OUTPATIENT
Start: 2025-07-02

## 2025-07-02 RX ORDER — SEMAGLUTIDE 2.68 MG/ML
2 INJECTION, SOLUTION SUBCUTANEOUS WEEKLY
Qty: 9 ML | Refills: 5 | Status: SHIPPED | OUTPATIENT
Start: 2025-07-02

## 2025-07-02 RX ORDER — AMOXICILLIN 500 MG/1
500 CAPSULE ORAL
COMMUNITY
Start: 2025-06-20 | End: 2025-07-05

## 2025-07-02 RX ORDER — CLINDAMYCIN PHOSPHATE 1 G/10ML
1 GEL TOPICAL DAILY
Qty: 75 ML | Refills: 0 | Status: SHIPPED | OUTPATIENT
Start: 2025-07-02

## 2025-07-02 NOTE — PROGRESS NOTES
WOODY Yoon  [facility]   Family Medicine  [address]  [place] [address]  Phone: [phone]  Fax: [phone]     Jessica Kern is a 69 y.o. female.   : 1955    You have chosen to receive care through a telehealth visit.  Do you consent to use a video/audio connection for your medical care today? Yes    Pt located at Home and provider located at home office.     CC: No chief complaint on file.       HPI: Jessica Kern is a 69 y.o. female.    History of Present Illness  The patient is a 69-year-old female who presents via virtual visit for follow-up on diabetes.    She reports that her blood sugar levels have been relatively high, with a current reading of 201, even though she has not yet had breakfast. She administered her insulin last night during dinner. Her lowest recorded blood sugar level was 105, which occurred last night. Typically, her readings are above 130, often around 180, and occasionally exceeding 200. On four or five instances, her blood sugar level has surpassed 250. She is currently on Ozempic 1 mg, which she believes is effective. She is considering increasing the dosage. She is scheduled for kidney stone surgery on 2025, and was advised to discontinue Ozempic. Her last dose was taken on Monday morning. She has two full doses of Ozempic 1 mg remaining.    She has a history of skin issues, including rashes, psoriasis, and eczema. She uses clindamycin phosphate topical solution 1% on sore spots that develop when her skin tears open. Her skin is particularly fragile in areas where it folds, such as behind her knees, inside her elbows, and sometimes on her abdomen. The clindamycin helps to heal these areas quickly. She also uses Elocon ointment for dry, scaly patches on her nose and behind her ears. She applies this every 4 to 6 weeks, which effectively heals the affected areas.      The following portions of the patient's history were  reviewed and updated as appropriate: allergies, current medications, past family history, past medical history, past social history, past surgical history, and problem list.  Past Medical History:   Diagnosis Date    Arthritis     Asthma 2022    Sleep apnea    Chronic kidney disease 2022    Kidnew stones that incurred spetic shock    Diabetes mellitus June 2022    Possibly/probably as much as two years earlier untreated but according to labs    Difficulty walking     Arthritis for yearss in back and kneews.  But poor balance after septic shock 2022    Gout     Random last two years    High arches     Is this the same as high instep?    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 Two years in fingers pointet and thumb both hands     Family History   Problem Relation Age of Onset    Lung cancer Mother     Cancer Mother         Lung tumor 1996-97    Hypertension Mother     Thyroid disease Mother     Brain cancer Father     Cancer Father         Brain tumor [id number]    Thyroid disease Father     Breast cancer Maternal Aunt     Cancer Maternal Aunt         Breast cancer    Diabetes Maternal Aunt     Hypertension Maternal Aunt     Psoriasis Maternal Aunt     Rashes / Skin problems Maternal Aunt     Severe sprains Maternal Aunt         Ankle    Thyroid disease Maternal Aunt      Social History     Socioeconomic History    Marital status:    Tobacco Use    Smoking status: Former     Current packs/day: 0.00     Average packs/day: 2.0 packs/day for 39.2 years (78.4 ttl pk-yrs)     Types: Cigarettes     Start date: 11/21/1970     Quit date: 2/10/2010     Years since quitting: 15.4     Passive exposure: Never    Smokeless tobacco: Never    Tobacco comments:     Quit 12  Years ago   Vaping Use    Vaping status: Never Used   Substance and Sexual Activity    Alcohol use: Not Currently    Drug use: Never     Sexual activity: Not Currently     Partners: Male     Birth control/protection: Condom, Pill, I.U.D., Spermicide, Post-menopausal, Tubal ligation, Hysterectomy, Partner of same sex     Review of Systems  There were no vitals taken for this visit.  Physical Exam  Constitutional:       Appearance: Normal appearance.      Comments: Very Pleasant   Neurological:      Mental Status: She is alert.   Psychiatric:         Mood and Affect: Mood normal.         Behavior: Behavior normal.         Thought Content: Thought content normal.         Judgment: Judgment normal.       Physical Exam  Skin: Fragile skin with rashes, psoriasis, and eczema. Areas behind knees, inside elbows, and abdomen prone to tearing and irritation.      Assessment and Plan:   Diagnoses and all orders for this visit:    1. Eczema, unspecified type (Primary)    2. Type 2 diabetes mellitus with hyperglycemia, with long-term current use of insulin    3. Skin tear of left lower leg without complication, initial encounter          Assessment & Plan  1. Diabetes Mellitus.  - Blood glucose levels remain high, frequently above 200, with occasional readings over 250.  - A1c levels have improved, indicating better overall glucose control.  - Discussed increasing Ozempic dosage to 2 mg; advised to complete current supply of 1 mg before transitioning.  - Prescription for a 3-month supply of Ozempic will be sent to the pharmacy; follow-up in 5 months.    2. Skin issues.  - History of rashes, psoriasis, and eczema; skin is fragile and prone to tearing.  - Uses clindamycin phosphate topical solution 1% for sore spots and Elocon ointment for dry, scaly areas.  - Advised to avoid use of Elocon ointment on the face and limit use to one to two weeks.  - Refills for clindamycin phosphate topical solution 1% and Elocon ointment will be provided.    I spent 17 minutes caring for Iris on this date of service. This time includes time spent by me in the following  activities: preparing for the visit, reviewing tests, performing a medically appropriate examination and/or evaluation, counseling and educating the patient/family/caregiver, documenting information in the medical record, and ordering medications     WOODY Yoon   7/2/2025  09:09 CDT        Patient or patient representative verbalized consent for the use of Ambient Listening during the visit with  WOODY Yoon for chart documentation. 7/2/2025  09:10 CDT

## 2025-07-08 ENCOUNTER — APPOINTMENT (OUTPATIENT)
Dept: GENERAL RADIOLOGY | Age: 70
End: 2025-07-08
Attending: UROLOGY
Payer: MEDICARE

## 2025-07-08 ENCOUNTER — ANESTHESIA (OUTPATIENT)
Dept: OPERATING ROOM | Age: 70
End: 2025-07-08
Payer: MEDICARE

## 2025-07-08 ENCOUNTER — ANESTHESIA EVENT (OUTPATIENT)
Dept: OPERATING ROOM | Age: 70
End: 2025-07-08
Payer: MEDICARE

## 2025-07-08 ENCOUNTER — HOSPITAL ENCOUNTER (OUTPATIENT)
Age: 70
Setting detail: OUTPATIENT SURGERY
Discharge: HOME | End: 2025-07-08
Attending: UROLOGY | Admitting: UROLOGY
Payer: MEDICARE

## 2025-07-08 VITALS
BODY MASS INDEX: 49.76 KG/M2 | TEMPERATURE: 98.6 F | OXYGEN SATURATION: 99 % | HEART RATE: 79 BPM | DIASTOLIC BLOOD PRESSURE: 78 MMHG | SYSTOLIC BLOOD PRESSURE: 145 MMHG | RESPIRATION RATE: 16 BRPM | WEIGHT: 293 LBS

## 2025-07-08 DIAGNOSIS — N20.1 RIGHT DISTAL URETERAL CALCULUS: Primary | ICD-10-CM

## 2025-07-08 LAB
GLUCOSE BLD-MCNC: 128 MG/DL (ref 70–99)
PERFORMED ON: ABNORMAL

## 2025-07-08 PROCEDURE — 3600000004 HC SURGERY LEVEL 4 BASE: Performed by: UROLOGY

## 2025-07-08 PROCEDURE — 7100000001 HC PACU RECOVERY - ADDTL 15 MIN: Performed by: UROLOGY

## 2025-07-08 PROCEDURE — 6360000002 HC RX W HCPCS

## 2025-07-08 PROCEDURE — 3700000000 HC ANESTHESIA ATTENDED CARE: Performed by: UROLOGY

## 2025-07-08 PROCEDURE — 2580000003 HC RX 258: Performed by: NURSE ANESTHETIST, CERTIFIED REGISTERED

## 2025-07-08 PROCEDURE — C1726 CATH, BAL DIL, NON-VASCULAR: HCPCS | Performed by: UROLOGY

## 2025-07-08 PROCEDURE — 2709999900 HC NON-CHARGEABLE SUPPLY: Performed by: UROLOGY

## 2025-07-08 PROCEDURE — 2500000003 HC RX 250 WO HCPCS: Performed by: NURSE ANESTHETIST, CERTIFIED REGISTERED

## 2025-07-08 PROCEDURE — 6360000002 HC RX W HCPCS: Performed by: UROLOGY

## 2025-07-08 PROCEDURE — C2617 STENT, NON-COR, TEM W/O DEL: HCPCS | Performed by: UROLOGY

## 2025-07-08 PROCEDURE — 82962 GLUCOSE BLOOD TEST: CPT

## 2025-07-08 PROCEDURE — 74420 UROGRAPHY RTRGR +-KUB: CPT

## 2025-07-08 PROCEDURE — 7100000000 HC PACU RECOVERY - FIRST 15 MIN: Performed by: UROLOGY

## 2025-07-08 PROCEDURE — 6360000002 HC RX W HCPCS: Performed by: NURSE PRACTITIONER

## 2025-07-08 PROCEDURE — 6370000000 HC RX 637 (ALT 250 FOR IP): Performed by: UROLOGY

## 2025-07-08 PROCEDURE — C1769 GUIDE WIRE: HCPCS | Performed by: UROLOGY

## 2025-07-08 PROCEDURE — 7100000011 HC PHASE II RECOVERY - ADDTL 15 MIN: Performed by: UROLOGY

## 2025-07-08 PROCEDURE — C1758 CATHETER, URETERAL: HCPCS | Performed by: UROLOGY

## 2025-07-08 PROCEDURE — 6360000002 HC RX W HCPCS: Performed by: NURSE ANESTHETIST, CERTIFIED REGISTERED

## 2025-07-08 PROCEDURE — 7100000010 HC PHASE II RECOVERY - FIRST 15 MIN: Performed by: UROLOGY

## 2025-07-08 PROCEDURE — 3700000001 HC ADD 15 MINUTES (ANESTHESIA): Performed by: UROLOGY

## 2025-07-08 PROCEDURE — 3600000014 HC SURGERY LEVEL 4 ADDTL 15MIN: Performed by: UROLOGY

## 2025-07-08 PROCEDURE — 2720000010 HC SURG SUPPLY STERILE: Performed by: UROLOGY

## 2025-07-08 PROCEDURE — 2500000003 HC RX 250 WO HCPCS

## 2025-07-08 DEVICE — URETERAL STENT WITH SIDE HOLES 6FX24CM
Type: IMPLANTABLE DEVICE | Status: FUNCTIONAL
Brand: TRIA™ SOFT

## 2025-07-08 RX ORDER — PHENAZOPYRIDINE HYDROCHLORIDE 100 MG/1
100 TABLET, FILM COATED ORAL ONCE
Status: COMPLETED | OUTPATIENT
Start: 2025-07-08 | End: 2025-07-08

## 2025-07-08 RX ORDER — PHENAZOPYRIDINE HYDROCHLORIDE 100 MG/1
100 TABLET, FILM COATED ORAL 3 TIMES DAILY PRN
Qty: 30 TABLET | Refills: 1 | Status: SHIPPED | OUTPATIENT
Start: 2025-07-08

## 2025-07-08 RX ORDER — SODIUM CHLORIDE 0.9 % (FLUSH) 0.9 %
5-40 SYRINGE (ML) INJECTION PRN
Status: DISCONTINUED | OUTPATIENT
Start: 2025-07-08 | End: 2025-07-08 | Stop reason: HOSPADM

## 2025-07-08 RX ORDER — SODIUM CHLORIDE 0.9 % (FLUSH) 0.9 %
5-40 SYRINGE (ML) INJECTION PRN
Status: CANCELLED | OUTPATIENT
Start: 2025-07-08

## 2025-07-08 RX ORDER — HYDROMORPHONE HYDROCHLORIDE 1 MG/ML
0.5 INJECTION, SOLUTION INTRAMUSCULAR; INTRAVENOUS; SUBCUTANEOUS
Refills: 0 | Status: DISCONTINUED | OUTPATIENT
Start: 2025-07-08 | End: 2025-07-08 | Stop reason: HOSPADM

## 2025-07-08 RX ORDER — SODIUM CHLORIDE 9 MG/ML
INJECTION, SOLUTION INTRAVENOUS PRN
Status: CANCELLED | OUTPATIENT
Start: 2025-07-08

## 2025-07-08 RX ORDER — ROCURONIUM BROMIDE 10 MG/ML
INJECTION, SOLUTION INTRAVENOUS
Status: DISCONTINUED | OUTPATIENT
Start: 2025-07-08 | End: 2025-07-08 | Stop reason: SDUPTHER

## 2025-07-08 RX ORDER — CEFDINIR 300 MG/1
300 CAPSULE ORAL 2 TIMES DAILY
Qty: 14 CAPSULE | Refills: 0 | Status: SHIPPED | OUTPATIENT
Start: 2025-07-08 | End: 2025-07-15

## 2025-07-08 RX ORDER — ONDANSETRON 2 MG/ML
INJECTION INTRAMUSCULAR; INTRAVENOUS
Status: DISCONTINUED | OUTPATIENT
Start: 2025-07-08 | End: 2025-07-08 | Stop reason: SDUPTHER

## 2025-07-08 RX ORDER — HYDROMORPHONE HYDROCHLORIDE 1 MG/ML
0.5 INJECTION, SOLUTION INTRAMUSCULAR; INTRAVENOUS; SUBCUTANEOUS EVERY 5 MIN PRN
Status: DISCONTINUED | OUTPATIENT
Start: 2025-07-08 | End: 2025-07-08 | Stop reason: HOSPADM

## 2025-07-08 RX ORDER — LEVOFLOXACIN 5 MG/ML
500 INJECTION, SOLUTION INTRAVENOUS
Status: COMPLETED | OUTPATIENT
Start: 2025-07-08 | End: 2025-07-08

## 2025-07-08 RX ORDER — PROPOFOL 10 MG/ML
INJECTION, EMULSION INTRAVENOUS
Status: DISCONTINUED | OUTPATIENT
Start: 2025-07-08 | End: 2025-07-08 | Stop reason: SDUPTHER

## 2025-07-08 RX ORDER — KETOROLAC TROMETHAMINE 30 MG/ML
15 INJECTION, SOLUTION INTRAMUSCULAR; INTRAVENOUS ONCE
Status: COMPLETED | OUTPATIENT
Start: 2025-07-08 | End: 2025-07-08

## 2025-07-08 RX ORDER — SODIUM CHLORIDE 0.9 % (FLUSH) 0.9 %
5-40 SYRINGE (ML) INJECTION EVERY 12 HOURS SCHEDULED
Status: DISCONTINUED | OUTPATIENT
Start: 2025-07-08 | End: 2025-07-08 | Stop reason: HOSPADM

## 2025-07-08 RX ORDER — ONDANSETRON 2 MG/ML
4 INJECTION INTRAMUSCULAR; INTRAVENOUS
Status: DISCONTINUED | OUTPATIENT
Start: 2025-07-08 | End: 2025-07-08 | Stop reason: HOSPADM

## 2025-07-08 RX ORDER — HYDROCODONE BITARTRATE AND ACETAMINOPHEN 5; 325 MG/1; MG/1
1 TABLET ORAL EVERY 6 HOURS PRN
Qty: 12 TABLET | Refills: 0 | Status: SHIPPED | OUTPATIENT
Start: 2025-07-08 | End: 2025-07-11

## 2025-07-08 RX ORDER — SODIUM CHLORIDE 9 MG/ML
INJECTION, SOLUTION INTRAVENOUS PRN
Status: DISCONTINUED | OUTPATIENT
Start: 2025-07-08 | End: 2025-07-08 | Stop reason: HOSPADM

## 2025-07-08 RX ORDER — LIDOCAINE HYDROCHLORIDE 10 MG/ML
INJECTION, SOLUTION EPIDURAL; INFILTRATION; INTRACAUDAL; PERINEURAL
Status: DISCONTINUED | OUTPATIENT
Start: 2025-07-08 | End: 2025-07-08 | Stop reason: SDUPTHER

## 2025-07-08 RX ORDER — SODIUM CHLORIDE, SODIUM LACTATE, POTASSIUM CHLORIDE, CALCIUM CHLORIDE 600; 310; 30; 20 MG/100ML; MG/100ML; MG/100ML; MG/100ML
INJECTION, SOLUTION INTRAVENOUS CONTINUOUS
Status: CANCELLED | OUTPATIENT
Start: 2025-07-08

## 2025-07-08 RX ORDER — SODIUM CHLORIDE, SODIUM LACTATE, POTASSIUM CHLORIDE, CALCIUM CHLORIDE 600; 310; 30; 20 MG/100ML; MG/100ML; MG/100ML; MG/100ML
INJECTION, SOLUTION INTRAVENOUS
Status: DISCONTINUED | OUTPATIENT
Start: 2025-07-08 | End: 2025-07-08 | Stop reason: SDUPTHER

## 2025-07-08 RX ORDER — SODIUM CHLORIDE 0.9 % (FLUSH) 0.9 %
5-40 SYRINGE (ML) INJECTION EVERY 12 HOURS SCHEDULED
Status: CANCELLED | OUTPATIENT
Start: 2025-07-08

## 2025-07-08 RX ORDER — SODIUM CHLORIDE 9 MG/ML
INJECTION, SOLUTION INTRAVENOUS CONTINUOUS
Status: DISCONTINUED | OUTPATIENT
Start: 2025-07-08 | End: 2025-07-08 | Stop reason: HOSPADM

## 2025-07-08 RX ORDER — HYDROMORPHONE HYDROCHLORIDE 1 MG/ML
0.25 INJECTION, SOLUTION INTRAMUSCULAR; INTRAVENOUS; SUBCUTANEOUS EVERY 5 MIN PRN
Status: DISCONTINUED | OUTPATIENT
Start: 2025-07-08 | End: 2025-07-08 | Stop reason: HOSPADM

## 2025-07-08 RX ORDER — FENTANYL CITRATE 50 UG/ML
INJECTION, SOLUTION INTRAMUSCULAR; INTRAVENOUS
Status: DISCONTINUED | OUTPATIENT
Start: 2025-07-08 | End: 2025-07-08 | Stop reason: SDUPTHER

## 2025-07-08 RX ORDER — OXYCODONE AND ACETAMINOPHEN 5; 325 MG/1; MG/1
2 TABLET ORAL EVERY 4 HOURS PRN
Refills: 0 | Status: DISCONTINUED | OUTPATIENT
Start: 2025-07-08 | End: 2025-07-08 | Stop reason: HOSPADM

## 2025-07-08 RX ORDER — TAMSULOSIN HYDROCHLORIDE 0.4 MG/1
0.4 CAPSULE ORAL ONCE
Status: COMPLETED | OUTPATIENT
Start: 2025-07-08 | End: 2025-07-08

## 2025-07-08 RX ADMIN — TAMSULOSIN HYDROCHLORIDE 0.4 MG: 0.4 CAPSULE ORAL at 16:35

## 2025-07-08 RX ADMIN — LEVOFLOXACIN 500 MG: 5 INJECTION, SOLUTION INTRAVENOUS at 15:18

## 2025-07-08 RX ADMIN — PHENAZOPYRIDINE 100 MG: 100 TABLET ORAL at 16:35

## 2025-07-08 RX ADMIN — SODIUM CHLORIDE, SODIUM LACTATE, POTASSIUM CHLORIDE, AND CALCIUM CHLORIDE: 600; 310; 30; 20 INJECTION, SOLUTION INTRAVENOUS at 15:07

## 2025-07-08 RX ADMIN — KETOROLAC TROMETHAMINE 15 MG: 30 INJECTION, SOLUTION INTRAMUSCULAR at 16:35

## 2025-07-08 RX ADMIN — PROPOFOL 120 MG: 10 INJECTION, EMULSION INTRAVENOUS at 15:13

## 2025-07-08 RX ADMIN — ROCURONIUM BROMIDE 50 MG: 10 INJECTION, SOLUTION INTRAVENOUS at 15:13

## 2025-07-08 RX ADMIN — ONDANSETRON 4 MG: 2 INJECTION INTRAMUSCULAR; INTRAVENOUS at 16:01

## 2025-07-08 RX ADMIN — FENTANYL CITRATE 50 MCG: 0.05 INJECTION, SOLUTION INTRAMUSCULAR; INTRAVENOUS at 15:13

## 2025-07-08 RX ADMIN — LIDOCAINE HYDROCHLORIDE 50 MG: 10 INJECTION, SOLUTION EPIDURAL; INFILTRATION; INTRACAUDAL; PERINEURAL at 15:13

## 2025-07-08 RX ADMIN — SUGAMMADEX 300 MG: 100 INJECTION, SOLUTION INTRAVENOUS at 16:07

## 2025-07-08 ASSESSMENT — PAIN - FUNCTIONAL ASSESSMENT
PAIN_FUNCTIONAL_ASSESSMENT: NONE - DENIES PAIN
PAIN_FUNCTIONAL_ASSESSMENT: 0-10
PAIN_FUNCTIONAL_ASSESSMENT: NONE - DENIES PAIN

## 2025-07-08 ASSESSMENT — ENCOUNTER SYMPTOMS: SHORTNESS OF BREATH: 1

## 2025-07-08 ASSESSMENT — LIFESTYLE VARIABLES: SMOKING_STATUS: 0

## 2025-07-08 NOTE — ANESTHESIA POSTPROCEDURE EVALUATION
Department of Anesthesiology  Postprocedure Note    Patient: Santa Puente  MRN: [id number]  YOB: 1955  Date of evaluation: 7/8/2025    Procedure Summary       Date: 07/08/25 Room / Location: [facility]    Anesthesia Start: 1507 Anesthesia Stop: 1622    Procedures:       CYSTOSCOPY RIGHT URETEROSCOPY, LASER LITHOTRIPSY, (Right: Bladder)      RIGHT URETERAL STENT PLACEMENT (Right: Ureter) Diagnosis:       Right distal ureteral calculus      (Right distal ureteral calculus [N20.1])    Surgeons: Vini Coelho MD Responsible Provider: Marie Calhoun APRN - CRNA    Anesthesia Type: general ASA Status: 3            Anesthesia Type: No value filed.    Niels Phase I: Niels Score: 8    Niels Phase II:      Anesthesia Post Evaluation    Patient location during evaluation: PACU  Patient participation: complete - patient participated  Level of consciousness: awake  Pain score: 0  Airway patency: patent  Nausea & Vomiting: no nausea and no vomiting  Cardiovascular status: hemodynamically stable  Respiratory status: acceptable and spontaneous ventilation  Hydration status: stable  Comments: BP (!) 140/83 Comment: Simultaneous filing. User may not have seen previous data.  Pulse 92 Comment: Simultaneous filing. User may not have seen previous data.  Temp 98.1 °F (36.7 °C) (Temporal) Comment: Simultaneous filing. User may not have seen previous data.  Resp 22 Comment: Simultaneous filing. User may not have seen previous data.  Wt 135.6 kg (299 lb)   SpO2 92% Comment: Simultaneous filing. User may not have seen previous data.  BMI 49.76 kg/m²     Pain management: adequate    No notable events documented.

## 2025-07-08 NOTE — OP NOTE
Operative Note      Patient: Santa Puente  YOB: 1955  MRN: [id number]    Date of Procedure: 7/8/2025    Pre-Op Diagnosis Codes:      * Right distal ureteral calculus [N20.1]    Post-Op Diagnosis: Same       Procedure(s):  CYSTOSCOPY RIGHT URETEROSCOPY, LASER LITHOTRIPSY,  RIGHT URETERAL STENT PLACEMENT    Surgeon(s):  Vini Kay MD    Assistant:   * No surgical staff found *    Anesthesia: General    Estimated Blood Loss (mL): 0    Complications: None    Specimens:   * No specimens in log *    Implants:  Implant Name Type Inv. Item Serial No.  Lot No. LRB No. Used Action   STENT URETERAL 6 FRX24 CM SOFT MONOFILAMENT TRIA - [id number]  STENT URETERAL 6 FRX24 CM SOFT MONOFILAMENT TRIA  [employer] [id number]-[id number] 35 Right 1 Implanted         Drains:   Ureteral Drain/Stent 07/08/25 Right Ureter (Active)       Findings:  Infection Present At Time Of Surgery (PATOS) (choose all levels that have infection present):  No infection present  Other Findings: Initially some cloudy E flux after guidewire was replaced from the right ureter however urine was grossly clear with clear hydronephrotic drip from the right renal pelvis.  Distal right ureteral calculus fragmented multiple small fragments should be able to pass these fragments.  6 Dominican by 24 cm Tria soft ureteral stent placed with string attached    Detailed Description of Procedure:   See dictated report: [id number]    Disposition to PACU then to op care outpatient follow-up in 1 week for stent removal she has history    Electronically signed by VINI KAY MD on 7/8/2025 at 4:28 PM

## 2025-07-08 NOTE — INTERVAL H&P NOTE
Update History & Physical    The patient's History and Physical of June 18, 2025 was reviewed with the patient and I examined the patient. There was no change. The surgical site was confirmed by the patient and me.     Plan: The risks, benefits, expected outcome, and alternative to the recommended procedure have been discussed with the patient. Patient understands and wants to proceed with the procedure.     Electronically signed by GEOVANY KAY MD on 7/8/2025 at 2:48 PM

## 2025-07-08 NOTE — DISCHARGE INSTRUCTIONS
[facility], Dr Coelho    Cystoscopy / Ureteroscopy / Bladder Endoscopy Procedures Discharge Instructions      You may experience : Burning sensation when you void     A feeling of a need to go to the bathroom frequently     You may have urgent urination     Your urine may be blood tinged    These symptoms should be relieved within a few hours and days  as you increase the amounts of fluids you drink and the number of times that you empty your bladder. They may persist for 1-2 weeks if you have a stent or had a biopsy or resection.  We recommended that you drink plenty of fluid a few days after surgery.    If you have a ureteral stent he may have pain in your side or back related to the stent. Stents also cause bladder irritation symptoms of frequency and urgency.    No strenuous activities for 1 week or  until you talk to your doctor.    You may feel light headed up to 24 hours after anesthesia.  You should not do the following for the next 24 hours.       Drive a car, operate machinery or power tools     Drink any alcoholic drinks (not even beer or wine)     Make any important decisions, i.e., signing important papers.    It is recommended that you begin with clear liquids and/or light foods.  If you  Are not nauseated, progress to your normal diet.    I you are unable to urinate you should call your surgeon.    Dr. Vini Coelho

## 2025-07-08 NOTE — ANESTHESIA PRE PROCEDURE
Department of Anesthesiology  Preprocedure Note       Name:  Santa Puente   Age:  69 y.o.  :  1955                                          MRN:  [id number]         Date:  2025      Surgeon: Surgeon(s):  Vini Coelho MD    Procedure: Procedure(s):  CYSTOSCOPY RIGHT URETEROSCOPY, LASER LITHOTRIPSY, STONE BASKET EXTRACTION  RIGHT URETERAL STENT PLACEMENT    Medications prior to admission:   Prior to Admission medications    Medication Sig Start Date End Date Taking? Authorizing Provider   furosemide (LASIX) 20 MG tablet Take 1 tablet by mouth Twice a Week 25  Yes Felix Andrade MD   Fesoterodine Fumarate ER 8 MG TB24 Take 8 mg by mouth daily 25  Yes Janna Gomes APRN - CNP   tamsulosin (FLOMAX) 0.4 MG capsule Take 1 capsule by mouth daily 25  Yes Janna Gomes APRN - CNP   diclofenac sodium (VOLTAREN) 1 % GEL Apply 2 g topically 2 times daily 25  Yes Hanna Conley PA   Semaglutide,0.25 or 0.5MG/DOS, (OZEMPIC, 0.25 OR 0.5 MG/DOSE,) 2 MG/3ML SOPN Inject 0.5 mg into the skin once a week 25  Yes Felix Andrade MD   BD PEN NEEDLE KARON U/F 32G X 4 MM MISC  25  Yes Felix Andrade MD   acetaminophen (TYLENOL) 500 MG tablet Take 1 tablet by mouth every 6 hours as needed for Pain   Yes Felix Andrade MD   traMADol (ULTRAM) 50 MG tablet Take 1-2 tabs q 6 hr prn moderate pain  Indications: Pain 5/3/24  Yes Felix Andrade MD   Cholecalciferol (VITAMIN D) 50 MCG (2000 UT) CAPS capsule Take 1 capsule by mouth daily 5/3/24  Yes Felix Andrade MD   pravastatin (PRAVACHOL) 80 MG tablet Take 1 tablet by mouth daily 4/15/24  Yes Felix Andrade MD   hydrOXYzine HCl (ATARAX) 25 MG tablet Take 1 tablet by mouth 3 times daily as needed for Anxiety (bladder pain) 24  Yes Janna Gomes APRN - CNP   estradiol (ESTRACE VAGINAL) 0.1 MG/GM vaginal cream Place 1 g vaginally daily X 2 weeks, then twice weekly 24  Yes

## 2025-07-09 ENCOUNTER — TELEPHONE (OUTPATIENT)
Dept: UROLOGY | Age: 70
End: 2025-07-09

## 2025-07-09 DIAGNOSIS — S81.812A SKIN TEAR OF LEFT LOWER LEG WITHOUT COMPLICATION, INITIAL ENCOUNTER: ICD-10-CM

## 2025-07-09 NOTE — OP NOTE
[address] [place] [address]                            OPERATIVE REPORT      PATIENT NAME: JYOTHI SHERMAN                 : 1955  MED REC NO: [id number]                          ROOM: [facility]  ACCOUNT NO: [id number]                       ADMIT DATE: 2025  PROVIDER: Vini Coelho MD      DATE OF PROCEDURE:  2025    SURGEON:  Vini Coelho MD    TITLE OF OPERATION:  Right ureteroscopy; laser lithotripsy; and placement of a right 6-Croatian x 24 cm Tria Soft ureteral stent, string attached.    PREOPERATIVE DIAGNOSIS:  Distal right ureteral calculus.    POSTOPERATIVE DIAGNOSIS:  Distal right ureteral calculus.    ANESTHESIA:  General anesthetic.    ESTIMATED BLOOD LOSS:  Zero.    HISTORY:  The patient presented to the office complaining of right flank pain.  She has a history of recurrent urinary tract infections.  Her last urine culture from 2025, grew out E coli and lactobacillus resistant to fluoroquinolones and sulfa, but was sensitive to amoxicillin and she was treated with amoxicillin.  CT at that time showed a distal right ureteral calculus.  She has now completed her antibiotics and she is to undergo treatment of the stone.  The risks and complications of procedure have been discussed with her including the risk of bleeding, infection, injury to the ureter, need for subsequent adjuvant or repeat procedures, failure to fragment the stone and get up to the stone, and need for a planned staged procedure after a period of stenting.  She understands and agrees to proceed.    DESCRIPTION OF PROCEDURE:  The patient was brought to the operating room.  She underwent a general anesthetic.  She was placed in the lithotomy position.  She is obese.  She was noted to have rectal prolapse with a large rectocele.  Her genitalia were then prepped and draped in routine sterile fashion.  She received a preoperative antibiotic

## 2025-07-09 NOTE — TELEPHONE ENCOUNTER
Patient needs a  Follow up with Janna Gomes APRN - CNP (Urology) in 1 week (7/15/2025); Please called patient.      Thank you

## 2025-07-10 RX ORDER — CLINDAMYCIN PHOSPHATE 1 G/10ML
1 GEL TOPICAL DAILY
Qty: 75 ML | Refills: 0 | OUTPATIENT
Start: 2025-07-10

## 2025-07-15 ENCOUNTER — HOSPITAL ENCOUNTER (OUTPATIENT)
Dept: VASCULAR LAB | Age: 70
Discharge: HOME | End: 2025-07-17
Payer: MEDICARE

## 2025-07-15 ENCOUNTER — OFFICE VISIT (OUTPATIENT)
Dept: UROLOGY | Age: 70
End: 2025-07-15
Payer: MEDICARE

## 2025-07-15 VITALS — BODY MASS INDEX: 48.82 KG/M2 | TEMPERATURE: 97.3 F | HEIGHT: 65 IN | WEIGHT: 293 LBS

## 2025-07-15 DIAGNOSIS — M79.89 PAIN AND SWELLING OF LOWER LEG, LEFT: ICD-10-CM

## 2025-07-15 DIAGNOSIS — M79.662 PAIN AND SWELLING OF LOWER LEG, LEFT: ICD-10-CM

## 2025-07-15 DIAGNOSIS — N20.1 RIGHT DISTAL URETERAL CALCULUS: Primary | ICD-10-CM

## 2025-07-15 DIAGNOSIS — N39.0 RECURRENT UTI: ICD-10-CM

## 2025-07-15 DIAGNOSIS — N39.46 MIXED STRESS AND URGE URINARY INCONTINENCE: ICD-10-CM

## 2025-07-15 DIAGNOSIS — N32.81 OAB (OVERACTIVE BLADDER): ICD-10-CM

## 2025-07-15 LAB
BACTERIA URINE, POC: ABNORMAL
BILIRUBIN URINE: 0 MG/DL
BLOOD, URINE: POSITIVE
CASTS URINE, POC: ABNORMAL
CLARITY, UA: CLEAR
COLOR, UA: YELLOW
CRYSTALS URINE, POC: ABNORMAL
ECHO BSA: 2.49 M2
EPI CELLS URINE, POC: ABNORMAL
GLUCOSE URINE: 100
KETONES, URINE: NEGATIVE
LEUKOCYTE EST, POC: ABNORMAL
NITRITE, URINE: POSITIVE
PH UA: 7 (ref 4.5–8)
PROTEIN UA: ABNORMAL
RBC URINE, POC: 29
SPECIFIC GRAVITY UA: 1.01 (ref 1–1.03)
UROBILINOGEN, URINE: ABNORMAL
WBC URINE, POC: 60
YEAST URINE, POC: ABNORMAL

## 2025-07-15 PROCEDURE — 1159F MED LIST DOCD IN RCRD: CPT | Performed by: NURSE PRACTITIONER

## 2025-07-15 PROCEDURE — 0509F URINE INCON PLAN DOCD: CPT | Performed by: NURSE PRACTITIONER

## 2025-07-15 PROCEDURE — 93971 EXTREMITY STUDY: CPT

## 2025-07-15 PROCEDURE — 1123F ACP DISCUSS/DSCN MKR DOCD: CPT | Performed by: NURSE PRACTITIONER

## 2025-07-15 PROCEDURE — 1090F PRES/ABSN URINE INCON ASSESS: CPT | Performed by: NURSE PRACTITIONER

## 2025-07-15 PROCEDURE — G8399 PT W/DXA RESULTS DOCUMENT: HCPCS | Performed by: NURSE PRACTITIONER

## 2025-07-15 PROCEDURE — G8427 DOCREV CUR MEDS BY ELIG CLIN: HCPCS | Performed by: NURSE PRACTITIONER

## 2025-07-15 PROCEDURE — 99214 OFFICE O/P EST MOD 30 MIN: CPT | Performed by: NURSE PRACTITIONER

## 2025-07-15 PROCEDURE — 3017F COLORECTAL CA SCREEN DOC REV: CPT | Performed by: NURSE PRACTITIONER

## 2025-07-15 PROCEDURE — G8417 CALC BMI ABV UP PARAM F/U: HCPCS | Performed by: NURSE PRACTITIONER

## 2025-07-15 PROCEDURE — 1036F TOBACCO NON-USER: CPT | Performed by: NURSE PRACTITIONER

## 2025-07-15 PROCEDURE — 81001 URINALYSIS AUTO W/SCOPE: CPT | Performed by: NURSE PRACTITIONER

## 2025-07-15 PROCEDURE — 1160F RVW MEDS BY RX/DR IN RCRD: CPT | Performed by: NURSE PRACTITIONER

## 2025-07-15 RX ORDER — ESTRADIOL 0.1 MG/G
1 CREAM VAGINAL
Qty: 42.5 G | Refills: 3 | Status: SHIPPED | OUTPATIENT
Start: 2025-07-16

## 2025-07-15 NOTE — PROGRESS NOTES
Santa Puente is a 69 y.o., female, Established patient who presents today   Chief Complaint   Patient presents with    Follow-up     I am here for a follow up  I have not been feeling well I have not had any fever chills or vomitting.      Patient presents after surgical intervention on 7/8/2025 with right ureteroscopy and stent placement for treatment of a distal right ureteral stone. She does have history of recurrent stone episodes.  She has been treated previously at [facility] for a 2.2 cm stone in the right upper pole and a 3 cm stone in the left lower pole in addition to the left upper pole perinephric renal abscess secondary to pyelonephritis from December 2023 through mid 2024.  Bilateral ureteral stents were removed in office by Dr. Coelho on 4/8/2024 after surgical intervention.  Since her surgery, she reports she is feeling a bit weak and having some typical stent symptoms.  She also complains of some worsening swelling in her left lower extremity and some increased soreness.  She does have bilateral lower leg edema, but 1 typically does not hurt worse than the other.  Her stent was removed today in office without difficulty.      Patient also follows for lower urinary tract symptoms including frequency, urgency, mixed incontinence.  Also with history of recurrent urinary tract infection.  She previously failed therapy with Gemtesa.  She was most recently initiated on Toviaz.  She reports this medication has provided significant relief of her symptoms.  She still experiences occasional dribbling but much better than previous symptoms.  Frequency and urgency are also improved.  Postvoid residual is mildly elevated, but stable.  She is also maintained on topical estrogen cream.  She had contemplated being fitted for a pessary for a large known rectocele, but is not currently pursuing this option.    REVIEW OF SYSTEMS:  Review of Systems   Constitutional:  Negative for chills and fever.   Gastrointestinal:

## 2025-07-16 ENCOUNTER — OFFICE VISIT (OUTPATIENT)
Dept: PALLATIVE CARE | Age: 70
End: 2025-07-16

## 2025-07-16 DIAGNOSIS — Z51.5 ENCOUNTER FOR PALLIATIVE CARE: Primary | ICD-10-CM

## 2025-07-16 ASSESSMENT — ENCOUNTER SYMPTOMS
ABDOMINAL DISTENTION: 0
ABDOMINAL PAIN: 0
NAUSEA: 0
BACK PAIN: 0
VOMITING: 0

## 2025-07-16 NOTE — PROGRESS NOTES
Patient is 69 year old female followed by [facility].  SN arrived, greeted by patient. Patient is awake, alert, oriented x 4, sitting up in recliner. Patient reports no recent falls. Ambulates with Rolator. Pt states she has support from son and grandson who live 2 doors down. BG are running more regular with insulin management. BG averaging 180s. Pt states herself and PCP are happy with that average, request no changes be made. Pt reports improved A1C from recent blood draw, reports below 6. Last A1C seen on chart is from 2/7/2025 resulted 13.3.  Patient has no complaints of nausea, anxiety or shortness of air. Pt reports knee pain is managed with use of Tramadol. VSS for condition. /70 RR 16, HR- 90, O2%- 93, T- 98.1. Respirations even and unlabored. Lung sounds clear. Dry cough reported this visit as chronic.  Patient reports appetite is fair apx 2 meals a day.  Pt reports no issues sleeping.  Regular BMs. Pt is continent of bowel/bladder. Pt states continued improvement since medication maintenance on Toviaz.  2+ edema BLE. Pt states she has had more difficulty with leg swelling, Pt was seen by vascular on 7/15, no reports at this time.  No medication needs this visit.      Electronically signed by Rae Vieira RN on 7/16/2025 at 9:01 AM

## 2025-07-17 NOTE — TELEPHONE ENCOUNTER
Rx Refill Note  Requested Prescriptions     Pending Prescriptions Disp Refills    tamsulosin (FLOMAX) 0.4 MG capsule 24 hr capsule [Pharmacy Med Name: TAMSULOSIN HYDROCHLORIDE 0.4 MG Oral Capsule] 90 capsule 3     Sig: TAKE 1 CAPSULE EVERY DAY FOR DYSFUNCTION OF THE URINARY BLADDER      Last office visit with prescribing clinician: 9/13/2024   Last telemedicine visit with prescribing clinician: 7/2/2025   Next office visit with prescribing clinician: 12/4/2025                         Would you like a call back once the refill request has been completed: [] Yes [] No    If the office needs to give you a call back, can they leave a voicemail: [] Yes [] No    VIRGILIO Henry  07/17/25, 09:15 CDT

## 2025-07-18 RX ORDER — TAMSULOSIN HYDROCHLORIDE 0.4 MG/1
CAPSULE ORAL
Qty: 90 CAPSULE | Refills: 0 | Status: SHIPPED | OUTPATIENT
Start: 2025-07-18

## 2025-07-23 DIAGNOSIS — G89.4 CHRONIC PAIN DISORDER: ICD-10-CM

## 2025-07-23 DIAGNOSIS — G57.93 NEUROPATHY INVOLVING BOTH LOWER EXTREMITIES: ICD-10-CM

## 2025-07-23 RX ORDER — GABAPENTIN 600 MG/1
600 TABLET ORAL 4 TIMES DAILY
Qty: 360 TABLET | Refills: 1 | Status: SHIPPED | OUTPATIENT
Start: 2025-07-30

## 2025-07-23 RX ORDER — TRAMADOL HYDROCHLORIDE 50 MG/1
TABLET ORAL
Qty: 200 TABLET | Refills: 1 | Status: SHIPPED | OUTPATIENT
Start: 2025-07-25

## 2025-07-23 NOTE — TELEPHONE ENCOUNTER
Rx Refill Note  Requested Prescriptions     Pending Prescriptions Disp Refills    traMADol (ULTRAM) 50 MG tablet [Pharmacy Med Name: TRAMADOL HYDROCHLORIDE 50MG TABLET] 210 tablet 0     Sig: TAKE ONE (1) TO TWO (2) TABS EVERY SIX (6) HR AS NEEDED MODERATE PAIN INDICATIONS: PAIN    gabapentin (NEURONTIN) 600 MG tablet [Pharmacy Med Name: GABAPENTIN 600MG TABLET] 360 tablet 0     Sig: TAKE ONE (1) TABLET BY MOUTH 4 (FOUR) TIMES A DAY. INDICATIONS: NEUROPATHIC PAIN      Last office visit with prescribing clinician: 9/13/2024   Last telemedicine visit with prescribing clinician: 7/2/2025   Next office visit with prescribing clinician: 12/4/2025                         Would you like a call back once the refill request has been completed: [] Yes [] No    If the office needs to give you a call back, can they leave a voicemail: [] Yes [] No    Kris Garnica MA  07/23/25, 11:21 CDT

## 2025-07-24 LAB — ECHO BSA: 2.49 M2

## 2025-07-28 ENCOUNTER — TELEPHONE (OUTPATIENT)
Dept: INTERNAL MEDICINE | Facility: CLINIC | Age: 70
End: 2025-07-28

## 2025-07-29 ENCOUNTER — TELEPHONE (OUTPATIENT)
Dept: INTERNAL MEDICINE | Facility: CLINIC | Age: 70
End: 2025-07-29
Payer: MEDICARE

## 2025-08-01 ENCOUNTER — OFFICE VISIT (OUTPATIENT)
Dept: PALLATIVE CARE | Age: 70
End: 2025-08-01
Payer: MEDICARE

## 2025-08-01 DIAGNOSIS — M25.561 CHRONIC PAIN OF BOTH KNEES: Primary | ICD-10-CM

## 2025-08-01 DIAGNOSIS — Z78.9 IMPAIRED MOBILITY AND ACTIVITIES OF DAILY LIVING: ICD-10-CM

## 2025-08-01 DIAGNOSIS — G89.29 CHRONIC PAIN OF BOTH KNEES: Primary | ICD-10-CM

## 2025-08-01 DIAGNOSIS — M25.562 CHRONIC PAIN OF BOTH KNEES: Primary | ICD-10-CM

## 2025-08-01 DIAGNOSIS — Z74.09 IMPAIRED MOBILITY AND ACTIVITIES OF DAILY LIVING: ICD-10-CM

## 2025-08-01 DIAGNOSIS — N18.2 CKD (CHRONIC KIDNEY DISEASE) STAGE 2, GFR 60-89 ML/MIN: ICD-10-CM

## 2025-08-01 DIAGNOSIS — Z51.5 ENCOUNTER FOR PALLIATIVE CARE: ICD-10-CM

## 2025-08-01 PROCEDURE — 3017F COLORECTAL CA SCREEN DOC REV: CPT

## 2025-08-01 PROCEDURE — 1036F TOBACCO NON-USER: CPT

## 2025-08-01 PROCEDURE — 1090F PRES/ABSN URINE INCON ASSESS: CPT

## 2025-08-01 PROCEDURE — 99350 HOME/RES VST EST HIGH MDM 60: CPT

## 2025-08-01 PROCEDURE — 1123F ACP DISCUSS/DSCN MKR DOCD: CPT

## 2025-08-01 PROCEDURE — G8417 CALC BMI ABV UP PARAM F/U: HCPCS

## 2025-08-01 RX ORDER — CLINDAMYCIN PHOSPHATE 10 MG/G
1 GEL TOPICAL 2 TIMES DAILY PRN
Qty: 60 G | Refills: 1 | Status: SHIPPED | OUTPATIENT
Start: 2025-08-01

## 2025-08-01 RX ORDER — CYCLOBENZAPRINE HCL 5 MG
5 TABLET ORAL 3 TIMES DAILY PRN
Qty: 30 TABLET | Refills: 0 | Status: SHIPPED | OUTPATIENT
Start: 2025-08-01 | End: 2025-08-11

## 2025-08-14 RX ORDER — CYCLOBENZAPRINE HCL 5 MG
TABLET ORAL
Qty: 30 TABLET | Refills: 0 | Status: SHIPPED | OUTPATIENT
Start: 2025-08-14

## 2025-08-21 ENCOUNTER — OFFICE VISIT (OUTPATIENT)
Dept: PALLATIVE CARE | Age: 70
End: 2025-08-21

## 2025-08-21 VITALS
HEART RATE: 90 BPM | TEMPERATURE: 97.6 F | SYSTOLIC BLOOD PRESSURE: 142 MMHG | DIASTOLIC BLOOD PRESSURE: 70 MMHG | OXYGEN SATURATION: 94 %

## 2025-08-21 DIAGNOSIS — R30.0 DYSURIA: Primary | ICD-10-CM

## 2025-08-21 DIAGNOSIS — R30.0 DYSURIA: ICD-10-CM

## 2025-08-21 LAB
BACTERIA URNS QL MICRO: ABNORMAL /HPF
BILIRUB UR QL STRIP: NEGATIVE
CLARITY UR: ABNORMAL
COLOR UR: YELLOW
CRYSTALS URNS MICRO: ABNORMAL /HPF
GLUCOSE UR STRIP.AUTO-MCNC: NEGATIVE MG/DL
HGB UR STRIP.AUTO-MCNC: ABNORMAL MG/L
KETONES UR STRIP.AUTO-MCNC: ABNORMAL MG/DL
LEUKOCYTE ESTERASE UR QL STRIP.AUTO: ABNORMAL
NITRITE UR QL STRIP.AUTO: POSITIVE
PH UR STRIP.AUTO: 6 [PH] (ref 5–8)
PROT UR STRIP.AUTO-MCNC: 100 MG/DL
RBC #/AREA URNS HPF: ABNORMAL /HPF (ref 0–2)
SP GR UR STRIP.AUTO: 1.02 (ref 1–1.03)
SQUAMOUS #/AREA URNS HPF: ABNORMAL /HPF
UROBILINOGEN UR STRIP.AUTO-MCNC: 1 E.U./DL
WBC #/AREA URNS HPF: ABNORMAL /HPF (ref 0–5)

## 2025-08-21 ASSESSMENT — PAIN SCALES - GENERAL: PAINLEVEL_OUTOF10: 2

## 2025-08-21 ASSESSMENT — PAIN DESCRIPTION - LOCATION: LOCATION: KNEE;BACK

## 2025-08-22 ENCOUNTER — TELEPHONE (OUTPATIENT)
Dept: PALLATIVE CARE | Age: 70
End: 2025-08-22

## 2025-08-22 ENCOUNTER — RESULTS FOLLOW-UP (OUTPATIENT)
Dept: PALLATIVE CARE | Age: 70
End: 2025-08-22

## 2025-08-22 RX ORDER — LEVOFLOXACIN 750 MG/1
750 TABLET, FILM COATED ORAL DAILY
Qty: 5 TABLET | Refills: 0 | Status: SHIPPED | OUTPATIENT
Start: 2025-08-22 | End: 2025-08-27

## 2025-08-23 LAB
BACTERIA UR CULT: ABNORMAL
ORGANISM: ABNORMAL
ORGANISM: ABNORMAL

## 2025-08-28 ENCOUNTER — OFFICE VISIT (OUTPATIENT)
Dept: PALLATIVE CARE | Age: 70
End: 2025-08-28

## 2025-08-28 VITALS
DIASTOLIC BLOOD PRESSURE: 93 MMHG | OXYGEN SATURATION: 94 % | HEART RATE: 88 BPM | TEMPERATURE: 98 F | SYSTOLIC BLOOD PRESSURE: 143 MMHG | RESPIRATION RATE: 18 BRPM

## 2025-08-28 DIAGNOSIS — Z51.5 ENCOUNTER FOR PALLIATIVE CARE: Primary | ICD-10-CM

## (undated) DEVICE — SOLUTION IRRIG 3000ML 0.9% SOD CHL USP UROMATIC PLAS CONT

## (undated) DEVICE — GUIDEWIRE ENDOSCP L150CM DIA0.035IN TIP 3CM PTFE NIT

## (undated) DEVICE — CATHETERIZATION KIT 7 FRX16 CM 3L

## (undated) DEVICE — GLOVE SURG SZ 75 CRM LTX FREE POLYISOPRENE POLYMER BEAD ANTI

## (undated) DEVICE — Y-TYPE TUR/BLADDER IRRIGATION SET, REGULATING CLAMP

## (undated) DEVICE — SET IRRIG L94IN DIA0.281IN L BOR W/ 2 N VENT SPIK 2 ON/OFF

## (undated) DEVICE — SURGICAL PROCEDURE PACK CYTOSCOPY

## (undated) DEVICE — BAG DRNGE COMB PK

## (undated) DEVICE — CATHETER URET 5FR L70CM OPN END SGL LUMN INJ HUB FLEXIMA

## (undated) DEVICE — CATHETER FOL 18 FR 5 CC 2 W HYDRGEL COAT DOVER

## (undated) DEVICE — VAGINAL PREP TRAY: Brand: MEDLINE INDUSTRIES, INC.

## (undated) DEVICE — TUBE ET 7.5MM NSL ORAL BASIC CUF INTMED MURPHY EYE RADPQ

## (undated) DEVICE — PAD,EYE,1-5/8X2 5/8,STERILE,LF,1/PK: Brand: MEDLINE

## (undated) DEVICE — CATHETER,FOLEY,SILI-ELAST,LTX,16FR,10ML: Brand: MEDLINE

## (undated) DEVICE — DRAINBAG,ANTI-REFLUX TOWER,L/F,2000ML,LL: Brand: MEDLINE

## (undated) DEVICE — FIBER LASER 200UM 2J 80HZ 60W D F L FOR LITHO MOSES

## (undated) DEVICE — SEAL ENDO INSTR SELF SEAL UROLOGY

## (undated) DEVICE — CONTAINER,SPECIMEN,OR STERILE,4OZ: Brand: MEDLINE

## (undated) DEVICE — BLADE GLIDESCOPE LOPRO SPECTRUM S3

## (undated) DEVICE — Device

## (undated) DEVICE — NITINOL STONE RETRIEVAL DEVICE: Brand: DAKOTA

## (undated) DEVICE — BALLOON DILATATION CATHETER: Brand: UROMAX ULTRA

## (undated) DEVICE — STERILE LATEX POWDER FREE SURGICAL GLOVES WITH HYDROGEL COATING: Brand: PROTEXIS

## (undated) DEVICE — INFLATION DEVICE: Brand: ENCORE™ 26

## (undated) DEVICE — GLOVE SURG SZ 8 L12IN FNGR THK79MIL GRN LTX FREE